# Patient Record
Sex: FEMALE | Race: BLACK OR AFRICAN AMERICAN | Employment: UNEMPLOYED | URBAN - METROPOLITAN AREA
[De-identification: names, ages, dates, MRNs, and addresses within clinical notes are randomized per-mention and may not be internally consistent; named-entity substitution may affect disease eponyms.]

---

## 2018-12-03 ENCOUNTER — HOSPITAL ENCOUNTER (INPATIENT)
Age: 63
LOS: 9 days | Discharge: HOME | DRG: 885 | End: 2018-12-12
Attending: EMERGENCY MEDICINE | Admitting: PSYCHIATRY & NEUROLOGY
Payer: MEDICARE

## 2018-12-03 DIAGNOSIS — R44.0 AUDITORY HALLUCINATION: ICD-10-CM

## 2018-12-03 DIAGNOSIS — Z91.89 AT RISK FOR DANGER TO OTHERS: ICD-10-CM

## 2018-12-03 DIAGNOSIS — F20.1 SCHIZOPHRENIA, DISORGANIZED (HCC): Primary | ICD-10-CM

## 2018-12-03 DIAGNOSIS — R44.1 VISUAL HALLUCINATION: ICD-10-CM

## 2018-12-03 DIAGNOSIS — F23 ACUTE PSYCHOSIS (HCC): ICD-10-CM

## 2018-12-03 PROBLEM — F25.9 SCHIZOAFFECTIVE DISORDER (HCC): Status: ACTIVE | Noted: 2018-12-03

## 2018-12-03 LAB
ALBUMIN SERPL-MCNC: 3.7 G/DL (ref 3.5–5)
ALBUMIN/GLOB SERPL: 1 {RATIO} (ref 1.1–2.2)
ALP SERPL-CCNC: 69 U/L (ref 45–117)
ALT SERPL-CCNC: 21 U/L (ref 12–78)
AMPHET UR QL SCN: NEGATIVE
ANION GAP SERPL CALC-SCNC: 9 MMOL/L (ref 5–15)
APAP SERPL-MCNC: <2 UG/ML (ref 10–30)
APPEARANCE UR: CLEAR
AST SERPL-CCNC: 18 U/L (ref 15–37)
BACTERIA URNS QL MICRO: NEGATIVE /HPF
BARBITURATES UR QL SCN: NEGATIVE
BASOPHILS # BLD: 0 K/UL (ref 0–0.1)
BASOPHILS NFR BLD: 1 % (ref 0–1)
BENZODIAZ UR QL: NEGATIVE
BILIRUB SERPL-MCNC: 0.3 MG/DL (ref 0.2–1)
BILIRUB UR QL: NEGATIVE
BUN SERPL-MCNC: 18 MG/DL (ref 6–20)
BUN/CREAT SERPL: 16 (ref 12–20)
CALCIUM SERPL-MCNC: 9.6 MG/DL (ref 8.5–10.1)
CANNABINOIDS UR QL SCN: NEGATIVE
CHLORIDE SERPL-SCNC: 108 MMOL/L (ref 97–108)
CO2 SERPL-SCNC: 28 MMOL/L (ref 21–32)
COCAINE UR QL SCN: NEGATIVE
COLOR UR: NORMAL
CREAT SERPL-MCNC: 1.12 MG/DL (ref 0.55–1.02)
DIFFERENTIAL METHOD BLD: NORMAL
DRUG SCRN COMMENT,DRGCM: NORMAL
EOSINOPHIL # BLD: 0 K/UL (ref 0–0.4)
EOSINOPHIL NFR BLD: 1 % (ref 0–7)
EPITH CASTS URNS QL MICRO: NORMAL /LPF
ERYTHROCYTE [DISTWIDTH] IN BLOOD BY AUTOMATED COUNT: 12.9 % (ref 11.5–14.5)
ETHANOL SERPL-MCNC: <10 MG/DL
GLOBULIN SER CALC-MCNC: 3.8 G/DL (ref 2–4)
GLUCOSE SERPL-MCNC: 124 MG/DL (ref 65–100)
GLUCOSE UR STRIP.AUTO-MCNC: NEGATIVE MG/DL
HCT VFR BLD AUTO: 38.6 % (ref 35–47)
HGB BLD-MCNC: 12.5 G/DL (ref 11.5–16)
HGB UR QL STRIP: NEGATIVE
IMM GRANULOCYTES # BLD: 0 K/UL (ref 0–0.04)
IMM GRANULOCYTES NFR BLD AUTO: 0 % (ref 0–0.5)
KETONES UR QL STRIP.AUTO: NEGATIVE MG/DL
LEUKOCYTE ESTERASE UR QL STRIP.AUTO: NEGATIVE
LYMPHOCYTES # BLD: 1.7 K/UL (ref 0.8–3.5)
LYMPHOCYTES NFR BLD: 29 % (ref 12–49)
MCH RBC QN AUTO: 29.3 PG (ref 26–34)
MCHC RBC AUTO-ENTMCNC: 32.4 G/DL (ref 30–36.5)
MCV RBC AUTO: 90.4 FL (ref 80–99)
METHADONE UR QL: NEGATIVE
MONOCYTES # BLD: 0.5 K/UL (ref 0–1)
MONOCYTES NFR BLD: 8 % (ref 5–13)
NEUTS SEG # BLD: 3.6 K/UL (ref 1.8–8)
NEUTS SEG NFR BLD: 62 % (ref 32–75)
NITRITE UR QL STRIP.AUTO: NEGATIVE
NRBC # BLD: 0 K/UL (ref 0–0.01)
NRBC BLD-RTO: 0 PER 100 WBC
OPIATES UR QL: NEGATIVE
PCP UR QL: NEGATIVE
PH UR STRIP: 5.5 [PH] (ref 5–8)
PLATELET # BLD AUTO: 300 K/UL (ref 150–400)
PMV BLD AUTO: 9.4 FL (ref 8.9–12.9)
POTASSIUM SERPL-SCNC: 4.3 MMOL/L (ref 3.5–5.1)
PROT SERPL-MCNC: 7.5 G/DL (ref 6.4–8.2)
PROT UR STRIP-MCNC: NEGATIVE MG/DL
RBC # BLD AUTO: 4.27 M/UL (ref 3.8–5.2)
RBC #/AREA URNS HPF: NORMAL /HPF (ref 0–5)
SALICYLATES SERPL-MCNC: 2.2 MG/DL (ref 2.8–20)
SODIUM SERPL-SCNC: 145 MMOL/L (ref 136–145)
SP GR UR REFRACTOMETRY: 1.02 (ref 1–1.03)
UA: UC IF INDICATED,UAUC: NORMAL
UROBILINOGEN UR QL STRIP.AUTO: 1 EU/DL (ref 0.2–1)
WBC # BLD AUTO: 5.8 K/UL (ref 3.6–11)
WBC URNS QL MICRO: NORMAL /HPF (ref 0–4)

## 2018-12-03 PROCEDURE — 90791 PSYCH DIAGNOSTIC EVALUATION: CPT

## 2018-12-03 PROCEDURE — 65220000003 HC RM SEMIPRIVATE PSYCH

## 2018-12-03 PROCEDURE — 80053 COMPREHEN METABOLIC PANEL: CPT

## 2018-12-03 PROCEDURE — 99284 EMERGENCY DEPT VISIT MOD MDM: CPT

## 2018-12-03 PROCEDURE — 81001 URINALYSIS AUTO W/SCOPE: CPT

## 2018-12-03 PROCEDURE — 80307 DRUG TEST PRSMV CHEM ANLYZR: CPT

## 2018-12-03 PROCEDURE — 85025 COMPLETE CBC W/AUTO DIFF WBC: CPT

## 2018-12-03 PROCEDURE — 36415 COLL VENOUS BLD VENIPUNCTURE: CPT

## 2018-12-03 RX ORDER — IBUPROFEN 400 MG/1
400 TABLET ORAL
Status: DISCONTINUED | OUTPATIENT
Start: 2018-12-03 | End: 2018-12-12 | Stop reason: HOSPADM

## 2018-12-03 RX ORDER — LORAZEPAM 2 MG/ML
2 INJECTION INTRAMUSCULAR
Status: DISCONTINUED | OUTPATIENT
Start: 2018-12-03 | End: 2018-12-12 | Stop reason: HOSPADM

## 2018-12-03 RX ORDER — ACETAMINOPHEN 325 MG/1
650 TABLET ORAL
Status: DISCONTINUED | OUTPATIENT
Start: 2018-12-03 | End: 2018-12-12 | Stop reason: HOSPADM

## 2018-12-03 RX ORDER — IBUPROFEN 200 MG
1 TABLET ORAL
Status: DISCONTINUED | OUTPATIENT
Start: 2018-12-03 | End: 2018-12-12 | Stop reason: HOSPADM

## 2018-12-03 RX ORDER — ZOLPIDEM TARTRATE 5 MG/1
5 TABLET ORAL
Status: DISCONTINUED | OUTPATIENT
Start: 2018-12-03 | End: 2018-12-04

## 2018-12-03 RX ORDER — LORAZEPAM 1 MG/1
1 TABLET ORAL
Status: DISCONTINUED | OUTPATIENT
Start: 2018-12-03 | End: 2018-12-12 | Stop reason: HOSPADM

## 2018-12-03 RX ORDER — BENZTROPINE MESYLATE 2 MG/1
2 TABLET ORAL
Status: DISCONTINUED | OUTPATIENT
Start: 2018-12-03 | End: 2018-12-12 | Stop reason: HOSPADM

## 2018-12-03 RX ORDER — ADHESIVE BANDAGE
30 BANDAGE TOPICAL DAILY PRN
Status: DISCONTINUED | OUTPATIENT
Start: 2018-12-03 | End: 2018-12-12 | Stop reason: HOSPADM

## 2018-12-03 RX ORDER — OLANZAPINE 5 MG/1
5 TABLET ORAL
Status: DISCONTINUED | OUTPATIENT
Start: 2018-12-03 | End: 2018-12-12 | Stop reason: HOSPADM

## 2018-12-03 RX ORDER — BENZTROPINE MESYLATE 1 MG/ML
2 INJECTION INTRAMUSCULAR; INTRAVENOUS
Status: DISCONTINUED | OUTPATIENT
Start: 2018-12-03 | End: 2018-12-12 | Stop reason: HOSPADM

## 2018-12-04 PROBLEM — R41.89 NEUROCOGNITIVE DEFICITS: Status: ACTIVE | Noted: 2018-12-04

## 2018-12-04 PROBLEM — R29.818 NEUROCOGNITIVE DEFICITS: Status: ACTIVE | Noted: 2018-12-04

## 2018-12-04 PROCEDURE — 65220000003 HC RM SEMIPRIVATE PSYCH

## 2018-12-04 PROCEDURE — 74011250637 HC RX REV CODE- 250/637: Performed by: PSYCHIATRY & NEUROLOGY

## 2018-12-04 PROCEDURE — 74011250637 HC RX REV CODE- 250/637: Performed by: FAMILY MEDICINE

## 2018-12-04 RX ORDER — RISPERIDONE 0.25 MG/1
0.5 TABLET, FILM COATED ORAL EVERY 12 HOURS
Status: DISCONTINUED | OUTPATIENT
Start: 2018-12-04 | End: 2018-12-06

## 2018-12-04 RX ORDER — SERTRALINE HYDROCHLORIDE 50 MG/1
25 TABLET, FILM COATED ORAL DAILY
Status: DISCONTINUED | OUTPATIENT
Start: 2018-12-05 | End: 2018-12-06

## 2018-12-04 RX ORDER — TRAZODONE HYDROCHLORIDE 50 MG/1
50 TABLET ORAL
Status: DISCONTINUED | OUTPATIENT
Start: 2018-12-04 | End: 2018-12-06

## 2018-12-04 RX ORDER — TRAZODONE HYDROCHLORIDE 50 MG/1
50 TABLET ORAL
Status: DISCONTINUED | OUTPATIENT
Start: 2018-12-04 | End: 2018-12-04

## 2018-12-04 RX ADMIN — LORAZEPAM 1 MG: 1 TABLET ORAL at 21:19

## 2018-12-04 RX ADMIN — RISPERIDONE 0.5 MG: 0.25 TABLET ORAL at 21:19

## 2018-12-04 RX ADMIN — LISINOPRIL: 20 TABLET ORAL at 14:58

## 2018-12-04 NOTE — BH NOTES
PSYCHOSOCIAL ASSESSMENT 
:Patient identifying info: 
Jacquelyn Benoit is a 58 y.o., female admitted 12/3/2018  8:32 PM  
 
Presenting problem and precipitating factors: Pt was admitted on a voluntary status due to inability to care for herself in unsafe living environment. Pt was evicted from her home but had been staying there illegally, all utilities have been shut off. Pt did report that at times she experiences auditory hallucinations. Mental status assessment: Pt was alert and oriented to self and place. Pt denies SI/HI. Pt is free of delusions. Pt's mod was euthymic, affect was congruent with mood. Pt's thought process was logical. Pt's memory and concentration was poor. Pt insight and judgment was poor, reliability was poor. Collateral information: None Current psychiatric /substance abuse providers and contact info: No current provider Previous psychiatric/substance abuse providers and response to treatment: Pt was last hospitalized at [facility] in 2013. Family history of mental illness or substance abuse: Pt's adult daughter is also diagnosed with a mental health illness Substance abuse history:   
Social History Tobacco Use  Smoking status: Former Smoker Packs/day: 0.00  Smokeless tobacco: Never Used Substance Use Topics  Alcohol use: No  
 
 
History of biomedical complications associated with substance abuse : N/A Patient's current acceptance of treatment or motivation for change: Pt willing to take medications and follow-up with treatment Family constellation:  Pt has one adult daughter and extended family that she mentioned whiel meeting with the treatment team.  
 
Is significant other involved? Pt has been  x3 Describe support system:  Pt mentioned a lot of extended family in the area Describe living arrangements and home environment: Pt is homeless Health issues:  
Hospital Problems  Date Reviewed: 9/3/2013 Codes Class Noted POA Neurocognitive deficits ICD-10-CM: R29.818, R41.89 ICD-9-CM: 781.99  2018 Unknown * (Principal) Schizoaffective disorder ([facility] [address] ICD-10-CM: F25.9 ICD-9-CM: 295.70  12/3/2018 Unknown Trauma history: None reported Legal issues: No current/past legal issues History of  service: N/A Financial status: Pt receives SSI Sabianist/cultural factors: None reported Education/work history: unknown Have you been licensed as a health care professional (current or ): N/A Leisure and recreation preferences: None reported at this time Describe coping skills: poor [address], [place] 
2018

## 2018-12-04 NOTE — BSMART NOTE
Comprehensive Assessment Form Part 1 Section I - Disposition Axis I - Schizophrenia Axis II - Deferred Axis III - Hypertension Axis IV - Relationship stressors, Homelessness [place] V - 35 The Medical Doctor to Psychiatrist conference was not completed. The Medical Doctor is in agreement with Psychiatrist disposition because of (reason) patient is willing to be admitted voluntarily. The plan is admit to [facility]. 
The on-call Psychiatrist consulted was Dr. Reza Oneil. The admitting Psychiatrist will be Dr. Reza Oneil. The admitting Diagnosis is Schizophrenia. The Payor source is Medicare and Medicaid. Section II - Integrated Summary Summary:  Patient is a 58year old female seen face to face in the ER. She has a long history of schizophrenia but hasn't taken any medications for \"a long time. \"  She was last hospitalized at [facility] in September 2013. She was living with her adult daughter who also has mental health issues. They were evicted from their house, however patient knows how to get back into the house and has been staying in the house illegally. There is no hot water, and possibly other utilities are shut off. She is every disheveled and has very poor hygiene. She is not sleeping and has not been eating regularly. She admits to hearing voices. She denied suicidal and homicidal ideation. Her daughter has the card that she gets her social security money on, so she does not have access to any of her funds. She is willing to be admitted voluntarily. The patient has demonstrated mental capacity to provide informed consent. The information is given by the patient, past medical records and neice. The Chief Complaint is mental health problem. The Precipitant Factors are relationship stressors, homelessness. Previous Hospitalizations: yes The patient has been in restraints in the past and has not escaped from them. Current Psychiatrist and/or  is NA. Lethality Assessment: 
 
The potential for suicide is not noted. The potential for homicide is not noted. The patient has not been a perpetrator of sexual or physical abuse. There are not pending charges. The patient is not felt to be at risk for self harm or harm to others. The attending nurse was advised that security has not been notified. Section III - Psychosocial 
The patient's overall mood and attitude is euthymic. Feelings of helplessness and hopelessness are not observed. Generalized anxiety is not observed. Panic is not observed. Phobias are not observed. Obsessive compulsive tendencies are not observed. Section IV - Mental Status Exam 
The patient's appearance is unkempt and shows poor hygiene. The patient's behavior shows no evidence of impairment. The patient is oriented to time, place, person and situation. The patient's speech is pressured. The patient's mood is euthymic. The range of affect is flat. The patient's thought content demonstrates no evidence of impairment. The thought process shows no evidence of impairment. The patient's perception demonstrated changes in the following:  auditory hallucinations. The patient's memory shows no evidence of impairment. The patient's appetite shows no evidence of impairment. The patient's sleep has evidence of insomnia. The patient shows little insight. The patient's judgement is psychologically impaired. Section V - Substance Abuse The patient is not using substances. Section VI - Living Arrangements The patient is single. The patient is homeless. The patient has adult children. The patient does not plan to return home upon discharge. The patient does not have legal issues pending. The patient's source of income comes from social security. Anabaptist and cultural practices have not been voiced at this time. The patient's greatest support comes from her neice and this person will be involved with the treatment. The patient has not been in an event described as horrible or outside the realm of ordinary life experience either currently or in the past. 
The patient has not been a victim of sexual/physical abuse. Section VII - Other Areas of Clinical Concern The highest grade achieved is unknown with the overall quality of school experience being described as unknown. The patient is currently disabled and speaks [place] as a primary language. The patient has no communication impairments affecting communication. The patient's preference for learning can be described as: learns best by oral information. The patient's hearing is normal.  The patient's vision is normal. 
 
 
Parmjit Arroyo

## 2018-12-04 NOTE — ED NOTES
Emergency Department Nursing Plan of Care The Nursing Plan of Care is developed from the Nursing assessment and Emergency Department Attending provider initial evaluation. The plan of care may be reviewed in the ED Provider note. The Plan of Care was developed with the following considerations:  
Patient / Family readiness to learn indicated by:verbalized understanding Persons(s) to be included in education: patient Barriers to Learning/Limitations:No 
 
Signed Priti Golden RN   
12/3/2018   9:23 PM

## 2018-12-04 NOTE — CONSULTS
Medical Consult for [facility] Patient    Consult H&P   dictated, see patient chart    Impression:    David Herrera a 58 y.o. female with past medical history of htn, schizophrenia, anxietypresents with behavioral health problems of auditory and visual hallucinations admitted for further psychiatric evaluation and treatment. Plan:   1. Psychiatry to manage mental health issues  2. Continuing home medications once confirmed by nursing/pharmacy. 3. Medically stable at this time, will follow up as needed. 4. No VTE prophylaxis indicated or necessary at this time.      Thank you  Misael Morales MD  12/4/2018, 2:23 PM

## 2018-12-04 NOTE — ED NOTES
TRANSFER - OUT REPORT: 
 
Verbal report given to Tre Tai RN(name) on [employer]  being transferred to behavioral health(unit) for routine progression of care Report consisted of patients Situation, Background, Assessment and  
Recommendations(SBAR). Information from the following report(s) ED Summary and Recent Results was reviewed with the receiving nurse. Lines:    
 
Opportunity for questions and clarification was provided. Patient transported with: 
 Angelo Greenfield

## 2018-12-04 NOTE — BH NOTES
Pt admitted for inability to care for self. Pt has a history of HTN and Schizoaffective Disorder. Pt reports she has not taken any medicine for 10 years. Pt stated she has been depressed for a year or more because they don't have any power where she lives with her daughter and granddaughter. Pt has loud pressured speech and is malodorous. Per report pt has been living without electricity, water or heat for some time now. Pt's family is concerned for her and her daughter as they have not been caring for themselves. Pt's daughter collects her social security and uses the money, unsure if daughter has POA. Skin assessment was unremarkable. Pt showered and clothes are being washed. Orders obtained from Dr Raoul Lamb. Will monitor Q 15 minutes for safety and provide support.

## 2018-12-04 NOTE — PROGRESS NOTES
Problem: Depressed Mood (Adult/Pediatric) Goal: *STG: Participates in treatment plan The patient participates in treatment plan 
Goal: *STG: Attends activities and groups Attended group Goal: *STG: Remains safe in hospital 
The patient is ambulatory with a steady gait has remained safe on Q 15 minute safety checks

## 2018-12-04 NOTE — PROGRESS NOTES
Laboratory monitoring for mood stabilizer and antipsychotics: 
 
Recommended baseline monitoring has been completed based on this patient's current medication regimen. The patient is currently taking the following medication(s):  
Current Facility-Administered Medications Medication Dose Route Frequency  lisinopril/hydroCHLOROthiazide(PRINZIDE/ZESTORETIC) 20/25 mg   Oral DAILY  risperiDONE (RisperDAL) tablet 0.5 mg  0.5 mg Oral Q12H  sertraline (ZOLOFT) tablet 25 mg  25 mg Oral DAILY Height, Weight, BMI Estimation Estimated body mass index is 34.39 kg/m² as calculated from the following: 
  Height as of this encounter: 157.5 cm (62\"). Weight as of this encounter: 85.3 kg (188 lb). Renal Function, Hepatic Function and Chemistry Estimated Creatinine Clearance: 51.4 mL/min (A) (based on SCr of 1.12 mg/dL (H)). Lab Results Component Value Date/Time Sodium 145 12/03/2018 09:06 PM  
 Potassium 4.3 12/03/2018 09:06 PM  
 Chloride 108 12/03/2018 09:06 PM  
 CO2 28 12/03/2018 09:06 PM  
 Anion gap 9 12/03/2018 09:06 PM  
 Glucose 124 (H) 12/03/2018 09:06 PM  
 BUN 18 12/03/2018 09:06 PM  
 Creatinine 1.12 (H) 12/03/2018 09:06 PM  
 BUN/Creatinine ratio 16 12/03/2018 09:06 PM  
 GFR est AA 59 (L) 12/03/2018 09:06 PM  
 GFR est non-AA 49 (L) 12/03/2018 09:06 PM  
 Calcium 9.6 12/03/2018 09:06 PM  
 ALT (SGPT) 21 12/03/2018 09:06 PM  
 AST (SGOT) 18 12/03/2018 09:06 PM  
 Alk. phosphatase 69 12/03/2018 09:06 PM  
 Protein, total 7.5 12/03/2018 09:06 PM  
 Albumin 3.7 12/03/2018 09:06 PM  
 Globulin 3.8 12/03/2018 09:06 PM  
 A-G Ratio 1.0 (L) 12/03/2018 09:06 PM  
 Bilirubin, total 0.3 12/03/2018 09:06 PM  
 
 
Lab Results Component Value Date/Time Glucose 124 (H) 12/03/2018 09:06 PM  
 Glucose (POC) 108 (H) 09/02/2013 10:09 AM  
 
 
Lab Results Component Value Date/Time Hemoglobin A1c 6.0 12/05/2018 04:50 AM  
 
 
Hematology Lab Results Component Value Date/Time WBC 5.8 12/03/2018 09:06 PM  
 Hemoglobin (POC) 11.9 09/02/2013 10:09 AM  
 HGB 12.5 12/03/2018 09:06 PM  
 Hematocrit (POC) 35 09/02/2013 10:09 AM  
 HCT 38.6 12/03/2018 09:06 PM  
 PLATELET 533 84/65/6576 09:06 PM  
 MCV 90.4 12/03/2018 09:06 PM  
 
 
Lipids Lab Results Component Value Date/Time Cholesterol, total 202 (H) 12/05/2018 04:50 AM  
 HDL Cholesterol 85 12/05/2018 04:50 AM  
 LDL, calculated 98.2 12/05/2018 04:50 AM  
 Triglyceride 94 12/05/2018 04:50 AM  
 CHOL/HDL Ratio 2.4 12/05/2018 04:50 AM  
 
 
Thyroid Function Lab Results Component Value Date/Time TSH 1.81 12/05/2018 04:50 AM  
 
Vitals Visit Vitals /85 Pulse 67 Temp 97.4 °F (36.3 °C) Resp 18 Ht 157.5 cm (62\") Wt 85.3 kg (188 lb) SpO2 99% Breastfeeding? No  
BMI 34.39 kg/m² Sylvester Bernstein, PharmD, BCPS 
[phone] (pharmacy)

## 2018-12-04 NOTE — ED NOTES
Family (niece) at bedside. Reports patient is living with her daughter who is probably in need of help herself. No heat, water, electricity and they are being evicted. Reports patient has probably not been on her medications for almost 10 years. Patient is disheveled, malodorous. Seems to have pressured speech
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
## 2018-12-04 NOTE — ED TRIAGE NOTES
Reports she hears voices. Denies any HI or SI. States has not taken any medication for anything for \"a long time\". States lives with her daughter. Patient arriving with EMS. EMS reports called by family who reports patient has decompensated. Family is supposed to be on their way.

## 2018-12-04 NOTE — BH NOTES
GROUP THERAPY PROGRESS NOTE The patient Darrell contreras 58 y.o. female is participating in Coping Skills Group. Group time: 45 minutes Personal goal for participation: To identify positive coping strategies a-z 
 
Goal orientation:  personal 
 
Group therapy participation: active Therapeutic interventions reviewed and discussed: worksheet Impression of participation:  The patient was attentive. Ashli Stern 12/4/2018  2:06 PM
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
## 2018-12-04 NOTE — BH NOTES
Pt observed resting quietly, respirations even and unlabored. No s/s distress noted, no complaints voiced. Pt slept 4.5 hours. Will continue Q 15 minute monitoring for safety.

## 2018-12-05 LAB
CHOLEST SERPL-MCNC: 202 MG/DL
EST. AVERAGE GLUCOSE BLD GHB EST-MCNC: 126 MG/DL
HBA1C MFR BLD: 6 % (ref 4.2–6.3)
HDLC SERPL-MCNC: 85 MG/DL
HDLC SERPL: 2.4 {RATIO} (ref 0–5)
LDLC SERPL CALC-MCNC: 98.2 MG/DL (ref 0–100)
LIPID PROFILE,FLP: ABNORMAL
TRIGL SERPL-MCNC: 94 MG/DL (ref ?–150)
TSH SERPL DL<=0.05 MIU/L-ACNC: 1.81 UIU/ML (ref 0.36–3.74)
VLDLC SERPL CALC-MCNC: 18.8 MG/DL

## 2018-12-05 PROCEDURE — 83036 HEMOGLOBIN GLYCOSYLATED A1C: CPT

## 2018-12-05 PROCEDURE — 36415 COLL VENOUS BLD VENIPUNCTURE: CPT

## 2018-12-05 PROCEDURE — 65220000003 HC RM SEMIPRIVATE PSYCH

## 2018-12-05 PROCEDURE — 74011250637 HC RX REV CODE- 250/637: Performed by: PSYCHIATRY & NEUROLOGY

## 2018-12-05 PROCEDURE — 80061 LIPID PANEL: CPT

## 2018-12-05 PROCEDURE — 84443 ASSAY THYROID STIM HORMONE: CPT

## 2018-12-05 PROCEDURE — 74011250637 HC RX REV CODE- 250/637: Performed by: FAMILY MEDICINE

## 2018-12-05 RX ADMIN — TRAZODONE HYDROCHLORIDE 50 MG: 50 TABLET ORAL at 21:12

## 2018-12-05 RX ADMIN — LISINOPRIL: 20 TABLET ORAL at 08:38

## 2018-12-05 RX ADMIN — SERTRALINE HYDROCHLORIDE 25 MG: 50 TABLET ORAL at 08:38

## 2018-12-05 RX ADMIN — RISPERIDONE 0.5 MG: 0.25 TABLET ORAL at 21:12

## 2018-12-05 RX ADMIN — RISPERIDONE 0.5 MG: 0.25 TABLET ORAL at 08:38

## 2018-12-05 NOTE — BH NOTES
GROUP THERAPY PROGRESS NOTE The patient Jean Paul contreras 58 y.o. female is participating in Creative Expression Group. Group time: 1 hour Personal goal for participation: To concentrate on selected task Goal orientation: social 
 
Group therapy participation: minimal 
 
Therapeutic interventions reviewed and discussed: Crafts, games, music Impression of participation: The patient was present-arrived late Ivy Chin 12/5/2018  5:58 PM

## 2018-12-05 NOTE — PROGRESS NOTES
Problem: Depressed Mood (Adult/Pediatric) Goal: *STG: Demonstrates reduction in symptoms and increase in insight into coping skills/future focused Outcome: Progressing Towards Goal 
Patient became loud and hyperverbal when on the phone with her daughter. PRN Ativan given for anxiety. Patient medication and meal compliant. Had visitors this afternoon. Will continue to monitor patient and assess needs.

## 2018-12-05 NOTE — CONSULTS
[facility]  MR#: [id number]  : 1955  ACCOUNT #: [de-identified]   DATE OF SERVICE: 2018    REFERRING PHYSICIAN:  Tyler Wild MD    REASON FOR CONSULTATION:  Medical evaluation for psychiatric admission. CHIEF COMPLAINT:  Hallucinations. HISTORY OF PRESENT ILLNESS:  This is a 50-year-old who presents with hallucinations requiring further psychiatric evaluation. The patient apparently having auditory and visual hallucinations. Denies any chest pain, shortness of breath, nausea, vomiting or diarrhea. Does not know who her primary is. PAST MEDICAL HISTORY:  Hypertension, schizophrenia and anxiety. PAST SURGICAL HISTORY:  Bilateral tubal ligation. MEDICATIONS:  Listed hydrochlorothiazide 25 mg and lisinopril 20 and she is on trazodone 50 at bedtime, Zoloft 100 mg, Risperdal 2 mg. SOCIAL HISTORY:  Denies any tobacco, alcohol or illicit drug use. She is , has 2 boys, 3 grandkids and gets disability. PHYSICAL EXAMINATION:  VITAL SIGNS:  Blood pressure is 97.9, blood pressure 180/89, pulse 82, respiration 18, pulse ox 98%. GENERAL:  Pleasant, very engaging in no acute distress. HEENT:  Oropharynx is clear. NECK:  Supple. LUNGS:  Clear to auscultation, no wheezes, rales or rhonchi. HEART:  Regular rate. No murmurs, gallops or rubs. ABDOMEN:  Soft, nontender, nondistended. Normoactive bowel sounds. No hepatosplenomegaly. EXTREMITIES:  No cyanosis, clubbing, edema. LABORATORY DATA:  Hemoglobin 12.5, hematocrit 38.6. UA was negative. Sodium 145, potassium 4.3, chloride 108, bicarb 28, BUN was 18. Creatinine is 1.12, glucose 124. Acetaminophen less than 2, salicylate 2.1. Tox screen was negative. TSH 1.04.     IMPRESSION:  A 50-year-old female with past medical history of hypertension, mental disease, presents with auditory and visual hallucinations, admitted for further psychiatric evaluation and treatment. PLAN:  1. Psychiatric management of mental health issues  2. Continue home meds once confirmed. Particularly, we will restart patient's blood pressure medicine and monitor closely. 3.  Otherwise, medically stable, will follow up on a p.r.n. basis. 4.  No VTE prophylaxis indicated or warranted at this time. Thank you for this consult.       Ana Manning MD DC / Paresh Terry  D: 12/05/2018 07:16     T: 12/05/2018 09:41  JOB #: [id number]

## 2018-12-05 NOTE — BH NOTES
Social Work Pt was seen in treatment team this morning. Pt is alert and oriented. Pt denies SI/HI. Pt's mood is euthymic, affect is congruent with mood. Pt's thought process is illogical. Pt's insight and judgment is impaired, reliability is poor. Pt reported that she will stay with her sister upon discharge. As of this time clinician has be unable to get in contact with family. No contact numbers  are listed in pt chart. Pt reported that she spoke with her daughter last night. Moni Marcelino (daughter) [phone] informed clinician that she is taking care of the utilities in the home and the pt will be able to return home with her or stay with a cousin while work is being done on the home. Social work department will continue to coordinate discharge plans.

## 2018-12-05 NOTE — BH NOTES
GROUP THERAPY PROGRESS NOTE Saida Xie is participating in Leisure-Creative Group. Group time: 15 minutes Personal goal for participation: Leisure Goal orientation: relaxation Group therapy participation: minimal 
 
Therapeutic interventions reviewed and discussed: Group read [employer] to promote humor and relaxation to start day. Impression of participation: Pt was in dayroom during group but did not participate.

## 2018-12-05 NOTE — BH NOTES
GROUP THERAPY PROGRESS NOTE The patient Sandi contreras 58 y.o. female is participating in Coping Skills Group. Group time: 45 minutes Personal goal for participation: To participate in mental health journey game Goal orientation:  personal 
 
Group therapy participation: active Therapeutic interventions reviewed and discussed: choices in recovery Impression of participation:  The patient was attentive. Keny Boyd 12/5/2018  1:54 PM
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
## 2018-12-05 NOTE — PROGRESS NOTES
Problem: Depressed Mood (Adult/Pediatric) Goal: *STG: Remains safe in hospital 
Outcome: Progressing Towards Goal 
Alert  and oriented. No distress noted. Medication compliant. Out of room for meals. Cooperative and isolative to room

## 2018-12-05 NOTE — PROGRESS NOTES
Problem: Depressed Mood (Adult/Pediatric) Goal: *STG: Remains safe in hospital 
Outcome: Progressing Towards Goal 
Pt has been observed throughout the night intermittently sleeping in bed with even respirations. Total hours slept approximately  5. No s/s of distress noted. Labs drawn this morning without incident. Patient has remained safe. Will continue to monitor.

## 2018-12-05 NOTE — BH NOTES
Received patient arousable but drowsy in bed with even respirations. Introduced night staff and patient acknowledged information and voiced no complaints. BP under better control. Will continue to monitor with q 15 min checks.

## 2018-12-06 PROCEDURE — 65220000003 HC RM SEMIPRIVATE PSYCH

## 2018-12-06 PROCEDURE — 74011250637 HC RX REV CODE- 250/637: Performed by: PSYCHIATRY & NEUROLOGY

## 2018-12-06 PROCEDURE — 74011250637 HC RX REV CODE- 250/637: Performed by: FAMILY MEDICINE

## 2018-12-06 RX ORDER — RISPERIDONE 1 MG/1
1 TABLET, FILM COATED ORAL EVERY 12 HOURS
Status: DISCONTINUED | OUTPATIENT
Start: 2018-12-06 | End: 2018-12-10

## 2018-12-06 RX ORDER — ZOLPIDEM TARTRATE 5 MG/1
5 TABLET ORAL
Status: DISCONTINUED | OUTPATIENT
Start: 2018-12-06 | End: 2018-12-12 | Stop reason: HOSPADM

## 2018-12-06 RX ORDER — SERTRALINE HYDROCHLORIDE 50 MG/1
50 TABLET, FILM COATED ORAL DAILY
Status: DISCONTINUED | OUTPATIENT
Start: 2018-12-07 | End: 2018-12-12 | Stop reason: HOSPADM

## 2018-12-06 RX ORDER — TRAZODONE HYDROCHLORIDE 50 MG/1
50 TABLET ORAL
Status: DISCONTINUED | OUTPATIENT
Start: 2018-12-06 | End: 2018-12-12 | Stop reason: HOSPADM

## 2018-12-06 RX ADMIN — RISPERIDONE 0.5 MG: 0.25 TABLET ORAL at 08:05

## 2018-12-06 RX ADMIN — LISINOPRIL: 20 TABLET ORAL at 08:05

## 2018-12-06 RX ADMIN — RISPERIDONE 1 MG: 1 TABLET ORAL at 21:23

## 2018-12-06 RX ADMIN — TRAZODONE HYDROCHLORIDE 50 MG: 50 TABLET ORAL at 21:23

## 2018-12-06 RX ADMIN — SERTRALINE HYDROCHLORIDE 25 MG: 50 TABLET ORAL at 08:06

## 2018-12-06 NOTE — BH NOTES
GROUP THERAPY PROGRESS NOTE The patient Cathy contreras 58 y.o. female is participating in [employer]. Group time: 1 hour Personal goal for participation: To concentrate on selected task Goal orientation: social 
 
Group therapy participation: active Therapeutic interventions reviewed and discussed: Crafts, games, music Impression of participation: The patient was attentive. Benji Rossi 12/6/2018  5:42 PM
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
## 2018-12-06 NOTE — PROGRESS NOTES
Problem: Depressed Mood (Adult/Pediatric) Goal: *STG: Attends activities and groups Outcome: Not Progressing Towards Goal 
Patient attended groups today, otherwise, patient isolated mostly to her room this morning. Patient denied SI/HI and A/V Hallucinations. Patient speaks in a loud voice. Affect flat, mood euthymic. Patient is compliant with medication therapy.

## 2018-12-06 NOTE — BH NOTES
GROUP THERAPY PROGRESS NOTE The patient Glenny contreras 58 y.o. female is participating in Coping Skills Group. Group time: 45 minutes Personal goal for participation: To participate in healthy relationships bingo game Goal orientation:  personal 
 
Group therapy participation: active Therapeutic interventions reviewed and discussed: things pertaining to relationships Impression of participation:  The patient was attentive. Ant Adan 12/6/2018  1:41 PM

## 2018-12-06 NOTE — PROGRESS NOTES
GROUP THERAPY PROGRESS NOTE Sandi Hull was present for medication group. GROUP TIME: 45 minutes, Thursdays 2pm 
 
PERSONAL GOAL FOR PARTICIPATION: To be present for group, participate in discussion, and answer patient-directed questions. GOAL ORIENTATION: Personal 
 
THERAPEUTIC INTERVENTIONS REVIEWED AND DISCUSSED: The following topics were presented: storage of medications, how to remember to refill medications and keep up with doctor appointments, relapse prevention, keeping a record of all medication including prescription and non-prescription drugs, and who to contact with medication questions. Patients were given time to ask questions regarding their current therapy. IMPRESSION OF PARTICIPATION: Patient occasionally responded to questions posed in a true/false quiz. She also actively participated in medication bingo and was able to appropriately mj her card with no assistance. Anna Perez, PharmD Candidate

## 2018-12-06 NOTE — PROGRESS NOTES
Problem: Depressed Mood (Adult/Pediatric) Goal: *STG: Remains safe in hospital 
Client has spent most of the shift in her room isolating. Did come out for meals and meds but other wise has stayed to herself. States she is ok when asked. Q 15 min checks for safety continue.

## 2018-12-06 NOTE — PROGRESS NOTES
Problem: Depressed Mood (Adult/Pediatric) Goal: *STG: Attends activities and groups Outcome: Progressing Towards Goal 
Patient noted in and out the milieu.

## 2018-12-07 PROCEDURE — 65220000003 HC RM SEMIPRIVATE PSYCH

## 2018-12-07 PROCEDURE — 74011250637 HC RX REV CODE- 250/637: Performed by: FAMILY MEDICINE

## 2018-12-07 PROCEDURE — 74011250637 HC RX REV CODE- 250/637: Performed by: PSYCHIATRY & NEUROLOGY

## 2018-12-07 RX ADMIN — LISINOPRIL: 20 TABLET ORAL at 08:49

## 2018-12-07 RX ADMIN — TRAZODONE HYDROCHLORIDE 50 MG: 50 TABLET ORAL at 21:18

## 2018-12-07 RX ADMIN — RISPERIDONE 1 MG: 1 TABLET ORAL at 21:18

## 2018-12-07 RX ADMIN — SERTRALINE HYDROCHLORIDE 50 MG: 50 TABLET ORAL at 08:49

## 2018-12-07 RX ADMIN — RISPERIDONE 1 MG: 1 TABLET ORAL at 08:50

## 2018-12-07 NOTE — BH NOTES
GROUP THERAPY PROGRESS NOTE The patient Karthik contreras 58 y.o. female is participating in Coping Skills Group. Group time: 45 minutes Personal goal for participation: To develop a personal plan for success Goal orientation:  personal 
 
Group therapy participation: active Therapeutic interventions reviewed and discussed: positive coping strategies/goals Impression of participation:  The patient was attentive. Ana Wu 12/7/2018  2:04 PM

## 2018-12-07 NOTE — BH NOTES
Patient had no complaints throughout the night, remained in room, monitored q 15minutes for safety, and slept for 2 hours. Will continue to monitor for remainder of shift for changes/issues/complaints.

## 2018-12-07 NOTE — BH NOTES
Social Work Pt was seen in treatment team this morning. Pt is alert and oriented. Pt denies SI/HI. Pt's mood is euthymic, affect is congruent with mood. Pt's thought process is disorganized although she can be redirected during a conversation. Pt's insight and judgment is limited, reliability is poor. Clinician spoke with the pt's daughter Satya Lei [phone] and she confirmed the pt will be going to her sister Rach's home upon discharge. Discharge will be on Monday. Social work department will continue to coordinate discharge plans.

## 2018-12-07 NOTE — PROGRESS NOTES
Problem: Depressed Mood (Adult/Pediatric) Goal: *STG: Remains safe in hospital 
Outcome: Progressing Towards Goal 
Alert and oriented. No acute distress at this time. Isolates to her room all except for meals. Meal and medication compliant. Will continue frequent checks per [facility] protocol.

## 2018-12-07 NOTE — BH NOTES
Patient awake in bed when writer took over assignment. No S/S of distress noted at this time. Breathing appears unlabored. Will continue to monitor patient for safety and security.

## 2018-12-07 NOTE — PROGRESS NOTES
Problem: Depressed Mood (Adult/Pediatric) Goal: *STG: Remains safe in hospital 
Outcome: Progressing Towards Goal 
Patient alert oriented. Calm and cooperative on shift. Isolative to room. Meal and medication complaint. Participatory in groups/activities. Denies SI/HI/A/V hallucinations. No current complaints of pain/discomfort. Continues on q15 min safety checks. Will continue to monitor for changes in physical and mental health and continue plan of care.

## 2018-12-07 NOTE — BH NOTES
GROUP THERAPY PROGRESS NOTE The patient Carmela contreras 58 y.o. female is participating in Creative Expression Group. Group time: 1 hour Personal goal for participation: To concentrate on selected task Goal orientation: social 
 
Group therapy participation: active Therapeutic interventions reviewed and discussed: Crafts, games, music Impression of participation: The patient was attentive. Joan Prado 12/7/2018  6:19 PM
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
## 2018-12-08 PROCEDURE — 65220000003 HC RM SEMIPRIVATE PSYCH

## 2018-12-08 PROCEDURE — 74011250637 HC RX REV CODE- 250/637: Performed by: FAMILY MEDICINE

## 2018-12-08 PROCEDURE — 74011250637 HC RX REV CODE- 250/637: Performed by: PSYCHIATRY & NEUROLOGY

## 2018-12-08 RX ADMIN — RISPERIDONE 1 MG: 1 TABLET ORAL at 10:10

## 2018-12-08 RX ADMIN — RISPERIDONE 1 MG: 1 TABLET ORAL at 21:58

## 2018-12-08 RX ADMIN — TRAZODONE HYDROCHLORIDE 50 MG: 50 TABLET ORAL at 21:58

## 2018-12-08 RX ADMIN — SERTRALINE HYDROCHLORIDE 50 MG: 50 TABLET ORAL at 10:10

## 2018-12-08 RX ADMIN — LISINOPRIL: 20 TABLET ORAL at 10:09

## 2018-12-08 NOTE — BH NOTES
Resting quietly in bed RR even and unlabored Slept 5hrs Q15m checks continued Will continue to monitor for safety

## 2018-12-08 NOTE — PROGRESS NOTES
Problem: Depressed Mood (Adult/Pediatric) Goal: *STG: Remains safe in hospital 
Outcome: Progressing Towards Goal 
Alert and oriented. No acute distress at this time. Denies any SI/HI. Admits to hearing voices. Isolates to herself most of the day. Earlier was called to the phone to talk to her daughter and she was heard arguing with her and telling her to \"mind her own business\". Meal and medication compliant. Will continue frequent checks per [facility] protocol.

## 2018-12-09 PROCEDURE — 74011250637 HC RX REV CODE- 250/637: Performed by: FAMILY MEDICINE

## 2018-12-09 PROCEDURE — 74011250637 HC RX REV CODE- 250/637: Performed by: PSYCHIATRY & NEUROLOGY

## 2018-12-09 PROCEDURE — 65220000003 HC RM SEMIPRIVATE PSYCH

## 2018-12-09 RX ADMIN — RISPERIDONE 1 MG: 1 TABLET ORAL at 21:41

## 2018-12-09 RX ADMIN — TRAZODONE HYDROCHLORIDE 50 MG: 50 TABLET ORAL at 21:42

## 2018-12-09 RX ADMIN — SERTRALINE HYDROCHLORIDE 50 MG: 50 TABLET ORAL at 08:38

## 2018-12-09 RX ADMIN — RISPERIDONE 1 MG: 1 TABLET ORAL at 08:38

## 2018-12-09 RX ADMIN — LISINOPRIL: 20 TABLET ORAL at 08:37
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
## 2018-12-09 NOTE — PROGRESS NOTES
Problem: Depressed Mood (Adult/Pediatric) Goal: *STG: Remains safe in hospital 
Outcome: Progressing Towards Goal 
Patient alert and oriented. Medication and meal compliant. Isolates to her room except for meals. Steady gait. Denies SI/HI. Denies A/V hallucinations. Will continue to monitor patient and assess needs per [facility] protocol.

## 2018-12-09 NOTE — MASTER TREATMENT PLAN
Pnt up in room RR even and unlabored No behavioral issues noted No complaints voiced Slept 4hrs Will continue to monitor for safety
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
## 2018-12-09 NOTE — PROGRESS NOTES
Diet as tolerated. Pt noted to be meal compliant. Pt's wt was 150# in 2012. Hx unremarkable per nutrition Ht: 5'2\" Wt: 188 lb BMI: 34.39 kg/(m^2) c/w obesity grade I 
Est energy needs: 1656 kcal, 65 g protein, 1830 mL fluids Pt will consume > 75% of meals at follow up 7-10 days LOS

## 2018-12-10 PROCEDURE — 74011250637 HC RX REV CODE- 250/637: Performed by: PSYCHIATRY & NEUROLOGY

## 2018-12-10 PROCEDURE — 65220000003 HC RM SEMIPRIVATE PSYCH

## 2018-12-10 PROCEDURE — 74011250637 HC RX REV CODE- 250/637: Performed by: FAMILY MEDICINE

## 2018-12-10 RX ORDER — RISPERIDONE 1 MG/1
2 TABLET, FILM COATED ORAL
Status: DISCONTINUED | OUTPATIENT
Start: 2018-12-10 | End: 2018-12-12 | Stop reason: HOSPADM

## 2018-12-10 RX ORDER — RISPERIDONE 1 MG/1
1 TABLET, FILM COATED ORAL DAILY
Status: DISCONTINUED | OUTPATIENT
Start: 2018-12-11 | End: 2018-12-12 | Stop reason: HOSPADM

## 2018-12-10 RX ADMIN — SERTRALINE HYDROCHLORIDE 50 MG: 50 TABLET ORAL at 08:58

## 2018-12-10 RX ADMIN — LISINOPRIL: 20 TABLET ORAL at 08:58

## 2018-12-10 RX ADMIN — RISPERIDONE 2 MG: 1 TABLET ORAL at 21:00

## 2018-12-10 RX ADMIN — TRAZODONE HYDROCHLORIDE 50 MG: 50 TABLET ORAL at 21:00

## 2018-12-10 RX ADMIN — RISPERIDONE 1 MG: 1 TABLET ORAL at 08:58

## 2018-12-10 NOTE — BH NOTES
Pnt resting quietly in bed RR even and unlabored No behavioral issues noted No complaints voiced Slept 5hrs Will continue to monitor for safety
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
## 2018-12-10 NOTE — BH NOTES
Patient has been noted responding loudly in her room. Patient stated that she's hearing voices, but it doesn't bother her. Patient has been more visible in the milieu. Patient has been pleasant and cooperative.

## 2018-12-11 PROCEDURE — 74011250637 HC RX REV CODE- 250/637: Performed by: FAMILY MEDICINE

## 2018-12-11 PROCEDURE — 74011250637 HC RX REV CODE- 250/637: Performed by: PSYCHIATRY & NEUROLOGY

## 2018-12-11 PROCEDURE — 65220000003 HC RM SEMIPRIVATE PSYCH

## 2018-12-11 RX ORDER — TRAZODONE HYDROCHLORIDE 50 MG/1
50 TABLET ORAL
Qty: 14 TAB | Refills: 1 | Status: SHIPPED | OUTPATIENT
Start: 2018-12-11 | End: 2019-01-25

## 2018-12-11 RX ORDER — RISPERIDONE 1 MG/1
1 TABLET, FILM COATED ORAL DAILY
Qty: 30 TAB | Refills: 1 | Status: SHIPPED | OUTPATIENT
Start: 2018-12-12 | End: 2019-01-25

## 2018-12-11 RX ORDER — SERTRALINE HYDROCHLORIDE 50 MG/1
50 TABLET, FILM COATED ORAL DAILY
Qty: 30 TAB | Refills: 1 | Status: SHIPPED | OUTPATIENT
Start: 2018-12-12 | End: 2019-01-25

## 2018-12-11 RX ORDER — LISINOPRIL 20 MG/1
20 TABLET ORAL DAILY
Qty: 30 TAB | Refills: 1 | Status: SHIPPED | OUTPATIENT
Start: 2018-12-11 | End: 2019-01-25

## 2018-12-11 RX ORDER — HYDROCHLOROTHIAZIDE 25 MG/1
25 TABLET ORAL DAILY
Qty: 30 TAB | Refills: 1 | Status: SHIPPED | OUTPATIENT
Start: 2018-12-11 | End: 2019-01-25

## 2018-12-11 RX ORDER — RISPERIDONE 2 MG/1
2 TABLET, FILM COATED ORAL
Qty: 30 TAB | Refills: 1 | Status: SHIPPED | OUTPATIENT
Start: 2018-12-11 | End: 2019-01-25

## 2018-12-11 RX ADMIN — RISPERIDONE 1 MG: 1 TABLET ORAL at 07:51

## 2018-12-11 RX ADMIN — SERTRALINE HYDROCHLORIDE 50 MG: 50 TABLET ORAL at 07:51

## 2018-12-11 RX ADMIN — TRAZODONE HYDROCHLORIDE 50 MG: 50 TABLET ORAL at 21:17

## 2018-12-11 RX ADMIN — LISINOPRIL: 20 TABLET ORAL at 07:50

## 2018-12-11 RX ADMIN — RISPERIDONE 2 MG: 1 TABLET ORAL at 21:17
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
## 2018-12-11 NOTE — BH NOTES
7-11 Pt has not been visible in milieu, mostly isolative to room. Calm, quiet and cooperative with staff. Pt is medication and meal compliant. Will continue to monitor Q 15 minutes for safety and provide support.

## 2018-12-11 NOTE — PROGRESS NOTES
Problem: Falls - Risk of  Goal: *Absence of Falls  Document Jennifer Fall Risk and appropriate interventions in the flowsheet. Outcome: Progressing Towards Goal  Fall Risk Interventions:            Medication Interventions: Teach patient to arise slowly     Pt continues to receive education r/t fall prevention. Pt is receptive and verbalizes understanding of education. Pt remains self isolative for a majority of shift. Pt interaction with staff and peers is minimal yet positive and appropriate. Pt is med and meal compliant. Pt affect is calm and positive. No behavioral issues.
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
## 2018-12-12 VITALS
OXYGEN SATURATION: 100 % | TEMPERATURE: 98.4 F | WEIGHT: 188 LBS | BODY MASS INDEX: 34.6 KG/M2 | DIASTOLIC BLOOD PRESSURE: 86 MMHG | HEIGHT: 62 IN | SYSTOLIC BLOOD PRESSURE: 141 MMHG | HEART RATE: 61 BPM | RESPIRATION RATE: 18 BRPM

## 2018-12-12 PROCEDURE — 74011250637 HC RX REV CODE- 250/637: Performed by: PSYCHIATRY & NEUROLOGY

## 2018-12-12 PROCEDURE — 74011250637 HC RX REV CODE- 250/637: Performed by: FAMILY MEDICINE

## 2018-12-12 RX ADMIN — LISINOPRIL: 20 TABLET ORAL at 10:23

## 2018-12-12 RX ADMIN — SERTRALINE HYDROCHLORIDE 50 MG: 50 TABLET ORAL at 10:23

## 2018-12-12 RX ADMIN — RISPERIDONE 1 MG: 1 TABLET ORAL at 10:23

## 2018-12-12 NOTE — BH NOTES
Assumed care of pt, pt appears to be asleep. Respirations even and unlabored. No s/s distress noted. Will continue monitoring Q 15 minutes for safety and provide support.

## 2018-12-12 NOTE — BH NOTES
Behavioral Health Transition Record to Provider    Patient Name: Shelly [facility]  YOB: 1955  Medical Record Number: [id number]  Date of Admission: 12/3/2018  Date of Discharge: 12/12/2018     Attending Provider: Loraine Cid, *  Discharging Provider:Charity Maldonado 35, *  To contact this individual call [phone]  and ask the  to page. If unavailable, ask to be transferred to [facility] Provider on call. [facility] Provider will be available on call 24/7 and during holidays. Primary Care Provider: None    Allergies   Allergen Reactions    No Known Allergies Other (comments)       Reason for Admission: Pt has been off her medications for the past 10 years , she has been experiencing auditory hallunications    Admission Diagnosis: Schizoaffective disorder ([facility] [address] [F25.9]    * No surgery found *    Results for orders placed or performed during the hospital encounter of 12/03/18   CBC WITH AUTOMATED DIFF   Result Value Ref Range    WBC 5.8 3.6 - 11.0 K/uL    RBC 4.27 3.80 - 5.20 M/uL    HGB 12.5 11.5 - 16.0 g/dL    HCT 38.6 35.0 - 47.0 %    MCV 90.4 80.0 - 99.0 FL    MCH 29.3 26.0 - 34.0 PG    MCHC 32.4 30.0 - 36.5 g/dL    RDW 12.9 11.5 - 14.5 %    PLATELET 533 047 - 817 K/uL    MPV 9.4 8.9 - 12.9 FL    NRBC 0.0 0  WBC    ABSOLUTE NRBC 0.00 0.00 - 0.01 K/uL    NEUTROPHILS 62 32 - 75 %    LYMPHOCYTES 29 12 - 49 %    MONOCYTES 8 5 - 13 %    EOSINOPHILS 1 0 - 7 %    BASOPHILS 1 0 - 1 %    IMMATURE GRANULOCYTES 0 0.0 - 0.5 %    ABS. NEUTROPHILS 3.6 1.8 - 8.0 K/UL    ABS. LYMPHOCYTES 1.7 0.8 - 3.5 K/UL    ABS. MONOCYTES 0.5 0.0 - 1.0 K/UL    ABS. EOSINOPHILS 0.0 0.0 - 0.4 K/UL    ABS. BASOPHILS 0.0 0.0 - 0.1 K/UL    ABS. IMM.  GRANS. 0.0 0.00 - 0.04 K/UL    DF AUTOMATED     METABOLIC PANEL, COMPREHENSIVE   Result Value Ref Range    Sodium 145 136 - 145 mmol/L    Potassium 4.3 3.5 - 5.1 mmol/L    Chloride 108 97 - 108 mmol/L    CO2 28 21 - 32 mmol/L    Anion gap 9 5 - 15 mmol/L    Glucose 124 (H) 65 - 100 mg/dL    BUN 18 6 - 20 MG/DL    Creatinine 1.12 (H) 0.55 - 1.02 MG/DL    BUN/Creatinine ratio 16 12 - 20      GFR est AA 59 (L) >60 ml/min/1.73m2    GFR est non-AA 49 (L) >60 ml/min/1.73m2    Calcium 9.6 8.5 - 10.1 MG/DL    Bilirubin, total 0.3 0.2 - 1.0 MG/DL    ALT (SGPT) 21 12 - 78 U/L    AST (SGOT) 18 15 - 37 U/L    Alk.  phosphatase 69 45 - 117 U/L    Protein, total 7.5 6.4 - 8.2 g/dL    Albumin 3.7 3.5 - 5.0 g/dL    Globulin 3.8 2.0 - 4.0 g/dL    A-G Ratio 1.0 (L) 1.1 - 2.2     DRUG SCREEN, URINE   Result Value Ref Range    AMPHETAMINES NEGATIVE  NEG      BARBITURATES NEGATIVE  NEG      BENZODIAZEPINES NEGATIVE  NEG      COCAINE NEGATIVE  NEG      METHADONE NEGATIVE  NEG      OPIATES NEGATIVE  NEG      PCP(PHENCYCLIDINE) NEGATIVE  NEG      THC (TH-CANNABINOL) NEGATIVE  NEG      Drug screen comment (NOTE)    URINALYSIS W/ REFLEX CULTURE   Result Value Ref Range    Color YELLOW/STRAW      Appearance CLEAR CLEAR      Specific gravity 1.020 1.003 - 1.030      pH (UA) 5.5 5.0 - 8.0      Protein NEGATIVE  NEG mg/dL    Glucose NEGATIVE  NEG mg/dL    Ketone NEGATIVE  NEG mg/dL    Bilirubin NEGATIVE  NEG      Blood NEGATIVE  NEG      Urobilinogen 1.0 0.2 - 1.0 EU/dL    Nitrites NEGATIVE  NEG      Leukocyte Esterase NEGATIVE  NEG      WBC 0-4 0 - 4 /hpf    RBC 0-5 0 - 5 /hpf    Epithelial cells FEW FEW /lpf    Bacteria NEGATIVE  NEG /hpf    UA:UC IF INDICATED CULTURE NOT INDICATED BY UA RESULT CNI     SALICYLATE   Result Value Ref Range    Salicylate level 2.2 (L) 2.8 - 20.0 MG/DL   ACETAMINOPHEN   Result Value Ref Range    Acetaminophen level <2 (L) 10 - 30 ug/mL   ETHYL ALCOHOL   Result Value Ref Range    ALCOHOL(ETHYL),SERUM <10 <10 MG/DL   HEMOGLOBIN A1C WITH EAG   Result Value Ref Range    Hemoglobin A1c 6.0 4.2 - 6.3 %    Est. average glucose 126 mg/dL   LIPID PANEL   Result Value Ref Range    LIPID PROFILE          Cholesterol, total 202 (H) <200 MG/DL    Triglyceride 94 <150 MG/DL    HDL Cholesterol 85 MG/DL    LDL, calculated 98.2 0 - 100 MG/DL    VLDL, calculated 18.8 MG/DL    CHOL/HDL Ratio 2.4 0 - 5.0     TSH 3RD GENERATION   Result Value Ref Range    TSH 1.81 0.36 - 3.74 uIU/mL       Immunizations administered during this encounter:   Immunization History   Administered Date(s) Administered    Influenza Vaccine Split 10/17/2012       Screening for Metabolic Disorders for Patients on Antipsychotic Medications  (Data obtained from the EMR)    Estimated Body Mass Index  Estimated body mass index is 34.39 kg/m² as calculated from the following:    Height as of this encounter: 5' 2\" (1.575 m). Weight as of this encounter: 85.3 kg (188 lb). Vital Signs/Blood Pressure  Visit Vitals  /86 (BP Patient Position: Sitting)   Pulse 61   Temp 98.4 °F (36.9 °C)   Resp 18   Ht 5' 2\" (1.575 m)   Wt 85.3 kg (188 lb)   SpO2 100%   Breastfeeding? No   BMI 34.39 kg/m²       Blood Glucose/Hemoglobin A1c  Lab Results   Component Value Date/Time    Glucose 124 (H) 12/03/2018 09:06 PM    Glucose (POC) 108 (H) 09/02/2013 10:09 AM       Lab Results   Component Value Date/Time    Hemoglobin A1c 6.0 12/05/2018 04:50 AM        Lipid Panel  Lab Results   Component Value Date/Time    Cholesterol, total 202 (H) 12/05/2018 04:50 AM    HDL Cholesterol 85 12/05/2018 04:50 AM    LDL, calculated 98.2 12/05/2018 04:50 AM    Triglyceride 94 12/05/2018 04:50 AM    CHOL/HDL Ratio 2.4 12/05/2018 04:50 AM        Discharge Diagnosis: Schizoaffective Disorder   Discharge Plan: Pt will return to family's home with her daughter Angela Michel. She was picked up by family this morning. Pt will follow-up with outpatient provider in [facility] outpatient clinic. Clinician explained after-care to daughter Angela Michel. Pt was alert and oriented. Pt denies SI/HI. Pt's mood was euthymic. Pt's thought process is disorganized at baseline. Pt has been compliant with medications. Pt is in agreement with the plan.      Discharge Medication List and Instructions:   Current Discharge Medication List      CONTINUE these medications which have CHANGED    Details   traZODone (DESYREL) 50 mg tablet Take 1 Tab by mouth nightly. Qty: 14 Tab, Refills: 1      hydroCHLOROthiazide (HYDRODIURIL) 25 mg tablet Take 1 Tab by mouth daily for 30 days. Qty: 30 Tab, Refills: 1      lisinopril (PRINIVIL, ZESTRIL) 20 mg tablet Take 1 Tab by mouth daily for 30 days. Qty: 30 Tab, Refills: 1      !! risperiDONE (RISPERDAL) 2 mg tablet Take 1 Tab by mouth nightly for 60 days. Qty: 30 Tab, Refills: 1      !! risperiDONE (RISPERDAL) 1 mg tablet Take 1 Tab by mouth daily. Qty: 30 Tab, Refills: 1      sertraline (ZOLOFT) 50 mg tablet Take 1 Tab by mouth daily. Qty: 30 Tab, Refills: 1       !! - Potential duplicate medications found. Please discuss with provider. Unresulted Labs (24h ago, onward)    None        To obtain results of studies pending at discharge, please contact N/A     Follow-up Information     Follow up With Specialties Details Why Contact Info    Refugio Junior NP Nurse Practitioner  Appointment 1/30/19 @ 2:30pm. If you need to cancel call office prior to date of appointment  [address] [place] [address]       None    None ([phone]) Patient stated that they have no PCP            Advanced Directive:   Does the patient have an appointed surrogate decision maker? Yes  Does the patient have a Medical Advance Directive? No  Does the patient have a Psychiatric Advance Directive? No  If the patient does not have a surrogate or Medical Advance Directive AND Psychiatric Advance Directive, the patient was offered information on these advance directives Patient will complete at a later time    Patient Instructions: Please continue all medications until otherwise directed by physician. Review discharge paperwork     Tobacco Cessation Discharge Plan:   Is the patient a smoker and needs referral for smoking cessation? No  Patient referred to the following for smoking cessation with an appointment? No     Patient was offered medication to assist with smoking cessation at discharge? No  Was education for smoking cessation added to the discharge instructions? No    Alcohol/Substance Abuse Discharge Plan:   Does the patient have a history of substance/alcohol abuse and requires a referral for treatment? No  Patient referred to the following for substance/alcohol abuse treatment with an appointment? No  Patient was offered medication to assist with alcohol cessation at discharge? No  Was education for substance/alcohol abuse added to discharge instructions? No    Patient discharged to Home; provided to the patient/caregiver either in hard copy or electronically.

## 2018-12-12 NOTE — DISCHARGE INSTRUCTIONS
Learning About Schizoaffective Disorder  What is schizoaffective disorder? Schizoaffective (say \"csuk-[id number]-tl-FECK-tiv\") disorder is a complex mental illness. People who have it have the symptoms of both schizophrenia and a mood disorder. The disorder affects how clearly you can think. It can also make it hard to manage your emotions and connect with others. And it affects how happy or sad you feel. What causes it? Experts don't know what causes schizoaffective disorder. It may have different causes for different people. It's not caused by anything you did or how your parents raised you. And it's not a sign of weakness. What are the symptoms? The symptoms of schizoaffective disorder are the same as those of schizophrenia and a mood disorder. People with schizoaffective disorder may have many of these symptoms. Schizophrenia symptoms include:  · Having hallucinations. This means that you see or hear things that aren't really there. · Having delusions. These are beliefs that aren't real.  · Having a hard time feeling and showing emotion. Mood disorder symptoms include:  · Depression. · Feeling extremely happy or having lots of energy. How is it diagnosed? Your doctor or mental health professional usually can tell if you have schizoaffective disorder by talking with you. He or she will look at the order and timing of your symptoms and how long your symptoms last.  Your doctor will ask you about other things too. These may include questions about:  · Any odd experiences you may have had, such as hearing voices or having confusing thoughts. · Your feelings. · Any changes in eating habits, energy level, and interest in daily tasks. · How well you are sleeping. · If you can focus on the things you do. How is it treated? Finding out that you have schizoaffective disorder can be scary and hard to deal with. But the disorder can be treated.   The goal of treatment is to lower your stress and help your brain work as it should. Ongoing treatment can keep the disorder under control. Treatment includes medicines and counseling. Medicines help your symptoms. It's important to take your medicines on schedule to keep your moods even. When you feel good, you may think that you don't need them. But it is important to keep taking them. Counseling helps you change how you think about things. It can also help you cope with the illness. You will work with a mental health professional. This may be a psychologist, a licensed professional counselor, a clinical , or a psychiatrist.  Follow-up care is a key part of your treatment and safety. Be sure to make and go to all appointments, and call your doctor if you are having problems. It's also a good idea to know your test results and keep a list of the medicines you take. Where can you learn more? Go to http://manohar-toyin.info/. Enter A016 in the search box to learn more about \"Learning About Schizoaffective Disorder. \"  Current as of: December 7, 2017  Content Version: 11.8  © 4883-0700 Healthwise, Incorporated. Care instructions adapted under license by [employer] (which disclaims liability or warranty for this information). If you have questions about a medical condition or this instruction, always ask your healthcare professional. [address] any warranty or liability for your use of this information. [facility] [address] [facility]  [phone]  [facility] [place] [address] [phone]  [address] [facility]  [address] Crisis-  [phone]

## 2019-01-21 ENCOUNTER — HOSPITAL ENCOUNTER (EMERGENCY)
Age: 64
Discharge: PSYCHIATRIC OR SUBSTANCE USE FACILITY | End: 2019-01-22
Attending: EMERGENCY MEDICINE
Payer: MEDICARE

## 2019-01-21 DIAGNOSIS — E86.0 DEHYDRATION: Primary | ICD-10-CM

## 2019-01-21 DIAGNOSIS — F20.1 DISORGANIZED SCHIZOPHRENIA (HCC): ICD-10-CM

## 2019-01-21 LAB
ALBUMIN SERPL-MCNC: 4.1 G/DL (ref 3.5–5)
ALBUMIN/GLOB SERPL: 1 {RATIO} (ref 1.1–2.2)
ALP SERPL-CCNC: 72 U/L (ref 45–117)
ALT SERPL-CCNC: 29 U/L (ref 12–78)
AMPHET UR QL SCN: NEGATIVE
ANION GAP SERPL CALC-SCNC: 12 MMOL/L (ref 5–15)
APPEARANCE UR: ABNORMAL
AST SERPL-CCNC: 26 U/L (ref 15–37)
BACTERIA URNS QL MICRO: ABNORMAL /HPF
BARBITURATES UR QL SCN: NEGATIVE
BASOPHILS # BLD: 0 K/UL (ref 0–0.1)
BASOPHILS NFR BLD: 0 % (ref 0–1)
BENZODIAZ UR QL: NEGATIVE
BILIRUB SERPL-MCNC: 0.4 MG/DL (ref 0.2–1)
BILIRUB UR QL CFM: NEGATIVE
BUN SERPL-MCNC: 27 MG/DL (ref 6–20)
BUN/CREAT SERPL: 18 (ref 12–20)
CALCIUM SERPL-MCNC: 9.6 MG/DL (ref 8.5–10.1)
CANNABINOIDS UR QL SCN: NEGATIVE
CHLORIDE SERPL-SCNC: 100 MMOL/L (ref 97–108)
CO2 SERPL-SCNC: 28 MMOL/L (ref 21–32)
COCAINE UR QL SCN: NEGATIVE
COLOR UR: ABNORMAL
COMMENT, HOLDF: NORMAL
CREAT SERPL-MCNC: 1.47 MG/DL (ref 0.55–1.02)
DIFFERENTIAL METHOD BLD: ABNORMAL
DRUG SCRN COMMENT,DRGCM: NORMAL
EOSINOPHIL # BLD: 0 K/UL (ref 0–0.4)
EOSINOPHIL NFR BLD: 0 % (ref 0–7)
EPITH CASTS URNS QL MICRO: ABNORMAL /LPF
ERYTHROCYTE [DISTWIDTH] IN BLOOD BY AUTOMATED COUNT: 11.9 % (ref 11.5–14.5)
ETHANOL SERPL-MCNC: <10 MG/DL
FLUAV AG NPH QL IA: NEGATIVE
FLUBV AG NOSE QL IA: NEGATIVE
GLOBULIN SER CALC-MCNC: 4.2 G/DL (ref 2–4)
GLUCOSE SERPL-MCNC: 113 MG/DL (ref 65–100)
GLUCOSE UR STRIP.AUTO-MCNC: NEGATIVE MG/DL
HCT VFR BLD AUTO: 39.3 % (ref 35–47)
HGB BLD-MCNC: 13.2 G/DL (ref 11.5–16)
HGB UR QL STRIP: NEGATIVE
IMM GRANULOCYTES # BLD AUTO: 0 K/UL (ref 0–0.04)
IMM GRANULOCYTES NFR BLD AUTO: 0 % (ref 0–0.5)
KETONES UR QL STRIP.AUTO: ABNORMAL MG/DL
LEUKOCYTE ESTERASE UR QL STRIP.AUTO: NEGATIVE
LYMPHOCYTES # BLD: 1 K/UL (ref 0.8–3.5)
LYMPHOCYTES NFR BLD: 11 % (ref 12–49)
MCH RBC QN AUTO: 28.8 PG (ref 26–34)
MCHC RBC AUTO-ENTMCNC: 33.6 G/DL (ref 30–36.5)
MCV RBC AUTO: 85.8 FL (ref 80–99)
METHADONE UR QL: NEGATIVE
MONOCYTES # BLD: 0.5 K/UL (ref 0–1)
MONOCYTES NFR BLD: 6 % (ref 5–13)
NEUTS SEG # BLD: 7.4 K/UL (ref 1.8–8)
NEUTS SEG NFR BLD: 83 % (ref 32–75)
NITRITE UR QL STRIP.AUTO: NEGATIVE
NRBC # BLD: 0 K/UL (ref 0–0.01)
NRBC BLD-RTO: 0 PER 100 WBC
OPIATES UR QL: NEGATIVE
PCP UR QL: NEGATIVE
PH UR STRIP: 6 [PH] (ref 5–8)
PLATELET # BLD AUTO: 312 K/UL (ref 150–400)
PMV BLD AUTO: 9 FL (ref 8.9–12.9)
POTASSIUM SERPL-SCNC: 4 MMOL/L (ref 3.5–5.1)
PROT SERPL-MCNC: 8.3 G/DL (ref 6.4–8.2)
PROT UR STRIP-MCNC: 30 MG/DL
RBC # BLD AUTO: 4.58 M/UL (ref 3.8–5.2)
RBC #/AREA URNS HPF: ABNORMAL /HPF (ref 0–5)
SAMPLES BEING HELD,HOLD: NORMAL
SODIUM SERPL-SCNC: 140 MMOL/L (ref 136–145)
SP GR UR REFRACTOMETRY: 1.03 (ref 1–1.03)
UA: UC IF INDICATED,UAUC: ABNORMAL
UROBILINOGEN UR QL STRIP.AUTO: 1 EU/DL (ref 0.2–1)
WBC # BLD AUTO: 9 K/UL (ref 3.6–11)
WBC URNS QL MICRO: ABNORMAL /HPF (ref 0–4)

## 2019-01-21 PROCEDURE — 96360 HYDRATION IV INFUSION INIT: CPT

## 2019-01-21 PROCEDURE — 90791 PSYCH DIAGNOSTIC EVALUATION: CPT

## 2019-01-21 PROCEDURE — 85025 COMPLETE CBC W/AUTO DIFF WBC: CPT

## 2019-01-21 PROCEDURE — 87804 INFLUENZA ASSAY W/OPTIC: CPT

## 2019-01-21 PROCEDURE — 87086 URINE CULTURE/COLONY COUNT: CPT

## 2019-01-21 PROCEDURE — 81001 URINALYSIS AUTO W/SCOPE: CPT

## 2019-01-21 PROCEDURE — 74011250636 HC RX REV CODE- 250/636: Performed by: PHYSICIAN ASSISTANT

## 2019-01-21 PROCEDURE — 80307 DRUG TEST PRSMV CHEM ANLYZR: CPT

## 2019-01-21 PROCEDURE — 96361 HYDRATE IV INFUSION ADD-ON: CPT

## 2019-01-21 PROCEDURE — 80053 COMPREHEN METABOLIC PANEL: CPT

## 2019-01-21 PROCEDURE — 93005 ELECTROCARDIOGRAM TRACING: CPT

## 2019-01-21 PROCEDURE — 99285 EMERGENCY DEPT VISIT HI MDM: CPT

## 2019-01-21 PROCEDURE — 36415 COLL VENOUS BLD VENIPUNCTURE: CPT

## 2019-01-21 PROCEDURE — 74011250637 HC RX REV CODE- 250/637: Performed by: PHYSICIAN ASSISTANT

## 2019-01-21 RX ORDER — ONDANSETRON 4 MG/1
4 TABLET, ORALLY DISINTEGRATING ORAL
Status: COMPLETED | OUTPATIENT
Start: 2019-01-21 | End: 2019-01-21

## 2019-01-21 RX ADMIN — SODIUM CHLORIDE 1000 ML: 900 INJECTION, SOLUTION INTRAVENOUS at 20:56

## 2019-01-21 RX ADMIN — ONDANSETRON 4 MG: 4 TABLET, ORALLY DISINTEGRATING ORAL at 18:30

## 2019-01-21 NOTE — ED TRIAGE NOTES
Pt's niece concerned for pt stating \"her mental health meds aren't helping. She wandered off last night and was found by the police in the street. She paces back and forth at the house. She still hears voices. \" pt denied complaints in triage, stating unsure why she's here.

## 2019-01-21 NOTE — BSMART NOTE
Comprehensive Assessment Form Part 1 Section I - Disposition Axis I - Schizophrenia Rule out Neurocognitive Disoder Axis II - Deffered Axis III - Past Medical History:  
Diagnosis Date  Anxiety disorder  Hypertension  Psychiatric disorder   
 schizophrenia Axis IV - medication concerns, wandering [place] [address] 44 The Medical Doctor to Psychiatrist conference was not completed. The Medical Doctor is in agreement with Psychiatrist disposition because of (reason) they did not request to speak to each other. The plan is admitition to behavioral health unit . The on-call Psychiatrist consulted was Dr. Chitra Beckman. The admitting Psychiatrist will be to be determined. The admitting Diagnosis is Schizophrenia. The Payor source is [facility]. Section II - Integrated Summary Summary:  Writer met with this patient face to face at [facility] Emergency Department. She told writer that she is here due to vomiting. Writer learned from the patient's niece and care provider that the patient wandered from her home last night and slept outside. She was found by the police and returned home this morning. The patient stated that she remembers leaving last night after becoming \"anxious\". She denied suicidal or homicidal ideations. She does state that she hears voices that tell her nice things like \"you are a nice person\" and \"your mama and daddy were really nice\". She stated that she is taking her medications and believes that they are working. However, Her niece, Ms. Bradshaw, stated that the patient has been pacing throughout the house during the day and has been wandering out in the street at night several times. She does not believe that the patient's medication is working and does not feel safe to bring her home.  Writer contacted on call psychiatrist Dr. Chitra Beckman who instructed writer to admit the patient due to the family not feeling safe with her in the home. The patient agreed to stay, however declared very loudly that she will only stay for 5 days. The patient has demonstrated mental capacity to provide informed consent. The information is given by the patient and relative(s). The Chief Complaint is \"vomiting\". The Precipitant Factors are see axis IV. Previous Hospitalizations: December 2018 The patient has not previously been in restraints. Current Psychiatrist and/or  is NP [address] [place] [address]. Lethality Assessment: 
 
The potential for suicide noted by the following: active psychosis . The potential for homicide is noted by the following : psychosis. The patient has not been a perpetrator of sexual or physical abuse. There are not pending charges. The patient is not felt to be at risk for self harm or harm to others. The attending nurse was advised that security has not been notified. Section III - Psychosocial 
The patient's overall mood and attitude is anxious. Feelings of helplessness and hopelessness are not observed. Generalized anxiety is observed by tense appearance. Panic is not observed. Phobias are not observed. Obsessive compulsive tendencies are not observed. Section IV - Mental Status Exam 
The patient's appearance is unkempt. The patient's behavior shows no evidence of impairment. The patient is oriented to time, place, person and situation. The patient's speech shows no evidence of impairment. The patient's mood is anxious. The range of affect is constricted. The patient's thought content demonstrates no evidence of impairment. The thought process shows a flight of ideas. The patient's perception demonstrated changes in the following:  auditory  visual hallucinations. The patient's memory is remote. The patient's appetite shows no evidence of impairment. The patient's sleep has evidence of insomnia. The patient shows no insight. The patient's judgement is cognitively impaired. Section V - Substance Abuse The patient is not using substances. Section VI - Living Arrangements The patient is single. The patient lives her sister and niece. The patient has no children. The patient does plan to return home upon discharge. The patient does not have legal issues pending. The patient's source of income comes from social security and family. Amish and cultural practices have not been voiced at this time. The patient's greatest support comes from family and this person will be involved with the treatment. The patient has been in an event described as horrible or outside the realm of ordinary life experience either currently or in the past. 
The patient has not been a victim of sexual/physical abuse. Section VII - Other Areas of Clinical Concern The highest grade achieved is 11th with the overall quality of school experience being described as not assesed. The patient is currently disabled and speaks [place] as a primary language. The patient has no communication impairments affecting communication. The patient's preference for learning can be described as: not assessed. The patient's hearing is normal.  The patient's vision is impaired and  wears glasses or contacts. REMY Kc, Supervisee in Social Work

## 2019-01-21 NOTE — ED NOTES
Pt left around 230pm yesterday and police brought her back home earlier today. Pt denies hitting head or LOC.

## 2019-01-21 NOTE — ED PROVIDER NOTES
EMERGENCY DEPARTMENT HISTORY AND PHYSICAL EXAM 
 
 
Date: 1/21/2019 Patient Name: Shameka Bonilla History of Presenting Illness Chief Complaint Patient presents with  Mental Health Problem History Provided By: Patient and Pt's niece HPI: Shameka Bonilla, 58 y.o. female with PMHx significant for schizophrenia, HTN, hallucinations, anxiety, psychosis, presents ambulatory w/ niece to the ED with cc of acute mild intermittent n/v x 1 day. Pt reportedly had 3 episodes of \"spitting up\" earlier today. Unable to tolerate food. Pt reportedly \"walked off\" last night because she was feeling anxious. Niece reports she was not found until the police found her sleeping outside this morning in freezing weather. Pt reports she knew where she was and how to get home. Niece reports this is the 2nd episode of walking off in 1 month since being discharged from [place] [address] services 12/3/2018. Denies head injury or LOC. Niece reports pt has had mild chills, cough and cold sxs today. Pt denies pain, fever, CP, sob, wheezing, hemoptysis, headache, lightheadedness, dizziness, abd pain, urinary sxs, diarrhea, constipation. No SI/HI/hallucinations per pt. No alcohol or illicit drug use. Pt last had medications yesterday morning. No medications today. No other modifying factors. There are no other complaints, changes, or physical findings at this time. PCP: None No current facility-administered medications on file prior to encounter. Current Outpatient Medications on File Prior to Encounter Medication Sig Dispense Refill  traZODone (DESYREL) 50 mg tablet Take 1 Tab by mouth nightly. 14 Tab 1  risperiDONE (RISPERDAL) 2 mg tablet Take 1 Tab by mouth nightly for 60 days. 30 Tab 1  risperiDONE (RISPERDAL) 1 mg tablet Take 1 Tab by mouth daily. 30 Tab 1  
 sertraline (ZOLOFT) 50 mg tablet Take 1 Tab by mouth daily.  30 Tab 1  
 hydroCHLOROthiazide (HYDRODIURIL) 25 mg tablet Take 1 Tab by mouth daily for 30 days. 30 Tab 1  
 lisinopril (PRINIVIL, ZESTRIL) 20 mg tablet Take 1 Tab by mouth daily for 30 days. 30 Tab 1 Past History Past Medical History: 
Past Medical History:  
Diagnosis Date  Anxiety disorder  Hypertension  Psychiatric disorder   
 schizophrenia Past Surgical History: 
History reviewed. No pertinent surgical history. Family History: 
History reviewed. No pertinent family history. Social History: 
Social History Tobacco Use  Smoking status: Former Smoker Packs/day: 0.00  Smokeless tobacco: Never Used Substance Use Topics  Alcohol use: No  
 Drug use: No  
 
 
Allergies: Allergies Allergen Reactions  No Known Allergies Other (comments) Review of Systems Review of Systems Constitutional: Positive for chills. Negative for activity change, appetite change, diaphoresis, fatigue and fever. HENT: Negative for congestion, ear discharge, ear pain, postnasal drip, rhinorrhea, sneezing, sore throat and trouble swallowing. Eyes: Negative for pain, redness and visual disturbance. Respiratory: Positive for cough. Negative for chest tightness, shortness of breath, wheezing and stridor. Cardiovascular: Negative for chest pain, palpitations and leg swelling. Gastrointestinal: Positive for nausea and vomiting. Negative for abdominal distention, abdominal pain, blood in stool, constipation and diarrhea. Genitourinary: Negative. Negative for decreased urine volume, dysuria, frequency, hematuria and urgency. Musculoskeletal: Negative for arthralgias, back pain, myalgias, neck pain and neck stiffness. Skin: Negative. Negative for rash. Allergic/Immunologic: Negative for environmental allergies. Neurological: Negative for dizziness, seizures, syncope, facial asymmetry, weakness, light-headedness, numbness and headaches. Psychiatric/Behavioral: Positive for behavioral problems.  Negative for dysphoric mood, hallucinations, self-injury and suicidal ideas. The patient is nervous/anxious. Physical Exam  
Physical Exam  
Constitutional: She is oriented to person, place, and time. She appears well-developed and well-nourished. No distress. HENT:  
Head: Normocephalic and atraumatic. Right Ear: Hearing and external ear normal.  
Left Ear: Hearing and external ear normal.  
Nose: Nose normal.  
Mouth/Throat: Oropharynx is clear and moist. No oropharyngeal exudate. Eyes: Conjunctivae and EOM are normal. Pupils are equal, round, and reactive to light. Neck: Normal range of motion. Cardiovascular: Normal rate, regular rhythm, normal heart sounds and intact distal pulses. Exam reveals no gallop and no friction rub. No murmur heard. Pulmonary/Chest: Effort normal and breath sounds normal. No respiratory distress. She has no wheezes. She has no rales. She exhibits no tenderness. Abdominal: Soft. Bowel sounds are normal. She exhibits no distension and no mass. There is no tenderness. There is no rigidity, no rebound, no guarding, no CVA tenderness, no tenderness at McBurney's point and negative Martin's sign. Musculoskeletal: Normal range of motion. Neurological: She is alert and oriented to person, place, and time. She has normal strength. She is not disoriented. No cranial nerve deficit or sensory deficit. GCS eye subscore is 4. GCS verbal subscore is 5. GCS motor subscore is 6. Skin: Skin is warm and dry. She is not diaphoretic. Psychiatric: Her speech is normal and behavior is normal. Judgment and thought content normal. Her mood appears anxious. She expresses no homicidal and no suicidal ideation. She expresses no suicidal plans and no homicidal plans. Nursing note and vitals reviewed. Diagnostic Study Results Labs - Recent Results (from the past 12 hour(s)) INFLUENZA A & B AG (RAPID TEST) Collection Time: 01/21/19  6:45 PM  
Result Value Ref Range Influenza A Antigen NEGATIVE  NEG Influenza B Antigen NEGATIVE  NEG    
ETHYL ALCOHOL Collection Time: 01/21/19  6:45 PM  
Result Value Ref Range ALCOHOL(ETHYL),SERUM <10 <10 MG/DL  
CBC WITH AUTOMATED DIFF Collection Time: 01/21/19  6:45 PM  
Result Value Ref Range WBC 9.0 3.6 - 11.0 K/uL  
 RBC 4.58 3.80 - 5.20 M/uL  
 HGB 13.2 11.5 - 16.0 g/dL HCT 39.3 35.0 - 47.0 % MCV 85.8 80.0 - 99.0 FL  
 MCH 28.8 26.0 - 34.0 PG  
 MCHC 33.6 30.0 - 36.5 g/dL  
 RDW 11.9 11.5 - 14.5 % PLATELET 060 344 - 893 K/uL MPV 9.0 8.9 - 12.9 FL  
 NRBC 0.0 0  WBC ABSOLUTE NRBC 0.00 0.00 - 0.01 K/uL NEUTROPHILS 83 (H) 32 - 75 % LYMPHOCYTES 11 (L) 12 - 49 % MONOCYTES 6 5 - 13 % EOSINOPHILS 0 0 - 7 % BASOPHILS 0 0 - 1 % IMMATURE GRANULOCYTES 0 0.0 - 0.5 % ABS. NEUTROPHILS 7.4 1.8 - 8.0 K/UL  
 ABS. LYMPHOCYTES 1.0 0.8 - 3.5 K/UL  
 ABS. MONOCYTES 0.5 0.0 - 1.0 K/UL  
 ABS. EOSINOPHILS 0.0 0.0 - 0.4 K/UL  
 ABS. BASOPHILS 0.0 0.0 - 0.1 K/UL  
 ABS. IMM. GRANS. 0.0 0.00 - 0.04 K/UL  
 DF AUTOMATED METABOLIC PANEL, COMPREHENSIVE Collection Time: 01/21/19  6:45 PM  
Result Value Ref Range Sodium 140 136 - 145 mmol/L Potassium 4.0 3.5 - 5.1 mmol/L Chloride 100 97 - 108 mmol/L  
 CO2 28 21 - 32 mmol/L Anion gap 12 5 - 15 mmol/L Glucose 113 (H) 65 - 100 mg/dL BUN 27 (H) 6 - 20 MG/DL Creatinine 1.47 (H) 0.55 - 1.02 MG/DL  
 BUN/Creatinine ratio 18 12 - 20 GFR est AA 43 (L) >60 ml/min/1.73m2 GFR est non-AA 36 (L) >60 ml/min/1.73m2 Calcium 9.6 8.5 - 10.1 MG/DL Bilirubin, total 0.4 0.2 - 1.0 MG/DL  
 ALT (SGPT) 29 12 - 78 U/L  
 AST (SGOT) 26 15 - 37 U/L Alk. phosphatase 72 45 - 117 U/L Protein, total 8.3 (H) 6.4 - 8.2 g/dL Albumin 4.1 3.5 - 5.0 g/dL Globulin 4.2 (H) 2.0 - 4.0 g/dL A-G Ratio 1.0 (L) 1.1 - 2.2 SAMPLES BEING HELD Collection Time: 01/21/19  6:45 PM  
Result Value Ref Range  SAMPLES BEING HELD  Michele Car   
 COMMENT Add-on orders for these samples will be processed based on acceptable specimen integrity and analyte stability, which may vary by analyte. URINALYSIS W/ REFLEX CULTURE Collection Time: 01/21/19  8:15 PM  
Result Value Ref Range Color YELLOW/STRAW Appearance CLOUDY (A) CLEAR Specific gravity 1.030 1.003 - 1.030    
 pH (UA) 6.0 5.0 - 8.0 Protein 30 (A) NEG mg/dL Glucose NEGATIVE  NEG mg/dL Ketone TRACE (A) NEG mg/dL Blood NEGATIVE  NEG Urobilinogen 1.0 0.2 - 1.0 EU/dL Nitrites NEGATIVE  NEG Leukocyte Esterase NEGATIVE  NEG Bilirubin UA, confirm NEGATIVE  NEG    
 WBC 0-4 0 - 4 /hpf  
 RBC 0-5 0 - 5 /hpf Epithelial cells MODERATE (A) FEW /lpf Bacteria 2+ (A) NEG /hpf  
 UA:UC IF INDICATED URINE CULTURE ORDERED (A) CNI    
EKG, 12 LEAD, INITIAL Collection Time: 01/21/19  8:33 PM  
Result Value Ref Range Ventricular Rate 72 BPM  
 Atrial Rate 72 BPM  
 P-R Interval 164 ms QRS Duration 84 ms Q-T Interval 556 ms  
 QTC Calculation (Bezet) 608 ms Calculated P Axis 61 degrees Calculated R Axis 39 degrees Calculated T Axis 95 degrees Diagnosis Normal sinus rhythm Possible Left atrial enlargement Left ventricular hypertrophy with repolarization abnormality Prolonged QT Abnormal ECG No previous ECGs available DRUG SCREEN, URINE Collection Time: 01/21/19 11:11 PM  
Result Value Ref Range AMPHETAMINES NEGATIVE  NEG    
 BARBITURATES NEGATIVE  NEG BENZODIAZEPINES NEGATIVE  NEG    
 COCAINE NEGATIVE  NEG METHADONE NEGATIVE  NEG    
 OPIATES NEGATIVE  NEG    
 PCP(PHENCYCLIDINE) NEGATIVE  NEG    
 THC (TH-CANNABINOL) NEGATIVE  NEG Drug screen comment (NOTE) Radiologic Studies - No orders to display CT Results  (Last 48 hours) None CXR Results  (Last 48 hours) None Medical Decision Making I am the first provider for this patient. I reviewed the vital signs, available nursing notes, past medical history, past surgical history, family history and social history. Vital Signs-Reviewed the patient's vital signs. Patient Vitals for the past 12 hrs: 
 Temp Pulse Resp BP SpO2  
01/22/19 0046 98.4 °F (36.9 °C) 72 16 94/49 99 % 01/21/19 2355  72 16 92/48 99 % 01/21/19 1735 98.1 °F (36.7 °C) 99 16 114/81 100 % Pulse Oximetry Analysis - 100% on RA Records Reviewed: Nursing Notes, Old Medical Records, Previous Radiology Studies and Previous Laboratory Studies EKG interpretation: (Preliminary) 2033 Rhythm: normal sinus rhythm; and regular . Rate (approx.): 72; Axis: normal; VT interval: normal; QRS interval: normal ; ST/T wave: normal; Other findings: left ventricular hypertrophy with repolarization abnormality. Written by Radha Cannon, ED Scribe, as dictated by Skyla Worthington MD. Provider Notes (Medical Decision Making):  
Pt w/ hx of disorganized schizophrenia, psychosis, hallucinations presents w/ acute episode of n/v x 1 day (3 episodes) secondary to \"walking off\" last night and sleeping outside in freezing weather. Found by police this morning and [facility]. Plan to check labs and consult BSMART. Will tx symptoms and PO challenge. ED Course:  
Initial assessment performed. The patients presenting problems have been discussed, and they are in agreement with the care plan formulated and outlined with them. I have encouraged them to ask questions as they arise throughout their visit. 7:55 PM 
Pt is now feeling lightheaded. Will treat with fluids for dehydration. 8:57 PM 
Pt accepted to [facility] [place] by Dr. Marge Francisco. 9:45 PM 
No longer lightheaded. Tolerating PO fluid and food. 1:17 AM 
Pt continues to rest comfortably in room in NAD. Has tolerated frequent PO fluids and bagged lunch in ED. Critical Care Time:  
0 Disposition: 
1:17 AM 
Transfer Note: Patient is being transferred to Psychiatry at [facility], transfer accepted by Dr. Humberto Freeman. The reasons for patient's transfer have been discussed with the patient and available family. They convey agreement and understanding for the need to be transferred as explained to them by myself. PLAN: 
1. Transfer to [facility] Diagnosis Clinical Impression: 1. Dehydration 2. Disorganized schizophrenia ([facility] [address]

## 2019-01-22 ENCOUNTER — HOSPITAL ENCOUNTER (INPATIENT)
Age: 64
LOS: 3 days | Discharge: HOME | DRG: 885 | End: 2019-01-25
Attending: PSYCHIATRY & NEUROLOGY | Admitting: PSYCHIATRY & NEUROLOGY
Payer: MEDICARE

## 2019-01-22 VITALS
HEART RATE: 90 BPM | OXYGEN SATURATION: 97 % | TEMPERATURE: 98.2 F | WEIGHT: 146 LBS | RESPIRATION RATE: 18 BRPM | DIASTOLIC BLOOD PRESSURE: 64 MMHG | HEIGHT: 62 IN | BODY MASS INDEX: 26.87 KG/M2 | SYSTOLIC BLOOD PRESSURE: 119 MMHG

## 2019-01-22 PROCEDURE — 65220000003 HC RM SEMIPRIVATE PSYCH

## 2019-01-22 PROCEDURE — 96361 HYDRATE IV INFUSION ADD-ON: CPT

## 2019-01-22 PROCEDURE — 74011250637 HC RX REV CODE- 250/637: Performed by: PSYCHIATRY & NEUROLOGY

## 2019-01-22 PROCEDURE — 93005 ELECTROCARDIOGRAM TRACING: CPT

## 2019-01-22 RX ORDER — OLANZAPINE 2.5 MG/1
2.5 TABLET ORAL
Status: DISCONTINUED | OUTPATIENT
Start: 2019-01-22 | End: 2019-01-25 | Stop reason: HOSPADM

## 2019-01-22 RX ORDER — SERTRALINE HYDROCHLORIDE 50 MG/1
25 TABLET, FILM COATED ORAL DAILY
Status: DISCONTINUED | OUTPATIENT
Start: 2019-01-22 | End: 2019-01-23

## 2019-01-22 RX ORDER — TRAZODONE HYDROCHLORIDE 50 MG/1
50 TABLET ORAL
Status: DISCONTINUED | OUTPATIENT
Start: 2019-01-22 | End: 2019-01-22

## 2019-01-22 RX ORDER — ACETAMINOPHEN 325 MG/1
650 TABLET ORAL
Status: DISCONTINUED | OUTPATIENT
Start: 2019-01-22 | End: 2019-01-25 | Stop reason: HOSPADM

## 2019-01-22 RX ORDER — SERTRALINE HYDROCHLORIDE 50 MG/1
50 TABLET, FILM COATED ORAL DAILY
Status: DISCONTINUED | OUTPATIENT
Start: 2019-01-23 | End: 2019-01-22

## 2019-01-22 RX ORDER — ADHESIVE BANDAGE
30 BANDAGE TOPICAL DAILY PRN
Status: DISCONTINUED | OUTPATIENT
Start: 2019-01-22 | End: 2019-01-25 | Stop reason: HOSPADM

## 2019-01-22 RX ORDER — ZOLPIDEM TARTRATE 5 MG/1
5 TABLET ORAL
Status: DISCONTINUED | OUTPATIENT
Start: 2019-01-22 | End: 2019-01-25 | Stop reason: HOSPADM

## 2019-01-22 RX ORDER — BENZTROPINE MESYLATE 1 MG/ML
1 INJECTION INTRAMUSCULAR; INTRAVENOUS
Status: DISCONTINUED | OUTPATIENT
Start: 2019-01-22 | End: 2019-01-25 | Stop reason: HOSPADM

## 2019-01-22 RX ORDER — HYDROCHLOROTHIAZIDE 25 MG/1
25 TABLET ORAL DAILY
Status: DISCONTINUED | OUTPATIENT
Start: 2019-01-23 | End: 2019-01-23

## 2019-01-22 RX ORDER — LISINOPRIL 20 MG/1
20 TABLET ORAL DAILY
Status: DISCONTINUED | OUTPATIENT
Start: 2019-01-23 | End: 2019-01-23

## 2019-01-22 RX ORDER — RISPERIDONE 1 MG/1
1 TABLET, FILM COATED ORAL DAILY
Status: DISCONTINUED | OUTPATIENT
Start: 2019-01-22 | End: 2019-01-25 | Stop reason: HOSPADM

## 2019-01-22 RX ORDER — IBUPROFEN 400 MG/1
400 TABLET ORAL
Status: DISCONTINUED | OUTPATIENT
Start: 2019-01-22 | End: 2019-01-25 | Stop reason: HOSPADM

## 2019-01-22 RX ORDER — BENZTROPINE MESYLATE 1 MG/1
1 TABLET ORAL
Status: DISCONTINUED | OUTPATIENT
Start: 2019-01-22 | End: 2019-01-25 | Stop reason: HOSPADM

## 2019-01-22 RX ADMIN — ZOLPIDEM TARTRATE 5 MG: 5 TABLET ORAL at 21:23

## 2019-01-22 RX ADMIN — SERTRALINE HYDROCHLORIDE 25 MG: 50 TABLET ORAL at 12:48

## 2019-01-22 RX ADMIN — RISPERIDONE 1 MG: 1 TABLET ORAL at 16:13

## 2019-01-22 NOTE — BH NOTES
Primary Nurse Jaskaran Gipson and Jessee Espitia RN performed a dual skin assessment on this patient Impairment noted- see wound doc flow sheet Thompson score is 23

## 2019-01-22 NOTE — PROGRESS NOTES
TRANSFER - IN REPORT: 
 
Verbal report received from  [facility] Melissa DIAZ RN on Terence Huston  being received from  [facility]/ED  for routine progression of care Report consisted of patients Situation, Background, Assessment and  
Recommendations(SBAR). Information from the following report(s) SBAR, Kardex, ED Summary, [facility] and Recent Results was reviewed with the receiving nurse. Opportunity for questions and clarification was provided. Assessment completed upon patients arrival to unit and care assumed Discussion/concerns   related to last  BP 94/49  Post IV fluids Urinalysis results 2+ Bacteria , no treatment  
 reported asymptomatic , received medical clearance

## 2019-01-22 NOTE — PROGRESS NOTES
Problem: Discharge Planning Goal: *Discharge to safe environment Outcome: Progressing Towards Goal 
Patient does not identify family member's home as safe environment. Patient's judgement is impaired. Patient may need [facility] placement if she is not able to return to family member's home. Goal: *Knowledge of medication management Outcome: Progressing Towards Goal 
Patient is able to verbalize limited understanding of medication regimen. Patient agrees to take medications as prescribed. Goal: *Knowledge of discharge instructions Outcome: Progressing Towards Goal 
Patient verbalizes limited understanding of goals for treatment and safe discharge.

## 2019-01-22 NOTE — ED NOTES
Emergency Department Nursing Plan of Care The Nursing Plan of Care is developed from the Nursing assessment and Emergency Department Attending provider initial evaluation. The plan of care may be reviewed in the ED Provider note. The Plan of Care was developed with the following considerations:  
Patient / Family readiness to learn indicated by:verbalized understanding Persons(s) to be included in education: patient Barriers to Learning/Limitations:No 
 
Signed Willem Flores 1/21/2019   7:04 PM

## 2019-01-22 NOTE — BH NOTES
Primary Nurse Joan Easton, RN and Flaco Lemus, RN performed a dual skin assessment on this patient Impairment noted- see wound doc flow sheet , pendulous breast , loose skin tissue  Appears significant weight loss over a 2 year period states pt Thompson score is 23

## 2019-01-22 NOTE — INTERDISCIPLINARY ROUNDS
Behavioral Health Interdisciplinary Rounds Patient Name: Nan Olmstead  Age: 58 y.o. Room/Bed:  725/02 Primary Diagnosis: <principal problem not specified> Admission Status: Voluntary Readmission within 30 days: no 
Power of  in place: no 
Patient requires a blocked bed: no          Reason for blocked bed: VTE Prophylaxis: No 
 
Mobility needs/Fall risk: no 
Flu Vaccine : yes , PTA Nutritional Plan: no 
Consults: Hospitalist for  H&P Labs/Testing due today?: no 
 
Sleep hours:  45 min Participation in Care/Groups:  New admit Medication Compliant?: new admit PRNS (last 24 hours): None Restraints (last 24 hours):  no 
  
CIWA (range last 24 hours): COWS (range last 24 hours): Alcohol screening (AUDIT) completed -   AUDIT Score: 0 If applicable, date SBIRT discussed in treatment team AND documented:  
 
Tobacco - patient is a smoker:   
Illegal Drugs use:   
 
24 hour chart check complete: yes Patient goal(s) for today: meet with treatment team 
Treatment team focus/goals: psychosocial 
Progress note: Pt disorganized and confused; states she was wandering because she was upset; does not remember being found by police LOS:  0  Expected LOS: TBD Financial concerns/prescription coverage: Medicare; Medicaid Date of last family contact: None Family requesting physician contact today: No 
Discharge plan: TBD Guns in the home: No     
Outpatient provider(s): [facility] [address] [place] [address] 
 
Participating treatment team members: Taya Goldstein MSW; Dr. Oren Ugalde MD; Zehra Lou RN; Gilda Winston, SandraD

## 2019-01-22 NOTE — BH NOTES
GROUP THERAPY PROGRESS NOTE Leigh Sr participated in a morning Process Group on the General Unit with a focus identifying feelings, planning for the day, and learning more about DBT concepts on \"Emotion Regulation. \" . 
Group time: 60 minutes. Personal goal for participation: To increase the capacity to improve ones mood, set personal goals, and understand more about basic activities to help regulate emotions. Goal orientation: The patients will be able to identify their feelings and develop a plan for  
structuring their day. They were also presented with a summary sheet on emotional regulation,  
in regards to focusing on one goal per day, taking physical care of oneself, and recognizing  
and/or building positive experiences. The didactic portion of the session focused on these three 
concepts; 1) defining and focusing on one goal per day; 2) taking care of ones physical  
maintenance and basic needs  sleep, nutrition, and exercise; and 3) finding and building on  
positive experiences. Group therapy participation: With prompting, this patient actively participated in the group. Therapeutic interventions reviewed and discussed: The group members were asked to  
identify an emotion they are having and/or let the group know what they want to focus on for the  
day as they continue to make discharge plans. The group members reviewed three DBT  
suggestions regarding emotional regulation, in regards to focusing on one goal per day, taking  
physical care of oneself, and recognizing and/or building positive experiences. It was suggested  
that these three concepts can be seen as headings for their list of coping skills. The group  
members were also provided worksheets on the topic discussed for their review and use on  
their own time. Impression of participation: The patient said she was feeling, \"Good. [place] Srinath [place] I don't have anything I need to work on. [place] Srin[place] I do want to be polite. Srinath [place] Srinath [place] I have a brother and my children to help me. \" She was alert, generally oriented, complained about \"other people,\" and spoke about her jb a couple of times. She was provided re-direction that underlined that this group can get diverted if we focus too much on other people and their negativity or begin to preach. She was encouraged to focus more on what she wants or needs to do to make her life better, because her power to change things begins with the personal changes one is willing to make. The patient expressed no current SI/HI and displayed no overt psychotic symptoms in this group, although her Temple preoccupation may be part of a larger delusional system. Her affect was mostly anxious and discontent about others. Her mood was either non-dysphoric, evangelistic, and attention seeking. This was the first General Process Group the patient has attended with the undersigned. Her capacity for insight will need to be evaluated during her hospitalization. She may need continued education on how best to use this group to help her achieve her goals.

## 2019-01-22 NOTE — ED NOTES
Verbal shift change report given to Wilfred Dawson RN (oncoming nurse) by Nate Craft RN (offgoing nurse). Report included the following information SBAR, ED Summary, MAR and Recent Results. Assumed pt care at this time. Pt noted to be resting comfortably. Pt updated on plan of care, has no questions or concerns at this time.

## 2019-01-22 NOTE — BH NOTES
GROUP THERAPY PROGRESS NOTE Lisa Villarreal participated in a morning Process Group on the Geriatric Unit, with a focus identifying feelings, planning for the day, and singing. Group time: 45 minutes. Personal goal for participation: To increase the capacity to shift ones mood, prepare for the day, and share in group singing. Goal orientation: The patient will be able to prepare for the day through group singing. Group therapy participation: When prompted, this patient partially participated in the group. Therapeutic interventions reviewed and discussed: The group members were asked to introduce themselves by first names and participate in group singing as a way to increase their oxygen and blood flow and begin their day on a positive note. They were also asked to join in singing several songs. Impression of participation: The patient joined the group about five minutes after it started and introduced herself to her peers. She took a song sheet but did not appear to be tracking the lyrics. It was not clear to the undersigned if the patient could not read, was having trouble seeing the words, or if some other factor may have created a difficulty for her regarding her ability to concentrate on the lyrics. She was alert and generally oriented. She expressed no current SI/HI and displayed no overt psychosis. Her affect was somewhat restricted and sad. Her mood matched her affect. This was the patient's first process group with the undersigned.

## 2019-01-22 NOTE — PROGRESS NOTES
100 [facility] 60 Master Treatment Plan for [employer] Date Treatment Plan Initiated: 1/22/19 Treatment Plan Modalities: 
Type of Modality Amount (x minutes) Frequency (x/week) Duration (x days) Name of Responsible Staff Community & wrap-up meetings to encourage peer interactions [address] Group psychotherapy to assist in building coping skills and internal controls 60 7 [address] Therapeutic activity groups to build coping skills 60 7 1 Marcus Hooks Psychoeducation in group setting to address:  
Medication education [address] [facility] Coping skills Relaxation techniques Symptom management Discharge planning [facility] 71 Spirituality 2[address] [facility] Simone 60 1 1 volunteer Recovery/AA/NA 
    volunteer Physician medication management 15 7 1 Dr. Brittany Petersen Family meeting/discharge planning [facility] Susanne and Porfirio Parekh These goals will be met by 1/29/19 Problem: Depressed Mood (Adult/Pediatric) Goal: *STG: Participates in treatment plan Outcome: Progressing Towards Goal 
Up on unit smiling and engaged. Affect incongruent with pt thoughts. Pt reports sadness and anxiety. Daily goal is to talk with tx team about housing and \"take my meds\" Goal: *STG: Verbalizes anger, guilt, and other feelings in a constructive manor Outcome: Progressing Towards Goal 
Anxiety and sadness regarding her housing situation Goal: *STG: Attends activities and groups Outcome: Progressing Towards Goal 
engaged Goal: *STG: Demonstrates reduction in symptoms and increase in insight into coping skills/future focused Outcome: Progressing Towards Goal 
Denies SI, states she is not suicidal and reports not liking her housing situation. Problem: Altered Thought Process (Adult/Pediatric) Goal: *STG: Decreased hallucinations Outcome: Progressing Towards Goal 
 Reports hearing \"talking\" that does not scare or cause anxiety Goal: *STG: Demonstrates ability to understand and use improved judgment in daily activities and relationships Outcome: Progressing Towards Goal 
Noted confusion easily and impaired memory.

## 2019-01-22 NOTE — H&P
INITIAL PSYCHIATRIC EVALUATION   
   
 
IDENTIFICATION:   
Patient Name  Gaby Boudreaux Date of Birth 1955 [facility] [id number] Medical Record Number  [id number] Age  58 y.o. PCP None Admit date:  1/22/2019 Room Number  725/02  @ [facility]  
Date of Service  1/22/2019 HISTORY  
 
   
REASON FOR HOSPITALIZATION: 
CC: \" Pt admitted under a voluntary basis for psychosis and bizzare behvaior and an inability to care for self. HISTORY OF PRESENT ILLNESS:   
The patient, Gaby Boudreaux, is a 58 y.o. [address] female with a past psychiatric history significant for schizophrenia was admitted for bizzare behavior and confusion . AS per reports, pt wandered off without telling the family. Pt reported she is going through a lot but did not elaborate. Pt presented as pleasently confused not knowing the year but new the day of the week. Gaby Boudreaux  currently denied suicidal/homicidal ideations and plans. Pt denied auditory and visual hallucinations, Pt is compliant with the meds, no side effects to meds reported. As per intial reports: 
Pt reportedly \"walked off\" because she was feeling anxious. Niece reports she was not found until the police found her sleeping outside this morning in freezing weather. Pt reports she knew where she was and how to get home. Niece reports this is the 2nd episode of walking off in 1 month since being discharged from [place] [address] services  These symptoms are of high severity. These symptoms are intermittent/ fleeting in nature. .  
 
 
Past Psychiatric history:  
Hospitalizations: yes, multiple, last admission at [facility] in December Pt w f/u at [place] Suicidal attempts: none Substance abuse: none Family History of mental illness: 
Not known: 
 
Social History: 
Patient reports she has one son nad one daughter. Pt lives with aunt Legal issues: none ALLERGIES:  
Allergies Allergen Reactions  No Known Allergies Other (comments) MEDICATIONS PRIOR TO ADMISSION:  
Medications Prior to Admission Medication Sig  
 traZODone (DESYREL) 50 mg tablet Take 1 Tab by mouth nightly.  hydroCHLOROthiazide (HYDRODIURIL) 25 mg tablet Take 1 Tab by mouth daily for 30 days.  lisinopril (PRINIVIL, ZESTRIL) 20 mg tablet Take 1 Tab by mouth daily for 30 days.  risperiDONE (RISPERDAL) 2 mg tablet Take 1 Tab by mouth nightly for 60 days.  risperiDONE (RISPERDAL) 1 mg tablet Take 1 Tab by mouth daily.  sertraline (ZOLOFT) 50 mg tablet Take 1 Tab by mouth daily. PAST MEDICAL HISTORY:  
Past Medical History:  
Diagnosis Date  Anxiety disorder  Hypertension  Psychiatric disorder   
 schizophrenia History reviewed. No pertinent surgical history. SOCIAL HISTORY:   
Social History Socioeconomic History  Marital status: UNKNOWN Spouse name: Not on file  Number of children: Not on file  Years of education: Not on file  Highest education level: Not on file Social Needs  Financial resource strain: Not on file  Food insecurity - worry: Not on file  Food insecurity - inability: Not on file  Transportation needs - medical: Not on file  Transportation needs - non-medical: Not on file Occupational History  Not on file Tobacco Use  Smoking status: Former Smoker Packs/day: 0.00  Smokeless tobacco: Never Used Substance and Sexual Activity  Alcohol use: No  
 Drug use: No  
 Sexual activity: No  
Other Topics Concern  Not on file Social History Narrative  Not on file FAMILY HISTORY: History reviewed. No pertinent family history. History reviewed. No pertinent family history. REVIEW OF SYSTEMS:  
Negative except Pertinent items are noted in the History of Present Illness. All other Systems reviewed and are considered negative. [facility] MENTAL STATUS EXAM (MSE):   
 MSE FINDINGS ARE WITHIN NORMAL LIMITS (WNL) UNLESS OTHERWISE STATED BELOW. ( ALL OF THE BELOW CATEGORIES OF THE MSE HAVE BEEN REVIEWED (reviewed 1/22/2019) AND UPDATED AS DEEMED APPROPRIATE ) General Presentation age appropriate and casually dressed, cooperative Orientation oriented to time, place and person Vital Signs  See below (reviewed 1/22/2019); Vital Signs (BP, Pulse, & Temp) are within normal limits if not listed below. Gait and Station Stable/steady, no ataxia Musculoskeletal System No extrapyramidal symptoms (EPS); no abnormal muscular movements or Tardive Dyskinesia (TD); muscle strength and tone are within normal limits Language No aphasia or dysarthria Speech:  normal pitch and normal volume Thought Processes logical; slow rate of thoughts; poor abstract reasoning/computation Thought Associations tangential  
Thought Content free of hallucinations Suicidal Ideations no plan  and no intention Homicidal Ideations no plan  and no intention Mood:  anxious Affect:  full range Memory recent  fair Memory remote:  impaired Concentration/Attention:  distractable Fund of Knowledge below average Insight:  limited Reliability poor Judgment:  poor VITALS:    
Patient Vitals for the past 24 hrs: 
 Temp Pulse Resp BP SpO2  
01/22/19 0825 98.5 °F (36.9 °C) 69 18 103/71 96 % 01/22/19 0203 99.7 °F (37.6 °C) 94 16 114/72 98 % Wt Readings from Last 3 Encounters:  
01/21/19 66.2 kg (146 lb) 12/03/18 85.3 kg (188 lb) 09/02/13 74.8 kg (165 lb) Temp Readings from Last 3 Encounters:  
01/22/19 98.5 °F (36.9 °C)  
01/22/19 98.2 °F (36.8 °C)  
12/12/18 98.4 °F (36.9 °C) BP Readings from Last 3 Encounters:  
01/22/19 103/71  
01/22/19 119/64  
12/12/18 141/86 Pulse Readings from Last 3 Encounters:  
01/22/19 69  
01/22/19 90  
12/12/18 61 DATA LABORATORY DATA: 
Labs Reviewed - No data to display Admission on 01/21/2019, Discharged on 01/22/2019 Component Date Value Ref Range Status  Influenza A Antigen 01/21/2019 NEGATIVE   NEG   Final  
 Influenza B Antigen 01/21/2019 NEGATIVE   NEG   Final  
 Color 01/21/2019 YELLOW/STRAW    Final  
 Appearance 01/21/2019 CLOUDY* CLEAR   Final  
 Specific gravity 01/21/2019 1.030  1.003 - 1.030   Final  
 pH (UA) 01/21/2019 6.0  5.0 - 8.0   Final  
 Protein 01/21/2019 30* NEG mg/dL Final  
 Glucose 01/21/2019 NEGATIVE   NEG mg/dL Final  
 Ketone 01/21/2019 TRACE* NEG mg/dL Final  
 Blood 01/21/2019 NEGATIVE   NEG   Final  
 Urobilinogen 01/21/2019 1.0  0.2 - 1.0 EU/dL Final  
 Nitrites 01/21/2019 NEGATIVE   NEG   Final  
 Leukocyte Esterase 01/21/2019 NEGATIVE   NEG   Final  
 Bilirubin UA, confirm 01/21/2019 NEGATIVE   NEG   Final  
 WBC 01/21/2019 0-4  0 - 4 /hpf Final  
 RBC 01/21/2019 0-5  0 - 5 /hpf Final  
 Epithelial cells 01/21/2019 MODERATE* FEW /lpf Final  
 Bacteria 01/21/2019 2+* NEG /hpf Final  
 UA:UC IF INDICATED 01/21/2019 URINE CULTURE ORDERED* CNI   Final  
 AMPHETAMINES 01/21/2019 NEGATIVE   NEG   Final  
 BARBITURATES 01/21/2019 NEGATIVE   NEG   Final  
 BENZODIAZEPINES 01/21/2019 NEGATIVE   NEG   Final  
 COCAINE 01/21/2019 NEGATIVE   NEG   Final  
 METHADONE 01/21/2019 NEGATIVE   NEG   Final  
 OPIATES 01/21/2019 NEGATIVE   NEG   Final  
 PCP(PHENCYCLIDINE) 01/21/2019 NEGATIVE   NEG   Final  
 THC (TH-CANNABINOL) 01/21/2019 NEGATIVE   NEG   Final  
 Drug screen comment 01/21/2019 (NOTE)   Final  
 ALCOHOL(ETHYL),SERUM 01/21/2019 <10  <10 MG/DL Final  
 WBC 01/21/2019 9.0  3.6 - 11.0 K/uL Final  
 RBC 01/21/2019 4.58  3.80 - 5.20 M/uL Final  
 HGB 01/21/2019 13.2  11.5 - 16.0 g/dL Final  
 HCT 01/21/2019 39.3  35.0 - 47.0 % Final  
 MCV 01/21/2019 85.8  80.0 - 99.0 FL Final  
 MCH 01/21/2019 28.8  26.0 - 34.0 PG Final  
 MCHC 01/21/2019 33.6  30.0 - 36.5 g/dL Final  
 RDW 01/21/2019 11.9  11.5 - 14.5 % Final  
  PLATELET 98/51/0867 646  150 - 400 K/uL Final  
 MPV 01/21/2019 9.0  8.9 - 12.9 FL Final  
 NRBC 01/21/2019 0.0  0  WBC Final  
 ABSOLUTE NRBC 01/21/2019 0.00  0.00 - 0.01 K/uL Final  
 NEUTROPHILS 01/21/2019 83* 32 - 75 % Final  
 LYMPHOCYTES 01/21/2019 11* 12 - 49 % Final  
 MONOCYTES 01/21/2019 6  5 - 13 % Final  
 EOSINOPHILS 01/21/2019 0  0 - 7 % Final  
 BASOPHILS 01/21/2019 0  0 - 1 % Final  
 IMMATURE GRANULOCYTES 01/21/2019 0  0.0 - 0.5 % Final  
 ABS. NEUTROPHILS 01/21/2019 7.4  1.8 - 8.0 K/UL Final  
 ABS. LYMPHOCYTES 01/21/2019 1.0  0.8 - 3.5 K/UL Final  
 ABS. MONOCYTES 01/21/2019 0.5  0.0 - 1.0 K/UL Final  
 ABS. EOSINOPHILS 01/21/2019 0.0  0.0 - 0.4 K/UL Final  
 ABS. BASOPHILS 01/21/2019 0.0  0.0 - 0.1 K/UL Final  
 ABS. IMM. GRANS. 01/21/2019 0.0  0.00 - 0.04 K/UL Final  
 DF 01/21/2019 AUTOMATED    Final  
 Sodium 01/21/2019 140  136 - 145 mmol/L Final  
 Potassium 01/21/2019 4.0  3.5 - 5.1 mmol/L Final  
 Chloride 01/21/2019 100  97 - 108 mmol/L Final  
 CO2 01/21/2019 28  21 - 32 mmol/L Final  
 Anion gap 01/21/2019 12  5 - 15 mmol/L Final  
 Glucose 01/21/2019 113* 65 - 100 mg/dL Final  
 BUN 01/21/2019 27* 6 - 20 MG/DL Final  
 Creatinine 01/21/2019 1.47* 0.55 - 1.02 MG/DL Final  
 BUN/Creatinine ratio 01/21/2019 18  12 - 20   Final  
 GFR est AA 01/21/2019 43* >60 ml/min/1.73m2 Final  
 GFR est non-AA 01/21/2019 36* >60 ml/min/1.73m2 Final  
 Calcium 01/21/2019 9.6  8.5 - 10.1 MG/DL Final  
 Bilirubin, total 01/21/2019 0.4  0.2 - 1.0 MG/DL Final  
 ALT (SGPT) 01/21/2019 29  12 - 78 U/L Final  
 AST (SGOT) 01/21/2019 26  15 - 37 U/L Final  
 Alk.  phosphatase 01/21/2019 72  45 - 117 U/L Final  
 Protein, total 01/21/2019 8.3* 6.4 - 8.2 g/dL Final  
 Albumin 01/21/2019 4.1  3.5 - 5.0 g/dL Final  
 Globulin 01/21/2019 4.2* 2.0 - 4.0 g/dL Final  
 A-G Ratio 01/21/2019 1.0* 1.1 - 2.2   Final  
 SAMPLES BEING HELD 01/21/2019  1GOLD, 1BLUE   Final  
  COMMENT 01/21/2019 Add-on orders for these samples will be processed based on acceptable specimen integrity and analyte stability, which may vary by analyte. Final  
 Ventricular Rate 01/21/2019 72  BPM Preliminary  Atrial Rate 01/21/2019 72  BPM Preliminary  P-R Interval 01/21/2019 164  ms Preliminary  QRS Duration 01/21/2019 84  ms Preliminary  Q-T Interval 01/21/2019 556  ms Preliminary  QTC Calculation (Bezet) 01/21/2019 608  ms Preliminary  Calculated P Axis 01/21/2019 61  degrees Preliminary  Calculated R Axis 01/21/2019 39  degrees Preliminary  Calculated T Axis 01/21/2019 95  degrees Preliminary  Diagnosis 01/21/2019    Preliminary Value:Normal sinus rhythm Possible Left atrial enlargement Left ventricular hypertrophy with repolarization abnormality Prolonged QT Abnormal ECG No previous ECGs available RADIOLOGY REPORTS: 
No results found for this or any previous visit. No results found. MEDICATIONS ALL MEDICATIONS Current Facility-Administered Medications Medication Dose Route Frequency  OLANZapine (ZyPREXA) tablet 2.5 mg  2.5 mg Oral Q6H PRN  
 ziprasidone (GEODON) 10 mg in sterile water (preservative free) 0.5 mL injection  10 mg IntraMUSCular BID PRN  
 benztropine (COGENTIN) tablet 1 mg  1 mg Oral BID PRN  
 benztropine (COGENTIN) injection 1 mg  1 mg IntraMUSCular BID PRN  
 zolpidem (AMBIEN) tablet 5 mg  5 mg Oral QHS PRN  
 acetaminophen (TYLENOL) tablet 650 mg  650 mg Oral Q4H PRN  
 ibuprofen (MOTRIN) tablet 400 mg  400 mg Oral Q8H PRN  
 magnesium hydroxide (MILK OF MAGNESIA) 400 mg/5 mL oral suspension 30 mL  30 mL Oral DAILY PRN  
 [START ON 1/23/2019] risperiDONE (RisperDAL) tablet 1 mg  1 mg Oral DAILY  [START ON 1/23/2019] hydroCHLOROthiazide (HYDRODIURIL) tablet 25 mg  25 mg Oral DAILY  [START ON 1/23/2019] lisinopril (PRINIVIL, ZESTRIL) tablet 20 mg  20 mg Oral DAILY  [START ON 1/23/2019] sertraline (ZOLOFT) tablet 50 mg  50 mg Oral DAILY  traZODone (DESYREL) tablet 50 mg  50 mg Oral QHS  
  
SCHEDULED MEDICATIONS Current Facility-Administered Medications Medication Dose Route Frequency  [START ON 1/23/2019] risperiDONE (RisperDAL) tablet 1 mg  1 mg Oral DAILY  [START ON 1/23/2019] hydroCHLOROthiazide (HYDRODIURIL) tablet 25 mg  25 mg Oral DAILY  [START ON 1/23/2019] lisinopril (PRINIVIL, ZESTRIL) tablet 20 mg  20 mg Oral DAILY  [START ON 1/23/2019] sertraline (ZOLOFT) tablet 50 mg  50 mg Oral DAILY  traZODone (DESYREL) tablet 50 mg  50 mg Oral QHS  
 
  
 
 
 
ASSESSMENT & PLAN The patient, Mireille Cantu, is a 58 y.o.  female who presents at this time for treatment of the following diagnoses: 
Patient Active Hospital Problem List: 
 Schizoaffective disorder ([facility] [address] (12/3/2018) Assessment: psychosis, hearing voices, depressed Plan: zoloft, risperidal 
HTN: restart meds. Patient psycho educated Get collaterals Discussed side effects/benefits and risks of medications Individual/milue therapy I will continue to monitor blood levels (Depakote, Tegretol, lithium, clozapine---a drug with a narrow therapeutic index= NTI) and associated labs for drug therapy implemented that require intense monitoring for toxicity as deemed appropriate based on current medication side effects and pharmacodynamically determined drug 1/2 lives. A coordinated, multidisplinary treatment team (includes the nurse, unit pharmcist,  and writer) round was conducted for this initial evaluation with the patient present. The following regarding medications was addressed during rounds with patient:  
he risks and benefits of the proposed medication. The patient was given the opportunity to ask questions. Informed consent given to the use of the above medications.   
 
I will continue to adjust psychiatric and non-psychiatric medications (see above \"medication\" section and orders section for details) as deemed appropriate & based upon diagnoses and response to treatment. I have reviewed admission (and previous/old) labs and medical tests in the EHR and or transferring hospital documents. I will continue to order blood tests/labs and diagnostic tests as deemed appropriate and review results as they become available (see orders for details). 
 
 have reviewed old psychiatric and medical records available in the EHR. I Will order additional psychiatric records from other institutions to further elucidate the nature of patient's psychopathology and review once available. I will gather additional collateral information from riends, family and o/p treatment team to further elucidate the nature of patient's psychopathology and baselline level of psychiatric functioning. ESTIMATED LENGTH OF STAY:   
3-7 days STRENGTHS: 
Access to housing/residential stability and Interpersonal/supportive relationships (family, friends, peers) SIGNED:   
Sangeeta Sun MD 
1/22/2019

## 2019-01-22 NOTE — BH NOTES
Pt received to general unit voluntary a transfer from  [facility]/ED Alert , oriented , pleasant , steady gait , suttravis at times States she is depressed about death of mom and dad Hx of HTN , at times has pain in knees and legs Denies S/I, H/I , ETOH , Nicotine and street drugs Has lived in own home until recently , living with cousin now Pt has A/H at baseline , Wandering in the night , found by police . States she gets her mental health services at [address] [place] [address] States she likes to walk Poor historian

## 2019-01-22 NOTE — ED NOTES
TRANSFER - OUT REPORT: 
 
Verbal report given to Keli Garcia RN(name) on Dory Almanzar  being transferred to [facility] for routine progression of care Report consisted of patients Situation, Background, Assessment and  
Recommendations(SBAR). Information from the following report(s) SBAR, ED Summary, [facility] and Recent Results was reviewed with the receiving nurse. Lines:  
Peripheral IV 01/21/19 Right Antecubital (Active) Site Assessment Clean, dry, & intact 1/21/2019  8:55 PM  
Phlebitis Assessment 0 1/21/2019  8:55 PM  
Infiltration Assessment 0 1/21/2019  8:55 PM  
Dressing Status Clean, dry, & intact 1/21/2019  8:55 PM  
Dressing Type Transparent 1/21/2019  8:55 PM  
Hub Color/Line Status Blue;Flushed 1/21/2019  8:55 PM  
  
 
Opportunity for questions and clarification was provided. Patient transported with: 
 VALARIE via [facility]

## 2019-01-22 NOTE — CONSULTS
Hospitalist Consult for H&P Note  DEANNE Lofton  phone [phone]  Call physician on-call through the  7pm-7am    Primary Care Provider: None  Source of Information: Patient/Chart     History of Presenting Illness:   Hospitalists consulted for medical H&P:    Pt had initially presented to [facility] ED accompanied by her niece to the ED with a chief complaint of acute mild intermittent n/v x 1 day and reportedly had 3 episodes of \"spitting up\" and was unable to tolerate food. She also reportedly \"walked off\" the night before coming to the ED because she was feeling anxious and was not found until the police found her sleeping outside in the morning in the freezing weather. Pt reports she knew where she was and how to get home. Niece reported this episode was the 2nd episode of \"walking off\" in 1 month since being discharged from [place] [address] services 12/3/2018. Today, pt interviewed, she was very conversant and had a hard time staying on topic. Pt denied hx stroke, seizures, CAD, Pulmonary disorder, GI complaints. No hx DM but + hypertension.      PMHx significant for schizophrenia, HTN, hallucinations, anxiety, psychosis     Review of Systems:  General: negative for fever, chills, sweats, weakness, weight loss  Eyes: negative for changes or loss in vision  Ear Nose and Throat: negative for rhinorrhea, speech or swallowing difficulties  Respiratory:  negative for cough, SOB, wheezing, SUMMERS  Cardiology:  negative for chest pain, palpitations, edema, syncope   Gastrointestinal: negative for abdominal pain, N/V, change in bowel habits  Genitourinary: negative for frequency, urgency, dysuria  Musculoskeletal : negative for arthralgia, myalgia  Neurological: negative for headache, dizziness, focal weakness, paresthesia, gait disturbance    Past Medical History:   Diagnosis Date    Anxiety disorder     Hypertension     Psychiatric disorder     schizophrenia       History reviewed. No pertinent surgical history. Prior to Admission medications    Medication Sig Start Date End Date Taking? Authorizing Provider   traZODone (DESYREL) 50 mg tablet Take 1 Tab by mouth nightly. 12/11/18   Cleve Paz MD   hydroCHLOROthiazide (HYDRODIURIL) 25 mg tablet Take 1 Tab by mouth daily for 30 days. 12/11/18 1/10/19  Cleve Paz MD   lisinopril (PRINIVIL, ZESTRIL) 20 mg tablet Take 1 Tab by mouth daily for 30 days. 12/11/18 1/10/19  Cleve Paz MD   risperiDONE (RISPERDAL) 2 mg tablet Take 1 Tab by mouth nightly for 60 days. 12/11/18 2/9/19  Cleve Paz MD   risperiDONE (RISPERDAL) 1 mg tablet Take 1 Tab by mouth daily. 12/12/18   Cleve Paz MD   sertraline (ZOLOFT) 50 mg tablet Take 1 Tab by mouth daily. 12/12/18   Cleve Paz MD     Allergies   Allergen Reactions    No Known Allergies Other (comments)      SOCIAL HISTORY:   Smoking history:   None xx   Former    Chronic      Alcohol history:   None xx   Social    Chronic      CODE STATUS:  DNR    Full xx   Other      Objective:   Physical Exam:   Visit Vitals  /71 (BP 1 Location: Right arm, BP Patient Position: Sitting)   Pulse 69   Temp 98.5 °F (36.9 °C)   Resp 18   SpO2 96%   Breastfeeding? No      O2 Device: Room air    General:  Alert, cooperative, no distress, appears stated age. HEENT:  Normocephalic, atraumatic. Conjunctivae/corneas clear. EOMs intact. Nares nl. MM dry. Edentulous. Back:   Symmetric, no curvature. Lungs:   Clear to auscultation bilaterally. No Wheezing/Rhonchi. No SOB, no accessory muscle use and on RA    Heart:  Regular rate and rhythm    Abdomen:   Soft, non-tender. Bowel sounds nl. Extremities: Extremities nl, atraumatic, no edema. Pulses palpable    Skin: Skin color, texture, turgor nl. Psych: Cooperative, not anxious or agitated. A/O x 1-2.  Pt aware she was hospitalized but did not recall name and unclear on date/month or even temperatures outside. Neurologic: CNII-XII grossly intact. no focal neurological deficits,  moving all extremities, speech clear      EKG:  Prolonged QTc    Data Review:   24 Hour Results:  Recent Results (from the past 24 hour(s))   INFLUENZA A & B AG (RAPID TEST)    Collection Time: 01/21/19  6:45 PM   Result Value Ref Range    Influenza A Antigen NEGATIVE  NEG      Influenza B Antigen NEGATIVE  NEG     ETHYL ALCOHOL    Collection Time: 01/21/19  6:45 PM   Result Value Ref Range    ALCOHOL(ETHYL),SERUM <10 <10 MG/DL   CBC WITH AUTOMATED DIFF    Collection Time: 01/21/19  6:45 PM   Result Value Ref Range    WBC 9.0 3.6 - 11.0 K/uL    RBC 4.58 3.80 - 5.20 M/uL    HGB 13.2 11.5 - 16.0 g/dL    HCT 39.3 35.0 - 47.0 %    MCV 85.8 80.0 - 99.0 FL    MCH 28.8 26.0 - 34.0 PG    MCHC 33.6 30.0 - 36.5 g/dL    RDW 11.9 11.5 - 14.5 %    PLATELET 746 631 - 715 K/uL    MPV 9.0 8.9 - 12.9 FL    NRBC 0.0 0  WBC    ABSOLUTE NRBC 0.00 0.00 - 0.01 K/uL    NEUTROPHILS 83 (H) 32 - 75 %    LYMPHOCYTES 11 (L) 12 - 49 %    MONOCYTES 6 5 - 13 %    EOSINOPHILS 0 0 - 7 %    BASOPHILS 0 0 - 1 %    IMMATURE GRANULOCYTES 0 0.0 - 0.5 %    ABS. NEUTROPHILS 7.4 1.8 - 8.0 K/UL    ABS. LYMPHOCYTES 1.0 0.8 - 3.5 K/UL    ABS. MONOCYTES 0.5 0.0 - 1.0 K/UL    ABS. EOSINOPHILS 0.0 0.0 - 0.4 K/UL    ABS. BASOPHILS 0.0 0.0 - 0.1 K/UL    ABS. IMM.  GRANS. 0.0 0.00 - 0.04 K/UL    DF AUTOMATED     METABOLIC PANEL, COMPREHENSIVE    Collection Time: 01/21/19  6:45 PM   Result Value Ref Range    Sodium 140 136 - 145 mmol/L    Potassium 4.0 3.5 - 5.1 mmol/L    Chloride 100 97 - 108 mmol/L    CO2 28 21 - 32 mmol/L    Anion gap 12 5 - 15 mmol/L    Glucose 113 (H) 65 - 100 mg/dL    BUN 27 (H) 6 - 20 MG/DL    Creatinine 1.47 (H) 0.55 - 1.02 MG/DL    BUN/Creatinine ratio 18 12 - 20      GFR est AA 43 (L) >60 ml/min/1.73m2    GFR est non-AA 36 (L) >60 ml/min/1.73m2    Calcium 9.6 8.5 - 10.1 MG/DL    Bilirubin, total 0.4 0.2 - 1.0 MG/DL    ALT (SGPT) 29 12 - 78 U/L    AST (SGOT) 26 15 - 37 U/L    Alk. phosphatase 72 45 - 117 U/L    Protein, total 8.3 (H) 6.4 - 8.2 g/dL    Albumin 4.1 3.5 - 5.0 g/dL    Globulin 4.2 (H) 2.0 - 4.0 g/dL    A-G Ratio 1.0 (L) 1.1 - 2.2     SAMPLES BEING HELD    Collection Time: 01/21/19  6:45 PM   Result Value Ref Range    SAMPLES BEING HELD  1GOLD, 1BLUE     COMMENT        Add-on orders for these samples will be processed based on acceptable specimen integrity and analyte stability, which may vary by analyte.    URINALYSIS W/ REFLEX CULTURE    Collection Time: 01/21/19  8:15 PM   Result Value Ref Range    Color YELLOW/STRAW      Appearance CLOUDY (A) CLEAR      Specific gravity 1.030 1.003 - 1.030      pH (UA) 6.0 5.0 - 8.0      Protein 30 (A) NEG mg/dL    Glucose NEGATIVE  NEG mg/dL    Ketone TRACE (A) NEG mg/dL    Blood NEGATIVE  NEG      Urobilinogen 1.0 0.2 - 1.0 EU/dL    Nitrites NEGATIVE  NEG      Leukocyte Esterase NEGATIVE  NEG      Bilirubin UA, confirm NEGATIVE  NEG      WBC 0-4 0 - 4 /hpf    RBC 0-5 0 - 5 /hpf    Epithelial cells MODERATE (A) FEW /lpf    Bacteria 2+ (A) NEG /hpf    UA:UC IF INDICATED URINE CULTURE ORDERED (A) CNI     EKG, 12 LEAD, INITIAL    Collection Time: 01/21/19  8:33 PM   Result Value Ref Range    Ventricular Rate 72 BPM    Atrial Rate 72 BPM    P-R Interval 164 ms    QRS Duration 84 ms    Q-T Interval 556 ms    QTC Calculation (Bezet) 608 ms    Calculated P Axis 61 degrees    Calculated R Axis 39 degrees    Calculated T Axis 95 degrees    Diagnosis       Normal sinus rhythm  Possible Left atrial enlargement  Left ventricular hypertrophy with repolarization abnormality  Prolonged QT  Abnormal ECG  No previous ECGs available     DRUG SCREEN, URINE    Collection Time: 01/21/19 11:11 PM   Result Value Ref Range    AMPHETAMINES NEGATIVE  NEG      BARBITURATES NEGATIVE  NEG      BENZODIAZEPINES NEGATIVE  NEG      COCAINE NEGATIVE  NEG      METHADONE NEGATIVE  NEG      OPIATES NEGATIVE  NEG PCP(PHENCYCLIDINE) NEGATIVE  NEG      THC (TH-CANNABINOL) NEGATIVE  NEG      Drug screen comment (NOTE)      Assessment/Plan     Disorganized Schizophrenia:  - treatment as per primary team    Prolonged QTc:  - seen on EKG from [facility] 1/21  - repeat EKG as plans are to restart pts antipsychotics soon  - d/c trazadone  - check electrolytes in am    Elevated Creatinine:   - pt was noted to have some n/v PTA per chart with intoleration to po diet ?  Dehydration  - bolused at [facility]  - recheck labs in am    Hx Hypertension:  - BP appears as though it may be trending up, her last reading 133/73  - BP meds had been restarted this am with parameters    Code Status: Full  DVT ppx: none indication  Dispo: as per primary team       Signed By: Aj Pastrana NP     January 22, 2019

## 2019-01-22 NOTE — BH NOTES
PSYCHOSOCIAL ASSESSMENT 
:Patient identifying info: 
Michel Tuprin is a 58 y.o., female admitted 1/22/2019  2:12 AM  
 
Presenting problem and precipitating factors: Patient was transferred from [facility] to [place] [address] after being brought to the ED by her family member for wandering behavior and disorganized thinking. Pt's family member states that Pt lives with her and wandered out of the house when she stated she was going for a walk. Pt was found by police the following morning, sleeping outside in freezing weather. Pt did not know the reason she was brought to the hospital, stating she believed it was due to vomiting. Pt's family member reported that this is the 2nd incident of wandering in the past month and does not believe Pt's medications are working effectively. Pt reports AH, but denies depression, SI/HI. Of note, Pt's UA indicates the presence of bacteria and may indicate a UTI. Mental status assessment: Disorganized, alert, disoriented to place and time Collateral information: Yanni Ackerman (cousin [phone]) Current psychiatric /substance abuse providers and contact info: Newly linked to [facility], [address] Previous psychiatric/substance abuse providers and response to treatment: Multiple previous inpatient psychiatric hospitalizations [facility] 12/2018, 9/2013, 10/2012, 4/2012) Family history of mental illness or substance abuse: Unknown Substance abuse history:  None Social History Tobacco Use  Smoking status: Former Smoker Packs/day: 0.00  Smokeless tobacco: Never Used Substance Use Topics  Alcohol use: No  
 
 
History of biomedical complications associated with substance abuse: N/A Patient's current acceptance of treatment or motivation for change: N/A Family constellation: Adult children; aunt/niece/cousin Is significant other involved? No 
 
 
Describe support system: Family Describe living arrangements and home environment: Patient lives with family; it is unclear if Pt lives with an aunt, niece, or cousin, as she uses the terms interchangably. Health issues:  
Hospital Problems  Date Reviewed: 9/3/2013 Codes Class Noted POA [facility] ICD-10-CM: F25.9 ICD-9-CM: 295.70  12/3/2018 Unknown Trauma history: Long history of serious mental illness Legal issues: None indicated History of  service: No 
 
Financial status: [facility] [employer]/cultural factors: Non Church 
 
Education/work history: Completed 11th grade; unemployed on disability; previous work cleaning houses Have you been licensed as a health care professional (current or ): No 
 
Leisure and recreation preferences: Spending time with family Describe coping skills: Limited [place] 2019

## 2019-01-23 LAB
ANION GAP SERPL CALC-SCNC: 7 MMOL/L (ref 5–15)
ATRIAL RATE: 53 BPM
ATRIAL RATE: 72 BPM
BACTERIA SPEC CULT: NORMAL
BUN SERPL-MCNC: 27 MG/DL (ref 6–20)
BUN/CREAT SERPL: 27 (ref 12–20)
CALCIUM SERPL-MCNC: 8.8 MG/DL (ref 8.5–10.1)
CALCULATED P AXIS, ECG09: 53 DEGREES
CALCULATED P AXIS, ECG09: 61 DEGREES
CALCULATED R AXIS, ECG10: 27 DEGREES
CALCULATED R AXIS, ECG10: 39 DEGREES
CALCULATED T AXIS, ECG11: 84 DEGREES
CALCULATED T AXIS, ECG11: 95 DEGREES
CC UR VC: NORMAL
CHLORIDE SERPL-SCNC: 104 MMOL/L (ref 97–108)
CO2 SERPL-SCNC: 28 MMOL/L (ref 21–32)
CREAT SERPL-MCNC: 0.99 MG/DL (ref 0.55–1.02)
DIAGNOSIS, 93000: NORMAL
DIAGNOSIS, 93000: NORMAL
GLUCOSE P FAST SERPL-MCNC: 105 MG/DL (ref 65–100)
GLUCOSE SERPL-MCNC: 105 MG/DL (ref 65–100)
MAGNESIUM SERPL-MCNC: 2.1 MG/DL (ref 1.6–2.4)
P-R INTERVAL, ECG05: 164 MS
P-R INTERVAL, ECG05: 182 MS
POTASSIUM SERPL-SCNC: 4.3 MMOL/L (ref 3.5–5.1)
Q-T INTERVAL, ECG07: 556 MS
Q-T INTERVAL, ECG07: 564 MS
QRS DURATION, ECG06: 84 MS
QRS DURATION, ECG06: 86 MS
QTC CALCULATION (BEZET), ECG08: 529 MS
QTC CALCULATION (BEZET), ECG08: 608 MS
SERVICE CMNT-IMP: NORMAL
SODIUM SERPL-SCNC: 139 MMOL/L (ref 136–145)
VENTRICULAR RATE, ECG03: 53 BPM
VENTRICULAR RATE, ECG03: 72 BPM

## 2019-01-23 PROCEDURE — 65220000003 HC RM SEMIPRIVATE PSYCH

## 2019-01-23 PROCEDURE — 83735 ASSAY OF MAGNESIUM: CPT

## 2019-01-23 PROCEDURE — 74011250637 HC RX REV CODE- 250/637: Performed by: PSYCHIATRY & NEUROLOGY

## 2019-01-23 PROCEDURE — 82947 ASSAY GLUCOSE BLOOD QUANT: CPT

## 2019-01-23 PROCEDURE — 80048 BASIC METABOLIC PNL TOTAL CA: CPT

## 2019-01-23 PROCEDURE — 36415 COLL VENOUS BLD VENIPUNCTURE: CPT

## 2019-01-23 RX ORDER — SERTRALINE HYDROCHLORIDE 50 MG/1
50 TABLET, FILM COATED ORAL DAILY
Status: DISCONTINUED | OUTPATIENT
Start: 2019-01-24 | End: 2019-01-25 | Stop reason: HOSPADM

## 2019-01-23 RX ORDER — RISPERIDONE 1 MG/1
2 TABLET, FILM COATED ORAL
Status: DISCONTINUED | OUTPATIENT
Start: 2019-01-23 | End: 2019-01-25 | Stop reason: HOSPADM

## 2019-01-23 RX ORDER — SERTRALINE HYDROCHLORIDE 50 MG/1
25 TABLET, FILM COATED ORAL ONCE
Status: COMPLETED | OUTPATIENT
Start: 2019-01-23 | End: 2019-01-23

## 2019-01-23 RX ADMIN — RISPERIDONE 1 MG: 1 TABLET ORAL at 08:28

## 2019-01-23 RX ADMIN — SERTRALINE HYDROCHLORIDE 25 MG: 50 TABLET ORAL at 11:22

## 2019-01-23 RX ADMIN — SERTRALINE HYDROCHLORIDE 25 MG: 50 TABLET ORAL at 08:28

## 2019-01-23 RX ADMIN — RISPERIDONE 2 MG: 1 TABLET ORAL at 21:35

## 2019-01-23 NOTE — BH NOTES
Lying quietly in bed with eyes closed, respirations even and unlabored , NAD noted Q15 min safety monitoring continues

## 2019-01-23 NOTE — PROGRESS NOTES
Problem: Depressed Mood (Adult/Pediatric) Goal: *STG: Participates in treatment plan Outcome: Progressing Towards Goal 
Pt attends group. Problem: Altered Thought Process (Adult/Pediatric) Goal: *STG: Decreased hallucinations Outcome: Progressing Towards Goal 
Pt demonstrates no objective signs of hallucinations

## 2019-01-23 NOTE — BH NOTES
GROUP THERAPY PROGRESS NOTE Kaye Dawson participated in an [address] on the General Unit, with a focus on singing and building positive experiences as a coping skill. Group time: 50 minutes. Personal goal for participation: To increase the capacity to shift ones mood, prepare for the rest of the day, and share in group singing. Goal orientation: The patient will participate in group singing. Group therapy participation: When prompted, this patient partially and marginally participated in the group. Therapeutic interventions reviewed and discussed: The group members were join in singing several songs and talk about what ideas and thoughts came up for them during the song. Impression of participation: The patient sang quietly at the beginning but left about group home through, without comment. The group sang numerous songs including How Much [id number] that Doggie in the Window, This [address], Don't Fence Me In, Keep on the [place],  etc. The patients affect was both constrained and anxious. Her mood matched her affect.

## 2019-01-23 NOTE — BH NOTES
PSYCHIATRIC PROGRESS NOTE Patient Name  Ludwig Dudley Date of Birth 1955 [facility] [id number] Medical Record Number  [id number] Age  58 y.o. PCP None Admit date:  1/22/2019 Room Number  725/02  @ [facility]  
Date of Service  1/23/2019 E & M PROGRESS NOTE:15 mins HISTORY  
   
C 
REASON FOR HOSPITALIZATION: 
CC: \" Pt admitted under a voluntary basis for psychosis and bizzare behvaior and an inability to care for self. HISTORY OF PRESENT ILLNESS:   
The patient, Ludwig Dudley, is a 58 y.o. [address] female with a past psychiatric history significant for schizophrenia was admitted for bizzare behavior and confusion . AS per reports, pt wandered off without telling the family. Pt reported she is going through a lot but did not elaborate. Pt presented as pleasently confused not knowing the year but new the day of the week. Ludwig Dudley  currently denied suicidal/homicidal ideations and plans. Pt denied auditory and visual hallucinations, Pt is compliant with the meds, no side effects to meds reported. As per intial reports: 
Pt reportedly \"walked off\" because she was feeling anxious. Niece reports she was not found until the police found her sleeping outside this morning in freezing weather. Pt reports she knew where she was and how to get home. Niece reports this is the 2nd episode of walking off in 1 month since being discharged from [place] [address] services  These symptoms are of high severity. These symptoms are intermittent/ fleeting in nature. 1/23/19:  Pt is pleasant, smiling, forgetful, tangential, no acute issues reporsted 
  
  
Past Psychiatric history:  
Hospitalizations: yes, multiple, last admission at [facility] in December Pt w f/u at [place] Suicidal attempts: none Substance abuse: none 
  
Family History of mental illness: 
Not known: 
  
Social History: Patient reports she has one son nad one daughter. Pt lives with aunt Legal issues: none SIDE EFFECTS: (reviewed/updated 1/23/2019) None reported or admitted to. No noted toxicity with use of epakote/Tegretol/lithium/Clozaril/TCAs ALLERGIES:(reviewed/updated 1/23/2019) Allergies Allergen Reactions  No Known Allergies Other (comments) MEDICATIONS PRIOR TO ADMISSION:(reviewed/updated 1/23/2019) Medications Prior to Admission Medication Sig  
 traZODone (DESYREL) 50 mg tablet Take 1 Tab by mouth nightly.  hydroCHLOROthiazide (HYDRODIURIL) 25 mg tablet Take 1 Tab by mouth daily for 30 days.  lisinopril (PRINIVIL, ZESTRIL) 20 mg tablet Take 1 Tab by mouth daily for 30 days.  risperiDONE (RISPERDAL) 2 mg tablet Take 1 Tab by mouth nightly for 60 days.  risperiDONE (RISPERDAL) 1 mg tablet Take 1 Tab by mouth daily.  sertraline (ZOLOFT) 50 mg tablet Take 1 Tab by mouth daily. PAST MEDICAL HISTORY: Past medical history from the initial psychiatric evaluation has been reviewed (reviewed/updated 1/23/2019) with no additional updates (I asked patient and no additional past medical history provided). Past Medical History:  
Diagnosis Date  Anxiety disorder  Hypertension  Psychiatric disorder   
 schizophrenia History reviewed. No pertinent surgical history. SOCIAL HISTORY: Social history from the initial psychiatric evaluation has been reviewed (reviewed/updated 1/23/2019) with no additional updates (I asked patient and no additional social history provided). Social History Socioeconomic History  Marital status: UNKNOWN Spouse name: Not on file  Number of children: Not on file  Years of education: Not on file  Highest education level: Not on file Social Needs  Financial resource strain: Not on file  Food insecurity - worry: Not on file  Food insecurity - inability: Not on file  Transportation needs - medical: Not on file  Transportation needs - non-medical: Not on file Occupational History  Not on file Tobacco Use  Smoking status: Former Smoker Packs/day: 0.00  Smokeless tobacco: Never Used Substance and Sexual Activity  Alcohol use: No  
 Drug use: No  
 Sexual activity: No  
Other Topics Concern  Not on file Social History Narrative  Not on file FAMILY HISTORY: Family history from the initial psychiatric evaluation has been reviewed (reviewed/updated 1/23/2019) with no additional updates (I asked patient and no additional family history provided). History reviewed. No pertinent family history. REVIEW OF SYSTEMS: (reviewed/updated 1/23/2019) Appetite: good Sleep:  good All other Review of Systems: [facility] MENTAL STATUS EXAM (MSE): MSE FINDINGS ARE WITHIN NORMAL LIMITS (WNL) UNLESS OTHERWISE STATED BELOW. ( ALL OF THE BELOW CATEGORIES OF THE MSE HAVE BEEN REVIEWED (reviewed 1/22/2019) AND UPDATED AS DEEMED APPROPRIATE ) General Presentation age appropriate and casually dressed, cooperative Orientation oriented to time, place and person Vital Signs  See below (reviewed 1/22/2019); Vital Signs (BP, Pulse, & Temp) are within normal limits if not listed below. Gait and Station Stable/steady, no ataxia Musculoskeletal System No extrapyramidal symptoms (EPS); no abnormal muscular movements or Tardive Dyskinesia (TD); muscle strength and tone are within normal limits Language No aphasia or dysarthria Speech:  normal pitch and normal volume Thought Processes logical; slow rate of thoughts; poor abstract reasoning/computation Thought Associations tangential  
Thought Content free of hallucinations Suicidal Ideations no plan  and no intention Homicidal Ideations no plan  and no intention Mood:  anxious Affect:  full range Memory recent  fair Memory remote:  impaired Concentration/Attention:  distractable Fund of Knowledge below average Insight:  limited Reliability poor Judgment:  poor VITALS:    
Patient Vitals for the past 24 hrs: 
 Temp Pulse Resp BP SpO2  
01/23/19 0720 98.4 °F (36.9 °C) 60 16 118/74 97 % 01/22/19 2016 98.4 °F (36.9 °C) 60 16 123/74 100 % 01/22/19 1628 98.4 °F (36.9 °C) 64 16 102/65 100 % 01/22/19 1220 98.1 °F (36.7 °C) 98 16 133/73 100 % Wt Readings from Last 3 Encounters:  
01/21/19 66.2 kg (146 lb) 12/03/18 85.3 kg (188 lb) 09/02/13 74.8 kg (165 lb) Temp Readings from Last 3 Encounters:  
01/23/19 98.4 °F (36.9 °C)  
01/22/19 98.2 °F (36.8 °C)  
12/12/18 98.4 °F (36.9 °C) BP Readings from Last 3 Encounters:  
01/23/19 118/74  
01/22/19 119/64  
12/12/18 141/86 Pulse Readings from Last 3 Encounters:  
01/23/19 60  
01/22/19 90  
12/12/18 61 DATA LABORATORY DATA:(reviewed/updated 1/23/2019) Recent Results (from the past 24 hour(s)) EKG, 12 LEAD, INITIAL Collection Time: 01/22/19  4:33 PM  
Result Value Ref Range Ventricular Rate 53 BPM  
 Atrial Rate 53 BPM  
 P-R Interval 182 ms QRS Duration 86 ms  
 Q-T Interval 564 ms QTC Calculation (Bezet) 529 ms Calculated P Axis 53 degrees Calculated R Axis 27 degrees Calculated T Axis 84 degrees Diagnosis Sinus bradycardia Left ventricular hypertrophy with repolarization abnormality Prolonged QT Abnormal ECG When compared with ECG of 21-JAN-2019 20:33, 
QT has shortened METABOLIC PANEL, BASIC Collection Time: 01/23/19  6:21 AM  
Result Value Ref Range Sodium 139 136 - 145 mmol/L Potassium 4.3 3.5 - 5.1 mmol/L Chloride 104 97 - 108 mmol/L  
 CO2 28 21 - 32 mmol/L Anion gap 7 5 - 15 mmol/L Glucose 105 (H) 65 - 100 mg/dL BUN 27 (H) 6 - 20 MG/DL Creatinine 0.99 0.55 - 1.02 MG/DL  
 BUN/Creatinine ratio 27 (H) 12 - 20 GFR est AA >60 >60 ml/min/1.73m2 GFR est non-AA 56 (L) >60 ml/min/1.73m2  Calcium 8.8 8.5 - 10.1 MG/DL  
 MAGNESIUM Collection Time: 01/23/19  6:21 AM  
Result Value Ref Range Magnesium 2.1 1.6 - 2.4 mg/dL GLUCOSE, FASTING Collection Time: 01/23/19  6:21 AM  
Result Value Ref Range Glucose 105 (H) 65 - 100 MG/DL No results found for: VALF2, VALAC, VALP, VALPR, DS6, CRBAM, CRBAMP, CARB2, XCRBAM 
No results found for: LITHM  
RADIOLOGY REPORTS:(reviewed/updated 1/23/2019) No results found. MEDICATIONS ALL MEDICATIONS:  
Current Facility-Administered Medications Medication Dose Route Frequency  risperiDONE (RisperDAL) tablet 2 mg  2 mg Oral QHS  [START ON 1/24/2019] sertraline (ZOLOFT) tablet 50 mg  50 mg Oral DAILY  sertraline (ZOLOFT) tablet 25 mg  25 mg Oral ONCE  
 OLANZapine (ZyPREXA) tablet 2.5 mg  2.5 mg Oral Q6H PRN  
 ziprasidone (GEODON) 10 mg in sterile water (preservative free) 0.5 mL injection  10 mg IntraMUSCular BID PRN  
 benztropine (COGENTIN) tablet 1 mg  1 mg Oral BID PRN  
 benztropine (COGENTIN) injection 1 mg  1 mg IntraMUSCular BID PRN  
 zolpidem (AMBIEN) tablet 5 mg  5 mg Oral QHS PRN  
 acetaminophen (TYLENOL) tablet 650 mg  650 mg Oral Q4H PRN  
 ibuprofen (MOTRIN) tablet 400 mg  400 mg Oral Q8H PRN  
 magnesium hydroxide (MILK OF MAGNESIA) 400 mg/5 mL oral suspension 30 mL  30 mL Oral DAILY PRN  
 risperiDONE (RisperDAL) tablet 1 mg  1 mg Oral DAILY  hydroCHLOROthiazide (HYDRODIURIL) tablet 25 mg  25 mg Oral DAILY  lisinopril (PRINIVIL, ZESTRIL) tablet 20 mg  20 mg Oral DAILY  
  
SCHEDULED MEDICATIONS:  
Current Facility-Administered Medications Medication Dose Route Frequency  risperiDONE (RisperDAL) tablet 2 mg  2 mg Oral QHS  [START ON 1/24/2019] sertraline (ZOLOFT) tablet 50 mg  50 mg Oral DAILY  sertraline (ZOLOFT) tablet 25 mg  25 mg Oral ONCE  
 risperiDONE (RisperDAL) tablet 1 mg  1 mg Oral DAILY  hydroCHLOROthiazide (HYDRODIURIL) tablet 25 mg  25 mg Oral DAILY  lisinopril (PRINIVIL, ZESTRIL) tablet 20 mg  20 mg Oral DAILY ASSESSMENT & PLAN The patient, Ginette Lugo, is a 58 y.o.  female who presents at this time for treatment of the following diagnoses: 
Patient Active Hospital Problem List: 
 Schizoaffective disorder ([facility].) (12/3/2018) Assessment: psychosis, hearing voices, depressed Plan: zoloft, risperidal 
HTN: restart meds. Patient psycho educated Get collaterals Discussed side effects/benefits and risks of medications Individual/milue therapy 
 1/23/19: increaisng risperidal 2 mg po hs, get MMSE, continue milue, coll[facility]als from [facility] obtained 
  In summary, Ginette Lugo, is a 58 y.o.  female who presents with a severe exacerbation of the principal diagnosis of Schizoaffective disorder ([facility] Patient's condition is orsening/not improving/not stable improving. Patient requires continued inpatient hospitalization for further stabilization, safety monitoring and medication management. I will continue to coordinate the provision of individual, milieu, occupational, group, and substance abuse therapies to address target symptoms/diagnoses as deemed appropriate for the individual patient. A coordinated, multidisplinary treatment team round was conducted with the patient (this team consists of the nurse, psychiatric unit pharmcist,  and writer). Complete current electronic health record for patient has been reviewed today including consultant notes, ancillary staff notes, nurses and psychiatric tech notes. Suicide risk assessment completed and patient deemed to be of low risk for suicide at this time. The following regarding medications was addressed during rounds with patient:  
the risks and benefits of the proposed medication. The patient was given the opportunity to ask questions. Informed consent given to the use of the above medications.  Will continue to adjust psychiatric and non-psychiatric medications (see above \"medication\" section and orders section for details) as deemed appropriate & based upon diagnoses and response to treatment. I will continue to order blood tests/labs and diagnostic tests as deemed appropriate and review results as they become available (see orders for details and above listed lab/test results). I will order psychiatric records from previous [facility] hospitals to further elucidate the nature of patient's psychopathology and review once available. I will gather additional collateral information from friends, family and o/p treatment team to further elucidate the nature of patient's psychopathology and baselline level of psychiatric functioning. I certify that this patient's inpatient psychiatric hospital services furnished since the previous certification were, and continue to be, required for treatment that could reasonably be expected to improve the patient's condition, or for diagnostic study, and that the patient continues to need, on a daily basis, active treatment furnished directly by or requiring the supervision of inpatient psychiatric facility personnel. In addition, the hospital records show that services furnished were intensive treatment services, admission or related services, or equivalent services. EXPECTED DISCHARGE DATE/DAY: TBD  
 
DISPOSITION: Home Signed By:  
Suzie Delgadillo MD 
1/23/2019

## 2019-01-23 NOTE — PROGRESS NOTES
NUTRITION Nutrition screening referral was triggered based on results obtained during nursing admission assessment for \"unsure wt loss. \" The patient's chart was reviewed and nutrition assessment is not indicated at this time. Some weight loss per records but predict previous weight no accurate. Weight WNL. Some \"spitting up\" prior to admit noted but non-specific. Wt Readings:  
01/21/19 66.2 kg (146 lb) - standing scale at [facility] ED  
12/03/18 85.3 kg (188 lb) - ED weight, predict not accurate 09/02/13 74.8 kg (165 lb) 10/17/12 68 kg (150 lb) 04/29/12 63.5 kg (140 lb) If nausea and vomiting a persistent issue or poor appetite/wt loss during admit please consult as indicated. Thank you.   
 
Jayce Reeder RD

## 2019-01-23 NOTE — PROGRESS NOTES
Problem: Depressed Mood (Adult/Pediatric) Goal: *STG: Participates in treatment plan Outcome: Progressing Towards Goal 
Pt participated in treatment team. Interacting with peers and staff. Goal: *STG: Verbalizes anger, guilt, and other feelings in a constructive manor Outcome: Progressing Towards Goal 
Able to communicate feelings in a constructive manor. Goal: *STG: Attends activities and groups Outcome: Progressing Towards Goal 
Attended the Process Group and Nursing Group during the shift.   
 
1:39 pm-MMSE 20

## 2019-01-23 NOTE — BH NOTES
GROUP THERAPY PROGRESS NOTE Artis Feliz participated in a Process Group on the General Unit with a focus identifying feelings, planning for the day, and learning more about DBT concepts of \"Elf Help\" and the importance of self-care. Group time: 75 minutes. Personal goal for participation: To identify feelings and increase the capacity to take care of oneself first as a primary coping skill. Goal orientation: The patient will be able to introduce themselves to their peers, identify their feelings, and consider the importance of taking time for ones self-care as an important part of life planning and coping skill development. Group therapy participation: With minimal prompting, this patient partially participated in the group. The patient was called out of group to meet with her treatment team about fifteen minutes into the session and did not return. Therapeutic interventions reviewed and discussed: The group members were asked to begin by introducing themselves and sharing about their feelings. They were then asked to  take turns reading from an outline of twenty-two behavioral suggestions from DBT, called the [address] [place] [address], were reviewed with the group members and discussed as a group. The suggestions came with drawings of clinton engaged in the activities described. These suggestions included: 1) trusting yourself (wise mind); 2) take a day off to retreat (observe and describe); 3) talk and play with children or adults (participate); 4) when you cant stop thinking, feel (non-judgmental stance); 5) stay in the present by recognizing when anxiety gets you caught up in a future or a past (one mindfully); 6) take time to think before just jumping in to solve a problem (efficiently);  7) when angry, let yourself feel the anger (the function of emotion); 8) make time for play (adult pleasant activities); 9) when sad, think about what would be comforting (accepting and engaging the opposite); 10) put yourself first (self-soothe); 11) when uncomfortable being alone, think about being with others and decide whether to make it happen (improve imagery); 12) take time to wonder (improve meaning); 13) carve out time to be lazy (improve relaxation); 14) notice the sun and the moon as they rise and set (improve one thing at a time); 15) nothing is usually the hardest thing to do  but often it is the best (guidelines for accepting reality); 16) when things are in chaos and one is in a frenzy, ask yourself Adria Puente is right about now?  (turning your mind); 17) learn to stand back and let others take their turn as leaders (radical acceptance); 18) when someone turns you down, it usually has to do with them and not you  ask someone else for what you need (using objective effectiveness); 19) when wanting to talk with someone new and are scared, breathe  dont rehearse, just plunge and if it doesnt go well, you can stop (using relationship effectiveness); 20) when you see someone elses hurt face, breathe  you are not responsible for making other people happy (no apologies); 21) when you want something from someone else, ask  asking is being true to yourself (be truthful); 22) when you are hurt, tell the person who hurt you (situations for interpersonal effectiveness). The group members were provided a summary sheet of the DBT information discussed, which they could also review on their own time. Impression of participation: The patient said she was, \"Fine. \" She added that she wanted to \"focus on myself\" and pay attention \"to what's going to happen on the unit [attend her groups]. \" She  was alert, generally oriented, and cooperative for as long as she remained in the group. She expressed no current SI/HI and did not vocalize any [employer] preoccupation and was not hyper-verbal or interruptive.  She left the group, to meet with her treatment team, soon after she shared. Her mood was not as labile or agitated as she was the previous day. Her affect was non-dysphoric, with some anxiety. She was not able to tolerate the whole group session.

## 2019-01-23 NOTE — INTERDISCIPLINARY ROUNDS
Behavioral Health Interdisciplinary Rounds Patient Name: Sahara Alvarenga  Age: 58 y.o. Room/Bed:  725/02 Primary Diagnosis: Schizoaffective disorder ([facility] Admission Status: Voluntary Readmission within 30 days: no 
Power of  in place: no 
Patient requires a blocked bed: no          Reason for blocked bed: VTE Prophylaxis: No 
 
Mobility needs/Fall risk: no 
Flu Vaccine : yes , PTA Nutritional Plan: no 
Consults:       
Labs/Testing due today?: yes, cooperative with lab draw , tolerated well Sleep hours:  6 Participation in Care/Groups:  yes Medication Compliant?: Yes PRNS (last 24 hours): None Restraints (last 24 hours):  no 
  
CIWA (range last 24 hours): COWS (range last 24 hours): Alcohol screening (AUDIT) completed -   AUDIT Score: 0 If applicable, date SBIRT discussed in treatment team AND documented:  
 
Tobacco - patient is a smoker: Have You Used Tobacco in the Past 30 Days: No 
Illegal Drugs use: Have You Used Any Illegal Substances Over the Past 12 Months: No 
 
24 hour chart check complete: yes Patient goal(s) for today: continue attending all groups Treatment team focus/goals: Link to home health; MMSE; increase Risperdal 
Progress note: Pt is pleasant, but confused LOS:  1  Expected LOS: TBD Financial concerns/prescription coverage: Medicare; [place] Medicaid Date of last family contact: 1/22 RN spoke to family Family requesting physician contact today: No 
Discharge plan: Return to family's home Guns in the home: No      
Outpatient provider(s): Corine Kenney, [address] Participating treatment team members: Sahara Alvarenga, REMY Pedroza; Dr. Sola Perera MD; Linda Read, RN

## 2019-01-23 NOTE — BH NOTES
GROUP THERAPY PROGRESS NOTE Donis Jackson is participating in Reflections Group. Group time: 30 minutes Personal goal for participation: to relax Goal orientation: relaxation Group therapy participation: active Therapeutic interventions reviewed and discussed: review unit schedule, treatment and goal setting Impression of participation: Participated in entire group. Made goal \" to attend all groups tomorrow and ask to be discharged Friday\".

## 2019-01-24 LAB
ATRIAL RATE: 53 BPM
CALCULATED P AXIS, ECG09: 64 DEGREES
CALCULATED R AXIS, ECG10: 30 DEGREES
CALCULATED T AXIS, ECG11: 89 DEGREES
DIAGNOSIS, 93000: NORMAL
P-R INTERVAL, ECG05: 190 MS
Q-T INTERVAL, ECG07: 488 MS
QRS DURATION, ECG06: 88 MS
QTC CALCULATION (BEZET), ECG08: 457 MS
VENTRICULAR RATE, ECG03: 53 BPM

## 2019-01-24 PROCEDURE — 93005 ELECTROCARDIOGRAM TRACING: CPT

## 2019-01-24 PROCEDURE — 74011250637 HC RX REV CODE- 250/637: Performed by: PSYCHIATRY & NEUROLOGY

## 2019-01-24 PROCEDURE — 65220000003 HC RM SEMIPRIVATE PSYCH

## 2019-01-24 RX ORDER — ATORVASTATIN CALCIUM 20 MG/1
20 TABLET, FILM COATED ORAL
Status: DISCONTINUED | OUTPATIENT
Start: 2019-01-24 | End: 2019-01-25 | Stop reason: HOSPADM

## 2019-01-24 RX ADMIN — ATORVASTATIN CALCIUM 20 MG: 20 TABLET, FILM COATED ORAL at 21:27

## 2019-01-24 RX ADMIN — RISPERIDONE 2 MG: 1 TABLET ORAL at 21:27

## 2019-01-24 RX ADMIN — RISPERIDONE 1 MG: 1 TABLET ORAL at 08:23

## 2019-01-24 RX ADMIN — SERTRALINE HYDROCHLORIDE 50 MG: 50 TABLET ORAL at 08:23

## 2019-01-24 NOTE — PROGRESS NOTES
Problem: Altered Thought Process (Adult/Pediatric) Goal: *STG: Demonstrates ability to understand and use improved judgment in daily activities and relationships Outcome: Progressing Towards Goal 
Out on unit pleasant and cooperative. Engaged and social w peers. Thought process tangential, confused at times, forgetful regarding daily activities and local of room, and personal items. Easily redirected. Medication compliant. Daily goal is to dress and attend groups. Staff focus is on d/c planning and follow up care

## 2019-01-24 NOTE — PROGRESS NOTES
Problem: Discharge Planning Goal: *Discharge to safe environment Outcome: Progressing Towards Goal 
Patient identifies family's home as a safe environment. Patient will return to family's home upon discharge. Goal: *Knowledge of medication management Outcome: Progressing Towards Goal 
Patient is not able to verbalize understanding of medication regimen. Patient is taking medications as prescribed. Goal: *Knowledge of discharge instructions Outcome: Not Progressing Towards Goal 
Patient is not able to verbalize goals for treatment or safe discharge.

## 2019-01-24 NOTE — PROGRESS NOTES
Problem: Falls - Risk of 
Goal: *Absence of Falls Document Makenna Bland Fall Risk and appropriate interventions in the flowsheet. Outcome: Progressing Towards Goal 
Fall Risk Interventions: 
 Pt is free from falls, provided a safe environment. Mentation Interventions: Adequate sleep, hydration, pain control Medication Interventions: Teach patient to arise slowly

## 2019-01-24 NOTE — PROGRESS NOTES
Brief Follow up Note: 
 
Prolonged QTc: 
- seen on EKG from [facility] 1/21 
- repeat EKG as plans are to restart pts antipsychotics soon - this was done with an improved shorter QT interval 
- d/c'd trazadone - electrolytes normal 
  
Elevated Creatinine:  
- pt was noted to have some n/v PTA per chart with intoleration to po diet ? Dehydration 
- bolused at [facility], labs now normalized 
  
Hx Hypertension: 
- normotensive without meds 
- stop BP meds for now, if trend up can restart Thank you for giving us opportunity to participate in this patients care.  Will sign off at this time, please re-consult if there are any further medical management needs or questions.

## 2019-01-24 NOTE — BH NOTES
GROUP THERAPY PROGRESS NOTE Song Gonzales is participating in Positive thoughts. Group time: 30 minutes Personal goal for participation: focus on positive thinking Goal orientation: community Group therapy participation: minimal 
 
Therapeutic interventions reviewed and discussed: riddles, brain teasers Impression of participation: minimally engaged

## 2019-01-24 NOTE — INTERDISCIPLINARY ROUNDS
Behavioral Health Interdisciplinary Rounds Patient Name: Jostin Sears  Age: 58 y.o. Room/Bed:  725/02 Primary Diagnosis: Schizoaffective disorder ([facility] [address] [facility] Admission Status: Voluntary Readmission within 30 days: no 
Power of  in place: no 
Patient requires a blocked bed: no          Reason for blocked bed: VTE Prophylaxis: No 
 
Mobility needs/Fall risk: no 
Flu Vaccine : no  
Nutritional Plan: no 
Consults:         
Labs/Testing due today?: no 
 
Sleep hours:  6 Participation in Care/Groups:  yes Medication Compliant?: Yes PRNS (last 24 hours): None Restraints (last 24 hours):  no 
  
CIWA (range last 24 hours): COWS (range last 24 hours): Alcohol screening (AUDIT) completed -   AUDIT Score: 0 If applicable, date SBIRT discussed in treatment team AND documented:  
 
Tobacco - patient is a smoker: Have You Used Tobacco in the Past 30 Days: No 
Illegal Drugs use: Have You Used Any Illegal Substances Over the Past 12 Months: No 
 
24 hour chart check complete: yes Patient goal(s) for today: Continue attending groups Treatment team focus/goals: Link to [address] [place] [address]; schedule follow-up with psych and neuro Progress note: pt continues to be pleasant; scored 20 on MMSE 
 
LOS:  2  Expected LOS: 3 Financial concerns/prescription coverage:  Medicaid; Medicare Date of last family contact: 1/24 RN spoke to cousin Family requesting physician contact today:  No 
Discharge plan: Return to cousin's home Guns in the home: No     
Outpatient provider(s): [facility] [address] [place] [address] 
 
Participating treatment team members: Jostin OrionChana MSW; Dr. Fabi Self MD; Jean-Claude Dawson RN; Candace Romero, SandraD

## 2019-01-24 NOTE — BH NOTES
PSYCHIATRIC PROGRESS NOTE Patient Name  Gus Jennings Date of Birth 1955 [facility] [id number] Medical Record Number  [id number] Age  58 y.o. PCP None Admit date:  1/22/2019 Room Number  725/02  @ [facility]  
Date of Service  1/24/2019 E & M PROGRESS NOTE:15 mins HISTORY  
   
C 
REASON FOR HOSPITALIZATION: 
CC: \" Pt admitted under a voluntary basis for psychosis and bizzare behvaior and an inability to care for self. HISTORY OF PRESENT ILLNESS:   
The patient, Gus Jennings, is a 58 y.o. [address] female with a past psychiatric history significant for schizophrenia was admitted for bizzare behavior and confusion . AS per reports, pt wandered off without telling the family. Pt reported she is going through a lot but did not elaborate. Pt presented as pleasently confused not knowing the year but new the day of the week. Gus Jennings  currently denied suicidal/homicidal ideations and plans. Pt denied auditory and visual hallucinations, Pt is compliant with the meds, no side effects to meds reported. As per intial reports: 
Pt reportedly \"walked off\" because she was feeling anxious. Niece reports she was not found until the police found her sleeping outside this morning in freezing weather. Pt reports she knew where she was and how to get home. Niece reports this is the 2nd episode of walking off in 1 month since being discharged from [place] [address] services  These symptoms are of high severity. These symptoms are intermittent/ fleeting in nature. 1/23/19:  Pt is pleasant, smiling, forgetful, tangential, no acute issues reported. 1/24/19: As per family, pt has been more physically restless and wanting to go out more nad they had difficulty convincing her. Pt is not restless in the unit, is pleasant, is redirectiable. 
  
  
Past Psychiatric history:  
Hospitalizations: yes, multiple, last admission at [facility] in December Pt w f/u at [facility] Suicidal attempts: none Substance abuse: none 
  
Family History of mental illness: 
Not known: 
  
Social History: 
Patient reports she has one son nad one daughter. Pt lives with aunt Legal issues: none SIDE EFFECTS: (reviewed/updated 1/24/2019) None reported or admitted to. No noted toxicity with use of epakote/Tegretol/lithium/Clozaril/TCAs ALLERGIES:(reviewed/updated 1/24/2019) Allergies Allergen Reactions  No Known Allergies Other (comments) MEDICATIONS PRIOR TO ADMISSION:(reviewed/updated 1/24/2019) Medications Prior to Admission Medication Sig  
 traZODone (DESYREL) 50 mg tablet Take 1 Tab by mouth nightly.  hydroCHLOROthiazide (HYDRODIURIL) 25 mg tablet Take 1 Tab by mouth daily for 30 days.  lisinopril (PRINIVIL, ZESTRIL) 20 mg tablet Take 1 Tab by mouth daily for 30 days.  risperiDONE (RISPERDAL) 2 mg tablet Take 1 Tab by mouth nightly for 60 days.  risperiDONE (RISPERDAL) 1 mg tablet Take 1 Tab by mouth daily.  sertraline (ZOLOFT) 50 mg tablet Take 1 Tab by mouth daily. PAST MEDICAL HISTORY: Past medical history from the initial psychiatric evaluation has been reviewed (reviewed/updated 1/24/2019) with no additional updates (I asked patient and no additional past medical history provided). Past Medical History:  
Diagnosis Date  Anxiety disorder  Hypertension  Psychiatric disorder   
 schizophrenia History reviewed. No pertinent surgical history. SOCIAL HISTORY: Social history from the initial psychiatric evaluation has been reviewed (reviewed/updated 1/24/2019) with no additional updates (I asked patient and no additional social history provided). Social History Socioeconomic History  Marital status: UNKNOWN Spouse name: Not on file  Number of children: Not on file  Years of education: Not on file  Highest education level: Not on file Social Needs  Financial resource strain: Not on file  Food insecurity - worry: Not on file  Food insecurity - inability: Not on file  Transportation needs - medical: Not on file  Transportation needs - non-medical: Not on file Occupational History  Not on file Tobacco Use  Smoking status: Former Smoker Packs/day: 0.00  Smokeless tobacco: Never Used Substance and Sexual Activity  Alcohol use: No  
 Drug use: No  
 Sexual activity: No  
Other Topics Concern  Not on file Social History Narrative  Not on file FAMILY HISTORY: Family history from the initial psychiatric evaluation has been reviewed (reviewed/updated 1/24/2019) with no additional updates (I asked patient and no additional family history provided). History reviewed. No pertinent family history. REVIEW OF SYSTEMS: (reviewed/updated 1/24/2019) Appetite: good Sleep:  good All other Review of Systems: [facility] MENTAL STATUS EXAM (MSE): MSE FINDINGS ARE WITHIN NORMAL LIMITS (WNL) UNLESS OTHERWISE STATED BELOW. ( ALL OF THE BELOW CATEGORIES OF THE MSE HAVE BEEN REVIEWED (reviewed 1/22/2019) AND UPDATED AS DEEMED APPROPRIATE ) General Presentation age appropriate and casually dressed, cooperative Orientation oriented to time, place and person Vital Signs  See below (reviewed 1/22/2019); Vital Signs (BP, Pulse, & Temp) are within normal limits if not listed below. Gait and Station Stable/steady, no ataxia Musculoskeletal System No extrapyramidal symptoms (EPS); no abnormal muscular movements or Tardive Dyskinesia (TD); muscle strength and tone are within normal limits Language No aphasia or dysarthria Speech:  normal pitch and normal volume Thought Processes logical; slow rate of thoughts; poor abstract reasoning/computation Thought Associations tangential  
Thought Content free of hallucinations Suicidal Ideations no plan  and no intention Homicidal Ideations no plan  and no intention Mood:  anxious Affect:  full range Memory recent  fair Memory remote:  impaired Concentration/Attention:  distractable Fund of Knowledge below average Insight:  limited Reliability poor Judgment:  poor VITALS:    
Patient Vitals for the past 24 hrs: 
 Temp Pulse Resp BP SpO2  
01/24/19 0749 98.8 °F (37.1 °C) 63 16 133/86 98 % 01/23/19 2015 99.2 °F (37.3 °C) 66 16 138/84   
01/23/19 1522 99.1 °F (37.3 °C) 64 16 104/81 99 % 01/23/19 1205 98.2 °F (36.8 °C) 63 16 124/74  Wt Readings from Last 3 Encounters:  
01/21/19 66.2 kg (146 lb) 12/03/18 85.3 kg (188 lb) 09/02/13 74.8 kg (165 lb) Temp Readings from Last 3 Encounters:  
01/24/19 98.8 °F (37.1 °C)  
01/22/19 98.2 °F (36.8 °C)  
12/12/18 98.4 °F (36.9 °C) BP Readings from Last 3 Encounters:  
01/24/19 133/86  
01/22/19 119/64  
12/12/18 141/86 Pulse Readings from Last 3 Encounters:  
01/24/19 63  
01/22/19 90  
12/12/18 61 DATA LABORATORY DATA:(reviewed/updated 1/24/2019) No results found for this or any previous visit (from the past 24 hour(s)). No results found for: VALF2, VALAC, VALP, VALPR, DS6, CRBAM, CRBAMP, CARB2, XCRBAM 
No results found for: LITHM  
RADIOLOGY REPORTS:(reviewed/updated 1/24/2019) No results found. MEDICATIONS ALL MEDICATIONS:  
Current Facility-Administered Medications Medication Dose Route Frequency  risperiDONE (RisperDAL) tablet 2 mg  2 mg Oral QHS  sertraline (ZOLOFT) tablet 50 mg  50 mg Oral DAILY  OLANZapine (ZyPREXA) tablet 2.5 mg  2.5 mg Oral Q6H PRN  
 ziprasidone (GEODON) 10 mg in sterile water (preservative free) 0.5 mL injection  10 mg IntraMUSCular BID PRN  
 benztropine (COGENTIN) tablet 1 mg  1 mg Oral BID PRN  
 benztropine (COGENTIN) injection 1 mg  1 mg IntraMUSCular BID PRN  
 zolpidem (AMBIEN) tablet 5 mg  5 mg Oral QHS PRN  
 acetaminophen (TYLENOL) tablet 650 mg  650 mg Oral Q4H PRN  
 ibuprofen (MOTRIN) tablet 400 mg  400 mg Oral Q8H PRN  
  magnesium hydroxide (MILK OF MAGNESIA) 400 mg/5 mL oral suspension 30 mL  30 mL Oral DAILY PRN  
 risperiDONE (RisperDAL) tablet 1 mg  1 mg Oral DAILY  
  
SCHEDULED MEDICATIONS:  
Current Facility-Administered Medications Medication Dose Route Frequency  risperiDONE (RisperDAL) tablet 2 mg  2 mg Oral QHS  sertraline (ZOLOFT) tablet 50 mg  50 mg Oral DAILY  risperiDONE (RisperDAL) tablet 1 mg  1 mg Oral DAILY ASSESSMENT & PLAN The patient, Jignesh Monterroso, is a 58 y.o.  female who presents at this time for treatment of the following diagnoses: 
Patient Active Hospital Problem List: 
 [facility] (12/3/2018) Assessment: psychosis, hearing voices, depressed Plan: zoloft, risperidal 
HTN: restart meds. Patient psycho educated Get collaterals Discussed side effects/benefits and risks of medications Individual/milue therapy 
 1/23/19: increaisng risperidal 2 mg po hs, get MMSE, continue milue, collatreals from famiyl obtained 
 1/24/19: continue meds/milue In summary, Jignesh Monterroso, is a 58 y.o.  female who presents with a severe exacerbation of the principal diagnosis of Schizoaffective disorder ([facility] Patient's condition is orsening/not improving/not stable improving. Patient requires continued inpatient hospitalization for further stabilization, safety monitoring and medication management. I will continue to coordinate the provision of individual, milieu, occupational, group, and substance abuse therapies to address target symptoms/diagnoses as deemed appropriate for the individual patient. A coordinated, multidisplinary treatment team round was conducted with the patient (this team consists of the nurse, psychiatric unit pharmcist,  and writer). Complete current electronic health record for patient has been reviewed today including consultant notes, ancillary staff notes, nurses and psychiatric tech notes. Suicide risk assessment completed and patient deemed to be of low risk for suicide at this time. The following regarding medications was addressed during rounds with patient:  
the risks and benefits of the proposed medication. The patient was given the opportunity to ask questions. Informed consent given to the use of the above medications. Will continue to adjust psychiatric and non-psychiatric medications (see above \"medication\" section and orders section for details) as deemed appropriate & based upon diagnoses and response to treatment. I will continue to order blood tests/labs and diagnostic tests as deemed appropriate and review results as they become available (see orders for details and above listed lab/test results). I will order psychiatric records from previous [facility] hospitals to further elucidate the nature of patient's psychopathology and review once available. I will gather additional collateral information from friends, family and o/p treatment team to further elucidate the nature of patient's psychopathology and baselline level of psychiatric functioning. I certify that this patient's inpatient psychiatric hospital services furnished since the previous certification were, and continue to be, required for treatment that could reasonably be expected to improve the patient's condition, or for diagnostic study, and that the patient continues to need, on a daily basis, active treatment furnished directly by or requiring the supervision of inpatient psychiatric facility personnel. In addition, the hospital records show that services furnished were intensive treatment services, admission or related services, or equivalent services. EXPECTED DISCHARGE DATE/DAY: TBD  
 
DISPOSITION: Home Signed By:  
Saida Vaughn MD 
1/24/2019

## 2019-01-24 NOTE — PROGRESS NOTES
Problem: Patient Education: Go to Patient Education Activity Goal: Patient/Family Education Outcome: Progressing Towards Goal 
Pt resting quietly in bed, labile mood. Pt compliant with meals and medications.

## 2019-01-24 NOTE — BH NOTES
GROUP THERAPY PROGRESS NOTE Raymundo Park participated in a Process Group on the General Unit with a focus identifying feelings,  
planning for the day, and learning about using the [address] as a long-term personal treatment plan. . 
 Group time: 75 minutes. Personal goal for participation: To increase the capacity to improve ones mood, set personal goals, and understand more about basic activities to successfully state and get ones needs met through personal treatment goal setting. Goal orientation: The patients will be able to identify their feelings and develop a goal for themselves for  
their day. The didactic portion of the session covered developing a personal short-term and long-term  
treatment plan for oneself. The group members were asked to consider filling in on their own after group. Below are the elements of a DBT house drawing with multiple levels:   
1) foundation - values that govern your life;  
2) first floor with a door  behaviors would like to manage and feel more control over or areas you want to change; the door represents an opportunity to list or draw things that you keep hidden from others;  
3) second floor  list or draw emotions you want to experience more often, more fully, or in a more healthy fashion;  
4) third floor  a list of things that make you happy or want to feel happy about;  
5) attic  list or draw what  a Life [place] White would look like. There is also a roof, where people and things that protect you can be listed. The chimney provides an opportunity to list ways in which you blow off steam. The billboard allows one to post those things in one's life they are proud of and the walls of the house provide an opportunity to list those people and things that provide support. Group therapy participation: With prompting, this patient actively participated in the group. Therapeutic interventions reviewed and discussed:  The group members were asked to  
identify an emotion they are having and/or let the group know what they want to focus on for the  
day as they continue to make discharge plans. The group were informed of the elements of the  
[address] for them to complete in their free time. Impression of participation: The patient said she was feeling, \"Anxious. ..working on staying calm. \" She added that she had worked as a \"\" and a \"cook. \" She was alert, generally oriented, and attentive to the group discussion. She expressed no current SI/HI and displayed no overt psychotic symptoms in this group, although her work as a  was a new claim on her part and might be part of an exaggeration for self-esteem or need for protection. Her affect was mostly anxious. Her thinking seemed slightly unusual and difficult to follow. She appeared to be making an effort to fit into the group today and she stayed for the entire group session. Her mood matched her affect.

## 2019-01-25 VITALS
BODY MASS INDEX: 28.08 KG/M2 | RESPIRATION RATE: 16 BRPM | TEMPERATURE: 98.4 F | DIASTOLIC BLOOD PRESSURE: 82 MMHG | OXYGEN SATURATION: 96 % | HEART RATE: 58 BPM | WEIGHT: 153.5 LBS | SYSTOLIC BLOOD PRESSURE: 157 MMHG

## 2019-01-25 PROCEDURE — 74011250637 HC RX REV CODE- 250/637: Performed by: PSYCHIATRY & NEUROLOGY

## 2019-01-25 RX ORDER — ATORVASTATIN CALCIUM 20 MG/1
20 TABLET, FILM COATED ORAL
Qty: 30 TAB | Refills: 0 | Status: ON HOLD | OUTPATIENT
Start: 2019-01-25 | End: 2019-04-30

## 2019-01-25 RX ORDER — RISPERIDONE 1 MG/1
TABLET, FILM COATED ORAL
Qty: 90 TAB | Refills: 0 | Status: SHIPPED | OUTPATIENT
Start: 2019-01-25 | End: 2019-01-30 | Stop reason: SDUPTHER

## 2019-01-25 RX ORDER — SERTRALINE HYDROCHLORIDE 50 MG/1
50 TABLET, FILM COATED ORAL DAILY
Qty: 30 TAB | Refills: 0 | Status: SHIPPED | OUTPATIENT
Start: 2019-01-26 | End: 2019-01-30 | Stop reason: SDUPTHER

## 2019-01-25 RX ADMIN — RISPERIDONE 1 MG: 1 TABLET ORAL at 08:34

## 2019-01-25 RX ADMIN — SERTRALINE HYDROCHLORIDE 50 MG: 50 TABLET ORAL at 08:34

## 2019-01-25 NOTE — BH NOTES
PSYCHIATRIC PROGRESS NOTE Patient Name  Dory Almanzar Date of Birth 1955 [facility] [id number] Medical Record Number  [id number] Age  58 y.o. PCP None Admit date:  1/22/2019 Room Number  725/02  @ [facility]  
Date of Service  1/25/2019 E & M PROGRESS NOTE:15 mins HISTORY  
   
C 
REASON FOR HOSPITALIZATION: 
CC: \" Pt admitted under a voluntary basis for psychosis and bizzare behvaior and an inability to care for self. HISTORY OF PRESENT ILLNESS:   
The patient, Dory Almanzar, is a 58 y.o. [address] female with a past psychiatric history significant for schizophrenia was admitted for bizzare behavior and confusion . AS per reports, pt wandered off without telling the family. Pt reported she is going through a lot but did not elaborate. Pt presented as pleasently confused not knowing the year but new the day of the week. Dory Almanzar  currently denied suicidal/homicidal ideations and plans. Pt denied auditory and visual hallucinations, Pt is compliant with the meds, no side effects to meds reported. As per intial reports: 
Pt reportedly \"walked off\" because she was feeling anxious. Niece reports she was not found until the police found her sleeping outside this morning in freezing weather. Pt reports she knew where she was and how to get home. Niece reports this is the 2nd episode of walking off in 1 month since being discharged from [place] [address] services  These symptoms are of high severity. These symptoms are intermittent/ fleeting in nature. 1/23/19:  Pt is pleasant, smiling, forgetful, tangential, no acute issues reported. 1/24/19: As per family, pt has been more physically restless and wanting to go out more nad they had difficulty convincing her. Pt is not restless in the unit, is pleasant, is redirectiable. 
  
 1/25/19: Pt doing better, ready for discharge. Jignesh Monterroso  currently denied suicidal/homicidal ideations and plans. Pt denied auditory and visual hallucinations, Pt is compliant with the meds, no side effects to meds reported. Past Psychiatric history:  
Hospitalizations: yes, multiple, last admission at [facility] in December Pt w f/u at [facility] Suicidal attempts: none Substance abuse: none 
  
Family History of mental illness: 
Not known: 
  
Social History: 
Patient reports she has one son nad one daughter. Pt lives with aunt Legal issues: none SIDE EFFECTS: (reviewed/updated 1/25/2019) None reported or admitted to. No noted toxicity with use of epakote/Tegretol/lithium/Clozaril/TCAs ALLERGIES:(reviewed/updated 1/25/2019) Allergies Allergen Reactions  No Known Allergies Other (comments) MEDICATIONS PRIOR TO ADMISSION:(reviewed/updated 1/25/2019) Medications Prior to Admission Medication Sig  
 traZODone (DESYREL) 50 mg tablet Take 1 Tab by mouth nightly.  hydroCHLOROthiazide (HYDRODIURIL) 25 mg tablet Take 1 Tab by mouth daily for 30 days.  lisinopril (PRINIVIL, ZESTRIL) 20 mg tablet Take 1 Tab by mouth daily for 30 days.  risperiDONE (RISPERDAL) 2 mg tablet Take 1 Tab by mouth nightly for 60 days.  risperiDONE (RISPERDAL) 1 mg tablet Take 1 Tab by mouth daily.  sertraline (ZOLOFT) 50 mg tablet Take 1 Tab by mouth daily. PAST MEDICAL HISTORY: Past medical history from the initial psychiatric evaluation has been reviewed (reviewed/updated 1/25/2019) with no additional updates (I asked patient and no additional past medical history provided). Past Medical History:  
Diagnosis Date  Anxiety disorder  Hypertension  Psychiatric disorder   
 schizophrenia History reviewed. No pertinent surgical history.   
SOCIAL HISTORY: Social history from the initial psychiatric evaluation has been reviewed (reviewed/updated 1/25/2019) with no additional updates (I asked patient and no additional social history provided). Social History Socioeconomic History  Marital status: UNKNOWN Spouse name: Not on file  Number of children: Not on file  Years of education: Not on file  Highest education level: Not on file Social Needs  Financial resource strain: Not on file  Food insecurity - worry: Not on file  Food insecurity - inability: Not on file  Transportation needs - medical: Not on file  Transportation needs - non-medical: Not on file Occupational History  Not on file Tobacco Use  Smoking status: Former Smoker Packs/day: 0.00  Smokeless tobacco: Never Used Substance and Sexual Activity  Alcohol use: No  
 Drug use: No  
 Sexual activity: No  
Other Topics Concern  Not on file Social History Narrative  Not on file FAMILY HISTORY: Family history from the initial psychiatric evaluation has been reviewed (reviewed/updated 1/25/2019) with no additional updates (I asked patient and no additional family history provided). History reviewed. No pertinent family history. REVIEW OF SYSTEMS: (reviewed/updated 1/25/2019) Appetite: good Sleep:  good All other Review of Systems: [facility] MENTAL STATUS EXAM (MSE): MSE FINDINGS ARE WITHIN NORMAL LIMITS (WNL) UNLESS OTHERWISE STATED BELOW. ( ALL OF THE BELOW CATEGORIES OF THE MSE HAVE BEEN REVIEWED (reviewed 1/22/2019) AND UPDATED AS DEEMED APPROPRIATE ) General Presentation age appropriate and casually dressed, cooperative Orientation oriented to time, place and person Vital Signs  See below (reviewed 1/22/2019); Vital Signs (BP, Pulse, & Temp) are within normal limits if not listed below. Gait and Station Stable/steady, no ataxia Musculoskeletal System No extrapyramidal symptoms (EPS); no abnormal muscular movements or Tardive Dyskinesia (TD); muscle strength and tone are within normal limits Language No aphasia or dysarthria Speech:  normal pitch and normal volume Thought Processes logical; slow rate of thoughts; poor abstract reasoning/computation Thought Associations linear Thought Content free of hallucinations Suicidal Ideations no plan  and no intention Homicidal Ideations no plan  and no intention Mood:  euthymic Affect:  full range Memory recent  fair Memory remote:  impaired Concentration/Attention:  distractable Fund of Knowledge below average Insight:  improving Reliability improving Judgment:  improving VITALS:    
Patient Vitals for the past 24 hrs: 
 Temp Pulse Resp BP SpO2  
01/25/19 0720 98.4 °F (36.9 °C) (!) 58 16 157/82 96 % 01/24/19 2044 98.5 °F (36.9 °C) 65 16 133/80   
01/24/19 1542 98.5 °F (36.9 °C) 65 16 147/79 97 % Wt Readings from Last 3 Encounters:  
01/24/19 69.6 kg (153 lb 8 oz) 01/21/19 66.2 kg (146 lb) 12/03/18 85.3 kg (188 lb) Temp Readings from Last 3 Encounters:  
01/25/19 98.4 °F (36.9 °C)  
01/22/19 98.2 °F (36.8 °C)  
12/12/18 98.4 °F (36.9 °C) BP Readings from Last 3 Encounters:  
01/25/19 157/82  
01/22/19 119/64  
12/12/18 141/86 Pulse Readings from Last 3 Encounters:  
01/25/19 (!) 58  
01/22/19 90  
12/12/18 61 DATA LABORATORY DATA:(reviewed/updated 1/25/2019) No results found for this or any previous visit (from the past 24 hour(s)). No results found for: VALF2, VALAC, VALP, VALPR, DS6, CRBAM, CRBAMP, CARB2, XCRBAM 
No results found for: LITHM  
RADIOLOGY REPORTS:(reviewed/updated 1/25/2019) No results found. MEDICATIONS ALL MEDICATIONS:  
Current Facility-Administered Medications Medication Dose Route Frequency  atorvastatin (LIPITOR) tablet 20 mg  20 mg Oral QHS  risperiDONE (RisperDAL) tablet 2 mg  2 mg Oral QHS  sertraline (ZOLOFT) tablet 50 mg  50 mg Oral DAILY  OLANZapine (ZyPREXA) tablet 2.5 mg  2.5 mg Oral Q6H PRN  
  ziprasidone (GEODON) 10 mg in sterile water (preservative free) 0.5 mL injection  10 mg IntraMUSCular BID PRN  
 benztropine (COGENTIN) tablet 1 mg  1 mg Oral BID PRN  
 benztropine (COGENTIN) injection 1 mg  1 mg IntraMUSCular BID PRN  
 zolpidem (AMBIEN) tablet 5 mg  5 mg Oral QHS PRN  
 acetaminophen (TYLENOL) tablet 650 mg  650 mg Oral Q4H PRN  
 ibuprofen (MOTRIN) tablet 400 mg  400 mg Oral Q8H PRN  
 magnesium hydroxide (MILK OF MAGNESIA) 400 mg/5 mL oral suspension 30 mL  30 mL Oral DAILY PRN  
 risperiDONE (RisperDAL) tablet 1 mg  1 mg Oral DAILY  
  
SCHEDULED MEDICATIONS:  
Current Facility-Administered Medications Medication Dose Route Frequency  atorvastatin (LIPITOR) tablet 20 mg  20 mg Oral QHS  risperiDONE (RisperDAL) tablet 2 mg  2 mg Oral QHS  sertraline (ZOLOFT) tablet 50 mg  50 mg Oral DAILY  risperiDONE (RisperDAL) tablet 1 mg  1 mg Oral DAILY ASSESSMENT & PLAN The patient, Jignesh Monterroso, is a 58 y.o.  female who presents at this time for treatment of the following diagnoses: 
Patient Active Hospital Problem List: 
 [facility] (12/3/2018) Assessment: psychosis, hearing voices, depressed Plan: zoloft, risperidal 
HTN: restart meds. Patient psycho educated Get collaterals Discussed side effects/benefits and risks of medications Individual/milue therapy 
 1/23/19: increaisng risperidal 2 mg po hs, get MMSE, continue milue, [facility] 
 1/24/19: continue meds/milue In summary, Jignesh Monterroso, is a 58 y.o.  female who presents with a severe exacerbation of the principal diagnosis of [facility] Patient's condition is orsening/not improving/not stable improving. Patient requires continued inpatient hospitalization for further stabilization, safety monitoring and medication management.   I will continue to coordinate the provision of individual, milieu, occupational, group, and substance abuse therapies to address target symptoms/diagnoses as deemed appropriate for the individual patient. A coordinated, multidisplinary treatment team round was conducted with the patient (this team consists of the nurse, psychiatric unit pharmcist,  and writer). Complete current electronic health record for patient has been reviewed today including consultant notes, ancillary staff notes, nurses and psychiatric tech notes. Suicide risk assessment completed and patient deemed to be of low risk for suicide at this time. The following regarding medications was addressed during rounds with patient:  
the risks and benefits of the proposed medication. The patient was given the opportunity to ask questions. Informed consent given to the use of the above medications. Will continue to adjust psychiatric and non-psychiatric medications (see above \"medication\" section and orders section for details) as deemed appropriate & based upon diagnoses and response to treatment. I will continue to order blood tests/labs and diagnostic tests as deemed appropriate and review results as they become available (see orders for details and above listed lab/test results). I will order psychiatric records from previous [facility] hospitals to further elucidate the nature of patient's psychopathology and review once available. I will gather additional collateral information from friends, family and o/p treatment team to further elucidate the nature of patient's psychopathology and baselline level of psychiatric functioning.  
  
 
 
I certify that this patient's inpatient psychiatric hospital services furnished since the previous certification were, and continue to be, required for treatment that could reasonably be expected to improve the patient's condition, or for diagnostic study, and that the patient continues to need, on a daily basis, active treatment furnished directly by or requiring the supervision of inpatient psychiatric facility personnel. In addition, the hospital records show that services furnished were intensive treatment services, admission or related services, or equivalent services. EXPECTED DISCHARGE DATE/DAY: TBD  
 
DISPOSITION: Home Signed By:  
Judy Rudd MD 
1/25/2019

## 2019-01-25 NOTE — BH NOTES
Behavioral Health Transition Record to Provider Patient Name: Terence Huston YOB: 1955 Medical Record Number: [id number] Date of Admission: 1/22/2019 Date of Discharge: 1/25/2019 Attending Provider: Mariluz Shook MD 
Discharging Provider: Mariluz Shook MD 
To contact this individual call [phone] and ask the  to page. If unavailable, ask to be transferred to [facility] Provider on call. [facility] Provider will be available on call 24/7 and during holidays. Primary Care Provider: None Allergies Allergen Reactions  No Known Allergies Other (comments) Reason for Admission: Pt admitted under a voluntary basis for psychosis and bizzare behvaior and an inability to care for self. Admission Diagnosis: Schizoaffective Disorder Schizoaffective [facility] ([facility] * No surgery found * Results for orders placed or performed during the hospital encounter of 01/22/19 METABOLIC PANEL, BASIC Result Value Ref Range Sodium 139 136 - 145 mmol/L Potassium 4.3 3.5 - 5.1 mmol/L Chloride 104 97 - 108 mmol/L  
 CO2 28 21 - 32 mmol/L Anion gap 7 5 - 15 mmol/L Glucose 105 (H) 65 - 100 mg/dL BUN 27 (H) 6 - 20 MG/DL Creatinine 0.99 0.55 - 1.02 MG/DL  
 BUN/Creatinine ratio 27 (H) 12 - 20 GFR est AA >60 >60 ml/min/1.73m2 GFR est non-AA 56 (L) >60 ml/min/1.73m2 Calcium 8.8 8.5 - 10.1 MG/DL MAGNESIUM Result Value Ref Range Magnesium 2.1 1.6 - 2.4 mg/dL GLUCOSE, FASTING Result Value Ref Range Glucose 105 (H) 65 - 100 MG/DL  
EKG, 12 LEAD, INITIAL Result Value Ref Range Ventricular Rate 53 BPM  
 Atrial Rate 53 BPM  
 P-R Interval 182 ms QRS Duration 86 ms  
 Q-T Interval 564 ms QTC Calculation (Bezet) 529 ms Calculated P Axis 53 degrees Calculated R Axis 27 degrees Calculated T Axis 84 degrees Diagnosis Sinus bradycardia Left ventricular hypertrophy with repolarization abnormality Prolonged QT Abnormal ECG When compared with ECG of 21-JAN-2019 20:33, 
QT has shortened Confirmed by Laura Horan MD, Magy Royal ([id number]) on 1/23/2019 10:57:26 AM 
  
EKG, 12 LEAD, INITIAL Result Value Ref Range Ventricular Rate 53 BPM  
 Atrial Rate 53 BPM  
 P-R Interval 190 ms QRS Duration 88 ms Q-T Interval 488 ms QTC Calculation (Bezet) 457 ms Calculated P Axis 64 degrees Calculated R Axis 30 degrees Calculated T Axis 89 degrees Diagnosis Sinus bradycardia Voltage criteria for left ventricular hypertrophy T wave abnormality, consider lateral ischemia Abnormal ECG When compared with ECG of 22-JAN-2019 16:33, 
T wave inversion more evident in Lateral leads QT has shortened Confirmed by Verona Bello ([id number]) on 1/24/2019 6:46:52 PM 
  
 
 
Immunizations administered during this encounter:  
Immunization History Administered Date(s) Administered  Influenza Vaccine Split 10/17/2012 Screening for Metabolic Disorders for Patients on Antipsychotic Medications 
(Data obtained from the EMR) Estimated Body Mass Index Estimated body mass index is 28.08 kg/m² as calculated from the following: 
  Height as of 1/21/19: 5' 2\" (1.575 m). Weight as of this encounter: 69.6 kg (153 lb 8 oz). Vital Signs/Blood Pressure Visit Vitals /82 (BP 1 Location: Left arm, BP Patient Position: Sitting) Pulse (!) 58 Temp 98.4 °F (36.9 °C) Resp 16 Wt 69.6 kg (153 lb 8 oz) SpO2 96% Breastfeeding? No  
BMI 28.08 kg/m² Blood Glucose/Hemoglobin A1c Lab Results Component Value Date/Time Glucose 105 (H) 01/23/2019 06:21 AM  
 Glucose 105 (H) 01/23/2019 06:21 AM  
 Glucose (POC) 108 (H) 09/02/2013 10:09 AM  
 
Lab Results Component Value Date/Time Hemoglobin A1c 6.0 12/05/2018 04:50 AM  
 
  
Lipid Panel Lab Results Component Value Date/Time  Cholesterol, total 202 (H) 12/05/2018 04:50 AM  
 HDL Cholesterol 85 12/05/2018 04:50 AM  
 LDL, calculated 98.2 2018 04:50 AM  
 Triglyceride 94 2018 04:50 AM  
 CHOL/HDL Ratio 2.4 2018 04:50 AM  
 
  
Discharge Diagnosis: Schizoaffective disorder (ICD-10-CM: F25.9); Neurocognitive deficits (ICD-10-CM: R29.818; R41.89) Discharge Plan: Patient discharged home into the care of family. [id number] [place] [address] : 1955 MRN: [id number] The patient Gus Jennings exhibits the ability to control behavior in a less restrictive environment. Patient's level of functioning is improving. No assaultive/destructive behavior has been observed for the past 24 hours. No suicidal/homicidal threat or behavior has been observed for the past 24 hours. There is no evidence of serious medication side effects. Patient has not been in physical or protective restraints for at least the past 24 hours. If weapons involved, how are they secured? No weapons involved. Is patient aware of and in agreement with discharge plan? Yes Arrangements for medication:  Prescriptions given to patient. Copy of discharge instructions to provider?:  Estela Montoya; Dr. Joaquin Clemens Arrangements for transportation home:  Family to . Keep all follow up appointments as scheduled, continue to take prescribed medications per physician instructions. Mental health crisis number:  255 or your local mental health crisis line number at [phone]. Discharge Medication List and Instructions:  
Discharge Medication List as of 2019  1:02 PM  
  
START taking these medications Details  
atorvastatin (LIPITOR) 20 mg tablet Take 1 Tab by mouth nightly. , Print, Disp-30 Tab, R-0  
  
  
CONTINUE these medications which have CHANGED Details  
sertraline (ZOLOFT) 50 mg tablet Take 1 Tab by mouth daily. , Print, Disp-30 Tab, R-0  
  
risperiDONE (RISPERDAL) 1 mg tablet One tab on am and 2 tabs at night, Print, Disp-90 Tab, R-0  
  
  
STOP taking these medications traZODone (DESYREL) 50 mg tablet Comments:  
Reason for Stopping:   
   
 hydroCHLOROthiazide (HYDRODIURIL) 25 mg tablet Comments:  
Reason for Stopping:   
   
 lisinopril (PRINIVIL, ZESTRIL) 20 mg tablet Comments:  
Reason for Stopping:   
   
  
 
 
Unresulted Labs (24h ago, onward) None To obtain results of studies pending at discharge, please contact [phone] Follow-up Information Follow up With Specialties Details Why Contact Info NEUROLOGY CLINIC  [facility]  On 2/19/2019 11am arrive 10:30 with Dr. Yohana Holland [id number] [facility] [address] 
[phone] [facility] On 1/26/2019 Home health services to [place]. [address] [facility] [address] 
[phone] [facility] will receive a call about setting up an evaluation for mental health skill building services. [facility] 
[phone] Pierre Calderon NP Nurse Practitioner On 2/1/2019 You have a 1:30pm appointment with the psychiatric nurse practitioner for medication management. [address] [facility] [address] 
[phone] None    None [phone]) Patient stated that they have no PCP Advanced Directive:  
Does the patient have an appointed surrogate decision maker? No 
Does the patient have a Medical Advance Directive? No 
Does the patient have a Psychiatric Advance Directive? No 
If the patient does not have a surrogate or Medical Advance Directive AND Psychiatric Advance Directive, the patient was offered information on these advance directives Yes and Patient declined to complete Patient Instructions: Please continue all medications until otherwise directed by physician. Tobacco Cessation Discharge Plan:  
Is the patient a smoker and needs referral for smoking cessation?  No 
Patient referred to the following for smoking cessation with an appointment? Not applicable Patient was offered medication to assist with smoking cessation at discharge? Not applicable Was education for smoking cessation added to the discharge instructions? Yes Alcohol/Substance Abuse Discharge Plan:  
Does the patient have a history of substance/alcohol abuse and requires a referral for treatment? No 
Patient referred to the following for substance/alcohol abuse treatment with an appointment? Not applicable Patient was offered medication to assist with alcohol cessation at discharge? Not applicable Was education for substance/alcohol abuse added to discharge instructions? No 
 
Patient discharged to Home; discussed with patient/caregiver and provided to the patient/caregiver either in hard copy or electronically.

## 2019-01-25 NOTE — PROGRESS NOTES
Problem: Falls - Risk of 
Goal: *Absence of Falls Document Evelio Shows Fall Risk and appropriate interventions in the flowsheet. Outcome: Progressing Towards Goal 
Fall Risk Interventions: 
Mentation Interventions: Adequate sleep, hydration, pain control Medication Interventions: Teach patient to arise slowly Received pt lying awake in bed. Pleasant, cooperative. NAD. Respirations even and unlabored. Will cont to monitor q15min for safety.

## 2019-01-25 NOTE — DISCHARGE SUMMARY
PSYCHIATRIC DISCHARGE SUMMARY         IDENTIFICATION:    Patient Name  Patricia Figueredo   Date of Birth 1955   [facility] [id number]   Medical Record Number  [id number]      Age  58 y.o. PCP None   Admit date:  1/22/2019    Discharge date: 1/25/2019   Room Number  725/02  @ [facility]   Date of Service  1/25/2019            TYPE OF DISCHARGE:REGULAR               CONDITION AT DISCHARGE: improved       PROVISIONAL & DISCHARGE DIAGNOSES:    Problem List  Date Reviewed: 9/3/2013          Codes Class    Neurocognitive deficits ICD-10-CM: R29.818, R41.89  ICD-9-CM: 781.99         * (Principal) Schizoaffective disorder ([address] ICD-10-CM: F25.9  ICD-9-CM: 295.70               Active Hospital Problems    *Schizoaffective disorder ([address]        DISCHARGE DIAGNOSIS:   Axis I:  SEE ABOVE  Axis II: SEE ABOVE  Axis III: SEE ABOVE  Axis IV:  lack of structure  Axis V:  25 on admission, 55 on discharge 55(baseline)       CC & HISTORY OF PRESENT ILLNESS:  REASON FOR HOSPITALIZATION:  CC: \" Pt admitted under a voluntary basis for psychosis and bizzare behvaior and an inability to care for self. HISTORY OF PRESENT ILLNESS:    The patient, Patricia Figueredo, is a 58 y.o. [address] female with a past psychiatric history significant for schizophrenia was admitted for bizzare behavior and confusion . AS per reports, pt wandered off without telling the family. Pt reported she is going through a lot but did not elaborate. Pt presented as pleasently confused not knowing the year but new the day of the week. Patricia Figueredo  currently denied suicidal/homicidal ideations and plans. Pt denied auditory and visual hallucinations, Pt is compliant with the meds, no side effects to meds reported. As per intial reports:  Pt reportedly \"walked off\" because she was feeling anxious. Niece reports she was not found until the police found her sleeping outside this morning in freezing weather.  Pt reports she knew where she was and how to get home. Niece reports this is the 2nd episode of walking off in 1 month since being discharged from [place] [address] services  These symptoms are of high severity. These symptoms are intermittent/ fleeting in nature. 1/23/19:  Pt is pleasant, smiling, forgetful, tangential, no acute issues reported. 1/24/19: As per family, pt has been more physically restless and wanting to go out more nad they had difficulty convincing her. Pt is not restless in the unit, is pleasant, is redirectiable.      1/25/19: Pt doing better, ready for discharge. Nichol Handler  currently denied suicidal/homicidal ideations and plans. Pt denied auditory and visual hallucinations, Pt is compliant with the meds, no side effects to meds reported. Past Psychiatric history:   Hospitalizations: yes, multiple, last admission [facility] in December  Pt w f/u at [address] [facility]  Suicidal attempts: none  Substance abuse: none     Family History of mental illness:  Not known:     Social History:  Patient reports she has one son nad one daughter. Pt lives with aunt  Legal issues: none       SOCIAL HISTORY:    Social History     Socioeconomic History    Marital status: UNKNOWN     Spouse name: Not on file    Number of children: Not on file    Years of education: Not on file    Highest education level: Not on file   Social Needs    Financial resource strain: Not on file    Food insecurity - worry: Not on file    Food insecurity - inability: Not on file   [employer] needs - medical: Not on file   [employer] needs - non-medical: Not on file   Occupational History    Not on file   Tobacco Use    Smoking status: Former Smoker     Packs/day: 0.00    Smokeless tobacco: Never Used   Substance and Sexual Activity    Alcohol use: No    Drug use: No    Sexual activity: No   Other Topics Concern    Not on file   Social History Narrative    Not on file      FAMILY HISTORY:   History reviewed. No pertinent family history. HOSPITALIZATION COURSE:    Ginette Lugo was admitted to the inpatient psychiatric unit [facility] for acute psychiatric stabilization in regards to symptomatology as described in the HPI above. he differential diagnosis at time of admission included: schizoaffective  vs. Schizophrenia. While on the unit Ginette Lugo was involved in individual, group, occupational and milieu therapy. Psychiatric medications were adjusted during this hospitalization including risperidal, zoloft . Ginette Lugo demonstrated a slow, but progressive improvement in overall condition. uch of patient's depression appeared to be related to situational stressors, effects of drugs of abuse, and psychological factors. Please see individual progress notes for more specific details regarding patient's hospitalization course. At time of discharge, Ginette Lugo is without significant problems of depression psychosis  daniella. Patient free of suicidal and homicidal ideations (appears to be at very low risk of suicide or homicide) and reports many positive predictive factors in terms of not attempting suicide or homicide. Overall presentation at time of discharge is most consistent with the diagnosis of  Schizoaffective d/o r/o neurocognitive issues Patient with request for discharge today. There are no grounds to seek a TDO. Patient has maximized benefit to be derived from acute inpatient psychiatric treatment. All members of the treatment team concur with each other in regards to plans for discharge today er patient's request.  atient nd family are aware and in agreement with discharge and discharge plan. Per my last note:1 /25/19: Pt doing better, ready for discharge. Ginette Lugo  currently denied suicidal/homicidal ideations and plans. Pt denied auditory and visual hallucinations, Pt is compliant with the meds, no side effects to meds reported.          LABS AND IMAGAING:    Labs Reviewed   METABOLIC PANEL, BASIC - Abnormal; Notable for the following components:       Result Value    Glucose 105 (*)     BUN 27 (*)     BUN/Creatinine ratio 27 (*)     GFR est non-AA 56 (*)     All other components within normal limits   GLUCOSE, FASTING - Abnormal; Notable for the following components:    Glucose 105 (*)     All other components within normal limits   MAGNESIUM     No results found for: DS35, PHEN, PHENO, PHENT, DILF, DS39, PHENY, PTN, VALF2, VALAC, VALP, VALPR, DS6, CRBAM, CRBAMP, CARB2, XCRBAM  Admission on 01/22/2019   Component Date Value Ref Range Status    Ventricular Rate 01/22/2019 53  BPM Final    Atrial Rate 01/22/2019 53  BPM Final    P-R Interval 01/22/2019 182  ms Final    QRS Duration 01/22/2019 86  ms Final    Q-T Interval 01/22/2019 564  ms Final    QTC Calculation (Bezet) 01/22/2019 529  ms Final    Calculated P Axis 01/22/2019 53  degrees Final    Calculated R Axis 01/22/2019 27  degrees Final    Calculated T Axis 01/22/2019 84  degrees Final    Diagnosis 01/22/2019    Final                    Value:Sinus bradycardia  Left ventricular hypertrophy with repolarization abnormality  Prolonged QT  Abnormal ECG  When compared with ECG of 21-JAN-2019 20:33,  QT has shortened  Confirmed by Kayli Garcia MD, Shamar Nice ([id number]) on 1/23/2019 10:57:26 AM      Sodium 01/23/2019 139  136 - 145 mmol/L Final    Potassium 01/23/2019 4.3  3.5 - 5.1 mmol/L Final    Chloride 01/23/2019 104  97 - 108 mmol/L Final    CO2 01/23/2019 28  21 - 32 mmol/L Final    Anion gap 01/23/2019 7  5 - 15 mmol/L Final    Glucose 01/23/2019 105* 65 - 100 mg/dL Final    BUN 01/23/2019 27* 6 - 20 MG/DL Final    Creatinine 01/23/2019 0.99  0.55 - 1.02 MG/DL Final    BUN/Creatinine ratio 01/23/2019 27* 12 - 20   Final    GFR est AA 01/23/2019 >60  >60 ml/min/1.73m2 Final    GFR est non-AA 01/23/2019 56* >60 ml/min/1.73m2 Final    Calcium 01/23/2019 8.8  8.5 - 10.1 MG/DL Final    Magnesium 01/23/2019 2.1  1.6 - 2.4 mg/dL Final    Glucose 01/23/2019 105* 65 - 100 MG/DL Final    Ventricular Rate 01/24/2019 53  BPM Final    Atrial Rate 01/24/2019 53  BPM Final    P-R Interval 01/24/2019 190  ms Final    QRS Duration 01/24/2019 88  ms Final    Q-T Interval 01/24/2019 488  ms Final    QTC Calculation (Bezet) 01/24/2019 457  ms Final    Calculated P Axis 01/24/2019 64  degrees Final    Calculated R Axis 01/24/2019 30  degrees Final    Calculated T Axis 01/24/2019 89  degrees Final    Diagnosis 01/24/2019    Final                    Value:Sinus bradycardia  Voltage criteria for left ventricular hypertrophy  T wave abnormality, consider lateral ischemia  Abnormal ECG  When compared with ECG of 22-JAN-2019 16:33,  T wave inversion more evident in Lateral leads  QT has shortened  Confirmed by Verona Bello ([id number]) on 1/24/2019 6:46:52 PM     Admission on 01/21/2019, Discharged on 01/22/2019   Component Date Value Ref Range Status    Influenza A Antigen 01/21/2019 NEGATIVE   NEG   Final    Influenza B Antigen 01/21/2019 NEGATIVE   NEG   Final    Color 01/21/2019 YELLOW/STRAW    Final    Appearance 01/21/2019 CLOUDY* CLEAR   Final    Specific gravity 01/21/2019 1.030  1.003 - 1.030   Final    pH (UA) 01/21/2019 6.0  5.0 - 8.0   Final    Protein 01/21/2019 30* NEG mg/dL Final    Glucose 01/21/2019 NEGATIVE   NEG mg/dL Final    Ketone 01/21/2019 TRACE* NEG mg/dL Final    Blood 01/21/2019 NEGATIVE   NEG   Final    Urobilinogen 01/21/2019 1.0  0.2 - 1.0 EU/dL Final    Nitrites 01/21/2019 NEGATIVE   NEG   Final    Leukocyte Esterase 01/21/2019 NEGATIVE   NEG   Final    Bilirubin UA, confirm 01/21/2019 NEGATIVE   NEG   Final    WBC 01/21/2019 0-4  0 - 4 /hpf Final    RBC 01/21/2019 0-5  0 - 5 /hpf Final    Epithelial cells 01/21/2019 MODERATE* FEW /lpf Final    Bacteria 01/21/2019 2+* NEG /hpf Final    UA:UC IF INDICATED 01/21/2019 URINE CULTURE ORDERED* CNI   Final    AMPHETAMINES 01/21/2019 NEGATIVE   NEG   Final    BARBITURATES 01/21/2019 NEGATIVE   NEG   Final    BENZODIAZEPINES 01/21/2019 NEGATIVE   NEG   Final    COCAINE 01/21/2019 NEGATIVE   NEG   Final    METHADONE 01/21/2019 NEGATIVE   NEG   Final    OPIATES 01/21/2019 NEGATIVE   NEG   Final    PCP(PHENCYCLIDINE) 01/21/2019 NEGATIVE   NEG   Final    THC (TH-CANNABINOL) 01/21/2019 NEGATIVE   NEG   Final    Drug screen comment 01/21/2019 (NOTE)   Final    ALCOHOL(ETHYL),SERUM 01/21/2019 <10  <10 MG/DL Final    WBC 01/21/2019 9.0  3.6 - 11.0 K/uL Final    RBC 01/21/2019 4.58  3.80 - 5.20 M/uL Final    HGB 01/21/2019 13.2  11.5 - 16.0 g/dL Final    HCT 01/21/2019 39.3  35.0 - 47.0 % Final    MCV 01/21/2019 85.8  80.0 - 99.0 FL Final    MCH 01/21/2019 28.8  26.0 - 34.0 PG Final    MCHC 01/21/2019 33.6  30.0 - 36.5 g/dL Final    RDW 01/21/2019 11.9  11.5 - 14.5 % Final    PLATELET 89/10/9638 764  150 - 400 K/uL Final    MPV 01/21/2019 9.0  8.9 - 12.9 FL Final    NRBC 01/21/2019 0.0  0  WBC Final    ABSOLUTE NRBC 01/21/2019 0.00  0.00 - 0.01 K/uL Final    NEUTROPHILS 01/21/2019 83* 32 - 75 % Final    LYMPHOCYTES 01/21/2019 11* 12 - 49 % Final    MONOCYTES 01/21/2019 6  5 - 13 % Final    EOSINOPHILS 01/21/2019 0  0 - 7 % Final    BASOPHILS 01/21/2019 0  0 - 1 % Final    IMMATURE GRANULOCYTES 01/21/2019 0  0.0 - 0.5 % Final    ABS. NEUTROPHILS 01/21/2019 7.4  1.8 - 8.0 K/UL Final    ABS. LYMPHOCYTES 01/21/2019 1.0  0.8 - 3.5 K/UL Final    ABS. MONOCYTES 01/21/2019 0.5  0.0 - 1.0 K/UL Final    ABS. EOSINOPHILS 01/21/2019 0.0  0.0 - 0.4 K/UL Final    ABS. BASOPHILS 01/21/2019 0.0  0.0 - 0.1 K/UL Final    ABS. IMM.  GRANS. 01/21/2019 0.0  0.00 - 0.04 K/UL Final    DF 01/21/2019 AUTOMATED    Final    Sodium 01/21/2019 140  136 - 145 mmol/L Final    Potassium 01/21/2019 4.0  3.5 - 5.1 mmol/L Final    Chloride 01/21/2019 100  97 - 108 mmol/L Final    CO2 01/21/2019 28  21 - 32 mmol/L Final    Anion gap 01/21/2019 12  5 - 15 mmol/L Final    Glucose 01/21/2019 113* 65 - 100 mg/dL Final    BUN 01/21/2019 27* 6 - 20 MG/DL Final    Creatinine 01/21/2019 1.47* 0.55 - 1.02 MG/DL Final    BUN/Creatinine ratio 01/21/2019 18  12 - 20   Final    GFR est AA 01/21/2019 43* >60 ml/min/1.73m2 Final    GFR est non-AA 01/21/2019 36* >60 ml/min/1.73m2 Final    Calcium 01/21/2019 9.6  8.5 - 10.1 MG/DL Final    Bilirubin, total 01/21/2019 0.4  0.2 - 1.0 MG/DL Final    ALT (SGPT) 01/21/2019 29  12 - 78 U/L Final    AST (SGOT) 01/21/2019 26  15 - 37 U/L Final    Alk. phosphatase 01/21/2019 72  45 - 117 U/L Final    Protein, total 01/21/2019 8.3* 6.4 - 8.2 g/dL Final    Albumin 01/21/2019 4.1  3.5 - 5.0 g/dL Final    Globulin 01/21/2019 4.2* 2.0 - 4.0 g/dL Final    A-G Ratio 01/21/2019 1.0* 1.1 - 2.2   Final    SAMPLES BEING HELD 01/21/2019  1GOLD, 1BLUE   Final    COMMENT 01/21/2019 Add-on orders for these samples will be processed based on acceptable specimen integrity and analyte stability, which may vary by analyte.     Final    Ventricular Rate 01/21/2019 72  BPM Final    Atrial Rate 01/21/2019 72  BPM Final    P-R Interval 01/21/2019 164  ms Final    QRS Duration 01/21/2019 84  ms Final    Q-T Interval 01/21/2019 556  ms Final    QTC Calculation (Bezet) 01/21/2019 608  ms Final    Calculated P Axis 01/21/2019 61  degrees Final    Calculated R Axis 01/21/2019 39  degrees Final    Calculated T Axis 01/21/2019 95  degrees Final    Diagnosis 01/21/2019    Final                    Value:Normal sinus rhythm  Possible Left atrial enlargement  Left ventricular hypertrophy with repolarization abnormality  Prolonged QT  No previous ECGs available  Confirmed by Monica Blackmon ([id number]) on 1/23/2019 12:01:36 AM      Special Requests: 01/21/2019     Final                    Value:NO SPECIAL REQUESTS  Reflexed from [id number]      [place] Count 01/21/2019     Final                    Value:>100,000  COLONIES/mL      Culture result: 01/21/2019 MIXED UROGENITAL RICARDO ISOLATED    Final     No results found. DISPOSITION:  Pt to f/u with [facility] out pt on Feb 1 st nad will f/u with neurology for neurocognitive issues  Home. Patient to f/u with  psychiatric, and psychotherapy appointments. Patient is to f/u with internist as directed. atient should have a associated labs checked within the next 1-2 weeks by patient's o/p psychiatrist/internist.               FOLLOW-UP CARE:      Regular Diet  Wound Care: none needed. Follow-up Information     Follow up With Specialties Details Why [facility]  On 2/19/2019 11am arrive 10:30 with Dr. Desire Garcia [address] [facility] [address]  [address] [place] [address] On 1/26/2019 Home health services to start. [facility] [id number] [phone]    Mental Health Skill Building   You will receive a call about setting up an evaluation for mental health skill building services. [address] [place] [address], [address] [place] [address] [address], [address] [place] [address]  [phone]    Dat Jones NP Nurse Practitioner On 2/1/2019 You have a 1:30pm appointment with the psychiatric nurse practitioner for medication management. [facility]      None    None ([phone]) Patient stated that they have no PCP                   PROGNOSIS:   Good / Delories Chas / Nighat Peter--- based on nature of patient's pathology/ies and treatment compliance issues. Prognosis is greatly dependent upon patient's ability to remain sober and to follow up with drug/etoh rehabilitation and psychiatric/psychotherapy appointments as well as to comply with psychiatric medications as prescribed. DISCHARGE MEDICATIONS: (no changes made).     Informed consent given for the use of following psychotropic medications:  Current Discharge Medication List      START taking these medications    Details atorvastatin (LIPITOR) 20 mg tablet Take 1 Tab by mouth nightly. Qty: 30 Tab, Refills: 0         CONTINUE these medications which have CHANGED    Details   sertraline (ZOLOFT) 50 mg tablet Take 1 Tab by mouth daily. Qty: 30 Tab, Refills: 0      risperiDONE (RISPERDAL) 1 mg tablet One tab on am and 2 tabs at night  Qty: 90 Tab, Refills: 0         STOP taking these medications       traZODone (DESYREL) 50 mg tablet Comments:   Reason for Stopping:         hydroCHLOROthiazide (HYDRODIURIL) 25 mg tablet Comments:   Reason for Stopping:         lisinopril (PRINIVIL, ZESTRIL) 20 mg tablet Comments:   Reason for Stopping:                      A coordinated, multidisplinary treatment team round was conducted with Jacky Sosa---this is done daily here at [facility]. This team consists of the nurse, psychiatric unit pharmcist,  and writer. I have spent greater than 35 minutes on discharge work.     Signed:  Saida Vaughn MD  1/25/2019

## 2019-01-25 NOTE — INTERDISCIPLINARY ROUNDS
Behavioral Health Interdisciplinary Rounds Patient Name: Donis Jackson  Age: 58 y.o. Room/Bed:  725/02 Primary Diagnosis: [facility] Admission Status: Voluntary Readmission within 30 days: no 
Power of  in place: no 
Patient requires a blocked bed: no          Reason for blocked bed: VTE Prophylaxis: No 
 
Mobility needs/Fall risk: no 
Flu Vaccine : no  
Nutritional Plan: no 
Consults:         
Labs/Testing due today?: no 
 
Sleep hours:  8+ Participation in Care/Groups:  yes Medication Compliant?: Yes PRNS (last 24 hours): None Restraints (last 24 hours):  no 
  
CIWA (range last 24 hours): COWS (range last 24 hours): Alcohol screening (AUDIT) completed -   AUDIT Score: 0 If applicable, date SBIRT discussed in treatment team AND documented:  
 
Tobacco - patient is a smoker: Have You Used Tobacco in the Past 30 Days: No 
Illegal Drugs use: Have You Used Any Illegal Substances Over the Past 12 Months: No 
 
24 hour chart check complete: yes Patient goal(s) for today:  
Treatment team focus/goals:  
Progress note LOS:  3  Expected LOS:  
 
Financial concerns/prescription coverage:   
Date of last family contact:      
Family requesting physician contact today:   
Discharge plan:  
Guns in the home: Outpatient provider(s):  
 
Participating treatment team members: Donis Jackson, * (assigned SW),

## 2019-01-25 NOTE — PROGRESS NOTES
Problem: Depressed Mood (Adult/Pediatric) Goal: *STG: Participates in treatment plan Outcome: Progressing Towards Goal 
Out on unit engaged, pleasantly confused, mood and affect stable smiling and reports \"well' when asked about her mood. Thoughts are logical, slight delay at processing, demonstrates appropriate behaviors, ability to get her needs met, following nursing direction and unit routine with no difficulty. Completing her ADL's independently. Daily goal is to d/c home. Staff focus is on d/c planning 1310: discharge to exit signed AVS via electronically into aunts care with scripts, AVS and belongings. Aunt verbalized understanding of discharge instructions

## 2019-01-25 NOTE — DISCHARGE INSTRUCTIONS
DISCHARGE SUMMARY    NAME:Shani Sosa  : [birth year]  MRN: [id number]    The patient Dory Almanzar exhibits the ability to control behavior in a less restrictive environment. Patient's level of functioning is improving. No assaultive/destructive behavior has been observed for the past 24 hours. No suicidal/homicidal threat or behavior has been observed for the past 24 hours. There is no evidence of serious medication side effects. Patient has not been in physical or protective restraints for at least the past 24 hours. If weapons involved, how are they secured? No weapons involved. Is patient aware of and in agreement with discharge plan? Yes    Arrangements for medication:  Prescriptions given to patient. Copy of discharge instructions to provider?:  Shellie Thompson; Dr. Wisdom Ask for transportation home:  Family to . Keep all follow up appointments as scheduled, continue to take prescribed medications per physician instructions. Mental health crisis number:  026 or your local mental health crisis line number at [phone]. DISCHARGE SUMMARY from Nurse    PATIENT INSTRUCTIONS:    What to do at Home:  Recommended activity: Activity as tolerated,     If you experience any of the following symptoms thoughts of harming self, feeling overwhelmed with hopelessness, helplessness, please follow up with your assigned providers and local crisis number at [phone]. *  Please give a list of your current medications to your Primary Care Provider. *  Please update this list whenever your medications are discontinued, doses are      changed, or new medications (including over-the-counter products) are added. *  Please carry medication information at all times in case of emergency situations.     These are general instructions for a healthy lifestyle:    No smoking/ No tobacco products/ Avoid exposure to second hand smoke  Surgeon General's Warning:  Quitting smoking now greatly reduces serious risk to your health. Obesity, smoking, and sedentary lifestyle greatly increases your risk for illness    A healthy diet, regular physical exercise & weight monitoring are important for maintaining a healthy lifestyle    You may be retaining fluid if you have a history of heart failure or if you experience any of the following symptoms:  Weight gain of 3 pounds or more overnight or 5 pounds in a week, increased swelling in our hands or feet or shortness of breath while lying flat in bed. Please call your doctor as soon as you notice any of these symptoms; do not wait until your next office visit. Recognize signs and symptoms of STROKE:    F-face looks uneven    A-arms unable to move or move unevenly    S-speech slurred or non-existent    T-time-call 911 as soon as signs and symptoms begin-DO NOT go       Back to bed or wait to see if you get better-TIME IS BRAIN. Warning Signs of HEART ATTACK     Call 911 if you have these symptoms:   Chest discomfort. Most heart attacks involve discomfort in the center of the chest that lasts more than a few minutes, or that goes away and comes back. It can feel like uncomfortable pressure, squeezing, fullness, or pain.  Discomfort in other areas of the upper body. Symptoms can include pain or discomfort in one or both arms, the back, neck, jaw, or stomach.  Shortness of breath with or without chest discomfort.  Other signs may include breaking out in a cold sweat, nausea, or lightheadedness. Don't wait more than five minutes to call 911 - MINUTES MATTER! Fast action can save your life. Calling 911 is almost always the fastest way to get lifesaving treatment. Emergency Medical Services staff can begin treatment when they arrive -- up to an hour sooner than if someone gets to the hospital by car. The discharge information has been reviewed with the patient. The patient verbalized understanding.   Discharge medications reviewed with the patient and appropriate educational materials and side effects teaching were provided.   ___________________________________________________________________________________________________________________________________

## 2019-01-25 NOTE — BH NOTES
GROUP THERAPY PROGRESS NOTE Jostin Sears participated in a Morning Process Group on the General Unit with a focus on identifying feelings, planning for the day, and learning more about Self-nurturance. Group time: 75 minutes. Personal goal for participation: To identify feelings, increase the capacity to manage ones ability to cope through Wellness in several areas of personal life. Goal orientation: The patient will be able to introduce themselves to their peers, identify their feelings, and consider the importance of coping skill development, particularly in seven areas of ones life. Group therapy participation: With prompting, this patient participated in the group. Therapeutic interventions reviewed and discussed: The patients were encouraged to identify themselves by their first names, speak to their feelings, and define a goal for their day. The group participated in a discussion of a handout that suggested seven areas of one's life that may contribute to self-nurturance: physical nurturing, adult relationships, my voice, creativity, self-compassion, contributions and/or meaning, and limit setting. The group members were provided a copy of the handout to review on their own. Impression of participation: The patient waited until most of her peers had shared before saying that she felt, \"Alright. \" She said she planned \"to walk, eat a good meals, and stay on my meds. \" She was alert, generally oriented, and cooperative. She expressed no SI/HI and no overt psychotic symptoms. Her affect was anxious and depressed. Her mood reflected her affect. She appeared to have clearer thinking in today's process group.

## 2019-01-25 NOTE — PROGRESS NOTES
GROUP THERAPY PROGRESS NOTE Chris Cardoza is participating in Goals Group and Community. Group time: 45 minutes Personal goal for participation: Go home and be happy Goal orientation: personal 
 
Group therapy participation: active Therapeutic interventions reviewed and discussed: Reviewed unit schedule and roles of different staff members. Discussed treatment team and asked group members to reflect on medication effects before speaking with MD and charge nurse, asked to reflect on sleep, appetite, anxiety, thought pattern, etc. Discussed importance of taking care of self on unit, like showering, eating, making bed, etc. Discussed importance of setting daily goals that are small and realistic in order to achieve larger goals. Pts shared goals and shared tips with others to help achieve goals. Impression of participation: Actively listened, asked questions regarding discharge later this afternoon.

## 2019-01-30 ENCOUNTER — OFFICE VISIT (OUTPATIENT)
Dept: BEHAVIORAL/MENTAL HEALTH CLINIC | Age: 64
End: 2019-01-30

## 2019-01-30 VITALS — BODY MASS INDEX: 28.34 KG/M2 | WEIGHT: 154 LBS | HEIGHT: 62 IN

## 2019-01-30 DIAGNOSIS — F03.A0 MILD DEMENTIA: Primary | ICD-10-CM

## 2019-01-30 DIAGNOSIS — F41.9 ANXIETY: ICD-10-CM

## 2019-01-30 DIAGNOSIS — G31.84 MILD COGNITIVE IMPAIRMENT: ICD-10-CM

## 2019-01-30 DIAGNOSIS — F25.1 SCHIZOAFFECTIVE DISORDER, DEPRESSIVE TYPE (HCC): ICD-10-CM

## 2019-01-30 RX ORDER — SERTRALINE HYDROCHLORIDE 50 MG/1
50 TABLET, FILM COATED ORAL DAILY
Qty: 30 TAB | Refills: 1 | Status: SHIPPED | OUTPATIENT
Start: 2019-01-30 | End: 2019-03-04 | Stop reason: SDUPTHER

## 2019-01-30 RX ORDER — RISPERIDONE 1 MG/1
TABLET, FILM COATED ORAL
Qty: 90 TAB | Refills: 1 | Status: SHIPPED | OUTPATIENT
Start: 2019-01-30 | End: 2019-03-04 | Stop reason: SDUPTHER

## 2019-01-30 NOTE — PROGRESS NOTES
Ambulatory Initial Psychiatric Evaluation     Chief Complaint: \" I have my appointment today\"     History of Present Illness: Gaby Boudreaux is a 58 y.o. female who presents with symptoms of depression and schizoaffective disorder. Patient reports/evidences the following emotional symptoms: client  has no genetic loading for a psychiatric d/o and no personal history of substance abuse. No abuse history noted. PMH is significant for HT. Client ahs a long h/o of schizophrenia . she is a poor historian and unable to recall her previous psychaitric history. No previous medical records available. Reported she has onset of psychosis in her late 19's. She was [place] [facility] and had trial of psychotropics- haldol, ? thorazine , she was unable to recall her medications. Client has been hospitalized multiple times in past - [facility]. Hospitalized in December at [facility]. She was recently hospitalized at [facility] for schizoaffective ds from 01/22/19. She was wandering off from home x 2  In 1 month. Reported she is stable on sertraline and risperidone. Reported sleeping for 6-8  hrs and and reported trazodone is benefiting. Reported appetite is \"good\", has interest, has motivation, and able to focus and concentrate. Reported she watches tv for 4-5 hrs and able to enjoy movie. Reported AVH - hears male and female voices and voices are nice now a days. Denied any command hallucinations. Reported her last hallucination last week and hears at night. denied nay paranoid or delusional thoughts at this time. Reported she is able to maintain IADL's . She lives with her aunt, her aunt manages her finances. reported her aunt is supportive. H/o daniella in past.  Additional symptomatology include anxiety and had crying spells. The above symptoms have been present for \" all my life\". .   The patient reports onset of symptoms at young age. These symptoms are of high severity as per patient's report.  The symptoms are variable in nature. The patient's condition has been precipitated by and condition worsened with stressors. Stressors/life events: son killed at age 23, parents . Pt denied any flashbacks, hypervigilance or avoidance or reexperience or night damico. Pt denied any h/o seizures or head trauma or neurological problems. Client denied any SI or any plan or intent; denied HI or SIB. Past Psychiatric History:     Previous psychiatric care: admits   Age 33 till Age 47-  [facility]- psychosis/ schizophrenia- trial of  haldol, thorazine, risperidone, trazodone   Age 47 till 60-  psychiatrist- Dr. Yasir Sood- risperidone,      hospitalized in , ,, [id number] -     Previous suicide attempts: denied    Previous self injurious behavior: No    Previous Psych Hospitalizations: admits  Multiple hospitalizations-, , [facility] , [facility] [address] in 2019. Current psychotropics: risperidone, sertraline, trazodone.         Previous psychiatric medications/ECT/TMS: admits  unknown          Past history of SA,rehab, detox, withdrawal: denied    Social History:   Social History     Socioeconomic History    Marital status: UNKNOWN     Spouse name: Not on file    Number of children: Not on file    Years of education: Not on file    Highest education level: Not on file   Tobacco Use    Smoking status: Former Smoker     Packs/day: 0.00    Smokeless tobacco: Never Used   Substance and Sexual Activity    Alcohol use: No    Drug use: No    Sexual activity: No        Ethnic:   Relationship Status: single,   Kids: 3- daughter - 29, sons - 42's, one son got killed    Living Situation: Other aunt and cousin   Born: [place]  Raised by: mother   Siblings: 7   Education: 10 th grade- IEP    Employment: on permanent disability  Tobacco:  tobacco use: quit tobacco use 5 years ago   Caffeine: caffeine intake: 5 cans/bottles of caffeinated soda pop per day(s)  Alcohol: no alcohol use  Illicit Drug Social History:  no history of illicit drug use  Hobbies:  music   Abuse: denied  Sexual:  heterosexual  Support: family  Legal: denied    Family History:   Family History   Problem Relation Age of Onset    HIV/AIDS Mother     Heart Attack Father         Family Psychiatric history: Theres no formal diagnosed psychiatric illness in the family. There is no history of suicide attempt in the family. Past Medical/Surgical History:   Past Medical History:   Diagnosis Date    Anxiety disorder     Hypertension     Psychiatric disorder     schizophrenia          Allergies: Allergies   Allergen Reactions    No Known Allergies Other (comments)        Medication List:   Current Outpatient Medications   Medication Sig Dispense Refill    risperiDONE (RISPERDAL) 1 mg tablet One tab on am and 2 tabs at night 90 Tab 1    sertraline (ZOLOFT) 50 mg tablet Take 1 Tab by mouth daily. 30 Tab 1    atorvastatin (LIPITOR) 20 mg tablet Take 1 Tab by mouth nightly. 30 Tab 0        A comprehensive review of systems was negative except for that written in the HPI. Psychiatric/Mental Status Examination:     MENTAL STATUS EXAM:  Sensorium  oriented to time, place and person   Orientation person, place, not oriented to year and date,  situation, day of week, month of year    Relations cooperative   Eye Contact appropriate   Appearance:  age appropriate, disheveled , malodorous and older than stated age   Motor Behavior:  gait stable and within normal limits   Speech:  normal pitch and normal volume   Vocabulary below average   Thought Process: goal directed, logical and within normal limits   Thought Content hallucinations   Suicidal ideations none   Homicidal ideations none   Mood:  euthymic   Affect:  anxious and mood-incongruent   Memory recent  impaired   Memory remote:  impaired   Concentration:  adequate   Abstraction:  concrete   Insight:  limited   Reliability limited.    Judgment:  limited     Labs:  Results for orders placed or performed during the hospital encounter of 01/44/76   METABOLIC PANEL, BASIC   Result Value Ref Range    Sodium 139 136 - 145 mmol/L    Potassium 4.3 3.5 - 5.1 mmol/L    Chloride 104 97 - 108 mmol/L    CO2 28 21 - 32 mmol/L    Anion gap 7 5 - 15 mmol/L    Glucose 105 (H) 65 - 100 mg/dL    BUN 27 (H) 6 - 20 MG/DL    Creatinine 0.99 0.55 - 1.02 MG/DL    BUN/Creatinine ratio 27 (H) 12 - 20      GFR est AA >60 >60 ml/min/1.73m2    GFR est non-AA 56 (L) >60 ml/min/1.73m2    Calcium 8.8 8.5 - 10.1 MG/DL   MAGNESIUM   Result Value Ref Range    Magnesium 2.1 1.6 - 2.4 mg/dL   GLUCOSE, FASTING   Result Value Ref Range    Glucose 105 (H) 65 - 100 MG/DL   EKG, 12 LEAD, INITIAL   Result Value Ref Range    Ventricular Rate 53 BPM    Atrial Rate 53 BPM    P-R Interval 182 ms    QRS Duration 86 ms    Q-T Interval 564 ms    QTC Calculation (Bezet) 529 ms    Calculated P Axis 53 degrees    Calculated R Axis 27 degrees    Calculated T Axis 84 degrees    Diagnosis       Sinus bradycardia  Left ventricular hypertrophy with repolarization abnormality  Prolonged QT  Abnormal ECG  When compared with ECG of 21-JAN-2019 20:33,  QT has shortened  Confirmed by Laura Horan MD, Magy Royal ([id number]) on 1/23/2019 10:57:26 AM     EKG, 12 LEAD, INITIAL   Result Value Ref Range    Ventricular Rate 53 BPM    Atrial Rate 53 BPM    P-R Interval 190 ms    QRS Duration 88 ms    Q-T Interval 488 ms    QTC Calculation (Bezet) 457 ms    Calculated P Axis 64 degrees    Calculated R Axis 30 degrees    Calculated T Axis 89 degrees    Diagnosis       Sinus bradycardia  Voltage criteria for left ventricular hypertrophy  T wave abnormality, consider lateral ischemia  Abnormal ECG  When compared with ECG of 22-JAN-2019 16:33,  T wave inversion more evident in Lateral leads  QT has shortened  Confirmed by Verona Bello ([id number]) on 1/24/2019 6:46:52 PM           Assessment:  The client, Chris Cardoza is a 58 y. o.AA female presents with schizoaffective ds , depressiona nd anxiety. She was recently hospitalized at [facility] for anxiety and was wandering off from house. She was on risperidone and began sertraline. Client reported her mood is better, denied any symptoms of daniella or anxiety. reported AH is rare and are not bothersome. denied any command hallucinations. Client has memory issues and is not oriented. Her MOCA and MMSE score indicate mild cognitive impairment. Client possibly has ? intellectual disability. Unable to recall remote or recent incidents. support system is ? Nola Richardson Plan to referral for neurocognitive testing. Plan to continue medication regimen. Plan to adjust the medications as per the response and tolerability. Discussed importance of psychotherapy in her treatment plan and gave resources. Reviewed labs and records. Patient denies SI/HI/SIB. No evidence of AH/VH or delusions. Possible organic causes contributing are: HT  Reviewed medical admissions and discussed with the patient. Client is medically . .stable. Vitals stable    PHQ 9 score: 3- minimal  HAM:7 - mild  Mood disorder questionnaire: negative   MMSE:21- mild dementia -    [facility] 25- cognitive impairment /dementia    Diagnosis: schizoaffective disorder, anxiety, mild cognitive impairment , r/o Intellectual disability    Treatment Plan:   1.  Medication: continue sertraline 50 mg daily                           Continue risperidone 1 mg in morning and 2 mg HS                             Labs ordered- folate, B12, RPR,                              Ordered MRI                             Referral to neurocognitive testing - Dr walters - r/o dementia                              2. Discussed:  the risks and benefits of the proposed medication  the potential medication side effects  dry mouth, GI disturbance, headache, insomnia, libido decreased, weight gain, weight loss, somnolence  patient given opportunity to ask questions  off label use of an approved drug/prescription discussed with patient      3. Psychotherapy: Recommended: CBT- gave a list of local providers. 4. Medical: PCP  5. Return to Clinic: Follow-up Disposition:  Return in about 2 days (around 2/1/2019) for med check and follow up. or sooner prn    The risk versus benefits of treatment were discussed and side effects explained. Patient agreed with plan. Patient instructed to call with any side effects.   - Instructed patient to call the clinic, and if after hours call the provider on call if experiences any suicidal thought or ideas to hurt herself or other. Also instructed to call 911 or go to the ED. Patient verbalized understanding and agreed to call. Patient was given an after visit summary or informed of [employer] which includes patient instructions, diagnoses, current medications, & vitals.       Time spent with Patient:  30 to 74 minutes    Hilda Watts NP  1/30/2019

## 2019-03-04 ENCOUNTER — HOSPITAL ENCOUNTER (EMERGENCY)
Age: 64
Discharge: STILL ADMITTED OR ADMINISTRATIVE | End: 2019-03-04
Attending: EMERGENCY MEDICINE
Payer: MEDICAID

## 2019-03-04 PROCEDURE — 75810000275 HC EMERGENCY DEPT VISIT NO LEVEL OF CARE

## 2019-04-01 ENCOUNTER — OFFICE VISIT (OUTPATIENT)
Dept: BEHAVIORAL/MENTAL HEALTH CLINIC | Age: 64
End: 2019-04-01

## 2019-04-01 VITALS
WEIGHT: 154 LBS | HEIGHT: 62 IN | HEART RATE: 62 BPM | SYSTOLIC BLOOD PRESSURE: 173 MMHG | DIASTOLIC BLOOD PRESSURE: 102 MMHG | BODY MASS INDEX: 28.34 KG/M2

## 2019-04-01 DIAGNOSIS — F41.9 ANXIETY: ICD-10-CM

## 2019-04-01 DIAGNOSIS — F25.1 SCHIZOAFFECTIVE DISORDER, DEPRESSIVE TYPE (HCC): Primary | ICD-10-CM

## 2019-04-01 RX ORDER — SERTRALINE HYDROCHLORIDE 100 MG/1
100 TABLET, FILM COATED ORAL DAILY
Qty: 30 TAB | Refills: 1 | Status: ON HOLD | OUTPATIENT
Start: 2019-04-01 | End: 2019-04-30

## 2019-04-01 RX ORDER — RISPERIDONE 1 MG/1
TABLET, FILM COATED ORAL
Qty: 90 TAB | Refills: 0 | Status: ON HOLD | OUTPATIENT
Start: 2019-04-01 | End: 2019-04-30

## 2019-04-01 NOTE — PATIENT INSTRUCTIONS
Sleep Tips    What to avoid    · Do not have drinks with caffeine, such as coffee or black tea, for 8 hours before bed. · Do not smoke or use other types of tobacco near bedtime. Nicotine is a stimulant and can keep you awake. · Avoid drinking alcohol late in the evening, because it can cause you to wake in the middle of the night. · Do not eat a big meal close to bedtime. If you are hungry, eat a light snack. · Do not drink a lot of water close to bedtime, because the need to urinate may wake you up during the night. · Do not read or watch TV in bed. Use the bed only for sleeping and sexual activity. What to try    · Go to bed at the same time every night, and wake up at the same time every morning. Do not take naps during the day. · Keep your bedroom quiet, dark, and cool. · Get regular exercise, but not within 3 to 4 hours of your bedtime. · Sleep on a comfortable pillow and mattress. · If watching the clock makes you anxious, turn it facing away from you so you cannot see the time. · If you worry when you lie down, start a worry book. Well before bedtime, write down your worries, and then set the book and your concerns aside. · Try meditation or other relaxation techniques before you go to bed. · If you cannot fall asleep, get up and go to another room until you feel sleepy. Do something relaxing. Repeat your bedtime routine before you go to bed again. Make your house quiet and calm about an hour before bedtime. Turn down the lights, turn off the TV, log off the computer, and turn down the volume on music. This can help you relax after a busy day. Anxiety Disorder: Care Instructions  Your Care Instructions    Anxiety is a normal reaction to stress. Difficult situations can cause you to have symptoms such as sweaty palms and a nervous feeling. In an anxiety disorder, the symptoms are far more severe.  Constant worry, muscle tension, trouble sleeping, nausea and diarrhea, and other symptoms can make normal daily activities difficult or impossible. These symptoms may occur for no reason, and they can affect your work, school, or social life. Medicines, counseling, and self-care can all help. Follow-up care is a key part of your treatment and safety. Be sure to make and go to all appointments, and call your doctor if you are having problems. It's also a good idea to know your test results and keep a list of the medicines you take. How can you care for yourself at home? Take medicines exactly as directed. Call your doctor if you think you are having a problem with your medicine. Go to your counseling sessions and follow-up appointments. Recognize and accept your anxiety. Then, when you are in a situation that makes you anxious, say to yourself, \"This is not an emergency. I feel uncomfortable, but I am not in danger. I can keep going even if I feel anxious. \"  Be kind to your body:  Relieve tension with exercise or a massage. Get enough rest.  Avoid alcohol, caffeine, nicotine, and illegal drugs. They can increase your anxiety level and cause sleep problems. Learn and do relaxation techniques. See below for more about these techniques. Engage your mind. Get out and do something you enjoy. Go to a [employer] movie, or take a walk or hike. Plan your day. Having too much or too little to do can make you anxious. Keep a record of your symptoms. Discuss your fears with a good friend or family member, or join a support group for people with similar problems. Talking to others sometimes relieves stress. Get involved in social groups, or volunteer to help others. Being alone sometimes makes things seem worse than they are. Get at least 30 minutes of exercise on most days of the week to relieve stress. Walking is a good choice. You also may want to do other activities, such as running, swimming, cycling, or playing tennis or team sports. Relaxation techniques  Do relaxation exercises 10 to 20 minutes a day.  You can play soothing, relaxing music while you do them, if you wish. Tell others in your house that you are going to do your relaxation exercises. Ask them not to disturb you. Find a comfortable place, away from all distractions and noise. Lie down on your back, or sit with your back straight. Focus on your breathing. Make it slow and steady. Breathe in through your nose. Breathe out through either your nose or mouth. Breathe deeply, filling up the area between your navel and your rib cage. Breathe so that your belly goes up and down. Do not hold your breath. Breathe like this for 5 to 10 minutes. Notice the feeling of calmness throughout your whole body. As you continue to breathe slowly and deeply, relax by doing the following for another 5 to 10 minutes:  Tighten and relax each muscle group in your body. You can begin at your toes and work your way up to your head. Imagine your muscle groups relaxing and becoming heavy. Empty your mind of all thoughts. Let yourself relax more and more deeply. Become aware of the state of calmness that surrounds you. When your relaxation time is over, you can bring yourself back to alertness by moving your fingers and toes and then your hands and feet and then stretching and moving your entire body. Sometimes people fall asleep during relaxation, but they usually wake up shortly afterward. Always give yourself time to return to full alertness before you drive a car or do anything that might cause an accident if you are not fully alert. Never play a relaxation tape while you drive a car. When should you call for help? Call 911 anytime you think you may need emergency care. For example, call if:    You feel you cannot stop from hurting yourself or someone else.   Jong Quintana the numbers for these national suicide hotlines: 5-610-755-TALK (1-941.729.6867) and 1-887-XHDDABY (8-326.499.6627).  If you or someone you know talks about suicide or feeling hopeless, get help right away.   Watch closely for changes in your health, and be sure to contact your doctor if:    You have anxiety or fear that affects your life.     You have symptoms of anxiety that are new or different from those you had before. Where can you learn more? Go to http://manohar-toyin.info/. Enter P754 in the search box to learn more about \"Anxiety Disorder: Care Instructions. \"  Current as of: September 11, 2018  Content Version: 11.9  © 9282-8285 [employer]. Care instructions adapted under license by [employer] (which disclaims liability or warranty for this information). If you have questions about a medical condition or this instruction, always ask your healthcare professional. [place] [address] any warranty or liability for your use of this information.     ·

## 2019-04-01 NOTE — PROGRESS NOTES
Psychiatric Outpatient Progress Note    Account Number:  [id number]  Name: Hima Casas    SUBJECTIVE:   CHIEF COMPLAINT:  Hima Casas is a 71 y.o. female and was seen today for follow-up of psychiatric condition and therapy/ psychotropic medication management. Last office visit was . Jan 2019. .... HPI:  History and HPI Copied from Alyx's initial visit note and addended  Orlin Verdin reports the following psychiatric symptoms:  depression, anxiety and psychosis.   client  has no genetic loading for a psychiatric d/o and no personal history of substance abuse. No abuse history noted. PMH is significant for HT. Client ahs a long h/o of schizophrenia . she is a poor historian and unable to recall her previous psychaitric history. No previous medical records available. Reported she has onset of psychosis in her late 19's. She was seen in [facility] and had trial of psychotropics- haldol, ? thorazine , she was unable to recall her medications. Client has been hospitalized multiple times in past - [facility] and [address] [place] [address]. Hospitalized in December at [facility]. She was recently hospitalized at [facility] for schizoaffective ds from 01/22/19. She was wandering off from home x 2  In 1 month. Reported she is stable on sertraline and risperidone. Reported sleeping for 6-8  hrs and and reported trazodone is benefiting. Reported appetite is \"good\", has interest, has motivation, and able to focus and concentrate. Reported she watches tv for 4-5 hrs and able to enjoy movie. Reported AVH - hears male and female voices and voices are nice now a days. Denied any command hallucinations. Reported her last hallucination last week and hears at night. denied nay paranoid or delusional thoughts at this time. Reported she is able to maintain IADL's . She lives with her aunt, her aunt manages her finances. reported her aunt is supportive. H/o daniella in past.  Additional symptomatology include anxiety and had crying spells.  The above symptoms have been present for \" all my life\". .   The patient reports onset of symptoms at young age. These symptoms are of high severity as per patient's report. The symptoms are  variable in nature.  The patient's condition has been precipitated by and condition worsened with stressors.     Contributing factors include/Stressors/life events: son killed at age 23, parents .     :  Patient denies SI/HI/SIB. Side Effects:  none      Fam/Soc Hx (from [place] with updates):    Family History   Problem Relation Age of Onset    HIV/AIDS Mother     Heart Attack Father       Social History     Tobacco Use    Smoking status: Current Some Day Smoker     Packs/day: 0.00    Smokeless tobacco: Never Used   Substance Use Topics    Alcohol use: No    Drug use: No        Social History:   Social History               Socioeconomic History    Marital status: UNKNOWN       Spouse name: Not on file    Number of children: Not on file    Years of education: Not on file    Highest education level: Not on file   Tobacco Use    Smoking status: Former Smoker       Packs/day: 0.00    Smokeless tobacco: Never Used   Substance and Sexual Activity    Alcohol use: No    Drug use: No    Sexual activity: No            Ethnic:   Relationship Status: single,   Kids: 3- daughter - 29, sons - 42's, one son got killed    Living Situation: Other aunt and cousin   Born: [place]  Raised by: mother   Siblings: 7   Education: 10 th grade- IEP    Employment: on permanent disability  Tobacco:  tobacco use: quit tobacco use 5 years ago   Caffeine: caffeine intake: 5 cans/bottles of caffeinated soda pop per day(s)  Alcohol: no alcohol use  Illicit Drug Social History:  no history of illicit drug use  Hobbies:  music   Abuse: denied  Sexual:  heterosexual  Support: family  Legal: denied       Family Psychiatric history: Theres no formal diagnosed psychiatric illness in the family.  There is no history of suicide attempt in the family.           Scales:01/019-   PHQ 9 score: 3- minimal  HAM:7 - mild  Mood disorder questionnaire: negative   MMSE:21- mild dementia -    [facility]- 25- cognitive impairment /dementia      REVIEW OF SYSTEMS:  Psychiatric:  psychosis and depression. Appetite:good   Sleep: good   Neuro: none reported                 Mental Status exam: WNL except for      Sensorium  oriented to time, place and person   Relations cooperative    Eye Contact    appropriate   Appearance:  age appropriate, casually dressed, disheveled, malodorous and within normal Limits   Motor Behavior/Gait:  gait stable and within normal limits   Speech:  normal pitch and normal volume   Thought Process: goal directed, logical and within normal limits   Thought Content free of delusions and hallucinations   Suicidal ideations none   Homicidal ideations none   Mood:  anxious   Affect:  anxious and mood-congruent   Memory recent  adequate   Memory remote:  adequate   Concentration:  adequate   Abstraction:  abstract   Insight:  poor   Reliability fair   Judgment:  poor       MEDICAL DECISION MAKING  Data: pertinent labs, imaging, medical records and diagnostic tests reviewed and incorporated in diagnosis and treatment plan    Allergies   Allergen Reactions    No Known Allergies Other (comments)        Current Outpatient Medications   Medication Sig Dispense Refill    sertraline (ZOLOFT) 100 mg tablet Take 1 Tab by mouth daily. Indications: major depressive disorder 30 Tab 1    risperiDONE (RISPERDAL) 1 mg tablet One tab on am and 2 tabs at night  Indications: Schizophrenia 90 Tab 0    atorvastatin (LIPITOR) 20 mg tablet Take 1 Tab by mouth nightly. 30 Tab 0        Visit Vitals  BP (!) 173/102   Pulse 62   Ht 5' 2\" (1.575 m)   Wt 69.9 kg (154 lb)   BMI 28.17 kg/m²         Problems addressed today:  schizoaffective disorder, anxiety, mild cognitive impairment , r/o Intellectual disability, r/o MCI/ dementia    Assessment:   Bette Patel  is a 71 y.o. female  is responding to treatment. she has h/o  schizoaffective ds , depressiona nd anxiety. Patient reports no changes to her medical conditions Symptoms are occurring daily. Reported has AH of men and her mother some night. She is not wandering from house. Reported appetite is fair, has interest and energy, has motivation and able to focus and concentrate. She is anot able to maintain ADL's was malodorous. Occasional verbal agitation when things go her way. Denied any hopelessness or helplessness or any passive suicide thoughts. Reported improved sleep. - 7-8 hrs. Reported has anxiety ocassionally. Denied any symptoms of daniella or anxiety. reported AH is rare and are not bothersome. denied any command hallucinations. Client has memory issues and is not oriented. Her MOCA and MMSE score indicate mild cognitive impairment. Client possibly has ? intellectual disability. She has not completed any labs or MRI or neuropsychological. Her niece Ms. Sulma Billings is her payee. Plan to increase the dose of sertraline to target anxiety. Plan to continue medication regimen. Plan to adjust the medications as per the response and tolerability. Discussed importance of psychotherapy in her treatment plan and gave resources. Reviewed labs and records. Patient denies SI/HI/SIB. No evidence of AH/VH or delusions. .Client is not responding to treatment and is tolerating treatment well. Psychoeducation, medication teaching, co-morbid illness and pertinent health factors to manage care were discussed. Overall, patient is unstable at this time and will require ongoing medication management. Possible organic causes contributing are: HT  Reviewed medical admissions and discussed with the patient. Client is medically . .stable. Vitals stable    Risk Scoring- chronic illnesses and prescription drug management    Treatment Plan:  1.   Medications:          Medication Changes/Adjustments: Increase  Sertraline 100 mg daily Continue risperidone 1 mg in morning and 2 mg HS- 90 days supply. reprinted  Labs ordered- folate, B12, RPR,                                                                  Reprinted  MRI order                                                                 Referral to neurocognitive testing - Dr walters - r/o dementia      Current Outpatient Medications   Medication Sig Dispense Refill    sertraline (ZOLOFT) 100 mg tablet Take 1 Tab by mouth daily. Indications: major depressive disorder 30 Tab 1    risperiDONE (RISPERDAL) 1 mg tablet One tab on am and 2 tabs at night  Indications: Schizophrenia 90 Tab 0    atorvastatin (LIPITOR) 20 mg tablet Take 1 Tab by mouth nightly. 30 Tab 0                  The following regarding medications was addressed:    (The risks and benefits of the proposed medication; the potential medication side effects ie    dry mouth, weight gain, GI upset, headache; patient given opportunity to ask questions)       2. Counseling and coordination of care including instructions for treatment, risks/benefits, risk factor reduction and patient/family education. She agrees with the plan. Patient instructed to call with any side effects, questions or issues. Instructed patient to call the clinic, and if after hours call the provider on call ifclient experiences any suicidal thought or ideas to hurt self or other. Also instructed to call 911 or go to the ED. Patient verbalized understanding and agreed to call    3. Follow-up and Dispositions    · Return in about 2 months (around 6/1/2019) for med check and follow up. 4. Other: Nutritional/health counseling on diet and exercise. For reliable dietary information, go to www. [employer].org.     PSYCHOTHERAPY:  approx 16 minutes  Type:  Supportive/Cognitive Behavioral psychotherapy provided  Focus:     Current problems   Medical issues   Interpersonal conflicts  Ecucation : hygiene  Psychoeducation provided  Treatment plan reviewed with patient-including diagnosis and medications    Worked on issues of denial & effects of substance dependency/use    Clair Nyhan is not progressing.     Cynthia Mcbride NP  4/1/2019

## 2019-04-29 ENCOUNTER — HOSPITAL ENCOUNTER (EMERGENCY)
Age: 64
Discharge: OTHER INSTITUTION | End: 2019-04-30
Attending: INTERNAL MEDICINE | Admitting: INTERNAL MEDICINE
Payer: MEDICARE

## 2019-04-29 DIAGNOSIS — F20.0 SCHIZOPHRENIA, PARANOID (HCC): Primary | ICD-10-CM

## 2019-04-29 DIAGNOSIS — F33.9 RECURRENT MAJOR DEPRESSIVE DISORDER, REMISSION STATUS UNSPECIFIED (HCC): ICD-10-CM

## 2019-04-29 LAB
ALBUMIN SERPL-MCNC: 3.8 G/DL (ref 3.5–5)
ALBUMIN/GLOB SERPL: 1.1 {RATIO} (ref 1.1–2.2)
ALP SERPL-CCNC: 60 U/L (ref 45–117)
ALT SERPL-CCNC: 13 U/L (ref 12–78)
ANION GAP SERPL CALC-SCNC: 6 MMOL/L (ref 5–15)
APPEARANCE UR: ABNORMAL
AST SERPL-CCNC: 10 U/L (ref 15–37)
BACTERIA URNS QL MICRO: ABNORMAL /HPF
BASOPHILS # BLD: 0 K/UL (ref 0–0.1)
BASOPHILS NFR BLD: 1 % (ref 0–1)
BILIRUB SERPL-MCNC: 0.2 MG/DL (ref 0.2–1)
BILIRUB UR QL CFM: NEGATIVE
BUN SERPL-MCNC: 15 MG/DL (ref 6–20)
BUN/CREAT SERPL: 16 (ref 12–20)
CALCIUM SERPL-MCNC: 9 MG/DL (ref 8.5–10.1)
CAOX CRY URNS QL MICRO: ABNORMAL
CHLORIDE SERPL-SCNC: 107 MMOL/L (ref 97–108)
CO2 SERPL-SCNC: 30 MMOL/L (ref 21–32)
COLOR UR: ABNORMAL
CREAT SERPL-MCNC: 0.93 MG/DL (ref 0.55–1.02)
DIFFERENTIAL METHOD BLD: NORMAL
EOSINOPHIL # BLD: 0.1 K/UL (ref 0–0.4)
EOSINOPHIL NFR BLD: 2 % (ref 0–7)
EPITH CASTS URNS QL MICRO: ABNORMAL /LPF
ERYTHROCYTE [DISTWIDTH] IN BLOOD BY AUTOMATED COUNT: 12.1 % (ref 11.5–14.5)
ETHANOL SERPL-MCNC: <10 MG/DL
GLOBULIN SER CALC-MCNC: 3.5 G/DL (ref 2–4)
GLUCOSE SERPL-MCNC: 143 MG/DL (ref 65–100)
GLUCOSE UR STRIP.AUTO-MCNC: NEGATIVE MG/DL
HCT VFR BLD AUTO: 38.8 % (ref 35–47)
HGB BLD-MCNC: 12.7 G/DL (ref 11.5–16)
HGB UR QL STRIP: NEGATIVE
HYALINE CASTS URNS QL MICRO: ABNORMAL /LPF (ref 0–5)
IMM GRANULOCYTES # BLD AUTO: 0 K/UL (ref 0–0.04)
IMM GRANULOCYTES NFR BLD AUTO: 0 % (ref 0–0.5)
KETONES UR QL STRIP.AUTO: ABNORMAL MG/DL
LEUKOCYTE ESTERASE UR QL STRIP.AUTO: ABNORMAL
LYMPHOCYTES # BLD: 1.5 K/UL (ref 0.8–3.5)
LYMPHOCYTES NFR BLD: 33 % (ref 12–49)
MCH RBC QN AUTO: 29.7 PG (ref 26–34)
MCHC RBC AUTO-ENTMCNC: 32.7 G/DL (ref 30–36.5)
MCV RBC AUTO: 90.9 FL (ref 80–99)
MONOCYTES # BLD: 0.4 K/UL (ref 0–1)
MONOCYTES NFR BLD: 9 % (ref 5–13)
MUCOUS THREADS URNS QL MICRO: ABNORMAL /LPF
NEUTS SEG # BLD: 2.5 K/UL (ref 1.8–8)
NEUTS SEG NFR BLD: 55 % (ref 32–75)
NITRITE UR QL STRIP.AUTO: NEGATIVE
NRBC # BLD: 0 K/UL (ref 0–0.01)
NRBC BLD-RTO: 0 PER 100 WBC
PH UR STRIP: 5.5 [PH] (ref 5–8)
PLATELET # BLD AUTO: 303 K/UL (ref 150–400)
PMV BLD AUTO: 9 FL (ref 8.9–12.9)
POTASSIUM SERPL-SCNC: 4.2 MMOL/L (ref 3.5–5.1)
PROT SERPL-MCNC: 7.3 G/DL (ref 6.4–8.2)
PROT UR STRIP-MCNC: NEGATIVE MG/DL
RBC # BLD AUTO: 4.27 M/UL (ref 3.8–5.2)
RBC #/AREA URNS HPF: ABNORMAL /HPF (ref 0–5)
SODIUM SERPL-SCNC: 143 MMOL/L (ref 136–145)
SP GR UR REFRACTOMETRY: 1.02 (ref 1–1.03)
UROBILINOGEN UR QL STRIP.AUTO: 1 EU/DL (ref 0.2–1)
WBC # BLD AUTO: 4.5 K/UL (ref 3.6–11)
WBC URNS QL MICRO: ABNORMAL /HPF (ref 0–4)

## 2019-04-29 PROCEDURE — 99284 EMERGENCY DEPT VISIT MOD MDM: CPT

## 2019-04-29 PROCEDURE — 36415 COLL VENOUS BLD VENIPUNCTURE: CPT

## 2019-04-29 PROCEDURE — 81001 URINALYSIS AUTO W/SCOPE: CPT

## 2019-04-29 PROCEDURE — 90791 PSYCH DIAGNOSTIC EVALUATION: CPT

## 2019-04-29 PROCEDURE — 80053 COMPREHEN METABOLIC PANEL: CPT

## 2019-04-29 PROCEDURE — 80307 DRUG TEST PRSMV CHEM ANLYZR: CPT

## 2019-04-29 PROCEDURE — 85025 COMPLETE CBC W/AUTO DIFF WBC: CPT

## 2019-04-29 NOTE — ED NOTES
Report given to night shift rn, eliza, and care passed on of pt. No si/s of acute distress. Pt within line of sight of staff. Denied pain/complaints. Call bell within reach.

## 2019-04-29 NOTE — ED NOTES
Pt unable to yet void. Unable to get piv but able to draw labs-ok per dr Umu Flores md, to not have piv at present. Carlo Gonzalez, counselor at bedside.
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
## 2019-04-29 NOTE — ED NOTES
.. 
Emergency Department Nursing Plan of Care The Nursing Plan of Care is developed from the Nursing assessment and Emergency Department Attending provider initial evaluation. The plan of care may be reviewed in the ED Provider note. The Plan of Care was developed with the following considerations:  
Patient / Family readiness to learn indicated by:verbalized understanding and appropriate questions asked Persons(s) to be included in education: patient Barriers to Learning/Limitations:Yes:psych condition, poor historian Signed uQincy Cosby RN   
4/29/2019   7:12 PM

## 2019-04-30 ENCOUNTER — HOSPITAL ENCOUNTER (INPATIENT)
Age: 64
LOS: 20 days | Discharge: HOME | DRG: 885 | End: 2019-05-20
Attending: PSYCHIATRY & NEUROLOGY | Admitting: PSYCHIATRY & NEUROLOGY
Payer: MEDICARE

## 2019-04-30 VITALS
TEMPERATURE: 98.4 F | OXYGEN SATURATION: 100 % | WEIGHT: 157 LBS | SYSTOLIC BLOOD PRESSURE: 139 MMHG | BODY MASS INDEX: 28.89 KG/M2 | DIASTOLIC BLOOD PRESSURE: 92 MMHG | RESPIRATION RATE: 18 BRPM | HEART RATE: 66 BPM | HEIGHT: 62 IN

## 2019-04-30 PROBLEM — F20.0 SCHIZOPHRENIA, PARANOID (HCC): Status: ACTIVE | Noted: 2019-04-30

## 2019-04-30 PROCEDURE — 65220000003 HC RM SEMIPRIVATE PSYCH

## 2019-04-30 PROCEDURE — 74011250637 HC RX REV CODE- 250/637: Performed by: NURSE PRACTITIONER

## 2019-04-30 RX ORDER — BENZTROPINE MESYLATE 1 MG/1
1 TABLET ORAL
Status: DISCONTINUED | OUTPATIENT
Start: 2019-04-30 | End: 2019-05-20 | Stop reason: HOSPADM

## 2019-04-30 RX ORDER — IBUPROFEN 400 MG/1
400 TABLET ORAL
Status: DISCONTINUED | OUTPATIENT
Start: 2019-04-30 | End: 2019-05-20 | Stop reason: HOSPADM

## 2019-04-30 RX ORDER — SERTRALINE HYDROCHLORIDE 50 MG/1
150 TABLET, FILM COATED ORAL DAILY
Status: DISCONTINUED | OUTPATIENT
Start: 2019-04-30 | End: 2019-05-04

## 2019-04-30 RX ORDER — IBUPROFEN 200 MG
1 TABLET ORAL
Status: DISCONTINUED | OUTPATIENT
Start: 2019-04-30 | End: 2019-05-20 | Stop reason: HOSPADM

## 2019-04-30 RX ORDER — SERTRALINE HYDROCHLORIDE 100 MG/1
150 TABLET, FILM COATED ORAL DAILY
COMMUNITY
End: 2019-05-20

## 2019-04-30 RX ORDER — BENZTROPINE MESYLATE 1 MG/ML
1 INJECTION INTRAMUSCULAR; INTRAVENOUS
Status: DISCONTINUED | OUTPATIENT
Start: 2019-04-30 | End: 2019-05-20 | Stop reason: HOSPADM

## 2019-04-30 RX ORDER — ACETAMINOPHEN 325 MG/1
650 TABLET ORAL
Status: DISCONTINUED | OUTPATIENT
Start: 2019-04-30 | End: 2019-05-20 | Stop reason: HOSPADM

## 2019-04-30 RX ORDER — ADHESIVE BANDAGE
30 BANDAGE TOPICAL DAILY PRN
Status: DISCONTINUED | OUTPATIENT
Start: 2019-04-30 | End: 2019-05-20 | Stop reason: HOSPADM

## 2019-04-30 RX ORDER — RISPERIDONE 1 MG/1
TABLET, FILM COATED ORAL
COMMUNITY
End: 2019-05-20

## 2019-04-30 RX ORDER — RISPERIDONE 1 MG/1
2 TABLET, FILM COATED ORAL
Status: DISCONTINUED | OUTPATIENT
Start: 2019-04-30 | End: 2019-05-16

## 2019-04-30 RX ORDER — OLANZAPINE 2.5 MG/1
2.5 TABLET ORAL
Status: DISCONTINUED | OUTPATIENT
Start: 2019-04-30 | End: 2019-05-20 | Stop reason: HOSPADM

## 2019-04-30 RX ORDER — RISPERIDONE 1 MG/1
1 TABLET, FILM COATED ORAL DAILY
Status: DISCONTINUED | OUTPATIENT
Start: 2019-04-30 | End: 2019-05-16

## 2019-04-30 RX ADMIN — RISPERIDONE 1 MG: 1 TABLET ORAL at 12:11

## 2019-04-30 RX ADMIN — SERTRALINE HYDROCHLORIDE 150 MG: 50 TABLET ORAL at 12:11

## 2019-04-30 RX ADMIN — RISPERIDONE 2 MG: 1 TABLET ORAL at 21:35

## 2019-04-30 NOTE — PROGRESS NOTES
[facility] 60 Master Treatment Plan for [employer] Date Treatment Plan Initiated: 4/30/19 Treatment Plan Modalities: 
Type of Modality Amount (x minutes) Frequency (x/week) Duration (x days) Name of Responsible Staff Community & wrap-up meetings to encourage peer interactions 15 7 1 [facility] Group psychotherapy to assist in building coping skills and internal controls 60 7 [address] [facility] Therapeutic activity groups to build coping skills 60 7 1 Marcus Koo Psychoeducation in group setting to address:  
Medication education 105 [facility] Coping skills Relaxation techniques Symptom management Discharge planning [facility] Spirituality 2209 [facility] Hernandez 60 1 1 volunteer Recovery/AA/NA 
    volunteer Physician medication management [id number] [facility] NP Family meeting/discharge planning Migel Skinner and Arthur Larson THESE GOALS WILL BE MET BY 5/3/19 Problem: Depressed Mood (Adult/Pediatric) Goal: *STG: Participates in treatment plan Outcome: Progressing Towards Goal 
Note: Out on unit social w peers and staff. Mood and affect smiling, incongruent w thought content. Impaired memory, AH and slight delay in processing noted during tx team. Reports housing unstable and open to assistance with placement. Daily goal is to \"find a place to live\". Staff focus is on offering support, reassurance and  enocurage groups. Goal: *STG: Verbalizes anger, guilt, and other feelings in a constructive manor Outcome: Progressing Towards Goal 
Goal: *STG: Attends activities and groups Outcome: Progressing Towards Goal 
Goal: *STG: Demonstrates reduction in symptoms and increase in insight into coping skills/future focused Outcome: Progressing Towards Goal 
Goal: Interventions Outcome: Progressing Towards Goal 
  
 Problem: Patient Education: Go to Patient Education Activity Goal: Patient/Family Education Outcome: Progressing Towards Goal 
  
Problem: Altered Thought Process (Adult/Pediatric) Goal: *STG: Decreased hallucinations Outcome: Progressing Towards Goal 
Goal: Interventions Outcome: Progressing Towards Goal

## 2019-04-30 NOTE — ED NOTES
Verbal shift change report given to Mia Prather RN (oncoming nurse) by SONIDO Lanier RN (offgoing nurse). Report included the following information SBAR, Kardex, ED Summary, Procedure Summary, MAR and Recent Results.

## 2019-04-30 NOTE — PROGRESS NOTES
Problem: Discharge Planning Goal: *Discharge to safe environment Outcome: Progressing Towards Goal 
Note: She will return to her home with her niece She has supportive family She has psych follow up Goal: *Knowledge of medication management Outcome: Progressing Towards Goal 
Note: She is compliant with her medications Problem: Discharge Planning Goal: *Knowledge of discharge instructions Outcome: Not Progressing Towards Goal 
Note: She is unable to verbalize discharge instructions at this time

## 2019-04-30 NOTE — PROGRESS NOTES
Problem: Falls - Risk of 
Goal: *Absence of Falls Description Document Ml Lange Fall Risk and appropriate interventions in the flowsheet. Outcome: Progressing Towards Goal 
  
Problem: Patient Education: Go to Patient Education Activity Goal: Patient/Family Education Outcome: Progressing Towards Goal 
 Will continue to observe pt for any changes Pt will  remain  q15 minute check for saftey

## 2019-04-30 NOTE — BH NOTES
Admission note: 71year old female admitted from [facility] ER and TDO'D by [facility] due to having anxiety and feeling paranoid. Having problems with family members she was staying with and wanted to leave. Stated she wanted to get away from them but did not give specific reason. Labs indicated U/A-4+, CBC+, Etoh negative and NKA. B/P 162/93, P-106, R-18 O2 sats- 98%influenza ER. Calm and cooperative during admission process. Sleep hours -2.0

## 2019-04-30 NOTE — CONSULTS
Hospitalist Consult Note  Nishi Shelton NP  Answering service: [phone] OR [phone] from in house phone  Cell: Faina Joseph   Date of Service:    NAME:  Kya Orta  :  149  MRN:  [id number]    Admission Summary:   Pt initially presented to [facility] indicating she no longer wanted to live with her cousins because they were \"mean\" and did not treat her well. Denies any emotional or physical abuse. Per social work notes, pt denied suicidal and homicidal ideation, but did endorse auditory and visual hallucinations. Pt refuses to return to her cousin's house and is sought in-patient admission. Other than Htn, denies any other pmhx.     - flu vaccine  - EtOH  - illicit drug use  - smoking  - No Known Allergies    Interval history / Subjective:   Pt lying in bed resting. Awake and willing to participate in interview and questioning. Noted pt is edentulous - she requested we continue regular diet at present time but explained a mechanical soft diet could be provided if she was unable to chew. Assessment & Plan:     Psychosis, hallucinations: treatment as per primary team    Asymptomatic Bacteriuria:  - UA with moderate leuks, 4+ bacteria but WBC 0-4  - no urinary complaints  - no abx, discussed with nurse    Hx Hypertension:   - per pt reports and she stated she also takes BP medications at home   - no noted BP medications on file and these were verified through the [address] [place] [address]  - BP reasonable but if trends up, could consider adding 2.5mg Norvasc    Code status: Full  DVT prophylaxis: none indicated  Care Plan discussed with: patient, psychiatric staff  Disposition: as per primary team    Thank you for giving us opportunity to participate in this patients care. Will sign off at this time, please re-consult if there are any further medical management needs or questions.        Hospital Problems  Date Reviewed: 9/3/2013          Codes Class Noted POA    Schizophrenia, paranoid ([facility] ICD-10-CM: F20.0  ICD-9-CM: 295.30  4/30/2019 Unknown            Review of Systems:   Denies HA. No chest pain or pressure. No respiratory or GI complaints. No N/V. Vital Signs:    Last 24hrs VS reviewed since prior progress note. Most recent are:  Visit Vitals  /84   Pulse 60   Temp 98.3 °F (36.8 °C)   Resp 16   Ht 5' 2\" (1.575 m)   Wt 71.2 kg (157 lb)   SpO2 97%   BMI 28.72 kg/m²     No intake or output data in the 24 hours ending 04/30/19 1628     Physical Examination:         Constitutional:  No acute distress, cooperative, pleasant    ENT:  Oral MM moist, edentulous. No noted cervical adenopathy     Resp:  CTA bilaterally. No accessory muscle use and on RA   CV:  Regular rhythm, normal rate    GI:  Soft, non distended, non tender. Normoactive bowel sounds     Musculoskeletal:  No edema, warm, 2+ pulses throughout    Neurologic:  Moves all extremities. AAOx2 (person/city). Strength equal, no paresthesias. Speech clear. Skin:  No abrasions/cuts noted      Data Review:   Review and/or order of clinical lab test  Review and/or order of tests in the medicine section of CPT    Labs:     Recent Labs     04/29/19  1834   WBC 4.5   HGB 12.7   HCT 38.8        Recent Labs     04/29/19  1834      K 4.2      CO2 30   BUN 15   CREA 0.93   *   CA 9.0     Recent Labs     04/29/19  1834   SGOT 10*   ALT 13   AP 60   TBILI 0.2   TP 7.3   ALB 3.8   GLOB 3.5     No results for input(s): INR, PTP, APTT in the last 72 hours. No lab exists for component: INREXT   No results for input(s): FE, TIBC, PSAT, FERR in the last 72 hours. No results found for: FOL, RBCF   No results for input(s): PH, PCO2, PO2 in the last 72 hours. No results for input(s): CPK, CKNDX, TROIQ in the last 72 hours.     No lab exists for component: CPKMB  Lab Results   Component Value Date/Time    Cholesterol, total 202 (H) 12/05/2018 04:50 AM    HDL Cholesterol 85 12/05/2018 04:50 AM    LDL, calculated 98.2 12/05/2018 04:50 AM    Triglyceride 94 12/05/2018 04:50 AM    CHOL/HDL Ratio 2.4 12/05/2018 04:50 AM     Lab Results   Component Value Date/Time    Glucose (POC) 108 (H) 09/02/2013 10:09 AM    Glucose (POC) 104 10/16/2012 10:03 PM    Glucose (POC) 127 (H) 04/29/2012 01:40 PM     Lab Results   Component Value Date/Time    Color YELLOW/STRAW 04/29/2019 09:00 PM    Appearance CLOUDY (A) 04/29/2019 09:00 PM    Specific gravity 1.025 04/29/2019 09:00 PM    pH (UA) 5.5 04/29/2019 09:00 PM    Protein NEGATIVE  04/29/2019 09:00 PM    Glucose NEGATIVE  04/29/2019 09:00 PM    Ketone TRACE (A) 04/29/2019 09:00 PM    Bilirubin NEGATIVE  12/03/2018 09:06 PM    Urobilinogen 1.0 04/29/2019 09:00 PM    Nitrites NEGATIVE  04/29/2019 09:00 PM    Leukocyte Esterase MODERATE (A) 04/29/2019 09:00 PM    Epithelial cells MODERATE (A) 04/29/2019 09:00 PM    Bacteria 4+ (A) 04/29/2019 09:00 PM    WBC 0-4 04/29/2019 09:00 PM    RBC 0-5 04/29/2019 09:00 PM     Medications Reviewed:     Current Facility-Administered Medications   Medication Dose Route Frequency    OLANZapine (ZyPREXA) tablet 2.5 mg  2.5 mg Oral Q6H PRN    ziprasidone (GEODON) 10 mg in sterile water (preservative free) 0.5 mL injection  10 mg IntraMUSCular BID PRN    benztropine (COGENTIN) tablet 1 mg  1 mg Oral BID PRN    benztropine (COGENTIN) injection 1 mg  1 mg IntraMUSCular BID PRN    acetaminophen (TYLENOL) tablet 650 mg  650 mg Oral Q4H PRN    ibuprofen (MOTRIN) tablet 400 mg  400 mg Oral Q8H PRN    magnesium hydroxide (MILK OF MAGNESIA) 400 mg/5 mL oral suspension 30 mL  30 mL Oral DAILY PRN    nicotine (NICODERM CQ) 21 mg/24 hr patch 1 Patch  1 Patch TransDERmal DAILY PRN    risperiDONE (RisperDAL) tablet 1 mg  1 mg Oral DAILY    sertraline (ZOLOFT) tablet 150 mg  150 mg Oral DAILY    risperiDONE (RisperDAL) tablet 2 mg  2 mg Oral QHS ______________________________________________________________________  EXPECTED LENGTH OF STAY: - - -  ACTUAL LENGTH OF STAY:          0               Hortencia Gonsalves NP

## 2019-04-30 NOTE — BH NOTES
GROUP THERAPY PROGRESS NOTE Clever [place] participated in a morning Process Group on the Geriatric Unit, with a focus identifying feelings, planning for the day, and singing. Group time: 45 minutes. Personal goal for participation: To increase the capacity to shift ones mood, prepare for the day, and listen to live music. Goal orientation: The patient will be able to prepare for the day through music. Group therapy participation: When prompted, this patient partially participated in the group. Therapeutic interventions reviewed and discussed: The group members were asked to introduce themselves by first names and participate in listening to live music and/or singing as a way to begin their day on a positive note. Impression of participation: When prompted, the patient said she was feeling \"alright. \" She was alert and generally oriented. She did not join in the singing or track the lyrics. She expressed no current SI/HI and displayed no overt psychosis, although this latter concern was not fully evaluated in this group. She left the group about FPC through the session, without offering an explanation. Her affect was slightly anxious and her mood reflected her affect. This was the patient's first process group with the undersigned in this re-hospitalization.

## 2019-04-30 NOTE — BH NOTES
PSYCHOSOCIAL ASSESSMENT 
:Patient identifying info: 
Annalee Mobley is a 71 y.o., female admitted 4/30/2019  1:47 AM  
 
Presenting problem and precipitating factors: SH was admitted on a [facility] TDO due to depression and disorganized behavior. She stated she di not want to live with her niece anymore because she was mean to her. Niece had brought her tot he ER due to her request.  Niece reported that patient had been easily agitated and she does not feel she has been taking her medications. Mental status assessment:alert, oriented to self, she denies suicidal/ homicidal ideations, stated she is hearing voices , insight and judgment are impaired Collateral information: niece Ronald Jean-Baptiste [phone] Current psychiatric /substance abuse providers and contact info:  
 
Previous psychiatric/substance abuse providers and response to treatment: long history of psych treatment Family history of mental illness or substance abuse: unknown Substance abuse history:   
Social History Tobacco Use  Smoking status: Current Some Day Smoker Packs/day: 0.25  Smokeless tobacco: Never Used Substance Use Topics  Alcohol use: No  
 
 
History of biomedical complications associated with substance abuse :none noted Patient's current acceptance of treatment or motivation for change: 
 
Family constellation:  , one son and one daughter Is significant other involved? Describe support system:  
 
Describe living arrangements and home environment:lives with her niece Health issues:  
Hospital Problems  Date Reviewed: 9/3/2013 Codes Class Noted POA Schizophrenia, paranoid ([facility] ICD-10-CM: F20.0 ICD-9-CM: 295.30  4/30/2019 Unknown Trauma history: no 
 
Legal issues: no 
History of  service: no  
 
Financial status: SSDI Orthodox/cultural factors: none noted Education/work history: 10 th grade education Have you been licensed as a health care professional (current or ): no  
 
Leisure and recreation preferences: unknown Describe coping skills:ineffectual  
 
Himanshu South 2019

## 2019-04-30 NOTE — PROGRESS NOTES
Laboratory Monitoring for Antipsychotics: This patient is currently prescribed the following medication(s):  
Current Facility-Administered Medications Medication Dose Route Frequency  
 risperiDONE (RisperDAL) tablet 1 mg  1 mg Oral DAILY  
 sertraline (ZOLOFT) tablet 150 mg  150 mg Oral DAILY  
 risperiDONE (RisperDAL) tablet 2 mg  2 mg Oral QHS The following labs have been completed for monitoring of antipsychotics and/or mood stabilizers: 
 
Height, Weight, BMI Estimation Estimated body mass index is 28.72 kg/m² as calculated from the following: 
  Height as of this encounter: 157.5 cm (62\"). Weight as of this encounter: 71.2 kg (157 lb). Vital Signs/Blood Pressure Visit Vitals /84 Pulse 60 Temp 98.3 °F (36.8 °C) Resp 16 Ht 157.5 cm (62\") Wt 71.2 kg (157 lb) SpO2 97% BMI 28.72 kg/m² Renal Function, Hepatic Function and Chemistry Estimated Creatinine Clearance: 55.7 mL/min (based on SCr of 0.93 mg/dL). Lab Results Component Value Date/Time Sodium 143 04/29/2019 06:34 PM  
 Potassium 4.2 04/29/2019 06:34 PM  
 Chloride 107 04/29/2019 06:34 PM  
 CO2 30 04/29/2019 06:34 PM  
 Anion gap 6 04/29/2019 06:34 PM  
 BUN 15 04/29/2019 06:34 PM  
 Creatinine 0.93 04/29/2019 06:34 PM  
 BUN/Creatinine ratio 16 04/29/2019 06:34 PM  
 Bilirubin, total 0.2 04/29/2019 06:34 PM  
 Protein, total 7.3 04/29/2019 06:34 PM  
 Albumin 3.8 04/29/2019 06:34 PM  
 Globulin 3.5 04/29/2019 06:34 PM  
 A-G Ratio 1.1 04/29/2019 06:34 PM  
 ALT (SGPT) 13 04/29/2019 06:34 PM  
 Alk. phosphatase 60 04/29/2019 06:34 PM  
 
Lab Results Component Value Date/Time Glucose 143 (H) 04/29/2019 06:34 PM  
 Glucose 105 (H) 01/23/2019 06:21 AM  
 Glucose (POC) 108 (H) 09/02/2013 10:09 AM  
 
Lab Results Component Value Date/Time Hemoglobin A1c 6.0 12/05/2018 04:50 AM  
 
Hematology Lab Results Component Value Date/Time  WBC 4.5 04/29/2019 06:34 PM  
 RBC 4.27 04/29/2019 06:34 PM  
 HGB 12.7 04/29/2019 06:34 PM  
 HCT 38.8 04/29/2019 06:34 PM  
 MCV 90.9 04/29/2019 06:34 PM  
 MCH 29.7 04/29/2019 06:34 PM  
 MCHC 32.7 04/29/2019 06:34 PM  
 RDW 12.1 04/29/2019 06:34 PM  
 PLATELET 526 30/04/2529 06:34 PM  
 
Lipids Lab Results Component Value Date/Time Cholesterol, total 202 (H) 12/05/2018 04:50 AM  
 HDL Cholesterol 85 12/05/2018 04:50 AM  
 LDL, calculated 98.2 12/05/2018 04:50 AM  
 Triglyceride 94 12/05/2018 04:50 AM  
 CHOL/HDL Ratio 2.4 12/05/2018 04:50 AM  
 
Thyroid Function Lab Results Component Value Date/Time TSH 1.81 12/05/2018 04:50 AM  
 
Assessment/Plan: 
Will order lipid panel and hemoglobin A1c or fasting glucose to complete the recommended baseline laboratory monitoring based on the patient's current medication regimen.      
CHRIS MaxwellD

## 2019-04-30 NOTE — ED NOTES
TRANSFER - OUT REPORT: 
 
Verbal report given to Jazmyne Vázquez RN (name) on Rachelle Singleton  being transferred to [facility] Unit at [facility] (unit) for routine progression of care Report consisted of patients Situation, Background, Assessment and  
Recommendations(SBAR). Information from the following report(s) SBAR, Kardex, ED Summary, Procedure Summary, MAR and Recent Results was reviewed with the receiving nurse. Opportunity for questions and clarification was provided. Patient transported with: 
 [employer] officer

## 2019-04-30 NOTE — BH NOTES
[phone] 
Paged Matt Escobar regarding H/P for patient. RN also would like to inform of Urinalysis results and elevated glucose. At 0834: Matt Escobar called and aware of the H/P, urinalysis and glucose.

## 2019-04-30 NOTE — BH NOTES
TRANSFER - IN REPORT: 
 
Verbal report received from [address] [place] [address],[address]  being received from [facility]. (unit) for routine progression of care Report consisted of patients Situation, Background, Assessment and  
Recommendations(SBAR). Information from the following report(s) SBAR, ED Summary and Recent Results was reviewed with the receiving nurse. Opportunity for questions and clarification was provided. Assessment completed upon patients arrival to unit and care assumed.

## 2019-04-30 NOTE — INTERDISCIPLINARY ROUNDS
Behavioral Health Interdisciplinary Rounds Patient Name: Teresa Record  Age: 71 y.o. Room/Bed:  748/ Primary Diagnosis: <principal problem not specified> Admission Status: TDO Readmission within 30 days: no 
Power of  in place: no 
Patient requires a blocked bed: no          Reason for blocked bed: VTE Prophylaxis: Not indicated Mobility needs/Fall risk: no 
Flu Vaccine : no  
Nutritional Plan: no 
Consults:         
Labs/Testing due today?: admission labs Sleep hours: new admission Participation in Care/Groups:   
Medication Compliant?:  
PRNS (last 24 hours):    
Restraints (last 24 hours):  no 
  
CIWA (range last 24 hours): CIWA-Ar Total: 0  
COWS (range last 24 hours): Alcohol screening (AUDIT) completed -   AUDIT Score: 0 If applicable, date SBIRT discussed in treatment team AND documented:  
AUDIT Screen Score: AUDIT Score: 0 Tobacco - patient is a smoker: Have You Used Tobacco in the Past 30 Days: No 
Illegal Drugs use: Have You Used Any Illegal Substances Over the Past 12 Months: No 
 
24 hour chart check complete: new admission Patient goal(s) for today:  
Treatment team focus/goals: Plan to set up her hearing. Plan to restart her medications. Assess for medication and discharge needs. Progress note : She was very disorganized and confused in treatment team.   Denies suicidal/ homicidal ideations. Very pleasant. LOS:  0  Expected LOS: TBD Financial concerns/prescription coverage:  Medicaid Date of last family contact: TAM spoke to her moe Gallardo  - [phone] Family requesting physician contact today:   
Discharge plan: She will return home with her niece Guns in the home: no Outpatient provider(s): Dr. Estevan Hashimoto  
 
Participating treatment team members: Tonny Slaughter NP - Joanne Bloom, RN - Paul Villasenor, PharmD.  Arthur Larson NP

## 2019-04-30 NOTE — ED NOTES
Pt left ED via [employer] en route to Behavioral Health Unit at [facility]. Pt in no acute distress and verbalizes understanding of progression of care.

## 2019-05-01 LAB
EST. AVERAGE GLUCOSE BLD GHB EST-MCNC: 120 MG/DL
HBA1C MFR BLD: 5.8 % (ref 4.2–6.3)
TSH SERPL DL<=0.05 MIU/L-ACNC: 1.36 UIU/ML (ref 0.36–3.74)

## 2019-05-01 PROCEDURE — 36415 COLL VENOUS BLD VENIPUNCTURE: CPT

## 2019-05-01 PROCEDURE — 84443 ASSAY THYROID STIM HORMONE: CPT

## 2019-05-01 PROCEDURE — 74011250637 HC RX REV CODE- 250/637: Performed by: NURSE PRACTITIONER

## 2019-05-01 PROCEDURE — 65220000003 HC RM SEMIPRIVATE PSYCH

## 2019-05-01 PROCEDURE — 83036 HEMOGLOBIN GLYCOSYLATED A1C: CPT

## 2019-05-01 RX ADMIN — RISPERIDONE 1 MG: 1 TABLET ORAL at 08:50

## 2019-05-01 RX ADMIN — RISPERIDONE 2 MG: 1 TABLET ORAL at 21:02

## 2019-05-01 RX ADMIN — SERTRALINE HYDROCHLORIDE 150 MG: 50 TABLET ORAL at 08:49

## 2019-05-01 NOTE — BH NOTES
Note no acute distress at present. No agitation present. Sl disorganized thoughts,follows redirection well.

## 2019-05-01 NOTE — BH NOTES
GROUP THERAPY PROGRESS NOTE Clementine Emery is participating in [place]. Group time: 30 minutes Personal goal for participation: take a shower Goal orientation: community Group therapy participation: active Therapeutic interventions reviewed and discussed: yes Impression of participation: engaged

## 2019-05-01 NOTE — H&P
[address] PSYCH HISTORY AND PHYSICAL Name:  Richardo Schlatter 
MR#:  [id number] :  1955 ACCOUNT #:  [de-identified] ADMIT DATE:  2019 DATE OF SERVICE: 19 CHIEF COMPLAINT:  \"My cousin does not treat me right. \" HISTORY OF PRESENT ILLNESS:  The patient is a 65-year-old female who is currently being admitted at [address] [place] [address] on a voluntary basis. She is known here at [facility] due to previous inpatient psychiatric admission, but is new to this provider. Paranoia and persecutory delusions noteworthy. She states that she went to the emergency room because she thought that her cousin is not treating her right. She had an argument with her cousin, states that her cousin is not friendly with her. She, however, cannot clearly elaborate what was going on. She believes that people are out to hurt her, keeping secrets from her. She feels that her cousin is not nice and friendly. She denies any abuse or any trauma history. She is noted to have intermittent confusion during the interview. She tells me that she gets confused, she has been forgetful lately. She states that the year is 65, she could not tell me the date and the month. She is endorsing suicidal ideation. She states that the voices are telling her that she is a nice person and visual hallucination of a man and a woman who love each other. Her thought process is noted to be disorganized. PAST MEDICAL HISTORY:  See H and P. PAST PSYCHIATRIC HOSPITALIZATION:  She was previously admitted at [facility].  It is unsure if she is currently being followed by [facility] or by Dr. Amberly Berg at [facility].  She is currently being prescribed Risperdal and Zoloft. PSYCHOSOCIAL HISTORY:  She is . She has two children. She finished tenth grade. She is currently receiving social security disability checks. MENTAL STATUS EXAMINATION:  She is only alert and oriented to self. She is calm and pleasant during the interview. She is dressed in hospital apparel. Mood is elevated. Affect is a little bit expansive. Speech is pressured. Thought process is notably disorganized. She denies suicidal ideation, homicidal ideations. Admits to auditory or visual hallucinations. Memory seems poor. Insight is poor. Judgment is poor. Intelligence seems below average. DIAGNOSIS:  Unspecified psychotic disorder. Unspecified dementia. TREATMENT PLANNING:  I will continue her inpatient stay. She will be provided with support and encouraged to attend groups. Her safety will be monitored. Her medications will be modified and assessed. Case Management will work on discharge planning. ASSETS AND STRENGTH:  She is willing to seek help. She is willing to take medications. ESTIMATED LENGTH OF STAY:  5-7 days. PARAMJIT NUÑEZ NP 
 
 
[id number] 
D:  04/30/2019 18:18 
T:  04/30/2019 22:45 JOB #:  [id number]

## 2019-05-01 NOTE — PROGRESS NOTES
PSYCHIATRIC PROGRESS NOTE Chief Complaint: \"I am fine. \" Interval History: Ester Christine reports she is doing ok. She is notably paranoid, delusional, and acutely psychotic. She thinks that her cousin is out to get her. \"They'e keeping secrets from me including my daughter. \"  Her paranoia has gotten worse to the point that she was staying upstairs at home, refusing to eat, and subsequently stopped her hs meds. Disorganized thought process. Asserts that shes from [place] and her body is different form other people. States she is smart. No si/hi. Tolerating her meds and denies side effects. Past Medical History: 
Past Medical History:  
Diagnosis Date  Anxiety disorder  Hypertension  Psychiatric disorder   
 schizophrenia ALLERGIES:(reviewed/updated 5/1/2019) Allergies Allergen Reactions  No Known Allergies Other (comments) Laboratory report: 
Lab Results Component Value Date/Time WBC 4.5 04/29/2019 06:34 PM  
 Hemoglobin (POC) 11.9 09/02/2013 10:09 AM  
 HGB 12.7 04/29/2019 06:34 PM  
 Hematocrit (POC) 35 09/02/2013 10:09 AM  
 HCT 38.8 04/29/2019 06:34 PM  
 PLATELET 509 46/06/2402 06:34 PM  
 MCV 90.9 04/29/2019 06:34 PM  
  
Lab Results Component Value Date/Time Sodium 143 04/29/2019 06:34 PM  
 Potassium 4.2 04/29/2019 06:34 PM  
 Chloride 107 04/29/2019 06:34 PM  
 CO2 30 04/29/2019 06:34 PM  
 Anion gap 6 04/29/2019 06:34 PM  
 Glucose 143 (H) 04/29/2019 06:34 PM  
 Glucose 105 (H) 01/23/2019 06:21 AM  
 BUN 15 04/29/2019 06:34 PM  
 Creatinine 0.93 04/29/2019 06:34 PM  
 BUN/Creatinine ratio 16 04/29/2019 06:34 PM  
 GFR est AA >60 04/29/2019 06:34 PM  
 GFR est non-AA >60 04/29/2019 06:34 PM  
 Calcium 9.0 04/29/2019 06:34 PM  
 Bilirubin, total 0.2 04/29/2019 06:34 PM  
 AST (SGOT) 10 (L) 04/29/2019 06:34 PM  
 Alk.  phosphatase 60 04/29/2019 06:34 PM  
 Protein, total 7.3 04/29/2019 06:34 PM  
 Albumin 3.8 04/29/2019 06:34 PM  
 Globulin 3.5 04/29/2019 06:34 PM  
 A-G Ratio 1.1 04/29/2019 06:34 PM  
 ALT (SGPT) 13 04/29/2019 06:34 PM  
  
Vitals:  
 04/30/19 4297 04/30/19 0936 04/30/19 1710 04/30/19 2035 BP: 136/84  135/79 117/78 Pulse: 60  75 76 Resp: 16  18 16 Temp: 98.3 °F (36.8 °C)  98.8 °F (37.1 °C) 98.3 °F (36.8 °C) SpO2: 97%  98% Weight:  71.2 kg (157 lb) Height:  5' 2\" (1.575 m) No results found for: VALF2, VALAC, VALP, VALPR, DS6, CRBAM, CRBAMP, CARB2, XCRBAM 
No results found for: [facility] Vital Signs Patient Vitals for the past 24 hrs: 
 Temp Pulse Resp BP SpO2  
04/30/19 2035 98.3 °F (36.8 °C) 76 16 117/78   
04/30/19 1710 98.8 °F (37.1 °C) 75 18 135/79 98 % Wt Readings from Last 3 Encounters:  
04/30/19 71.2 kg (157 lb) 04/29/19 71.2 kg (157 lb) 04/01/19 69.9 kg (154 lb) Temp Readings from Last 3 Encounters:  
04/30/19 98.3 °F (36.8 °C)  
04/30/19 98.4 °F (36.9 °C)  
01/25/19 98.4 °F (36.9 °C) BP Readings from Last 3 Encounters:  
04/30/19 117/78  
04/30/19 (!) 139/92  
04/01/19 (!) 173/102 Pulse Readings from Last 3 Encounters:  
04/30/19 76  
04/30/19 66  
04/01/19 62 Radiology (reviewed/updated 5/1/2019) No results found. Side Effects: (reviewed/updated 5/1/2019) None reported or admitted to. Review of Systems: (reviewed/updated 5/1/2019) Appetite: 
Sleep: All other Review of Systems:  
 
Mental Status Exam: 
Eye contact: good Psychomotor activity: relaxed Speech is pressured Thought process: loose Mood is \"fine\" Affect: constricted Perception: No avh 
Suicidal ideation: None Homicidal ideation; None Insight/judgment: poor Cognition is impaired Physical Exam: Musculoskeletal system: steady gait Tremor not present Cog wheeling not present Assessment and Plan: Tiff Vega meets criteria for a diagnosis of Unspecified psychotic disorder. Unspecified dementia. Continue current medications as prescribed. We will closely monitor for safety. We will encourage reality orientation. Disposition planning to continue. I certify that this patients inpatient psychiatric hospital services furnished since the previous certification were, and continue to be, required for treatment that could reasonably be expected to improve the patient's condition, or for diagnostic study, and that the patient continues to need, on a daily basis, active treatment furnished directly by or requiring the supervision of inpatient psychiatric facility personnel. In addition, the hospital records show that services furnished were intensive treatment services, admission or related services, or equivalent services. Signed: 
Erin Elizabeth NP 
5/1/2019

## 2019-05-01 NOTE — PROGRESS NOTES
Problem: Falls - Risk of 
Goal: *Absence of Falls Description Document Janet Rojo Fall Risk and appropriate interventions in the flowsheet. Outcome: Progressing Towards Goal 
  
Problem: Depressed Mood (Adult/Pediatric) Goal: *STG: Participates in treatment plan Outcome: Progressing Towards Goal 
  
Problem: Altered Thought Process (Adult/Pediatric) Goal: *STG: Decreased hallucinations Outcome: Progressing Towards Goal 
 
Patient remains free from falls. Document Jennifer fall risk scale. Patient participates in treatment plan. Remains passively engaged on unit during mealtimes. States that she is not having auditory hallucinations at this time.

## 2019-05-01 NOTE — BH NOTES
GROUP THERAPY PROGRESS NOTE Hima Casas did not participate in a 45 minute Process Group on the Geriatric Unit with a focus on shifting ones feelings to a positive note and preparing for the day through live music.

## 2019-05-01 NOTE — PROGRESS NOTES
Problem: Falls - Risk of 
Goal: *Absence of Falls Description Document Jessica Briggs Fall Risk and appropriate interventions in the flowsheet. Outcome: Progressing Towards Goal 
  
Problem: Patient Education: Go to Patient Education Activity Goal: Patient/Family Education Outcome: Progressing Towards Goal 
 Will continue to observe pt for any changes in behavior Will continue q 15 minute checks for saftey

## 2019-05-01 NOTE — INTERDISCIPLINARY ROUNDS
Behavioral Health Interdisciplinary Rounds Patient Name: Rere Irvin  Age: 71 y.o. Room/Bed:  748/ Primary Diagnosis: <principal problem not specified> Admission Status: Involuntary Commitment Readmission within 30 days: no 
Power of  in place: no 
Patient requires a blocked bed: no          Reason for blocked bed: VTE Prophylaxis: Not indicated Mobility needs/Fall risk: no 
Flu Vaccine : no  
Nutritional Plan: no 
Consults: no        
Labs/Testing due today?: yes Sleep hours: 6 Participation in Care/Groups:  yes Medication Compliant?: Yes PRNS (last 24 hours):    
Restraints (last 24 hours):  no 
  
CIWA (range last 24 hours): CIWA-Ar Total: 0  
COWS (range last 24 hours): Alcohol screening (AUDIT) completed -   AUDIT Score: 0 If applicable, date SBIRT discussed in treatment team AND documented:  
AUDIT Screen Score: AUDIT Score: 0 Tobacco - patient is a smoker: Have You Used Tobacco in the Past 30 Days: No 
Illegal Drugs use: Have You Used Any Illegal Substances Over the Past 12 Months: No 
 
24 hour chart check complete: yes Patient goal(s) for today:  
Treatment team focus/goals:   Plan to titrate her medications. Consider long acting injection before discharge. Progress note : She has been pleasant and complaint with her treatment. Attended group this morning. Still somewhat disorganized. LOS:  1  Expected LOS: TBD Financial concerns/prescription coverage:  Medicare Date of last family contact:   TAM spoke to her niece yesterday. Family requesting physician contact today:   
Discharge plan: she will return home Guns in the home:  no Outpatient provider(s): Dr. Jessie Velásquez  
 
Participating treatment team members: Cesar Millan [address] [place] [address], NORMA Rashid

## 2019-05-01 NOTE — PROGRESS NOTES
2310:  Patient appears to be sleeping. Respirations even and unlabored. NAD. Will continue to monitor throughout the shift with q15 checks. Problem: Falls - Risk of 
Goal: *Absence of Falls Description Document Amie Bangura Fall Risk and appropriate interventions in the flowsheet.  
Outcome: Progressing Towards Goal

## 2019-05-01 NOTE — BH NOTES
GROUP THERAPY PROGRESS NOTE Aidee Blandon is participating in Reflections. Group time: 15 minutes Personal goal for participation: unit orientation and daily progress Goal orientation: personal 
 
Group therapy participation: active Therapeutic interventions reviewed and discussed: yes Impression of participation: Seems in fair spirits. Comments appropriate to group discussion. Voiced no complaints.

## 2019-05-01 NOTE — BH NOTES
Alert. Vitals stable, wnl.  
Medication and meal compliant. Patient remained isolative for most of shift. Cooperative with staff. Will continue to monitor on Q 15 minute safety checks.

## 2019-05-01 NOTE — PROGRESS NOTES
Problem: Falls - Risk of 
Goal: *Absence of Falls Description Document Jessica Briggs Fall Risk and appropriate interventions in the flowsheet.  
Outcome: Progressing Towards Goal

## 2019-05-02 PROCEDURE — 74011250637 HC RX REV CODE- 250/637: Performed by: NURSE PRACTITIONER

## 2019-05-02 PROCEDURE — 65220000003 HC RM SEMIPRIVATE PSYCH

## 2019-05-02 RX ADMIN — SERTRALINE HYDROCHLORIDE 150 MG: 50 TABLET ORAL at 08:36

## 2019-05-02 RX ADMIN — RISPERIDONE 2 MG: 1 TABLET ORAL at 21:23

## 2019-05-02 RX ADMIN — RISPERIDONE 1 MG: 1 TABLET ORAL at 08:36

## 2019-05-02 NOTE — PROGRESS NOTES
Problem: Falls - Risk of 
Goal: *Absence of Falls Description Document Lana Posey Fall Risk and appropriate interventions in the flowsheet. Outcome: Progressing Towards Goal 
Note:  
Fall Risk Interventions: 
Mobility Interventions: Assess mobility with egress test 
Mentation Interventions: Adequate sleep, hydration, pain control Medication Interventions: Teach patient to arise slowly Elimination Interventions: Toilet paper/wipes in reach History of Falls Interventions: Door open when patient unattended Received pt lying in bed, appears to be asleep. Respirations even and non labored. NAD. Will continue to monitor q15 for safety.

## 2019-05-02 NOTE — INTERDISCIPLINARY ROUNDS
Behavioral Health Interdisciplinary Rounds Patient Name: Imtiaz Ray  Age: 71 y.o. Room/Bed:  8/ Primary Diagnosis: <principal problem not specified> Admission Status: Involuntary Commitment Readmission within 30 days: no 
Power of  in place: no 
Patient requires a blocked bed: no          Reason for blocked bed: VTE Prophylaxis: No 
 
Mobility needs/Fall risk: no 
Flu Vaccine : no  
Nutritional Plan: no 
Consults:         
Labs/Testing due today?: no 
 
Sleep hours:  8.5 Participation in Care/Groups:  yes Medication Compliant?: Yes PRNS (last 24 hours): None Restraints (last 24 hours):  no 
  
CIWA (range last 24 hours): CIWA-Ar Total: 0  
COWS (range last 24 hours): Alcohol screening (AUDIT) completed -   AUDIT Score: 0 If applicable, date SBIRT discussed in treatment team AND documented:  
AUDIT Screen Score: AUDIT Score: 0 Tobacco - patient is a smoker: Have You Used Tobacco in the Past 30 Days: No 
Illegal Drugs use: Have You Used Any Illegal Substances Over the Past 12 Months: No 
 
24 hour chart check complete: no  
 
Patient goal(s) for today: Continue attending groups and taking medications as prescribed Treatment team focus/goals: MMSE; continue medication regimen Progress note: Still expressing paranoia that niece is \"out to get me;\" states her appetite and sleep are good; plan to discuss long-acting injectable on Monday LOS:  2  Expected LOS: TBD Financial concerns/prescription coverage: Medicare; [place] Medicaid Date of last family contact: None Family requesting physician contact today: None Discharge plan: TBD Guns in the home: No   
Outpatient provider(s): Hilda Vinson  
 
Participating treatment team members: REMY Campuzano; Liudmila Tavarez NP; Audra Starkey RN; Moe Rhoades, SandraD

## 2019-05-02 NOTE — BH NOTES
GROUP THERAPY PROGRESS NOTE Meir Pizano did not participate in a 45 minute Process Group on the Geriatric Unit with a focus on shifting ones feelings to a positive note and preparing for the day through live music.

## 2019-05-02 NOTE — PROGRESS NOTES
PSYCHIATRIC PROGRESS NOTE Chief Complaint: \"I am fine. \" Interval History: Yulissa Santillan reports she is doing ok. Feeling and sleeping good. She is grossly psychotic, ongoing persecuting delusions. Continues to feel that her niece is out to get hurt. States family is spreading AIDS. Confused but calm and pleasant. Denies si/hi. Past Medical History: 
Past Medical History:  
Diagnosis Date  Anxiety disorder  Hypertension  Psychiatric disorder   
 schizophrenia ALLERGIES:(reviewed/updated 5/2/2019) Allergies Allergen Reactions  No Known Allergies Other (comments) Laboratory report: 
Lab Results Component Value Date/Time WBC 4.5 04/29/2019 06:34 PM  
 Hemoglobin (POC) 11.9 09/02/2013 10:09 AM  
 HGB 12.7 04/29/2019 06:34 PM  
 Hematocrit (POC) 35 09/02/2013 10:09 AM  
 HCT 38.8 04/29/2019 06:34 PM  
 PLATELET 992 15/26/1686 06:34 PM  
 MCV 90.9 04/29/2019 06:34 PM  
  
Lab Results Component Value Date/Time Sodium 143 04/29/2019 06:34 PM  
 Potassium 4.2 04/29/2019 06:34 PM  
 Chloride 107 04/29/2019 06:34 PM  
 CO2 30 04/29/2019 06:34 PM  
 Anion gap 6 04/29/2019 06:34 PM  
 Glucose 143 (H) 04/29/2019 06:34 PM  
 Glucose 105 (H) 01/23/2019 06:21 AM  
 BUN 15 04/29/2019 06:34 PM  
 Creatinine 0.93 04/29/2019 06:34 PM  
 BUN/Creatinine ratio 16 04/29/2019 06:34 PM  
 GFR est AA >60 04/29/2019 06:34 PM  
 GFR est non-AA >60 04/29/2019 06:34 PM  
 Calcium 9.0 04/29/2019 06:34 PM  
 Bilirubin, total 0.2 04/29/2019 06:34 PM  
 AST (SGOT) 10 (L) 04/29/2019 06:34 PM  
 Alk. phosphatase 60 04/29/2019 06:34 PM  
 Protein, total 7.3 04/29/2019 06:34 PM  
 Albumin 3.8 04/29/2019 06:34 PM  
 Globulin 3.5 04/29/2019 06:34 PM  
 A-G Ratio 1.1 04/29/2019 06:34 PM  
 ALT (SGPT) 13 04/29/2019 06:34 PM  
  
Vitals:  
 05/01/19 1115 05/01/19 1856 05/01/19 2147 05/02/19 0747 BP: 143/88 152/86 136/87 150/84 Pulse: 88 94 71 (!) 56 Resp: 16 16 16 18  
 Temp: 98 °F (36.7 °C) 99.1 °F (37.3 °C)  98.6 °F (37 °C) SpO2: 99%   98% Weight:      
Height:      
   
No results found for: VALF2, VALAC, VALP, VALPR, DS6, CRBAM, CRBAMP, CARB2, XCRBAM 
No results found for: [facility] Vital Signs Patient Vitals for the past 24 hrs: 
 Temp Pulse Resp BP SpO2  
05/02/19 0747 98.6 °F (37 °C) (!) 56 18 150/84 98 % 05/01/19 2147  71 16 136/87   
05/01/19 1856 99.1 °F (37.3 °C) 94 16 152/86   
05/01/19 1115 98 °F (36.7 °C) 88 16 143/88 99 % Wt Readings from Last 3 Encounters:  
04/30/19 71.2 kg (157 lb) 04/29/19 71.2 kg (157 lb) 04/01/19 69.9 kg (154 lb) Temp Readings from Last 3 Encounters:  
05/02/19 98.6 °F (37 °C)  
04/30/19 98.4 °F (36.9 °C)  
01/25/19 98.4 °F (36.9 °C) BP Readings from Last 3 Encounters:  
05/02/19 150/84  
04/30/19 (!) 139/92  
04/01/19 (!) 173/102 Pulse Readings from Last 3 Encounters:  
05/02/19 (!) 56  
04/30/19 66  
04/01/19 62 Radiology (reviewed/updated 5/2/2019) No results found. Side Effects: (reviewed/updated 5/2/2019) None reported or admitted to. Review of Systems: (reviewed/updated 5/2/2019) Appetite: good Sleep: good All other Review of Systems: negative Mental Status Exam: 
Eye contact: limited Psychomotor activity: relaxed Speech is pressured Thought process: loose and disorganized Mood is \"ok\" Affect: constricted Perception: No avh. Suicidal ideation: None Homicidal ideation: None Insight/judgment: Poor Cognition is impaired Physical Exam: Musculoskeletal system: steady gait Tremor not present Cog wheeling not present Assessment and Plan: Annalee Mobley meets criteria for a diagnosis of Unspecified psychotic disorder. Unspecified dementia. Continue current medications as prescribed. Sheri Shed this coming Monday. Will request MMSE. We will closely monitor for safety. We will encourage reality orientation. Disposition planning to continue. I certify that this patients inpatient psychiatric hospital services furnished since the previous certification were, and continue to be, required for treatment that could reasonably be expected to improve the patient's condition, or for diagnostic study, and that the patient continues to need, on a daily basis, active treatment furnished directly by or requiring the supervision of inpatient psychiatric facility personnel. In addition, the hospital records show that services furnished were intensive treatment services, admission or related services, or equivalent services. Signed: 
Juan Escamilla NP 
5/2/2019

## 2019-05-02 NOTE — BH NOTES
GROUP THERAPY PROGRESS NOTE Wilson Calixto is participating in reflections grou. Group time: 20 minutes Personal goal for participation: reflections on positive and negatives of day, goals for tomorrow, and activities for enjoyment Goal orientation: community Group therapy participation: active Impression of participation: patient participated in group

## 2019-05-02 NOTE — PROGRESS NOTES
Problem: Depressed Mood (Adult/Pediatric) Goal: *STG: Participates in treatment plan Outcome: Progressing Towards Goal 
Note: Out on unit bright affect smiling when engaged. \"I feel better, now\". Reports ready to discuss d/c planning. Voices open to conversation about being placed in an snf not with family. Slower rate of thought, impaired memory noted and slight loose associations. AH has decreased per her report. Daily goal is to talk with tx team about d/c plans. Staff focus is on d/c planning Problem: Altered Thought Process (Adult/Pediatric) Goal: *STG: Decreased hallucinations Outcome: Progressing Towards Goal

## 2019-05-02 NOTE — PROGRESS NOTES
Problem: Discharge Planning Goal: *Knowledge of medication management Outcome: Progressing Towards Goal 
Note:  
Patient verbalizes limited understanding of medication regimen. Patient is taking all medications as prescribed. To discuss long-acting injectable next week. Goal: *Knowledge of discharge instructions Outcome: Progressing Towards Goal 
Note:  
Patient verbalizes understanding of goals for treatment and safe discharge. Problem: Discharge Planning Goal: *Discharge to safe environment Outcome: Not Progressing Towards Goal 
Note:  
Patient does not identify niece's home as a safe environment. Patient is hesitant to discharge to long term.

## 2019-05-02 NOTE — PROGRESS NOTES
1500: patient resting in bed. Med compliant. Attends some groups. Pleasant with staff and peers. Denies AH. Problem: Depressed Mood (Adult/Pediatric) Goal: *STG: Participates in treatment plan 5/2/2019 1602 by Darrell Steward RN Outcome: Progressing Towards Goal 
5/2/2019 1601 by Darrell Steward RN Outcome: Progressing Towards Goal 
Goal: *STG: Attends activities and groups 5/2/2019 1602 by Darrell Steward RN Outcome: Not Progressing Towards Goal 
5/2/2019 1601 by Darrell Steward RN Outcome: Not Progressing Towards Goal

## 2019-05-03 PROCEDURE — 74011250637 HC RX REV CODE- 250/637: Performed by: NURSE PRACTITIONER

## 2019-05-03 PROCEDURE — 65220000003 HC RM SEMIPRIVATE PSYCH

## 2019-05-03 RX ADMIN — RISPERIDONE 2 MG: 1 TABLET ORAL at 21:13

## 2019-05-03 RX ADMIN — RISPERIDONE 1 MG: 1 TABLET ORAL at 08:16

## 2019-05-03 RX ADMIN — SERTRALINE HYDROCHLORIDE 150 MG: 50 TABLET ORAL at 08:16

## 2019-05-03 NOTE — INTERDISCIPLINARY ROUNDS
Behavioral Health Interdisciplinary Rounds Patient Name: Mireille Bernstein  Age: 71 y.o. Room/Bed:  731/ Primary Diagnosis: <principal problem not specified> Admission Status: Involuntary Commitment Readmission within 30 days: no 
Power of  in place: no 
Patient requires a blocked bed: no          Reason for blocked bed: VTE Prophylaxis: No 
 
Mobility needs/Fall risk: no 
Flu Vaccine : no  
Nutritional Plan: no 
Consults:       
Labs/Testing due today?: no 
 
Sleep hours: 6 1/2 Participation in Care/Groups:  no 
Medication Compliant?: Yes PRNS (last 24 hours): None Restraints (last 24 hours):  no 
  
CIWA (range last 24 hours): CIWA-Ar Total: 0  
COWS (range last 24 hours): Alcohol screening (AUDIT) completed -   AUDIT Score: 0 If applicable, date SBIRT discussed in treatment team AND documented:  
AUDIT Screen Score: AUDIT Score: 0 Tobacco - patient is a smoker: Have You Used Tobacco in the Past 30 Days: No 
Illegal Drugs use: Have You Used Any Illegal Substances Over the Past 12 Months: No 
 
24 hour chart check complete: yes Patient goal(s) for today: Continue taking medications as prescribed Treatment team focus/goals: Continue medication regimen Progress note: Pt continues to express paranoia and delusional thinking; states she does not feel safe returning to her cousin's home and would like to be placed in [facility]  
 
LOS:  3  Expected LOS: TBD Financial concerns/prescription coverage: Medicare; [place] Medicaid Date of last family contact: None Family requesting physician contact today: No 
Discharge plan: intermediate placement Guns in the home: No     
Outpatient provider(s): Hilda Miranda 
 
Participating treatment team members: REMY Yarbrough; Delmar Boeck, NP; Carlos Brown RN; Nyasia Bryan, PharmD

## 2019-05-03 NOTE — PROGRESS NOTES
Problem: Falls - Risk of 
Goal: *Absence of Falls Description Document Lorri Bear Fall Risk and appropriate interventions in the flowsheet. Outcome: Progressing Towards Goal 
Note:  
Fall Risk Interventions: 
Mobility Interventions: Assess mobility with egress test 
 
Mentation Interventions: Adequate sleep, hydration, pain control Medication Interventions: Teach patient to arise slowly Elimination Interventions: Toilet paper/wipes in reach History of Falls Interventions: Door open when patient unattended Patient free from falls on current evening shift. Patient educated on fall precautions while taking sedative medications. Problem: Depressed Mood (Adult/Pediatric) Goal: *STG: Attends activities and groups Outcome: Progressing Towards Goal 
Note:  
Patient encouraged to seek staff when feelings of anxiety become overwhelming. Patient encouraged to participate in milieu activities and treatment planning. At 1745: 
Simi Durant called asking to speak with patient. Rohith Lobato did not have patient's code number. Patient was advised of phone call, patient advised that she does not want to talk with Phyliciajovanna Lee is from another [address] [place] [address]! \". Patient was reassured we will keep her identity private.

## 2019-05-03 NOTE — BH NOTES
GROUP THERAPY PROGRESS NOTE [place] Gary did not participate in a 45 minute Process Group on the Geriatric Unit with a focus on shifting ones feelings to a positive note and preparing for the day through live music.

## 2019-05-03 NOTE — PROGRESS NOTES
Problem: Falls - Risk of 
Goal: *Absence of Falls Description Document Bishop Nagel Fall Risk and appropriate interventions in the flowsheet. Outcome: Progressing Towards Goal 
 Lying quietly in bed with eyes closed, respirations  even and unlabored , NAD noted Q15 min safety monitoring continues

## 2019-05-03 NOTE — PROGRESS NOTES
PSYCHIATRIC PROGRESS NOTE Chief Complaint: \"My aunt is poisoning me. \" Interval History: Lucero Smallwood states she is feeling fine. Not making any progress. Ongoing paranoia with persecutory content. She states that Rekha Garlandr her aunt is out to get her. Reports that they are putting pills on her food to hurt her. If she gets back home she will die. \"They dont like me. \" \"They dont care about me. \" \"I hate these people. \" She is med compliant with no side effects noted. Denies si/hi. Past Medical History: 
Past Medical History:  
Diagnosis Date  Anxiety disorder  Hypertension  Psychiatric disorder   
 schizophrenia ALLERGIES:(reviewed/updated 5/3/2019) Allergies Allergen Reactions  No Known Allergies Other (comments) Laboratory report: 
Lab Results Component Value Date/Time WBC 4.5 04/29/2019 06:34 PM  
 Hemoglobin (POC) 11.9 09/02/2013 10:09 AM  
 HGB 12.7 04/29/2019 06:34 PM  
 Hematocrit (POC) 35 09/02/2013 10:09 AM  
 HCT 38.8 04/29/2019 06:34 PM  
 PLATELET 656 63/47/4765 06:34 PM  
 MCV 90.9 04/29/2019 06:34 PM  
  
Lab Results Component Value Date/Time Sodium 143 04/29/2019 06:34 PM  
 Potassium 4.2 04/29/2019 06:34 PM  
 Chloride 107 04/29/2019 06:34 PM  
 CO2 30 04/29/2019 06:34 PM  
 Anion gap 6 04/29/2019 06:34 PM  
 Glucose 143 (H) 04/29/2019 06:34 PM  
 Glucose 105 (H) 01/23/2019 06:21 AM  
 BUN 15 04/29/2019 06:34 PM  
 Creatinine 0.93 04/29/2019 06:34 PM  
 BUN/Creatinine ratio 16 04/29/2019 06:34 PM  
 GFR est AA >60 04/29/2019 06:34 PM  
 GFR est non-AA >60 04/29/2019 06:34 PM  
 Calcium 9.0 04/29/2019 06:34 PM  
 Bilirubin, total 0.2 04/29/2019 06:34 PM  
 AST (SGOT) 10 (L) 04/29/2019 06:34 PM  
 Alk. phosphatase 60 04/29/2019 06:34 PM  
 Protein, total 7.3 04/29/2019 06:34 PM  
 Albumin 3.8 04/29/2019 06:34 PM  
 Globulin 3.5 04/29/2019 06:34 PM  
 A-G Ratio 1.1 04/29/2019 06:34 PM  
 ALT (SGPT) 13 04/29/2019 06:34 PM  
  
Vitals: 05/01/19 2147 05/02/19 1183 05/02/19 1638 05/03/19 0919 BP: 136/87 150/84 (!) 145/93 136/88 Pulse: 71 (!) 56 60 89 Resp: 16 18 16 16 Temp:  98.6 °F (37 °C) 99 °F (37.2 °C) 98.2 °F (36.8 °C) SpO2:  98% 98% 97% Weight:      
Height:      
   
No results found for: VALF2, VALAC, VALP, VALPR, DS6, CRBAM, CRBAMP, CARB2, XCRBAM 
No results found for: [facility] Vital Signs Patient Vitals for the past 24 hrs: 
 Temp Pulse Resp BP SpO2  
05/03/19 0919 98.2 °F (36.8 °C) 89 16 136/88 97 % 05/02/19 1638 99 °F (37.2 °C) 60 16 (!) 145/93 98 % Wt Readings from Last 3 Encounters:  
04/30/19 71.2 kg (157 lb) 04/29/19 71.2 kg (157 lb) 04/01/19 69.9 kg (154 lb) Temp Readings from Last 3 Encounters:  
05/03/19 98.2 °F (36.8 °C)  
04/30/19 98.4 °F (36.9 °C)  
01/25/19 98.4 °F (36.9 °C) BP Readings from Last 3 Encounters:  
05/03/19 136/88  
04/30/19 (!) 139/92  
04/01/19 (!) 173/102 Pulse Readings from Last 3 Encounters:  
05/03/19 89  
04/30/19 66  
04/01/19 62 Radiology (reviewed/updated 5/3/2019) No results found. Side Effects: (reviewed/updated 5/3/2019) None reported or admitted to. Review of Systems: (reviewed/updated 5/3/2019) Appetite: good Sleep: good Positive for paranoia. Mental Status Exam: 
Eye contact: limited Psychomotor activity: relaxed Speech is pressured Thought process: loose and disorganized Content: delusion Mood is \"ok\" Affect: constricted Perception: No avh. Suicidal ideation: None Homicidal ideation: None Insight/judgment: Poor Cognition is impaired Physical Exam: Musculoskeletal system: steady gait Tremor not present Cog wheeling not present Assessment and Plan: Tila Carvajal meets criteria for a diagnosis of Unspecified psychotic disorder. Unspecified dementia. Continue current medications as prescribed. Nunu Cox this coming Monday. Will request MMSE. We will closely monitor for safety. We will encourage reality orientation. Disposition planning to continue. I certify that this patients inpatient psychiatric hospital services furnished since the previous certification were, and continue to be, required for treatment that could reasonably be expected to improve the patient's condition, or for diagnostic study, and that the patient continues to need, on a daily basis, active treatment furnished directly by or requiring the supervision of inpatient psychiatric facility personnel. In addition, the hospital records show that services furnished were intensive treatment services, admission or related services, or equivalent services. Signed: 
Chan Schmidt NP 
5/3/2019

## 2019-05-03 NOTE — PROGRESS NOTES
Problem: Depressed Mood (Adult/Pediatric) Goal: *STG: Participates in treatment plan Outcome: Progressing Towards Goal 
Note: Out on unit social w peers. Improved eye contact and attending groups. Easily shares her delusional thinking. Reports AH has decreased. Noted less preoccupied during conversation. States she would like to discuss options of FARIDA. Staff focus is on d/c planning and follow up care.

## 2019-05-04 PROCEDURE — 74011250637 HC RX REV CODE- 250/637: Performed by: NURSE PRACTITIONER

## 2019-05-04 PROCEDURE — 65220000003 HC RM SEMIPRIVATE PSYCH

## 2019-05-04 RX ORDER — SERTRALINE HYDROCHLORIDE 50 MG/1
100 TABLET, FILM COATED ORAL DAILY
Status: DISCONTINUED | OUTPATIENT
Start: 2019-05-05 | End: 2019-05-20 | Stop reason: HOSPADM

## 2019-05-04 RX ADMIN — RISPERIDONE 2 MG: 1 TABLET ORAL at 21:03

## 2019-05-04 RX ADMIN — RISPERIDONE 1 MG: 1 TABLET ORAL at 09:01

## 2019-05-04 RX ADMIN — SERTRALINE HYDROCHLORIDE 150 MG: 50 TABLET ORAL at 09:01

## 2019-05-04 NOTE — PROGRESS NOTES
Problem: Falls - Risk of 
Goal: *Absence of Falls Description Document Lorri Bear Fall Risk and appropriate interventions in the flowsheet. Outcome: Progressing Towards Goal 
  
Problem: Depressed Mood (Adult/Pediatric) Goal: *STG: Participates in treatment plan Outcome: Progressing Towards Goal 
Goal: *STG: Demonstrates reduction in symptoms and increase in insight into coping skills/future focused Outcome: Progressing Towards Goal 
  
Problem: Altered Thought Process (Adult/Pediatric) Goal: *STG: Decreased hallucinations Outcome: Progressing Towards Goal 
Patient remains free from falls. Document Jennifer fall risk scale. Patient participates in treatment plan. Strongly reiterates that she does not want to speak on telephone with someone called Rohith Lobato. Does not remain on unit unless at mealtime or groups. Stated that she is looking forward to finding out where she is going, to go home. @1111 Patient received visitors, Rohith Lobato (her niece) and an Aunt. Patient did visit with instructions on coping. Visit went well, Rohith Lobato wants her aunt to return home with her. Explained that  would be in on Monday and a discharge plan would be discussed that that time. All express happiness over situation

## 2019-05-04 NOTE — INTERDISCIPLINARY ROUNDS
Behavioral Health Interdisciplinary Rounds Patient Name: Lianet Vazquez  Age: 71 y.o. Room/Bed:  731/ Primary Diagnosis: <principal problem not specified> Admission Status: Involuntary committed Readmission within 30 days: no 
Power of  in place: no 
Patient requires a blocked bed: no          Reason for blocked bed: VTE Prophylaxis: No 
 
Mobility needs/Fall risk: no 
Flu Vaccine : no  
Nutritional Plan: no 
Consults:       
Labs/Testing due today?: no 
 
Sleep hours:  8 Participation in Care/Groups:  no 
Medication Compliant?: Yes PRNS (last 24 hours): None Restraints (last 24 hours):  no 
  
CIWA (range last 24 hours): CIWA-Ar Total: 0  
COWS (range last 24 hours): Alcohol screening (AUDIT) completed -   AUDIT Score: 0 If applicable, date SBIRT discussed in treatment team AND documented:  
AUDIT Screen Score: AUDIT Score: 0 Document Brief Intervention (corresponds directly with the 5 A's, Ask, Advise, Assess, Assist, and Arrange): At- Risk Patients (Score 7-15 for women; 8-15 for men) Discuss concern patient is drinking at unhealthy levels known to increase risk of alcohol-related health problems. Is Patient ready to commit to change? If No: 
? Encourage reflection ? Discuss short term and long term health risks of consuming alcohol ? Barriers to change ? Reaffirm willingness to help / Educational materials provided If Yes: 
? Set goal 
? Plan 
? Educational materials provided Harmful use or Dependence (Score 16 or greater) ? Discuss short term and long term health risks of consuming alcohol ? Recommendations ? Negotiate drinking goal 
? Recommend addiction specialist/center ? Arrange follow-up appointments. Tobacco - patient is a smoker: Have You Used Tobacco in the Past 30 Days: No 
Illegal Drugs use: Have You Used Any Illegal Substances Over the Past 12 Months: No 
 
24 hour chart check complete: yes Patient goal(s) for today: Treatment team focus/goals:  
Progress note LOS:  4  Expected LOS: 
 
Financial concerns/prescription coverage Date of last family contact:    
Family requesting physician contact today:   
Discharge plan: 
Guns in the home: Outpatient provider(s): 
 
Participating treatment team members: Corey Ley, * (assigned SW),

## 2019-05-04 NOTE — BH NOTES
Patient asked for staff to give code to moe Torres and phone number for  regarding discharge.  Gave code per patient request.

## 2019-05-04 NOTE — PROGRESS NOTES
Problem: Falls - Risk of 
Goal: *Absence of Falls Description Document Enoch Kelley Fall Risk and appropriate interventions in the flowsheet. Outcome: Progressing Towards Goal 
Note:  
Fall Risk Interventions: 
Mobility Interventions: Assess mobility with egress test 
 
Mentation Interventions: Adequate sleep, hydration, pain control Medication Interventions: Teach patient to arise slowly Elimination Interventions: Toilet paper/wipes in reach History of Falls Interventions: Door open when patient unattended Patient free from falls on current evening shift. Patient educated on fall precautions while taking sedative medications. Problem: Altered Thought Process (Adult/Pediatric) Goal: *STG: Decreased hallucinations Outcome: Progressing Towards Goal 
Note:  
Patient encouraged to seek staff when delusions start or become overwhelming.

## 2019-05-04 NOTE — PROGRESS NOTES
Problem: Falls - Risk of 
Goal: *Absence of Falls Description Document Clide Failing Fall Risk and appropriate interventions in the flowsheet. Outcome: Progressing Towards Goal 
 Lying quietly  in bed with eyes closed, respirations even and unlabored , NAD noted Has been free of falls Q15 min safety monitoring continues

## 2019-05-04 NOTE — PROGRESS NOTES
PSYCHIATRIC PROGRESS NOTE Chief Complaint: \"My aunt is poisoning me. \" Interval History: Ms. Nathen Kent presents with ongoing paranoia with persecutory content. She states that Mickeal Rode her aunt is out to get her. Reports that they are putting pills on her food to hurt her. If she gets back home she will die. \"They dont like me. \" \"They dont care about me. \" \"I hate these people. \" She is med compliant with no side effects noted. Denies si/hi. She was loud and hyper verbal but easily directable. Past Medical History: 
Past Medical History:  
Diagnosis Date  Anxiety disorder  Hypertension  Psychiatric disorder   
 schizophrenia ALLERGIES:(reviewed/updated 5/4/2019) Allergies Allergen Reactions  No Known Allergies Other (comments) Laboratory report: 
Lab Results Component Value Date/Time WBC 4.5 04/29/2019 06:34 PM  
 Hemoglobin (POC) 11.9 09/02/2013 10:09 AM  
 HGB 12.7 04/29/2019 06:34 PM  
 Hematocrit (POC) 35 09/02/2013 10:09 AM  
 HCT 38.8 04/29/2019 06:34 PM  
 PLATELET 061 73/20/2519 06:34 PM  
 MCV 90.9 04/29/2019 06:34 PM  
  
Lab Results Component Value Date/Time Sodium 143 04/29/2019 06:34 PM  
 Potassium 4.2 04/29/2019 06:34 PM  
 Chloride 107 04/29/2019 06:34 PM  
 CO2 30 04/29/2019 06:34 PM  
 Anion gap 6 04/29/2019 06:34 PM  
 Glucose 143 (H) 04/29/2019 06:34 PM  
 Glucose 105 (H) 01/23/2019 06:21 AM  
 BUN 15 04/29/2019 06:34 PM  
 Creatinine 0.93 04/29/2019 06:34 PM  
 BUN/Creatinine ratio 16 04/29/2019 06:34 PM  
 GFR est AA >60 04/29/2019 06:34 PM  
 GFR est non-AA >60 04/29/2019 06:34 PM  
 Calcium 9.0 04/29/2019 06:34 PM  
 Bilirubin, total 0.2 04/29/2019 06:34 PM  
 AST (SGOT) 10 (L) 04/29/2019 06:34 PM  
 Alk. phosphatase 60 04/29/2019 06:34 PM  
 Protein, total 7.3 04/29/2019 06:34 PM  
 Albumin 3.8 04/29/2019 06:34 PM  
 Globulin 3.5 04/29/2019 06:34 PM  
 A-G Ratio 1.1 04/29/2019 06:34 PM  
 ALT (SGPT) 13 04/29/2019 06:34 PM  
  
Vitals: 05/02/19 1638 05/03/19 0919 05/03/19 2107 05/04/19 0900 BP: (!) 145/93 136/88 134/83 140/79 Pulse: 60 89 69 63 Resp: 16 16 16 18 Temp: 99 °F (37.2 °C) 98.2 °F (36.8 °C) 98.3 °F (36.8 °C) 98.5 °F (36.9 °C) SpO2: 98% 97% 96% 99% Weight:      
Height:      
   
No results found for: VALF2, VALAC, VALP, VALPR, DS6, CRBAM, CRBAMP, CARB2, XCRBAM 
No results found for: [facility] Vital Signs Patient Vitals for the past 24 hrs: 
 Temp Pulse Resp BP SpO2  
05/04/19 0900 98.5 °F (36.9 °C) 63 18 140/79 99 % 05/03/19 2107 98.3 °F (36.8 °C) 69 16 134/83 96 % Wt Readings from Last 3 Encounters:  
04/30/19 71.2 kg (157 lb) 04/29/19 71.2 kg (157 lb) 04/01/19 69.9 kg (154 lb) Temp Readings from Last 3 Encounters:  
05/04/19 98.5 °F (36.9 °C)  
04/30/19 98.4 °F (36.9 °C)  
01/25/19 98.4 °F (36.9 °C) BP Readings from Last 3 Encounters:  
05/04/19 140/79  
04/30/19 (!) 139/92  
04/01/19 (!) 173/102 Pulse Readings from Last 3 Encounters:  
05/04/19 63  
04/30/19 66  
04/01/19 62 Radiology (reviewed/updated 5/4/2019) No results found. Side Effects: (reviewed/updated 5/4/2019) None reported or admitted to. Review of Systems: (reviewed/updated 5/4/2019) Appetite: good Sleep: good Positive for paranoia. Mental Status Exam: 
Eye contact: limited Psychomotor activity: relaxed Speech is loud and pressured Thought process: loose and disorganized Content: Paranoid delusion Mood is \"ok\" Affect: constricted Perception: No avh. Suicidal ideation: None Homicidal ideation: None Insight/judgment: Poor Cognition is impaired Physical Exam: Musculoskeletal system: steady gait Tremor not present Cog wheeling not present Assessment and Plan: Corey Ley meets criteria for a diagnosis of Unspecified psychotic disorder. Unspecified dementia. Pt presents loud and hyper verbal. Decrease Sertraline to 100 mg daily Apple Pierre this coming Monday. Will request MMSE. We will closely monitor for safety. We will encourage reality orientation. Disposition planning to continue. I certify that this patients inpatient psychiatric hospital services furnished since the previous certification were, and continue to be, required for treatment that could reasonably be expected to improve the patient's condition, or for diagnostic study, and that the patient continues to need, on a daily basis, active treatment furnished directly by or requiring the supervision of inpatient psychiatric facility personnel. In addition, the hospital records show that services furnished were intensive treatment services, admission or related services, or equivalent services. Signed: 
Shweta Goyal MD 
5/4/2019

## 2019-05-04 NOTE — PROGRESS NOTES
Pt has been refusing to see/speak with family since admission. Family came to unit for visitation but did not have code number. Staff notified pt of visitors Chelan  and her mother) and pt refused to see visitors. Staff returned to family and explained the confidentiality code and the regulations we must follow. Family began to yell at staff demanding to speak to manager. Nurses in unit came out to support staff in escalating situation. Nursing staff brought visitors onto unit to visit with pt, and pt greeted visitors with positive regard, contrary to previous conversations.

## 2019-05-05 PROCEDURE — 65220000003 HC RM SEMIPRIVATE PSYCH

## 2019-05-05 PROCEDURE — 74011250637 HC RX REV CODE- 250/637: Performed by: PSYCHIATRY & NEUROLOGY

## 2019-05-05 PROCEDURE — 74011250637 HC RX REV CODE- 250/637: Performed by: NURSE PRACTITIONER

## 2019-05-05 RX ADMIN — SERTRALINE HYDROCHLORIDE 100 MG: 50 TABLET ORAL at 08:28

## 2019-05-05 RX ADMIN — RISPERIDONE 2 MG: 1 TABLET ORAL at 21:28

## 2019-05-05 RX ADMIN — RISPERIDONE 1 MG: 1 TABLET ORAL at 08:28

## 2019-05-05 NOTE — PROGRESS NOTES
PSYCHIATRIC PROGRESS NOTE Chief Complaint: \"My aunt is poisoning me. \" Interval History: 
5/4/19-Ms. Renny Puentes presents with ongoing paranoia with persecutory content. She states that Nani Gabriely her aunt is out to get her. Reports that they are putting pills on her food to hurt her. If she gets back home she will die. \"They dont like me. \" \"They dont care about me. \" \"I hate these people. \" She is med compliant with no side effects noted. Denies si/hi. She was loud and hyper verbal but easily directable. 5/5/19- Presented today smiling and in a good mood. Did not verbalize any paranoid ideations today. Has been compliant with meds. Attended groups. Slept 7 hrs. No prn's were used. Past Medical History: 
Past Medical History:  
Diagnosis Date  Anxiety disorder  Hypertension  Psychiatric disorder   
 schizophrenia ALLERGIES:(reviewed/updated 5/5/2019) Allergies Allergen Reactions  No Known Allergies Other (comments) Laboratory report: 
Lab Results Component Value Date/Time WBC 4.5 04/29/2019 06:34 PM  
 Hemoglobin (POC) 11.9 09/02/2013 10:09 AM  
 HGB 12.7 04/29/2019 06:34 PM  
 Hematocrit (POC) 35 09/02/2013 10:09 AM  
 HCT 38.8 04/29/2019 06:34 PM  
 PLATELET 118 74/99/5247 06:34 PM  
 MCV 90.9 04/29/2019 06:34 PM  
  
Lab Results Component Value Date/Time Sodium 143 04/29/2019 06:34 PM  
 Potassium 4.2 04/29/2019 06:34 PM  
 Chloride 107 04/29/2019 06:34 PM  
 CO2 30 04/29/2019 06:34 PM  
 Anion gap 6 04/29/2019 06:34 PM  
 Glucose 143 (H) 04/29/2019 06:34 PM  
 Glucose 105 (H) 01/23/2019 06:21 AM  
 BUN 15 04/29/2019 06:34 PM  
 Creatinine 0.93 04/29/2019 06:34 PM  
 BUN/Creatinine ratio 16 04/29/2019 06:34 PM  
 GFR est AA >60 04/29/2019 06:34 PM  
 GFR est non-AA >60 04/29/2019 06:34 PM  
 Calcium 9.0 04/29/2019 06:34 PM  
 Bilirubin, total 0.2 04/29/2019 06:34 PM  
 AST (SGOT) 10 (L) 04/29/2019 06:34 PM  
 Alk.  phosphatase 60 04/29/2019 06:34 PM  
 Protein, total 7.3 04/29/2019 06:34 PM  
 Albumin 3.8 04/29/2019 06:34 PM  
 Globulin 3.5 04/29/2019 06:34 PM  
 A-G Ratio 1.1 04/29/2019 06:34 PM  
 ALT (SGPT) 13 04/29/2019 06:34 PM  
  
Vitals:  
 05/04/19 0900 05/04/19 1642 05/05/19 0745 05/05/19 0830 BP: 140/79 (!) 152/92 (!) 150/92 Pulse: 63 61 77 Resp: 18 16 16 Temp: 98.5 °F (36.9 °C) 98.4 °F (36.9 °C) 98.2 °F (36.8 °C) SpO2: 99%  97% Weight:    69.4 kg (153 lb 1.6 oz) Height:      
   
No results found for: VALF2, VALAC, VALP, VALPR, DS6, CRBAM, CRBAMP, CARB2, XCRBAM 
No results found for: [facility] Vital Signs Patient Vitals for the past 24 hrs: 
 Temp Pulse Resp BP SpO2  
05/05/19 0745 98.2 °F (36.8 °C) 77 16 (!) 150/92 97 % 05/04/19 1642 98.4 °F (36.9 °C) 61 16 (!) 152/92  Wt Readings from Last 3 Encounters:  
05/05/19 69.4 kg (153 lb 1.6 oz) 04/29/19 71.2 kg (157 lb) 04/01/19 69.9 kg (154 lb) Temp Readings from Last 3 Encounters:  
05/05/19 98.2 °F (36.8 °C)  
04/30/19 98.4 °F (36.9 °C)  
01/25/19 98.4 °F (36.9 °C) BP Readings from Last 3 Encounters:  
05/05/19 (!) 150/92  
04/30/19 (!) 139/92  
04/01/19 (!) 173/102 Pulse Readings from Last 3 Encounters:  
05/05/19 77  
04/30/19 66  
04/01/19 62 Radiology (reviewed/updated 5/5/2019) No results found. Side Effects: (reviewed/updated 5/5/2019) None reported or admitted to. Review of Systems: (reviewed/updated 5/5/2019) Appetite: good Sleep: good Positive for paranoia. Mental Status Exam: 
Eye contact: limited Psychomotor activity: relaxed Speech is loud and pressured Thought process: loose and disorganized Content: Paranoid delusion Mood is \"ok\" Affect: constricted Perception: No avh. Suicidal ideation: None Homicidal ideation: None Insight/judgment: Poor Cognition is impaired Physical Exam: Musculoskeletal system: steady gait Tremor not present Cog wheeling not present Assessment and Plan: Jorje Tripp meets criteria for a diagnosis of Unspecified psychotic disorder. Unspecified dementia. Pt presents loud and hyper verbal. Decrease Sertraline to 100 mg daily Steven Pinto this coming Monday. Will request MMSE. We will closely monitor for safety. We will encourage reality orientation. Disposition planning to continue. I certify that this patients inpatient psychiatric hospital services furnished since the previous certification were, and continue to be, required for treatment that could reasonably be expected to improve the patient's condition, or for diagnostic study, and that the patient continues to need, on a daily basis, active treatment furnished directly by or requiring the supervision of inpatient psychiatric facility personnel. In addition, the hospital records show that services furnished were intensive treatment services, admission or related services, or equivalent services. Signed: 
Summer Lin MD 
5/5/2019

## 2019-05-05 NOTE — PROGRESS NOTES
Problem: Falls - Risk of 
Goal: *Absence of Falls Description Document Max Chappell Fall Risk and appropriate interventions in the flowsheet. Outcome: Progressing Towards Goal 
Note:  
Fall Risk Interventions: 
Mobility Interventions: Assess mobility with egress test 
Mentation Interventions: Adequate sleep, hydration, pain control Medication Interventions: Teach patient to arise slowly Elimination Interventions: Toilet paper/wipes in reach History of Falls Interventions: Door open when patient unattended Received pt lying in bed, appears to be asleep. NAD. Respirations even and unlabored. Will continue to monitor Q15 for safety.

## 2019-05-05 NOTE — INTERDISCIPLINARY ROUNDS
Behavioral Health Interdisciplinary Rounds Patient Name: Norman Odell  Age: 71 y.o. Room/Bed:  731/ Primary Diagnosis: <principal problem not specified> Admission Status: Involuntary Commitment Readmission within 30 days: no 
Power of  in place: no 
Patient requires a blocked bed: no          Reason for blocked bed: VTE Prophylaxis: No 
 
Mobility needs/Fall risk: no 
Flu Vaccine : no  
Nutritional Plan: no 
Consults:         
Labs/Testing due today?: no 
 
Sleep hours:  8 Participation in Care/Groups:  yes Medication Compliant?: Yes PRNS (last 24 hours): None Restraints (last 24 hours):  no 
  
CIWA (range last 24 hours): CIWA-Ar Total: 0  
COWS (range last 24 hours): Alcohol screening (AUDIT) completed -   AUDIT Score: 0 If applicable, date SBIRT discussed in treatment team AND documented:  
AUDIT Screen Score: AUDIT Score: 0 Document Brief Intervention (corresponds directly with the 5 A's, Ask, Advise, Assess, Assist, and Arrange): At- Risk Patients (Score 7-15 for women; 8-15 for men) Discuss concern patient is drinking at unhealthy levels known to increase risk of alcohol-related health problems. Is Patient ready to commit to change? If No: 
? Encourage reflection ? Discuss short term and long term health risks of consuming alcohol ? Barriers to change ? Reaffirm willingness to help / Educational materials provided If Yes: 
? Set goal 
? Plan 
? Educational materials provided Harmful use or Dependence (Score 16 or greater) ? Discuss short term and long term health risks of consuming alcohol ? Recommendations ? Negotiate drinking goal 
? Recommend addiction specialist/center ? Arrange follow-up appointments. Tobacco - patient is a smoker: Have You Used Tobacco in the Past 30 Days: No 
Illegal Drugs use: Have You Used Any Illegal Substances Over the Past 12 Months: No 
 
24 hour chart check complete: yes Patient goal(s) for today:  
Treatment team focus/goals:  
Progress note LOS:  5  Expected LOS:  
 
Financial concerns/prescription coverage:   
Date of last family contact:      
Family requesting physician contact today:   
Discharge plan:  
Guns in the home: Outpatient provider(s):  
 
Participating treatment team members: Nishi Melara, * (assigned SW),

## 2019-05-05 NOTE — PROGRESS NOTES
12:37 pm-speech is rapid. Less paranoid at this time. Hygiene is poor. Encouraged pt to take a shower. Problem: Falls - Risk of 
Goal: *Absence of Falls Description Document Olga Sanchez Fall Risk and appropriate interventions in the flowsheet. Outcome: Progressing Towards Goal 
Note:  
Fall Risk Interventions: 
Mobility Interventions: Assess mobility with egress test 
 
Mentation Interventions: Adequate sleep, hydration, pain control Medication Interventions: Teach patient to arise slowly Elimination Interventions: Toilet paper/wipes in reach History of Falls Interventions: Door open when patient unattended Problem: Patient Education: Go to Patient Education Activity Goal: Patient/Family Education Outcome: Progressing Towards Goal 
  
Problem: Depressed Mood (Adult/Pediatric) Goal: *STG: Participates in treatment plan Outcome: Progressing Towards Goal 
Note:  
Pt participated in treatment team. Isolating in her room. Stated \"I am leaving tomorrow\". Goal: Interventions Outcome: Progressing Towards Goal 
  
Problem: Patient Education: Go to Patient Education Activity Goal: Patient/Family Education Outcome: Progressing Towards Goal

## 2019-05-05 NOTE — BH NOTES
GROUP THERAPY PROGRESS NOTE Norman Odell is participating in [place]. Group time: 1 hour Personal goal for participation: setting small goals Goal orientation: community Group therapy participation: active Therapeutic interventions reviewed and discussed: setting small goals Impression of participation: participated

## 2019-05-05 NOTE — PROGRESS NOTES
Problem: Falls - Risk of 
Goal: *Absence of Falls Description Document Lana Posey Fall Risk and appropriate interventions in the flowsheet. Outcome: Progressing Towards Goal 
Note:  
Fall Risk Interventions: 
Mobility Interventions: Assess mobility with egress test 
 
Mentation Interventions: Adequate sleep, hydration, pain control Medication Interventions: Teach patient to arise slowly Elimination Interventions: Toilet paper/wipes in reach History of Falls Interventions: Door open when patient unattended Patient free from falls on current evening shift. Problem: Depressed Mood (Adult/Pediatric) Goal: *STG: Participates in treatment plan Outcome: Progressing Towards Goal 
Note:  
Patient encouraged to participate in milieu activities, groups, and treatment planning.

## 2019-05-06 PROCEDURE — 74011250637 HC RX REV CODE- 250/637: Performed by: PSYCHIATRY & NEUROLOGY

## 2019-05-06 PROCEDURE — 74011250637 HC RX REV CODE- 250/637: Performed by: NURSE PRACTITIONER

## 2019-05-06 PROCEDURE — 65220000003 HC RM SEMIPRIVATE PSYCH

## 2019-05-06 RX ORDER — MEMANTINE HYDROCHLORIDE 10 MG/1
5 TABLET ORAL 2 TIMES DAILY
Status: DISCONTINUED | OUTPATIENT
Start: 2019-05-06 | End: 2019-05-20 | Stop reason: HOSPADM

## 2019-05-06 RX ORDER — DONEPEZIL HYDROCHLORIDE 5 MG/1
5 TABLET, FILM COATED ORAL
Status: DISCONTINUED | OUTPATIENT
Start: 2019-05-06 | End: 2019-05-20 | Stop reason: HOSPADM

## 2019-05-06 RX ADMIN — DONEPEZIL HYDROCHLORIDE 5 MG: 5 TABLET, FILM COATED ORAL at 21:39

## 2019-05-06 RX ADMIN — RISPERIDONE 1 MG: 1 TABLET ORAL at 08:13

## 2019-05-06 RX ADMIN — MEMANTINE HYDROCHLORIDE 5 MG: 10 TABLET ORAL at 17:12

## 2019-05-06 RX ADMIN — RISPERIDONE 2 MG: 1 TABLET ORAL at 21:39

## 2019-05-06 RX ADMIN — SERTRALINE HYDROCHLORIDE 100 MG: 50 TABLET ORAL at 08:13

## 2019-05-06 NOTE — BH NOTES
GROUP THERAPY PROGRESS NOTE Olaf Worthington partially participated in a morning Process Group on the General Unit with a focus identifying feelings, planning for the day, and learning more about DBT concepts on \"Emotion Regulation. \" . 
Group time: 75 minutes. Personal goal for participation: To increase the capacity to improve ones mood, set personal goals, and understand more about basic activities to help regulate emotions. Goal orientation: The patients will be able to identify their feelings and develop a plan for structuring  
their day. They were also presented with a summary sheet on emotional regulation, in regards to  
focusing on one goal per day, taking physical care of oneself, and recognizing and/or building positive  
experiences. The didactic portion of the session focused on these three concepts:  
1) defining and focusing on one goal per day;  
2) taking care of ones physical maintenance and basic needs  
sleep, nutrition, and exercise; and  
3) finding and building on positive experiences. Group therapy participation: With prompting, this patient mimally and passively participated in the group. Therapeutic interventions reviewed and discussed: The group members were asked to identify an  
emotion they are having and/or let the group know what they want to focus on for the day as they 
continue to make discharge plans. The group members reviewed three DBT suggestions regarding  
emotional regulation, in regards to focusing on one goal per day, taking physical care of oneself, 
and recognizing and/or building positive experiences. It was suggested that these three concepts can be  
seen as headings for their list of coping skills. The group members were also provided worksheets on the  
topic discussed for their review and use on their own time.  
  
Impression of participation: The patient said she did not want to speak in group today that she preferred \"to listen. \" She was alert,  generally oriented, and appeared to be tracking the group's conversation. She expressed no current SI/HI and displayed no overt psychotic symptoms in this group. Her affect was non-dysphoric and anxiously reserved. Her mood reflected her affect. This was the patient's first process group on the General Unit with the undersigned.

## 2019-05-06 NOTE — PROGRESS NOTES
Problem: Falls - Risk of 
Goal: *Absence of Falls Description Document Bishop Nagel Fall Risk and appropriate interventions in the flowsheet. Outcome: Progressing Towards Goal 
Note:  
Fall Risk Interventions: 
Mobility Interventions: Assess mobility with egress test 
 
Mentation Interventions: Adequate sleep, hydration, pain control Medication Interventions: Teach patient to arise slowly Elimination Interventions: Toilet paper/wipes in reach History of Falls Interventions: Door open when patient unattended Patient free from falls on current evening shift. Patient educated on fall precautions while taking sedative medications. Problem: Depressed Mood (Adult/Pediatric) Goal: *STG: Attends activities and groups Outcome: Progressing Towards Goal 
Note:  
Patient encouraged to participate in milieu activities, groups, and treatment planning.

## 2019-05-06 NOTE — PROGRESS NOTES
Problem: Falls - Risk of 
Goal: *Absence of Falls Description Document Yao Vanessa Fall Risk and appropriate interventions in the flowsheet. Note:  
Fall Risk Interventions: 
Mobility Interventions: Assess mobility with egress test 
 
Mentation Interventions: Adequate sleep, hydration, pain control Medication Interventions: Teach patient to arise slowly Elimination Interventions: Toilet paper/wipes in reach History of Falls Interventions: Door open when patient unattended Problem: Depressed Mood (Adult/Pediatric) Goal: *STG: Participates in treatment plan Note:  
Patient denies SI. Med and meal compliant. Stated that she continues to have auditory hallucinations that people are telling her \"I am a good person why am I still here\" Staff re-assured. Patient looking forward to discharge soon. SW working on placement. Goal: Interventions Note:  
Medication management. Q 15 min safety rounds. Group therapy.

## 2019-05-06 NOTE — INTERDISCIPLINARY ROUNDS
Behavioral Health Interdisciplinary Rounds Patient Name: Aidee Blandon  Age: 71 y.o. Room/Bed:  731/ Primary Diagnosis: <principal problem not specified> Admission Status: Involuntary Commitment Readmission within 30 days: no 
Power of  in place: no 
Patient requires a blocked bed: no          Reason for blocked bed: VTE Prophylaxis: No 
 
Mobility needs/Fall risk: no 
Flu Vaccine : no  
Nutritional Plan: no 
Consults:         
Labs/Testing due today?: no 
 
Sleep hours: 7.50 Participation in Care/Groups:  yes Medication Compliant?: Yes PRNS (last 24 hours): None Restraints (last 24 hours):  no 
  
CIWA (range last 24 hours): CIWA-Ar Total: 0  
COWS (range last 24 hours): Alcohol screening (AUDIT) completed -   AUDIT Score: 0 If applicable, date SBIRT discussed in treatment team AND documented:  
AUDIT Screen Score: AUDIT Score: 0 Tobacco - patient is a smoker: Have You Used Tobacco in the Past 30 Days: No 
Illegal Drugs use: Have You Used Any Illegal Substances Over the Past 12 Months: No 
 
24 hour chart check complete: yes Patient goal(s) for today: Continue attending all groups Treatment team focus/goals: Talk to Pt about half-way and long-acting injectable Progress note: Pt states she wants to go to [facility]  
 
LOS:  6  Expected LOS: TBD Financial concerns/prescription coverage: [place] Medicare; [place] Medicaid Date of last family contact: None Family requesting physician contact today: No 
Discharge plan: half-way placement Guns in the home: No      
Outpatient provider(s): Rayleen Kussmaul, NP Participating treatment team members: REMY Pollock; Bravo Coley NP; Lashay Ramsey RN; Ren Love, Li

## 2019-05-06 NOTE — PROGRESS NOTES
In sarah beth asleep with respirations noted as even and unlabored as chest was rising and falling. Will continue to monitor for safety and 15 minute checks throughout shift.

## 2019-05-06 NOTE — BH NOTES
GROUP THERAPY PROGRESS NOTE Wilson Durga did not participate in a 50 minute Process Group on the Geriatric Unit with a focus on shifting ones feelings to a positive note and preparing for the day through live music.

## 2019-05-06 NOTE — BH NOTES
GROUP THERAPY PROGRESS NOTE Kalen Bee is participating in Substance abuse group. Group time: 1 hour Personal goal for participation: To understand addiction, criteria for diagnosis, and identify triggers and coping skills. Goal orientation: personal 
 
Group therapy participation: active and minimal 
 
Therapeutic interventions reviewed and discussed: Group discussion of substance use, abuse, and dependence and the DSM 5 criteria for a substance use disorder. Patients were able to self-rate themselves based on the 11 criteria for a substance use disorder and explore their own level of addiction for cigarettes, alcohol, heroin, and other substances. Group discussed how they feel when they are unable to use and ways substance use has hindered their lives. Triggers for use and coping skills to avoid use or manage symptoms until craving subsides were discussed. Impression of participation: Clair Nyhan was quiet in group but did share her thoughts when prompted. She reports alcohol is bad because it causes damage to your body and that cigarettes are good and bad because they do not cause as much damage. She shared support or someone to talk to is important for people who are trying to keep from using or having issues with anxiety and depression.  
 
Jing Luu [facility]

## 2019-05-06 NOTE — PROGRESS NOTES
PSYCHIATRIC PROGRESS NOTE Chief Complaint: \"I am doing fine\" Interval History: Samuel Jauregui states she is feeling better and is requesting to be discharged. States she is eating and drinking well. Asserts that the voices are telling her that she is a good and sweet person and had to go through what she did. States her niece is out to hurt her. However, her niece came and visited her and states the visit went well. Pleasantly confused. Active in the unit and attending groups. No behavioral issues noted. No si or hi. MMSE 17 which shows moderate dementia. Past Medical History: 
Past Medical History:  
Diagnosis Date  Anxiety disorder  Hypertension  Psychiatric disorder   
 schizophrenia ALLERGIES:(reviewed/updated 5/6/2019) Allergies Allergen Reactions  No Known Allergies Other (comments) Laboratory report: 
Lab Results Component Value Date/Time WBC 4.5 04/29/2019 06:34 PM  
 Hemoglobin (POC) 11.9 09/02/2013 10:09 AM  
 HGB 12.7 04/29/2019 06:34 PM  
 Hematocrit (POC) 35 09/02/2013 10:09 AM  
 HCT 38.8 04/29/2019 06:34 PM  
 PLATELET 455 51/23/5699 06:34 PM  
 MCV 90.9 04/29/2019 06:34 PM  
  
Lab Results Component Value Date/Time Sodium 143 04/29/2019 06:34 PM  
 Potassium 4.2 04/29/2019 06:34 PM  
 Chloride 107 04/29/2019 06:34 PM  
 CO2 30 04/29/2019 06:34 PM  
 Anion gap 6 04/29/2019 06:34 PM  
 Glucose 143 (H) 04/29/2019 06:34 PM  
 Glucose 105 (H) 01/23/2019 06:21 AM  
 BUN 15 04/29/2019 06:34 PM  
 Creatinine 0.93 04/29/2019 06:34 PM  
 BUN/Creatinine ratio 16 04/29/2019 06:34 PM  
 GFR est AA >60 04/29/2019 06:34 PM  
 GFR est non-AA >60 04/29/2019 06:34 PM  
 Calcium 9.0 04/29/2019 06:34 PM  
 Bilirubin, total 0.2 04/29/2019 06:34 PM  
 AST (SGOT) 10 (L) 04/29/2019 06:34 PM  
 Alk.  phosphatase 60 04/29/2019 06:34 PM  
 Protein, total 7.3 04/29/2019 06:34 PM  
 Albumin 3.8 04/29/2019 06:34 PM  
 Globulin 3.5 04/29/2019 06:34 PM  
 A-G Ratio 1.1 04/29/2019 06:34 PM  
 ALT (SGPT) 13 04/29/2019 06:34 PM  
  
Vitals:  
 05/05/19 0830 05/05/19 1642 05/05/19 2002 05/06/19 0800 BP:  (!) 148/101 153/84 153/90 Pulse:  (!) 58 69 73 Resp:  16 16 18 Temp:  98.8 °F (37.1 °C) 98.8 °F (37.1 °C) 98.1 °F (36.7 °C) SpO2:  99% 97% 98% Weight: 69.4 kg (153 lb 1.6 oz) Height:      
   
No results found for: VALF2, VALAC, VALP, VALPR, DS6, CRBAM, CRBAMP, CARB2, XCRBAM 
No results found for: [facility] Vital Signs Patient Vitals for the past 24 hrs: 
 Temp Pulse Resp BP SpO2  
05/06/19 0800 98.1 °F (36.7 °C) 73 18 153/90 98 % 05/05/19 2002 98.8 °F (37.1 °C) 69 16 153/84 97 % 05/05/19 1642 98.8 °F (37.1 °C) (!) 58 16 (!) 148/101 99 % Wt Readings from Last 3 Encounters:  
05/05/19 69.4 kg (153 lb 1.6 oz) 04/29/19 71.2 kg (157 lb) 04/01/19 69.9 kg (154 lb) Temp Readings from Last 3 Encounters:  
05/06/19 98.1 °F (36.7 °C)  
04/30/19 98.4 °F (36.9 °C)  
01/25/19 98.4 °F (36.9 °C) BP Readings from Last 3 Encounters:  
05/06/19 153/90  
04/30/19 (!) 139/92  
04/01/19 (!) 173/102 Pulse Readings from Last 3 Encounters:  
05/06/19 73  
04/30/19 66  
04/01/19 62 Radiology (reviewed/updated 5/6/2019) No results found. Side Effects: (reviewed/updated 5/6/2019) None reported or admitted to. Review of Systems: (reviewed/updated 5/6/2019) Appetite: good Sleep: good Positive for paranoia. Mental Status Exam: 
Eye contact: limited Psychomotor activity: relaxed Speech is pressured Thought process: loose and disorganized Content: delusion Mood is \"ok\" Affect: constricted Perception: No avh. Suicidal ideation: None Homicidal ideation: None Insight/judgment: Poor Cognition is impaired Physical Exam: Musculoskeletal system: steady gait Tremor not present Cog wheeling not present Assessment and Plan: Clementine Emery meets criteria for a diagnosis of Unspecified psychotic disorder. Unspecified dementia. Aricept 5mg qhs. Namenda 5mg bid. Continue rest medications as prescribed. Sean  Tuesday since we don't have any supply today. We will closely monitor for safety. We will encourage reality orientation. Disposition planning to continue. I certify that this patients inpatient psychiatric hospital services furnished since the previous certification were, and continue to be, required for treatment that could reasonably be expected to improve the patient's condition, or for diagnostic study, and that the patient continues to need, on a daily basis, active treatment furnished directly by or requiring the supervision of inpatient psychiatric facility personnel. In addition, the hospital records show that services furnished were intensive treatment services, admission or related services, or equivalent services. Signed: 
Stacy Ordoñez NP 
5/6/2019

## 2019-05-07 PROCEDURE — 74011250637 HC RX REV CODE- 250/637: Performed by: PSYCHIATRY & NEUROLOGY

## 2019-05-07 PROCEDURE — 65220000003 HC RM SEMIPRIVATE PSYCH

## 2019-05-07 PROCEDURE — 74011250637 HC RX REV CODE- 250/637: Performed by: NURSE PRACTITIONER

## 2019-05-07 RX ADMIN — MEMANTINE HYDROCHLORIDE 5 MG: 10 TABLET ORAL at 09:00

## 2019-05-07 RX ADMIN — DONEPEZIL HYDROCHLORIDE 5 MG: 5 TABLET, FILM COATED ORAL at 21:20

## 2019-05-07 RX ADMIN — RISPERIDONE 1 MG: 1 TABLET ORAL at 09:00

## 2019-05-07 RX ADMIN — MEMANTINE HYDROCHLORIDE 5 MG: 10 TABLET ORAL at 17:15

## 2019-05-07 RX ADMIN — RISPERIDONE 2 MG: 1 TABLET ORAL at 21:20

## 2019-05-07 RX ADMIN — SERTRALINE HYDROCHLORIDE 100 MG: 50 TABLET ORAL at 08:59

## 2019-05-07 NOTE — PROGRESS NOTES
Problem: Falls - Risk of 
Goal: *Absence of Falls Description Document Olganola Lopeztodd Fall Risk and appropriate interventions in the flowsheet. Outcome: Progressing Towards Goal 
Note:  
Fall Risk Interventions: 
Mobility Interventions: Assess mobility with egress test 
Mentation Interventions: Adequate sleep, hydration, pain control Medication Interventions: Teach patient to arise slowly Elimination Interventions: Toilet paper/wipes in reach History of Falls Interventions: Door open when patient unattended Received pt lying in bed with eyes closed, appears to be asleep. NAD. Respirations even and unlabored. Will continue to monitor Q15 for safety.

## 2019-05-07 NOTE — INTERDISCIPLINARY ROUNDS
Behavioral Health Interdisciplinary Rounds Patient Name: Richie Blancas  Age: 71 y.o. Room/Bed:  731/ Primary Diagnosis: <principal problem not specified> Admission Status: Involuntary Commitment Readmission within 30 days: no 
Power of  in place: no 
Patient requires a blocked bed: no          Reason for blocked bed:  
Sleep hours:  6.5 Participation in Care/Groups:  yes Medication Compliant?: Yes PRNS (last 24 hours): None Restraints (last 24 hours):  no 
  
Alcohol screening (AUDIT) completed -  AUDIT Score: 0  If applicable, date SBIRT discussed in treatment team AND documented:   
Tobacco - patient is a smoker: Have You Used Tobacco in the Past 30 Days: No 
Illegal Drugs use: Have You Used Any Illegal Substances Over the Past 12 Months: No 
 
24 hour chart check complete: yes Patient goal(s) for today: Keep up the good work! Treatment team focus/goals: Administer Isabel Snow injection; complete [facility] Progress note: Pt endorsing delusions and paranoia about family; continues to state she wants to go to long-term  
 
LOS:  7  Expected LOS: TBD Participating treatment team members: REMY Quispe; Shun hKanna NP; Ellyn Pepe RN; Demetrius Beyer, PharmD

## 2019-05-07 NOTE — PROGRESS NOTES
PSYCHIATRIC PROGRESS NOTE Chief Complaint: \"I am doing fine\" Interval History: Anderson Espino states she is doing well. She is in complete agreement that [facility] is the best option for her. Persecutory delusions remain, she continues to believe that she and her niece don't get along. \"Bree and his mama have AIDS. \" Fixated on \"marie. \" Pressured speech, pleasantly confused. She is agreeable in taking sustenna. No acute behavioral issues noted. Past Medical History: 
Past Medical History:  
Diagnosis Date  Anxiety disorder  Hypertension  Psychiatric disorder   
 schizophrenia ALLERGIES:(reviewed/updated 5/7/2019) Allergies Allergen Reactions  No Known Allergies Other (comments) Laboratory report: 
Lab Results Component Value Date/Time WBC 4.5 04/29/2019 06:34 PM  
 Hemoglobin (POC) 11.9 09/02/2013 10:09 AM  
 HGB 12.7 04/29/2019 06:34 PM  
 Hematocrit (POC) 35 09/02/2013 10:09 AM  
 HCT 38.8 04/29/2019 06:34 PM  
 PLATELET 416 82/71/5672 06:34 PM  
 MCV 90.9 04/29/2019 06:34 PM  
  
Lab Results Component Value Date/Time Sodium 143 04/29/2019 06:34 PM  
 Potassium 4.2 04/29/2019 06:34 PM  
 Chloride 107 04/29/2019 06:34 PM  
 CO2 30 04/29/2019 06:34 PM  
 Anion gap 6 04/29/2019 06:34 PM  
 Glucose 143 (H) 04/29/2019 06:34 PM  
 Glucose 105 (H) 01/23/2019 06:21 AM  
 BUN 15 04/29/2019 06:34 PM  
 Creatinine 0.93 04/29/2019 06:34 PM  
 BUN/Creatinine ratio 16 04/29/2019 06:34 PM  
 GFR est AA >60 04/29/2019 06:34 PM  
 GFR est non-AA >60 04/29/2019 06:34 PM  
 Calcium 9.0 04/29/2019 06:34 PM  
 Bilirubin, total 0.2 04/29/2019 06:34 PM  
 AST (SGOT) 10 (L) 04/29/2019 06:34 PM  
 Alk. phosphatase 60 04/29/2019 06:34 PM  
 Protein, total 7.3 04/29/2019 06:34 PM  
 Albumin 3.8 04/29/2019 06:34 PM  
 Globulin 3.5 04/29/2019 06:34 PM  
 A-G Ratio 1.1 04/29/2019 06:34 PM  
 ALT (SGPT) 13 04/29/2019 06:34 PM  
  
Vitals:  
 05/06/19 0800 05/06/19 1642 05/06/19 2000 05/07/19 0815 BP: 153/90 (!) 176/93 128/80 157/90 Pulse: 73 78 76 61 Resp: 18 20 16 18 Temp: 98.1 °F (36.7 °C) 98.8 °F (37.1 °C) 98.2 °F (36.8 °C) 98.3 °F (36.8 °C) SpO2: 98% 98% 100% 99% Weight:      
Height:      
   
No results found for: VALF2, VALAC, VALP, VALPR, DS6, CRBAM, CRBAMP, CARB2, XCRBAM 
No results found for: [facility] Vital Signs Patient Vitals for the past 24 hrs: 
 Temp Pulse Resp BP SpO2  
05/07/19 0815 98.3 °F (36.8 °C) 61 18 157/90 99 % 05/06/19 2000 98.2 °F (36.8 °C) 76 16 128/80 100 % 05/06/19 1642 98.8 °F (37.1 °C) 78 20 (!) 176/93 98 % Wt Readings from Last 3 Encounters:  
05/05/19 69.4 kg (153 lb 1.6 oz) 04/29/19 71.2 kg (157 lb) 04/01/19 69.9 kg (154 lb) Temp Readings from Last 3 Encounters:  
05/07/19 98.3 °F (36.8 °C)  
04/30/19 98.4 °F (36.9 °C)  
01/25/19 98.4 °F (36.9 °C) BP Readings from Last 3 Encounters:  
05/07/19 157/90  
04/30/19 (!) 139/92  
04/01/19 (!) 173/102 Pulse Readings from Last 3 Encounters:  
05/07/19 61  
04/30/19 66  
04/01/19 62 Radiology (reviewed/updated 5/7/2019) No results found. Side Effects: (reviewed/updated 5/7/2019) None reported or admitted to. Review of Systems: (reviewed/updated 5/7/2019) Appetite: good Sleep: good Positive for paranoia. Mental Status Exam: 
Eye contact: limited Psychomotor activity: relaxed Speech is pressured Thought process: loose and disorganized Content: delusion Mood is \"ok\" Affect: constricted Perception: No avh. Suicidal ideation: None Homicidal ideation: None Insight/judgment: Poor Cognition is impaired Physical Exam: Musculoskeletal system: steady gait Tremor not present Cog wheeling not present Assessment and Plan: Guero Rausch meets criteria for a diagnosis of Unspecified psychotic disorder. Unspecified dementia. Invega sustenna 234mg im to be administered today. Continue rest of medications as prescribed. We will closely monitor for safety. We will encourage reality orientation. Disposition planning to continue. I certify that this patients inpatient psychiatric hospital services furnished since the previous certification were, and continue to be, required for treatment that could reasonably be expected to improve the patient's condition, or for diagnostic study, and that the patient continues to need, on a daily basis, active treatment furnished directly by or requiring the supervision of inpatient psychiatric facility personnel. In addition, the hospital records show that services furnished were intensive treatment services, admission or related services, or equivalent services. Signed: 
Carlos Naik NP 
5/7/2019

## 2019-05-07 NOTE — BSMART NOTE
GROUP THERAPY PROGRESS NOTE Guero Rausch is participating in Positive thoughts. Group time: 35 minutes Personal goal for participation: The goal of the group today was to understand what affirmations are and to develop your own word and phrase to use to promote positive thinking. Also made parallel of using a song  to also promote confidence and positive thinking. Goal orientation: personal 
 
Group therapy participation: minimal 
 
Therapeutic interventions reviewed and discussed: Cognitive Behavioral Therapy, affirmations, positive thinking strategies Impression of participation: Araceliwilian Cl attended group. She was attentive but did not spontaneously verbalize anything. She was able to relate to topics discussed and stated that she had reassuring statements she made to herself in times of stress and told herself that everything would be ok.

## 2019-05-07 NOTE — BH NOTES
GROUP THERAPY PROGRESS NOTE Rere Irvin partially participated in the General Unit's Process Group, with a focus identifying feelings,  planning for the day, and defining aftercare needs. Group time: 75 minutes. Personal goal for participation: To increase the capacity to improve ones mood and structure. Goal orientation: The patient will be able to identify their feelings, develop a plan for structuring their day, and discharge planning. Group therapy participation: With prompting, this patient marginally and passively participated in the group. Therapeutic interventions reviewed and discussed: The group members were asked to introduce themselves to each other and to see if they could identify an emotion they are having and/or let the group know what they want to focus on for the day as they continue to make discharge plans. A handout was provided that had the image of thunder cloud to represent Karen Flatten is looming in your life.  It also had raindrops standing for smaller obstacles. There was also a lightning bolt to stand for zaps to your ability to hold the umbrella of protection. The umbrella was where one might list those things that protect you or help one cope with the small obstacles in life and the drawing offers a place to identify Who holds up your umbrella?  Impression of participation: The patient said, \"I don't want to share this morning. \" She The patient was alert, generally oriented, and attentive. She looked like she may have been listening to and tracking the group discussion. At the end of the group, she said she thought she was getting close to being discharged and that she was pleased with her progress. She expressed no current SI/HI and displayed no overt psychotic symptoms, including any delusions. Her affect was non-dysphoric, mildly euphoric, and slightly anxious. Her mood was mostly quiet and reserved.

## 2019-05-07 NOTE — BH NOTES
GROUP THERAPY PROGRESS NOTE Mark Tsai did not participate in a 45 minute Process Group on the Geriatric Unit with a focus on shifting ones feelings to a positive note and preparing for the day through live music.

## 2019-05-07 NOTE — PROGRESS NOTES
100 [facility] 60 Master Treatment Plan for [employer] Date Treatment Plan Initiated: 5/7/19 Treatment Plan Modalities: 
Type of Modality Amount (x minutes) Frequency (x/week) Duration (x days) Name of Responsible Staff Community & wrap-up meetings to encourage peer interactions 15 7 1 Binu Pratt Group psychotherapy to assist in building coping skills and internal controls 60 7 [address] Therapeutic activity groups to build coping skills 60 7 1 Marcus Wood Psychoeducation in group setting to address:  
Medication education [facility] 224 Coping skills Relaxation techniques Symptom management Discharge planning [address] [place] [address] 71 Spirituality 2[address] [facility] Neri Kayce[employer]SMaterials 60 1 1 volunteer Recovery/AA/NA 
    volunteer Physician medication management 15 7 1  Family meeting/discharge planning Migel Cheng THESE GOALS WILL BE MET BY 5/13/19 Problem: Depressed Mood (Adult/Pediatric) Goal: *STG: Participates in treatment plan Outcome: Progressing Towards Goal 
Note: Out on unit attending groups and increase time w peers and out on unit. Sharing less with staff her delusional thinking. Delusional thinking continues shares only when asked about family. Discussed [place] and process. Verbalized understanding and agrees to [place].

## 2019-05-07 NOTE — BH NOTES
GROUP THERAPY PROGRESS NOTE Corey Ley did not participated in group. Group time: 
 
Personal goal for participation:  
 
Goal orientation:  
 
Group therapy participation:  
 
Therapeutic interventions reviewed and discussed:  
 
Impression of participation:

## 2019-05-07 NOTE — PROGRESS NOTES
Problem: Discharge Planning Goal: *Discharge to safe environment Outcome: Progressing Towards Goal 
Note:  
Patient will discharge to [place]. Patient and family are in agreement with this discharge plan. Goal: *Knowledge of medication management Outcome: Progressing Towards Goal 
Note:  
Patient verbalizes understanding of medication regimen. Patient is receptive to receiving long-acting injectable. Patient is taking all medications as prescribed. Goal: *Knowledge of discharge instructions Outcome: Progressing Towards Goal 
Note:  
Patient verbalizes understanding of goals for treatment and safe discharge.

## 2019-05-07 NOTE — PROGRESS NOTES
Patient attended spirituality group with the focus of \"being grateful\" and actively participated. : Rev. Maria Luz Harrington. Anic[place]; to contact [phone] Josef Rahman call: [phone]

## 2019-05-08 PROCEDURE — 74011250637 HC RX REV CODE- 250/637: Performed by: NURSE PRACTITIONER

## 2019-05-08 PROCEDURE — 65220000003 HC RM SEMIPRIVATE PSYCH

## 2019-05-08 PROCEDURE — 74011250637 HC RX REV CODE- 250/637: Performed by: PSYCHIATRY & NEUROLOGY

## 2019-05-08 RX ADMIN — RISPERIDONE 2 MG: 1 TABLET ORAL at 20:53

## 2019-05-08 RX ADMIN — DONEPEZIL HYDROCHLORIDE 5 MG: 5 TABLET, FILM COATED ORAL at 20:52

## 2019-05-08 RX ADMIN — MEMANTINE HYDROCHLORIDE 5 MG: 10 TABLET ORAL at 17:50

## 2019-05-08 RX ADMIN — MEMANTINE HYDROCHLORIDE 5 MG: 10 TABLET ORAL at 08:31

## 2019-05-08 RX ADMIN — RISPERIDONE 1 MG: 1 TABLET ORAL at 08:31

## 2019-05-08 RX ADMIN — SERTRALINE HYDROCHLORIDE 100 MG: 50 TABLET ORAL at 08:30

## 2019-05-08 NOTE — PROGRESS NOTES
Problem: Falls - Risk of 
Goal: *Absence of Falls Description Document Edgar Brunner Fall Risk and appropriate interventions in the flowsheet. Outcome: Progressing Towards Goal 
Note:  
Fall Risk Interventions: 
Mobility Interventions: Assess mobility with egress test 
 
Mentation Interventions: Adequate sleep, hydration, pain control Medication Interventions: Teach patient to arise slowly Elimination Interventions: Toilet paper/wipes in reach History of Falls Interventions: Door open when patient unattended Patient free from falls on current evening shift. Patient educated on fall precautions while on sedative medications. Problem: Depressed Mood (Adult/Pediatric) Goal: *STG: Attends activities and groups Outcome: Progressing Towards Goal 
Note:  
Patient encouraged to participate in milieu activities, groups, and treatment planning. GROUP THERAPY PROGRESS NOTE Tamara Jenny is participating in [place]. Group time: 30 minutes Personal goal for participation: stress reduction Goal orientation: relaxation Group therapy participation: active Therapeutic interventions reviewed and discussed: stress reduction techniques Impression of participation: good participation

## 2019-05-08 NOTE — PROGRESS NOTES
PSYCHIATRIC PROGRESS NOTE Chief Complaint: \"I am doing great. \" Interval History: Erin Martínez remains the same. Ongoing paranoia, continues to believe that \"Giron\" her brother is out to hurt her. Sleeping and eating ok. She did not receive the sustenna 234mg im yesterday due to no availability of 156 mg. Brand Setters had to be decreased d/t her creatinine clearance being in the 50's. No si or hi. Denies medication side effect. No acute behavioral issues noted. Past Medical History: 
Past Medical History:  
Diagnosis Date  Anxiety disorder  Hypertension  Psychiatric disorder   
 schizophrenia ALLERGIES:(reviewed/updated 5/8/2019) Allergies Allergen Reactions  No Known Allergies Other (comments) Laboratory report: 
Lab Results Component Value Date/Time WBC 4.5 04/29/2019 06:34 PM  
 Hemoglobin (POC) 11.9 09/02/2013 10:09 AM  
 HGB 12.7 04/29/2019 06:34 PM  
 Hematocrit (POC) 35 09/02/2013 10:09 AM  
 HCT 38.8 04/29/2019 06:34 PM  
 PLATELET 831 72/64/5360 06:34 PM  
 MCV 90.9 04/29/2019 06:34 PM  
  
Lab Results Component Value Date/Time Sodium 143 04/29/2019 06:34 PM  
 Potassium 4.2 04/29/2019 06:34 PM  
 Chloride 107 04/29/2019 06:34 PM  
 CO2 30 04/29/2019 06:34 PM  
 Anion gap 6 04/29/2019 06:34 PM  
 Glucose 143 (H) 04/29/2019 06:34 PM  
 Glucose 105 (H) 01/23/2019 06:21 AM  
 BUN 15 04/29/2019 06:34 PM  
 Creatinine 0.93 04/29/2019 06:34 PM  
 BUN/Creatinine ratio 16 04/29/2019 06:34 PM  
 GFR est AA >60 04/29/2019 06:34 PM  
 GFR est non-AA >60 04/29/2019 06:34 PM  
 Calcium 9.0 04/29/2019 06:34 PM  
 Bilirubin, total 0.2 04/29/2019 06:34 PM  
 AST (SGOT) 10 (L) 04/29/2019 06:34 PM  
 Alk. phosphatase 60 04/29/2019 06:34 PM  
 Protein, total 7.3 04/29/2019 06:34 PM  
 Albumin 3.8 04/29/2019 06:34 PM  
 Globulin 3.5 04/29/2019 06:34 PM  
 A-G Ratio 1.1 04/29/2019 06:34 PM  
 ALT (SGPT) 13 04/29/2019 06:34 PM  
  
Vitals: 05/07/19 0815 05/07/19 1650 05/07/19 2015 05/08/19 0825 BP: 157/90 153/83 164/82 (!) 176/96 Pulse: 61 80 (!) 53 (!) 58 Resp: 18 18 18 16 Temp: 98.3 °F (36.8 °C) 98.5 °F (36.9 °C) 98.8 °F (37.1 °C) 98.4 °F (36.9 °C) SpO2: 99% 99% 100% 98% Weight:      
Height:      
   
No results found for: VALF2, VALAC, VALP, VALPR, DS6, CRBAM, CRBAMP, CARB2, XCRBAM 
No results found for: [facility] Vital Signs Patient Vitals for the past 24 hrs: 
 Temp Pulse Resp BP SpO2  
05/08/19 0825 98.4 °F (36.9 °C) (!) 58 16 (!) 176/96 98 % 05/07/19 2015 98.8 °F (37.1 °C) (!) 53 18 164/82 100 % 05/07/19 1650 98.5 °F (36.9 °C) 80 18 153/83 99 % Wt Readings from Last 3 Encounters:  
05/05/19 69.4 kg (153 lb 1.6 oz) 04/29/19 71.2 kg (157 lb) 04/01/19 69.9 kg (154 lb) Temp Readings from Last 3 Encounters:  
05/08/19 98.4 °F (36.9 °C)  
04/30/19 98.4 °F (36.9 °C)  
01/25/19 98.4 °F (36.9 °C) BP Readings from Last 3 Encounters:  
05/08/19 (!) 176/96  
04/30/19 (!) 139/92  
04/01/19 (!) 173/102 Pulse Readings from Last 3 Encounters:  
05/08/19 (!) 58  
04/30/19 66  
04/01/19 62 Radiology (reviewed/updated 5/8/2019) No results found. Side Effects: (reviewed/updated 5/8/2019) None reported or admitted to. Review of Systems: (reviewed/updated 5/8/2019) Appetite: good Sleep: good Positive for paranoia. Mental Status Exam: 
Eye contact: limited Psychomotor activity: relaxed Speech is pressured Thought process: loose and disorganized Content: delusion Mood is \"ok\" Affect: constricted Perception: No avh. Suicidal ideation: None Homicidal ideation: None Insight/judgment: Poor Cognition is impaired Physical Exam: Musculoskeletal system: steady gait Tremor not present Cog wheeling not present Assessment and Plan: Kya Orta meets criteria for a diagnosis of Unspecified psychotic disorder. Unspecified dementia. Sustenna 156mg im today. Continue rest of medications as prescribed. We will closely monitor for safety. We will encourage reality orientation. Disposition planning to continue. I certify that this patients inpatient psychiatric hospital services furnished since the previous certification were, and continue to be, required for treatment that could reasonably be expected to improve the patient's condition, or for diagnostic study, and that the patient continues to need, on a daily basis, active treatment furnished directly by or requiring the supervision of inpatient psychiatric facility personnel. In addition, the hospital records show that services furnished were intensive treatment services, admission or related services, or equivalent services. Signed: 
Trinity Tavarez NP 
5/8/2019

## 2019-05-08 NOTE — PROGRESS NOTES
Problem: Depressed Mood (Adult/Pediatric) Goal: *STG: Participates in treatment plan Note:  
Denies SI. Med and meal compliant. Cooperative. Attends groups. Team is working on [facility] placement for patient and she is agreeable to this

## 2019-05-08 NOTE — PROGRESS NOTES
Problem: Falls - Risk of 
Goal: *Absence of Falls Description Document Ml Lange Fall Risk and appropriate interventions in the flowsheet. Outcome: Progressing Towards Goal 
Note:  
Fall Risk Interventions: 
Mobility Interventions: Assess mobility with egress test 
Mentation Interventions: Adequate sleep, hydration, pain control Medication Interventions: Teach patient to arise slowly Elimination Interventions: Toilet paper/wipes in reach History of Falls Interventions: Door open when patient unattended Received pt lying in bed, appears to be asleep. NAD. Respirations even and unlabored. Will continue to monitor Q15 for safety.

## 2019-05-08 NOTE — INTERDISCIPLINARY ROUNDS
Behavioral Health Interdisciplinary Rounds Patient Name: Mark Tsai  Age: 71 y.o. Room/Bed:  731/ Primary Diagnosis: <principal problem not specified> Admission Status: Involuntary Commitment Readmission within 30 days: no 
Power of  in place: no 
Patient requires a blocked bed: no          Reason for blocked bed:  
Sleep hours:  7 Participation in Care/Groups:  yes Medication Compliant?: Yes PRNS (last 24 hours): None Restraints (last 24 hours):  no 
  
Alcohol screening (AUDIT) completed -  AUDIT Score: 0  If applicable, date SBIRT discussed in treatment team AND documented:   
Tobacco - patient is a smoker: Have You Used Tobacco in the Past 30 Days: No 
Illegal Drugs use: Have You Used Any Illegal Substances Over the Past 12 Months: No 
 
24 hour chart check complete: yes Patient goal(s) for today:  
Treatment team focus/goals: Plan to give first Invega injection today. Progress note : she remains paranoid and guarded. She remains compliant with her treatment. LOS:  8  Expected LOS: TBD Participating treatment team members: Horacio Zheng NP- Darcus Hams

## 2019-05-08 NOTE — BH NOTES
GROUP THERAPY PROGRESS NOTE Shayy Fitzgerald participated in a Process Group on the General Unit with a focus identifying feelings, planning for the day, and learning about using the [address] as a long-term personal treatment plan. . 
 Group time: 60 minutes. Personal goal for participation: To increase the capacity to improve ones mood, set personal goals, and understand more about basic activities to successfully state and get ones needs met through personal treatment goal setting. Goal orientation: The patients will be able to identify their feelings 
and develop a goal for themselves for their day. The didactic 
portion of the session covered developing a personal short-term 
and long-term treatment plan for oneself. The group members 
were asked to consider filling in on their own after group. Below are 
the elements of a DBT house drawing with multiple levels:   
1) foundation - values that govern your life;  
2) first floor with a door  behaviors would like to manage and feel more control over or areas you want to change; the door represents an opportunity to list or draw things that you keep hidden from others;  
3) second floor  list or draw emotions you want to experience more often, more fully, or in a more healthy fashion;  
4) third floor  a list of things that make you happy or want to feel happy about;  
5) attic  list or draw what  a Life Alferd Backer would look like. There is also a roof, where people and things that protect you can be listed. The chimney provides an opportunity to list ways in which you blow off steam. The billboard allows one to post those things in one's life they are proud of and the walls of the house provide an opportunity to list those people and things that provide support. Group therapy participation: With prompting, this patient passively and marginally participated in the group. Therapeutic interventions reviewed and discussed: The group members were asked to identify an emotion they are having and/or 
let the group know what they want to focus on for the day as they 
continue to make discharge plans. The group were informed of the 
elements of the [address] for them to complete in their free time. Impression of participation: The patient said she was feeling, \"Fine. \" She added that she was pleased to have her head braided and she acknowledged \"making progress. \" She was alert, generally oriented, and reluctant to share much else. She expressed no current SI/HI and displayed no overt psychotic symptoms in this group. The latter concern regarding psychotic symptoms was not fully explored in this session. Her affect presented as non-dysphoric with some anxiety. Her mood matched her affect. She seemed to be taking care of her personal hygiene and interacting more with her peers on the unit, if not in group.

## 2019-05-08 NOTE — PROGRESS NOTES
Problem: Altered Thought Process (Adult/Pediatric) Goal: *STG: Decreased hallucinations Outcome: Progressing Towards Goal 
Pt is without objective signs of hallucinations. She is able to attend group without interruptiomn from internal stimuli. Pt does not discuss any delusional thoughts with staff.

## 2019-05-09 PROCEDURE — 74011250637 HC RX REV CODE- 250/637: Performed by: NURSE PRACTITIONER

## 2019-05-09 PROCEDURE — 74011250637 HC RX REV CODE- 250/637: Performed by: PSYCHIATRY & NEUROLOGY

## 2019-05-09 PROCEDURE — 65220000003 HC RM SEMIPRIVATE PSYCH

## 2019-05-09 RX ADMIN — MEMANTINE HYDROCHLORIDE 5 MG: 10 TABLET ORAL at 18:07

## 2019-05-09 RX ADMIN — SERTRALINE HYDROCHLORIDE 100 MG: 50 TABLET ORAL at 08:25

## 2019-05-09 RX ADMIN — DONEPEZIL HYDROCHLORIDE 5 MG: 5 TABLET, FILM COATED ORAL at 21:26

## 2019-05-09 RX ADMIN — RISPERIDONE 1 MG: 1 TABLET ORAL at 08:25

## 2019-05-09 RX ADMIN — RISPERIDONE 2 MG: 1 TABLET ORAL at 21:25

## 2019-05-09 RX ADMIN — MEMANTINE HYDROCHLORIDE 5 MG: 10 TABLET ORAL at 11:55

## 2019-05-09 NOTE — PROGRESS NOTES
Problem: Depressed Mood (Adult/Pediatric) Goal: *STG: Participates in treatment plan Outcome: Progressing Towards Goal 
Note:  
Illogical fixed delusions continue. Demonstrates appropriate behaviors, engagement in groups, ability to follow staff direction. Daily goal is to attend group. Staff focus is on offering support and reassurance. Problem: Altered Thought Process (Adult/Pediatric) Goal: *STG: Decreased hallucinations Outcome: Progressing Towards Goal 
Note:  
Denies AH and does not appear to internally stimulated Goal: Interventions Outcome: Progressing Towards Goal

## 2019-05-09 NOTE — PROGRESS NOTES
Problem: Discharge Planning Goal: *Discharge to safe environment Outcome: Progressing Towards Goal 
Note:  
Pt and family have agreed to placement @ halfway Goal: *Knowledge of medication management Outcome: Progressing Towards Goal 
Note:  
Pt remains complaint with taking all her meds Goal: *Knowledge of discharge instructions Outcome: Progressing Towards Goal 
Note:  
Pt is able to verbalize d/c needs and concerns

## 2019-05-09 NOTE — PROGRESS NOTES
PSYCHIATRIC PROGRESS NOTE Chief Complaint: \"I am doing great. \" Interval History: Naomi Mariscal is cheerful and happy. Attending groups. No acute behavioral issues noted. Paranoia however remains, she continues to believe that frank and his mother is out to hurt her. Refuses to go back at niece's home, \"They dont let me eat, they put pills on my dinner food. All I do is lay down away from them. \" Pleasantly confused. She received first loading dose of sustenna yesterday and tolerated it. No si or hi. Past Medical History: 
Past Medical History:  
Diagnosis Date  Anxiety disorder  Hypertension  Psychiatric disorder   
 schizophrenia ALLERGIES:(reviewed/updated 5/9/2019) Allergies Allergen Reactions  No Known Allergies Other (comments) Laboratory report: 
Lab Results Component Value Date/Time WBC 4.5 04/29/2019 06:34 PM  
 Hemoglobin (POC) 11.9 09/02/2013 10:09 AM  
 HGB 12.7 04/29/2019 06:34 PM  
 Hematocrit (POC) 35 09/02/2013 10:09 AM  
 HCT 38.8 04/29/2019 06:34 PM  
 PLATELET 630 93/64/4135 06:34 PM  
 MCV 90.9 04/29/2019 06:34 PM  
  
Lab Results Component Value Date/Time Sodium 143 04/29/2019 06:34 PM  
 Potassium 4.2 04/29/2019 06:34 PM  
 Chloride 107 04/29/2019 06:34 PM  
 CO2 30 04/29/2019 06:34 PM  
 Anion gap 6 04/29/2019 06:34 PM  
 Glucose 143 (H) 04/29/2019 06:34 PM  
 Glucose 105 (H) 01/23/2019 06:21 AM  
 BUN 15 04/29/2019 06:34 PM  
 Creatinine 0.93 04/29/2019 06:34 PM  
 BUN/Creatinine ratio 16 04/29/2019 06:34 PM  
 GFR est AA >60 04/29/2019 06:34 PM  
 GFR est non-AA >60 04/29/2019 06:34 PM  
 Calcium 9.0 04/29/2019 06:34 PM  
 Bilirubin, total 0.2 04/29/2019 06:34 PM  
 AST (SGOT) 10 (L) 04/29/2019 06:34 PM  
 Alk. phosphatase 60 04/29/2019 06:34 PM  
 Protein, total 7.3 04/29/2019 06:34 PM  
 Albumin 3.8 04/29/2019 06:34 PM  
 Globulin 3.5 04/29/2019 06:34 PM  
 A-G Ratio 1.1 04/29/2019 06:34 PM  
 ALT (SGPT) 13 04/29/2019 06:34 PM  
  
Vitals: 05/08/19 0825 05/08/19 1624 05/08/19 2121 05/09/19 7927 BP: (!) 176/96 149/72 164/76 146/86 Pulse: (!) 58 67 (!) 57 66 Resp: 16 16 16 16 Temp: 98.4 °F (36.9 °C) 98.6 °F (37 °C) 98.2 °F (36.8 °C) 98.2 °F (36.8 °C) SpO2: 98% 99% 98% 100% Weight:      
Height:      
   
No results found for: VALF2, VALAC, VALP, VALPR, DS6, CRBAM, CRBAMP, CARB2, XCRBAM 
No results found for: [facility] Vital Signs Patient Vitals for the past 24 hrs: 
 Temp Pulse Resp BP SpO2  
05/09/19 0912 98.2 °F (36.8 °C) 66 16 146/86 100 % 05/08/19 2121 98.2 °F (36.8 °C) (!) 57 16 164/76 98 % 05/08/19 1624 98.6 °F (37 °C) 67 16 149/72 99 % Wt Readings from Last 3 Encounters:  
05/05/19 69.4 kg (153 lb 1.6 oz) 04/29/19 71.2 kg (157 lb) 04/01/19 69.9 kg (154 lb) Temp Readings from Last 3 Encounters:  
05/09/19 98.2 °F (36.8 °C)  
04/30/19 98.4 °F (36.9 °C)  
01/25/19 98.4 °F (36.9 °C) BP Readings from Last 3 Encounters:  
05/09/19 146/86  
04/30/19 (!) 139/92  
04/01/19 (!) 173/102 Pulse Readings from Last 3 Encounters:  
05/09/19 66  
04/30/19 66  
04/01/19 62 Radiology (reviewed/updated 5/9/2019) No results found. Side Effects: (reviewed/updated 5/9/2019) None reported or admitted to. Review of Systems: (reviewed/updated 5/9/2019) Appetite: good Sleep: good Positive for paranoia. Mental Status Exam: 
Eye contact: limited Psychomotor activity: relaxed Speech is pressured Thought process: loose and disorganized Content: delusion Mood is \"ok\" Affect: constricted Perception: No avh. Suicidal ideation: None Homicidal ideation: None Insight/judgment: Poor Cognition is impaired Physical Exam: Musculoskeletal system: steady gait Tremor not present Cog wheeling not present Assessment and Plan: Aidee Blandon meets criteria for a diagnosis of Unspecified psychotic disorder. Unspecified dementia. Sustenna 117mg im this Sunday. Continue rest of medications as prescribed. We will closely monitor for safety. We will encourage reality orientation. Disposition planning to continue. I certify that this patients inpatient psychiatric hospital services furnished since the previous certification were, and continue to be, required for treatment that could reasonably be expected to improve the patient's condition, or for diagnostic study, and that the patient continues to need, on a daily basis, active treatment furnished directly by or requiring the supervision of inpatient psychiatric facility personnel. In addition, the hospital records show that services furnished were intensive treatment services, admission or related services, or equivalent services. Signed: 
Eloina Pagan NP 
5/9/2019

## 2019-05-09 NOTE — INTERDISCIPLINARY ROUNDS
Behavioral Health Interdisciplinary Rounds Patient Name: Carmella Bal  Age: 71 y.o. Room/Bed:  731/ Primary Diagnosis: <principal problem not specified> Admission Status: Involuntary Commitment Readmission within 30 days: no 
Power of  in place: no 
Patient requires a blocked bed: no          Reason for blocked bed:  
Sleep hours:  6 Participation in Care/Groups:  yes Medication Compliant?: Yes PRNS (last 24 hours): None Restraints (last 24 hours):  no 
  
Alcohol screening (AUDIT) completed -  AUDIT Score: 0  If applicable, date SBIRT discussed in treatment team AND documented:   
Tobacco - patient is a smoker: Have You Used Tobacco in the Past 30 Days: No 
Illegal Drugs use: Have You Used Any Illegal Substances Over the Past 12 Months: No 
 
24 hour chart check complete: yes Patient goal(s) for today:  
Treatment team focus/goals: Eval meds / effectiveness Progress note: Good mood, likeable personality and willing to move to living arrangement ( placement) Pt told Team that her niece was putting \"something in her pills\" and she wants to be placed in  New living environment LOS:  9  Expected LOS:  
 
Participating treatment team members: Carmella Brodericks,  Tiffany Austin NP, Kiran Burgess, Ferdinand Estrada RN, and Mitzy FloresD

## 2019-05-09 NOTE — PROGRESS NOTES
Problem: Falls - Risk of 
Goal: *Absence of Falls Description Document Edgar Brunner Fall Risk and appropriate interventions in the flowsheet. Outcome: Progressing Towards Goal 
Note:  
Fall Risk Interventions: 
Mobility Interventions: Assess mobility with egress test 
 
Mentation Interventions: Adequate sleep, hydration, pain control Medication Interventions: Teach patient to arise slowly Elimination Interventions: Toilet paper/wipes in reach History of Falls Interventions: Door open when patient unattended Patient free from falls on current evening shift. Patient educated on fall precautions while taking sedative medications. Problem: Depressed Mood (Adult/Pediatric) Goal: *STG: Attends activities and groups Outcome: Progressing Towards Goal 
Note:  
Patient encouraged to participate in milieu activities, groups, and treatment planning.

## 2019-05-09 NOTE — BH NOTES
GROUP THERAPY PROGRESS NOTE Carmella Bal is participating in [place]. Group time: 20 minutes Personal goal for participation: think positive about new home. Goal orientation: community Group therapy participation: minimal 
 
Therapeutic interventions reviewed and discussed:  
yes Impression of participation: engaged
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
## 2019-05-10 PROCEDURE — 74011250637 HC RX REV CODE- 250/637: Performed by: NURSE PRACTITIONER

## 2019-05-10 PROCEDURE — 74011250637 HC RX REV CODE- 250/637: Performed by: PSYCHIATRY & NEUROLOGY

## 2019-05-10 PROCEDURE — 65220000003 HC RM SEMIPRIVATE PSYCH

## 2019-05-10 RX ADMIN — RISPERIDONE 1 MG: 1 TABLET ORAL at 08:47

## 2019-05-10 RX ADMIN — DONEPEZIL HYDROCHLORIDE 5 MG: 5 TABLET, FILM COATED ORAL at 21:36

## 2019-05-10 RX ADMIN — MEMANTINE HYDROCHLORIDE 5 MG: 10 TABLET ORAL at 08:48

## 2019-05-10 RX ADMIN — RISPERIDONE 2 MG: 1 TABLET ORAL at 21:35

## 2019-05-10 RX ADMIN — SERTRALINE HYDROCHLORIDE 100 MG: 50 TABLET ORAL at 08:48

## 2019-05-10 RX ADMIN — MEMANTINE HYDROCHLORIDE 5 MG: 10 TABLET ORAL at 17:01

## 2019-05-10 NOTE — PROGRESS NOTES
1515: in bed and appears to be sleeping 1430: patient verbalized being ready to go home. Mood is cheerful. Appropriate with staff and peers. Mostly isolates to to room. Problem: Depressed Mood (Adult/Pediatric) Goal: *STG: Participates in treatment plan Outcome: Progressing Towards Goal 
Goal: *STG: Verbalizes anger, guilt, and other feelings in a constructive manor Outcome: Progressing Towards Goal 
Goal: *STG: Attends activities and groups Outcome: Not Progressing Towards Goal

## 2019-05-10 NOTE — INTERDISCIPLINARY ROUNDS
Behavioral Health Interdisciplinary Rounds Patient Name: Miriam Darden  Age: 71 y.o. Room/Bed:  731/ Primary Diagnosis: <principal problem not specified> Admission Status: Involuntary Commitment Readmission within 30 days: no 
Power of  in place: no 
Patient requires a blocked bed: no          Reason for blocked bed:  
Sleep hours: 7.5 Participation in Care/Groups:  yes Medication Compliant?: Yes PRNS (last 24 hours): None Restraints (last 24 hours):  no 
  
Alcohol screening (AUDIT) completed -  AUDIT Score: 0  If applicable, date SBIRT discussed in treatment team AND documented:   
Tobacco - patient is a smoker: Have You Used Tobacco in the Past 30 Days: No 
Illegal Drugs use: Have You Used Any Illegal Substances Over the Past 12 Months: No 
 
24 hour chart check complete: yes Patient goal(s) for today: Continue taking medications as prescribed Treatment team focus/goals: [facility] Progress note: Pt continues to do well; still voices paranoia and wants [facility] placement; Medicaid number in chart was wrong so [facility] was not approved LOS:  10  Expected LOS: TBD Participating treatment team members: REMY Grove; Ramone Yepez NP; Bull Partida RN; Kelly Mathis, PharmD

## 2019-05-10 NOTE — PROGRESS NOTES
PSYCHIATRIC PROGRESS NOTE Chief Complaint: \"I feel good. \" Interval History: Az Coy states she is doing well. She is med compliant and denies side effects. Sleeping and eating ok. Persecutory delusions remain. States that her sister is stealing her disability checks. Her family is putting poison on her food. She states she has a problem with marie because they are not nice people. Past Medical History: 
Past Medical History:  
Diagnosis Date  Anxiety disorder  Hypertension  Psychiatric disorder   
 schizophrenia ALLERGIES:(reviewed/updated 5/10/2019) Allergies Allergen Reactions  No Known Allergies Other (comments) Laboratory report: 
Lab Results Component Value Date/Time WBC 4.5 04/29/2019 06:34 PM  
 Hemoglobin (POC) 11.9 09/02/2013 10:09 AM  
 HGB 12.7 04/29/2019 06:34 PM  
 Hematocrit (POC) 35 09/02/2013 10:09 AM  
 HCT 38.8 04/29/2019 06:34 PM  
 PLATELET 931 26/21/7912 06:34 PM  
 MCV 90.9 04/29/2019 06:34 PM  
  
Lab Results Component Value Date/Time Sodium 143 04/29/2019 06:34 PM  
 Potassium 4.2 04/29/2019 06:34 PM  
 Chloride 107 04/29/2019 06:34 PM  
 CO2 30 04/29/2019 06:34 PM  
 Anion gap 6 04/29/2019 06:34 PM  
 Glucose 143 (H) 04/29/2019 06:34 PM  
 Glucose 105 (H) 01/23/2019 06:21 AM  
 BUN 15 04/29/2019 06:34 PM  
 Creatinine 0.93 04/29/2019 06:34 PM  
 BUN/Creatinine ratio 16 04/29/2019 06:34 PM  
 GFR est AA >60 04/29/2019 06:34 PM  
 GFR est non-AA >60 04/29/2019 06:34 PM  
 Calcium 9.0 04/29/2019 06:34 PM  
 Bilirubin, total 0.2 04/29/2019 06:34 PM  
 AST (SGOT) 10 (L) 04/29/2019 06:34 PM  
 Alk. phosphatase 60 04/29/2019 06:34 PM  
 Protein, total 7.3 04/29/2019 06:34 PM  
 Albumin 3.8 04/29/2019 06:34 PM  
 Globulin 3.5 04/29/2019 06:34 PM  
 A-G Ratio 1.1 04/29/2019 06:34 PM  
 ALT (SGPT) 13 04/29/2019 06:34 PM  
  
Vitals:  
 05/09/19 0912 05/09/19 2011 05/10/19 8940 05/10/19 1552 BP: 146/86 163/90 (!) 192/97 173/80 Pulse: 66 65 69 75 Resp: 16 20 18 16 Temp: 98.2 °F (36.8 °C) 98.3 °F (36.8 °C) 98.2 °F (36.8 °C) 98.9 °F (37.2 °C) SpO2: 100% 100% 95% 99% Weight:      
Height:      
   
No results found for: VALF2, VALAC, VALP, VALPR, DS6, CRBAM, CRBAMP, CARB2, XCRBAM 
No results found for: [facility] Vital Signs Patient Vitals for the past 24 hrs: 
 Temp Pulse Resp BP SpO2  
05/10/19 1552 98.9 °F (37.2 °C) 75 16 173/80 99 % 05/10/19 0944 98.2 °F (36.8 °C) 69 18 (!) 192/97 95 % 05/09/19 2011 98.3 °F (36.8 °C) 65 20 163/90 100 % Wt Readings from Last 3 Encounters:  
05/05/19 69.4 kg (153 lb 1.6 oz) 04/29/19 71.2 kg (157 lb) 04/01/19 69.9 kg (154 lb) Temp Readings from Last 3 Encounters:  
05/10/19 98.9 °F (37.2 °C)  
04/30/19 98.4 °F (36.9 °C)  
01/25/19 98.4 °F (36.9 °C) BP Readings from Last 3 Encounters:  
05/10/19 173/80  
04/30/19 (!) 139/92  
04/01/19 (!) 173/102 Pulse Readings from Last 3 Encounters:  
05/10/19 75  
04/30/19 66  
04/01/19 62 Radiology (reviewed/updated 5/10/2019) No results found. Side Effects: (reviewed/updated 5/10/2019) None reported or admitted to. Review of Systems: (reviewed/updated 5/10/2019) Appetite: good Sleep: good Positive for paranoia. Mental Status Exam: 
Eye contact: limited Psychomotor activity: relaxed Speech is pressured Thought process: loose and disorganized Content: delusion Mood is \"ok\" Affect: constricted Perception: No avh. Suicidal ideation: None Homicidal ideation: None Insight/judgment: Poor Cognition is impaired Physical Exam: Musculoskeletal system: steady gait Tremor not present Cog wheeling not present Assessment and Plan: Miriam Darden meets criteria for a diagnosis of Unspecified psychotic disorder. Unspecified dementia. Sustenna 117mg im this monday. Continue rest of medications as prescribed. We will closely monitor for safety. We will encourage reality orientation. Disposition planning to continue. I certify that this patients inpatient psychiatric hospital services furnished since the previous certification were, and continue to be, required for treatment that could reasonably be expected to improve the patient's condition, or for diagnostic study, and that the patient continues to need, on a daily basis, active treatment furnished directly by or requiring the supervision of inpatient psychiatric facility personnel. In addition, the hospital records show that services furnished were intensive treatment services, admission or related services, or equivalent services. Signed: 
González Oropeza NP 
5/10/2019

## 2019-05-10 NOTE — BH NOTES
GROUP THERAPY PROGRESS NOTE Lili Bobby did not participate in a 40 minute Geriatric Process Group with music or the 75 minute morning Process Group on the General Unit with a focus identifying feelings, planning for the day, and learning about Reactions to Trauma.

## 2019-05-10 NOTE — PROGRESS NOTES
Problem: Depressed Mood (Adult/Pediatric) Goal: *STG: Participates in treatment plan Outcome: Progressing Towards Goal 
Note: Out on unit passively engaged. Increase time out on unit improved eye contact and engagement. Fixed delusions remain however noted decrease focus on sharing delusions. Demonstrates appropriate behaviors and ability to get needs met. Complaint with tx plan. Staff focus is on d/c planning and follow up care.

## 2019-05-10 NOTE — PROGRESS NOTES
Problem: Falls - Risk of 
Goal: *Absence of Falls Description Document Yohan Stevens Fall Risk and appropriate interventions in the flowsheet. Outcome: Progressing Towards Goal 
Note:  
Fall Risk Interventions: 
Mobility Interventions: Assess mobility with egress test 
Mentation Interventions: Adequate sleep, hydration, pain control Medication Interventions: Teach patient to arise slowly Elimination Interventions: Toilet paper/wipes in reach History of Falls Interventions: Door open when patient unattended Received pt asleep in bed. Respirations even and unlabored. NAD. Will continue to monitor Q15 for safety.

## 2019-05-11 PROCEDURE — 65220000003 HC RM SEMIPRIVATE PSYCH

## 2019-05-11 PROCEDURE — 74011250637 HC RX REV CODE- 250/637: Performed by: NURSE PRACTITIONER

## 2019-05-11 PROCEDURE — 74011250637 HC RX REV CODE- 250/637: Performed by: PSYCHIATRY & NEUROLOGY

## 2019-05-11 RX ORDER — AMLODIPINE BESYLATE 5 MG/1
5 TABLET ORAL DAILY
Status: DISCONTINUED | OUTPATIENT
Start: 2019-05-11 | End: 2019-05-20 | Stop reason: HOSPADM

## 2019-05-11 RX ORDER — CLONIDINE HYDROCHLORIDE 0.1 MG/1
0.1 TABLET ORAL
Status: DISCONTINUED | OUTPATIENT
Start: 2019-05-11 | End: 2019-05-20 | Stop reason: HOSPADM

## 2019-05-11 RX ADMIN — RISPERIDONE 1 MG: 1 TABLET ORAL at 08:10

## 2019-05-11 RX ADMIN — MEMANTINE HYDROCHLORIDE 5 MG: 10 TABLET ORAL at 17:35

## 2019-05-11 RX ADMIN — AMLODIPINE BESYLATE 5 MG: 5 TABLET ORAL at 17:35

## 2019-05-11 RX ADMIN — RISPERIDONE 2 MG: 1 TABLET ORAL at 21:39

## 2019-05-11 RX ADMIN — DONEPEZIL HYDROCHLORIDE 5 MG: 5 TABLET, FILM COATED ORAL at 21:39

## 2019-05-11 RX ADMIN — SERTRALINE HYDROCHLORIDE 100 MG: 50 TABLET ORAL at 08:10

## 2019-05-11 RX ADMIN — MEMANTINE HYDROCHLORIDE 5 MG: 10 TABLET ORAL at 08:10

## 2019-05-11 NOTE — PROGRESS NOTES
PSYCHIATRIC PROGRESS NOTE Chief Complaint: \"I feel great. \" Interval History: Ester Christine states she doing great. Calm and pleasant. No acute behavioral issues noted. Persecutory delusions remain. Denies si/hi/avh. Bp has been elevated. Sleeping and eating ok. Past Medical History: 
Past Medical History:  
Diagnosis Date  Anxiety disorder  Hypertension  Psychiatric disorder   
 schizophrenia ALLERGIES:(reviewed/updated 5/11/2019) Allergies Allergen Reactions  No Known Allergies Other (comments) Laboratory report: 
Lab Results Component Value Date/Time WBC 4.5 04/29/2019 06:34 PM  
 Hemoglobin (POC) 11.9 09/02/2013 10:09 AM  
 HGB 12.7 04/29/2019 06:34 PM  
 Hematocrit (POC) 35 09/02/2013 10:09 AM  
 HCT 38.8 04/29/2019 06:34 PM  
 PLATELET 277 20/90/1121 06:34 PM  
 MCV 90.9 04/29/2019 06:34 PM  
  
Lab Results Component Value Date/Time Sodium 143 04/29/2019 06:34 PM  
 Potassium 4.2 04/29/2019 06:34 PM  
 Chloride 107 04/29/2019 06:34 PM  
 CO2 30 04/29/2019 06:34 PM  
 Anion gap 6 04/29/2019 06:34 PM  
 Glucose 143 (H) 04/29/2019 06:34 PM  
 Glucose 105 (H) 01/23/2019 06:21 AM  
 BUN 15 04/29/2019 06:34 PM  
 Creatinine 0.93 04/29/2019 06:34 PM  
 BUN/Creatinine ratio 16 04/29/2019 06:34 PM  
 GFR est AA >60 04/29/2019 06:34 PM  
 GFR est non-AA >60 04/29/2019 06:34 PM  
 Calcium 9.0 04/29/2019 06:34 PM  
 Bilirubin, total 0.2 04/29/2019 06:34 PM  
 AST (SGOT) 10 (L) 04/29/2019 06:34 PM  
 Alk. phosphatase 60 04/29/2019 06:34 PM  
 Protein, total 7.3 04/29/2019 06:34 PM  
 Albumin 3.8 04/29/2019 06:34 PM  
 Globulin 3.5 04/29/2019 06:34 PM  
 A-G Ratio 1.1 04/29/2019 06:34 PM  
 ALT (SGPT) 13 04/29/2019 06:34 PM  
  
Vitals:  
 05/10/19 1948 05/11/19 0840 05/11/19 5815 05/11/19 5711 BP: 168/85   (!) 151/92 Pulse: 69   76 Resp: 16   18 Temp: 98.6 °F (37 °C) 98.1 °F (36.7 °C) 98.1 °F (36.7 °C) 98.1 °F (36.7 °C) SpO2:    95% Weight:      
Height: No results found for: VALF2, VALAC, VALP, VALPR, DS6, CRBAM, CRBAMP, CARB2, XCRBAM 
No results found for: [facility] Vital Signs Patient Vitals for the past 24 hrs: 
 Temp Pulse Resp BP SpO2  
05/11/19 0858 98.1 °F (36.7 °C) 76 18 (!) 151/92 95 % 05/11/19 0841 98.1 °F (36.7 °C)      
05/11/19 0840 98.1 °F (36.7 °C)      
05/10/19 1948 98.6 °F (37 °C) 69 16 168/85   
05/10/19 1552 98.9 °F (37.2 °C) 75 16 173/80 99 % Wt Readings from Last 3 Encounters:  
05/05/19 69.4 kg (153 lb 1.6 oz) 04/29/19 71.2 kg (157 lb) 04/01/19 69.9 kg (154 lb) Temp Readings from Last 3 Encounters:  
05/11/19 98.1 °F (36.7 °C)  
04/30/19 98.4 °F (36.9 °C)  
01/25/19 98.4 °F (36.9 °C) BP Readings from Last 3 Encounters:  
05/11/19 (!) 151/92  
04/30/19 (!) 139/92  
04/01/19 (!) 173/102 Pulse Readings from Last 3 Encounters:  
05/11/19 76  
04/30/19 66  
04/01/19 62 Radiology (reviewed/updated 5/11/2019) No results found. Side Effects: (reviewed/updated 5/11/2019) None reported or admitted to. Review of Systems: (reviewed/updated 5/11/2019) Appetite: good Sleep: good Positive for paranoia. Mental Status Exam: 
Eye contact: limited Psychomotor activity: relaxed Speech is pressured Thought process: loose and disorganized Content: delusion Mood is \"ok\" Affect: constricted Perception: No avh. Suicidal ideation: None Homicidal ideation: None Insight/judgment: Poor Cognition is impaired Physical Exam: Musculoskeletal system: steady gait Tremor not present Cog wheeling not present Assessment and Plan: Guero Rausch meets criteria for a diagnosis of Unspecified psychotic disorder. Unspecified dementia. Norvasc 5mg po daily for bp. Clonidine 01.mg prn. Sustenna 117mg im this monday. Continue rest of medications as prescribed. We will closely monitor for safety. We will encourage reality orientation. Disposition planning to continue. I certify that this patients inpatient psychiatric hospital services furnished since the previous certification were, and continue to be, required for treatment that could reasonably be expected to improve the patient's condition, or for diagnostic study, and that the patient continues to need, on a daily basis, active treatment furnished directly by or requiring the supervision of inpatient psychiatric facility personnel. In addition, the hospital records show that services furnished were intensive treatment services, admission or related services, or equivalent services. Signed: 
Lm Hollins NP 
5/11/2019

## 2019-05-11 NOTE — PROGRESS NOTES
GROUP THERAPY PROGRESS NOTE Teresa Record is participating in Self-care issues. Group time: 20 minutes Personal goal for participation: Discuss healthy sleeping habits Goal orientation: personal 
 
Group therapy participation: minimal 
 
Therapeutic interventions reviewed and discussed: Staff provided snacks and handed out a worksheet discussing the Do's and Don'ts of getting a restful night of sleep. Discussed as a group what helps them get to sleep at night and things that make it hard for them to fall asleep at night. Impression of participation: Minimal, did not engage in conversation

## 2019-05-11 NOTE — PROGRESS NOTES
Problem: Altered Thought Process (Adult/Pediatric) Goal: *STG: Decreased hallucinations Outcome: Progressing Towards Goal 
 
Received pt in bed resting. Pt appears to be in no distress. Respirations even and unlabored. Continuing to monitor pt with q15 min safety rounds.

## 2019-05-11 NOTE — INTERDISCIPLINARY ROUNDS
Behavioral Health Interdisciplinary Rounds Patient Name: Mireille Bernstein  Age: 71 y.o. Room/Bed:  [facility]/ Primary Diagnosis: <principal problem not specified> Admission Status: Involuntary Commitment Readmission within 30 days: no 
Power of  in place: no 
Patient requires a blocked bed: no          Reason for blocked bed: 
Sleep hours: 7 Participation in Care/Groups:  yes Medication Compliant?: Yes PRNS (last 24 hours): None Restraints (last 24 hours):  no 
  
Alcohol screening (AUDIT) completed -  AUDIT Score: 0  If applicable, date SBIRT discussed in treatment team AND documented:   
Tobacco - patient is a smoker: Have You Used Tobacco in the Past 30 Days: No 
Illegal Drugs use: Have You Used Any Illegal Substances Over the Past 12 Months: No 
 
24 hour chart check complete: yes Patient goal(s) for today: 
Treatment team focus/goals: 
Progress note LOS:  10  Expected LOS: 
 
Participating treatment team members: Mireille Bernstein, * (assigned SW),

## 2019-05-11 NOTE — PROGRESS NOTES
Problem: Depressed Mood (Adult/Pediatric) Goal: *STG: Participates in treatment plan Outcome: Progressing Towards Goal 
Note: Out in unit briefly to see NP but returns shortly back to room. No acute distress noted at this time. Comes out for meals but somewhat isolative to room. Staff will continue to monitor q 15 min checks.

## 2019-05-11 NOTE — PROGRESS NOTES
Problem: Depressed Mood (Adult/Pediatric) Goal: *STG: Participates in treatment plan Outcome: Progressing Towards Goal 
 
Visible on the unit. Pt denies SI. Compliant with meals and medications. Continue to encourage and educate.

## 2019-05-12 PROCEDURE — 74011250637 HC RX REV CODE- 250/637: Performed by: PSYCHIATRY & NEUROLOGY

## 2019-05-12 PROCEDURE — 65220000003 HC RM SEMIPRIVATE PSYCH

## 2019-05-12 PROCEDURE — 74011250637 HC RX REV CODE- 250/637: Performed by: NURSE PRACTITIONER

## 2019-05-12 RX ADMIN — MEMANTINE HYDROCHLORIDE 5 MG: 10 TABLET ORAL at 09:21

## 2019-05-12 RX ADMIN — MEMANTINE HYDROCHLORIDE 5 MG: 10 TABLET ORAL at 18:28

## 2019-05-12 RX ADMIN — SERTRALINE HYDROCHLORIDE 100 MG: 50 TABLET ORAL at 09:21

## 2019-05-12 RX ADMIN — AMLODIPINE BESYLATE 5 MG: 5 TABLET ORAL at 09:22

## 2019-05-12 RX ADMIN — DONEPEZIL HYDROCHLORIDE 5 MG: 5 TABLET, FILM COATED ORAL at 21:35

## 2019-05-12 RX ADMIN — RISPERIDONE 1 MG: 1 TABLET ORAL at 09:22

## 2019-05-12 RX ADMIN — RISPERIDONE 2 MG: 1 TABLET ORAL at 21:35

## 2019-05-12 NOTE — BH NOTES
GROUP THERAPY PROGRESS NOTE Della Clark is participating in Reflections. Group time: 15 minutes Personal goal for participation:  
 
Goal orientation: community Group therapy participation: active Therapeutic interventions reviewed and discussed:  
 
Impression of participation: Patient stated her day was good,          Patient was quiet today.

## 2019-05-12 NOTE — PROGRESS NOTES
PSYCHIATRIC PROGRESS NOTE Chief Complaint: \"I am happy. \" Interval History: Elly Godoy states she likes the staff here and she has no plans of staying here forever. Pleasantly confused, delusions remain but no management issues. She is happy and cheerful. Compliant with her meds and denies medication side effects. Denies si hi avh. Bp improving. Past Medical History: 
Past Medical History:  
Diagnosis Date  Anxiety disorder  Hypertension  Psychiatric disorder   
 schizophrenia ALLERGIES:(reviewed/updated 5/12/2019) Allergies Allergen Reactions  No Known Allergies Other (comments) Laboratory report: 
Lab Results Component Value Date/Time WBC 4.5 04/29/2019 06:34 PM  
 Hemoglobin (POC) 11.9 09/02/2013 10:09 AM  
 HGB 12.7 04/29/2019 06:34 PM  
 Hematocrit (POC) 35 09/02/2013 10:09 AM  
 HCT 38.8 04/29/2019 06:34 PM  
 PLATELET 063 72/85/2489 06:34 PM  
 MCV 90.9 04/29/2019 06:34 PM  
  
Lab Results Component Value Date/Time Sodium 143 04/29/2019 06:34 PM  
 Potassium 4.2 04/29/2019 06:34 PM  
 Chloride 107 04/29/2019 06:34 PM  
 CO2 30 04/29/2019 06:34 PM  
 Anion gap 6 04/29/2019 06:34 PM  
 Glucose 143 (H) 04/29/2019 06:34 PM  
 Glucose 105 (H) 01/23/2019 06:21 AM  
 BUN 15 04/29/2019 06:34 PM  
 Creatinine 0.93 04/29/2019 06:34 PM  
 BUN/Creatinine ratio 16 04/29/2019 06:34 PM  
 GFR est AA >60 04/29/2019 06:34 PM  
 GFR est non-AA >60 04/29/2019 06:34 PM  
 Calcium 9.0 04/29/2019 06:34 PM  
 Bilirubin, total 0.2 04/29/2019 06:34 PM  
 AST (SGOT) 10 (L) 04/29/2019 06:34 PM  
 Alk. phosphatase 60 04/29/2019 06:34 PM  
 Protein, total 7.3 04/29/2019 06:34 PM  
 Albumin 3.8 04/29/2019 06:34 PM  
 Globulin 3.5 04/29/2019 06:34 PM  
 A-G Ratio 1.1 04/29/2019 06:34 PM  
 ALT (SGPT) 13 04/29/2019 06:34 PM  
  
Vitals:  
 05/11/19 1550 05/11/19 2034 05/11/19 2200 05/12/19 0751 BP: (!) 146/92 162/86 125/69 144/79 Pulse: 75 67  63 Resp: 16 16  17 Temp: 98.8 °F (37.1 °C) 99.2 °F (37.3 °C)  98 °F (36.7 °C) SpO2: 99%   98% Weight:      
Height:      
   
No results found for: VALF2, VALAC, VALP, VALPR, DS6, CRBAM, CRBAMP, CARB2, XCRBAM 
No results found for: [facility] Vital Signs Patient Vitals for the past 24 hrs: 
 Temp Pulse Resp BP SpO2  
05/12/19 0751 98 °F (36.7 °C) 63 17 144/79 98 % 05/11/19 2200    125/69   
05/11/19 2034 99.2 °F (37.3 °C) 67 16 162/86   
05/11/19 1550 98.8 °F (37.1 °C) 75 16 (!) 146/92 99 % Wt Readings from Last 3 Encounters:  
05/05/19 69.4 kg (153 lb 1.6 oz) 04/29/19 71.2 kg (157 lb) 04/01/19 69.9 kg (154 lb) Temp Readings from Last 3 Encounters:  
05/12/19 98 °F (36.7 °C)  
04/30/19 98.4 °F (36.9 °C)  
01/25/19 98.4 °F (36.9 °C) BP Readings from Last 3 Encounters:  
05/12/19 144/79  
04/30/19 (!) 139/92  
04/01/19 (!) 173/102 Pulse Readings from Last 3 Encounters:  
05/12/19 63  
04/30/19 66  
04/01/19 62 Radiology (reviewed/updated 5/12/2019) No results found. Side Effects: (reviewed/updated 5/12/2019) None reported or admitted to. Review of Systems: (reviewed/updated 5/12/2019) Appetite: good Sleep: good Positive for paranoia. Mental Status Exam: 
Eye contact: limited Psychomotor activity: relaxed Speech is pressured Thought process: loose and disorganized Content: delusion Mood is \"ok\" Affect: constricted Perception: No avh. Suicidal ideation: None Homicidal ideation: None Insight/judgment: Poor Cognition is impaired Physical Exam: Musculoskeletal system: steady gait Tremor not present Cog wheeling not present Assessment and Plan: Rere Freedom meets criteria for a diagnosis of Unspecified psychotic disorder. Unspecified dementia. Sustenna 117mg im tomorrow am. 
Continue rest of medications as prescribed. We will closely monitor for safety. We will encourage reality orientation. Disposition planning to continue. I certify that this patients inpatient psychiatric hospital services furnished since the previous certification were, and continue to be, required for treatment that could reasonably be expected to improve the patient's condition, or for diagnostic study, and that the patient continues to need, on a daily basis, active treatment furnished directly by or requiring the supervision of inpatient psychiatric facility personnel. In addition, the hospital records show that services furnished were intensive treatment services, admission or related services, or equivalent services. Signed: 
Macie Carrington NP 
5/12/2019

## 2019-05-12 NOTE — PROGRESS NOTES
Problem: Falls - Risk of 
Goal: *Absence of Falls Description Document Amando Luther Fall Risk and appropriate interventions in the flowsheet. Outcome: Progressing Towards Goal 
Note:  
Fall Risk Interventions: 
Mobility Interventions: Assess mobility with egress test 
 
Mentation Interventions: Adequate sleep, hydration, pain control Medication Interventions: Teach patient to arise slowly Elimination Interventions: Toilet paper/wipes in reach History of Falls Interventions: Door open when patient unattended Reviewed with pt placement and will need to follow up with  on Monday. Problem: Patient Education: Go to Patient Education Activity Goal: Patient/Family Education Outcome: Progressing Towards Goal 
  
Problem: Depressed Mood (Adult/Pediatric) Goal: *STG: Participates in treatment plan Outcome: Progressing Towards Goal 
Note:  
Pt participated in treatment team. Visible on the unit for small periods of time. Goal: *STG: Verbalizes anger, guilt, and other feelings in a constructive manor Outcome: Progressing Towards Goal 
Note: Able to verbalize feelings with staff. No concerns at this time. Goal: *STG: Attends activities and groups Outcome: Progressing Towards Goal 
Note:  
Encouraged pt to attend groups and express feelings and concerns. Goal: Interventions Outcome: Progressing Towards Goal 
  
Problem: Patient Education: Go to Patient Education Activity Goal: Patient/Family Education Outcome: Progressing Towards Goal

## 2019-05-12 NOTE — INTERDISCIPLINARY ROUNDS
Behavioral Health Interdisciplinary Rounds Patient Name: Nishi Melara  Age: 71 y.o. Room/Bed:  731/ Primary Diagnosis: <principal problem not specified> Admission Status: Involuntary Commitment Readmission within 30 days: no 
Power of  in place: no 
Patient requires a blocked bed: no          Reason for blocked bed: 
Sleep hours: 7 Participation in Care/Groups:  yes Medication Compliant?: Yes PRNS (last 24 hours): None Restraints (last 24 hours):  no 
  
Alcohol screening (AUDIT) completed -  AUDIT Score: 0  If applicable, date SBIRT discussed in treatment team AND documented:   
Tobacco - patient is a smoker: Have You Used Tobacco in the Past 30 Days: No 
Illegal Drugs use: Have You Used Any Illegal Substances Over the Past 12 Months: No 
 
24 hour chart check complete: yes Patient goal(s) for today: 
Treatment team focus/goals: 
Progress note LOS:  11  Expected LOS: 
 
Participating treatment team members: Nishi Saritha, * (assigned SW),

## 2019-05-13 ENCOUNTER — HOSPITAL ENCOUNTER (OUTPATIENT)
Dept: GENERAL RADIOLOGY | Age: 64
Discharge: HOME | End: 2019-05-13
Attending: NURSE PRACTITIONER

## 2019-05-13 PROCEDURE — 74011250637 HC RX REV CODE- 250/637: Performed by: NURSE PRACTITIONER

## 2019-05-13 PROCEDURE — 65220000003 HC RM SEMIPRIVATE PSYCH

## 2019-05-13 PROCEDURE — 74011250637 HC RX REV CODE- 250/637: Performed by: PSYCHIATRY & NEUROLOGY

## 2019-05-13 PROCEDURE — 71045 X-RAY EXAM CHEST 1 VIEW: CPT

## 2019-05-13 RX ADMIN — DONEPEZIL HYDROCHLORIDE 5 MG: 5 TABLET, FILM COATED ORAL at 21:50

## 2019-05-13 RX ADMIN — RISPERIDONE 1 MG: 1 TABLET ORAL at 10:20

## 2019-05-13 RX ADMIN — AMLODIPINE BESYLATE 5 MG: 5 TABLET ORAL at 10:21

## 2019-05-13 RX ADMIN — CLONIDINE HYDROCHLORIDE 0.1 MG: 0.1 TABLET ORAL at 17:40

## 2019-05-13 RX ADMIN — MEMANTINE HYDROCHLORIDE 5 MG: 10 TABLET ORAL at 10:21

## 2019-05-13 RX ADMIN — MEMANTINE HYDROCHLORIDE 5 MG: 10 TABLET ORAL at 17:12

## 2019-05-13 RX ADMIN — SERTRALINE HYDROCHLORIDE 100 MG: 50 TABLET ORAL at 10:21

## 2019-05-13 RX ADMIN — RISPERIDONE 2 MG: 1 TABLET ORAL at 21:50

## 2019-05-13 NOTE — PROGRESS NOTES
Problem: Depressed Mood (Adult/Pediatric) Goal: *STG: Demonstrates reduction in symptoms and increase in insight into coping skills/future focused Outcome: Progressing Towards Goal 
 
Received pt in bed resting. Pt appears to be in no distress. Respirations even and unlabored. Continuing to monitor pt with q15 min safety rounds.

## 2019-05-13 NOTE — PROGRESS NOTES
Problem: Falls - Risk of 
Goal: *Absence of Falls Description Document Jessica Briggs Fall Risk and appropriate interventions in the flowsheet. Outcome: Progressing Towards Goal 
  
Problem: Patient Education: Go to Patient Education Activity Goal: Patient/Family Education Outcome: Progressing Towards Goal 
  
Problem: Depressed Mood (Adult/Pediatric) Goal: *STG: Participates in treatment plan Outcome: Progressing Towards Goal 
Goal: *STG: Verbalizes anger, guilt, and other feelings in a constructive manor Outcome: Progressing Towards Goal 
Goal: *STG: Attends activities and groups Outcome: Progressing Towards Goal 
 Will continue q 15 minute checks for safety Will continue to observe for any behavioral and mood changes

## 2019-05-13 NOTE — BH NOTES
GROUP THERAPY PROGRESS NOTE Mireille Bernstein is participating in Leisure-Creative Group. Group time: 15 minutes Personal goal for participation: DECREASE ANXIETY Goal orientation: relaxation Group therapy participation: active Therapeutic interventions reviewed and discussed:  
 
Impression of participation: GOOD

## 2019-05-13 NOTE — BH NOTES
GROUP THERAPY PROGRESS NOTE Binu Wing did not participate in Substance abuse group.   
 
Tabatha Schwartz [facility]

## 2019-05-13 NOTE — PROGRESS NOTES
PSYCHIATRIC PROGRESS NOTE Chief Complaint: \"I am happy. \" Interval History: Colette Marroquin remains the same. She is calm and pleasant. Happy and cheerful. Pleasantly delusional. She denies si or hi. No acute behavioral issues noted. Patient's [facility] got approved this morning, CM will be looking for [facility] or nursing home. Hopefully libby man can come and interview her sometime this week. Past Medical History: 
Past Medical History:  
Diagnosis Date  Anxiety disorder  Hypertension  Psychiatric disorder   
 schizophrenia ALLERGIES:(reviewed/updated 5/13/2019) Allergies Allergen Reactions  No Known Allergies Other (comments) Laboratory report: 
Lab Results Component Value Date/Time WBC 4.5 04/29/2019 06:34 PM  
 Hemoglobin (POC) 11.9 09/02/2013 10:09 AM  
 HGB 12.7 04/29/2019 06:34 PM  
 Hematocrit (POC) 35 09/02/2013 10:09 AM  
 HCT 38.8 04/29/2019 06:34 PM  
 PLATELET 744 14/05/7341 06:34 PM  
 MCV 90.9 04/29/2019 06:34 PM  
  
Lab Results Component Value Date/Time Sodium 143 04/29/2019 06:34 PM  
 Potassium 4.2 04/29/2019 06:34 PM  
 Chloride 107 04/29/2019 06:34 PM  
 CO2 30 04/29/2019 06:34 PM  
 Anion gap 6 04/29/2019 06:34 PM  
 Glucose 143 (H) 04/29/2019 06:34 PM  
 Glucose 105 (H) 01/23/2019 06:21 AM  
 BUN 15 04/29/2019 06:34 PM  
 Creatinine 0.93 04/29/2019 06:34 PM  
 BUN/Creatinine ratio 16 04/29/2019 06:34 PM  
 GFR est AA >60 04/29/2019 06:34 PM  
 GFR est non-AA >60 04/29/2019 06:34 PM  
 Calcium 9.0 04/29/2019 06:34 PM  
 Bilirubin, total 0.2 04/29/2019 06:34 PM  
 AST (SGOT) 10 (L) 04/29/2019 06:34 PM  
 Alk. phosphatase 60 04/29/2019 06:34 PM  
 Protein, total 7.3 04/29/2019 06:34 PM  
 Albumin 3.8 04/29/2019 06:34 PM  
 Globulin 3.5 04/29/2019 06:34 PM  
 A-G Ratio 1.1 04/29/2019 06:34 PM  
 ALT (SGPT) 13 04/29/2019 06:34 PM  
  
Vitals:  
 05/12/19 0751 05/12/19 1220 05/12/19 2007 05/13/19 1021 BP: 144/79  125/79 (!) 153/94 Pulse: 63  67 85 Resp: 17  16 18 Temp: 98 °F (36.7 °C)  98.9 °F (37.2 °C) 98.4 °F (36.9 °C) SpO2: 98%   98% Weight:  70.4 kg (155 lb 3.2 oz) Height:      
   
No results found for: VALF2, VALAC, VALP, VALPR, DS6, CRBAM, CRBAMP, CARB2, XCRBAM 
No results found for: [facility] Vital Signs Patient Vitals for the past 24 hrs: 
 Temp Pulse Resp BP SpO2  
05/13/19 1021 98.4 °F (36.9 °C) 85 18 (!) 153/94 98 % 05/12/19 2007 98.9 °F (37.2 °C) 67 16 125/79  Wt Readings from Last 3 Encounters:  
05/12/19 70.4 kg (155 lb 3.2 oz) 04/29/19 71.2 kg (157 lb) 04/01/19 69.9 kg (154 lb) Temp Readings from Last 3 Encounters:  
05/13/19 98.4 °F (36.9 °C)  
04/30/19 98.4 °F (36.9 °C)  
01/25/19 98.4 °F (36.9 °C) BP Readings from Last 3 Encounters:  
05/13/19 (!) 153/94  
04/30/19 (!) 139/92  
04/01/19 (!) 173/102 Pulse Readings from Last 3 Encounters:  
05/13/19 85  
04/30/19 66  
04/01/19 62 Radiology (reviewed/updated 5/13/2019) No results found. Side Effects: (reviewed/updated 5/13/2019) None reported or admitted to. Review of Systems: (reviewed/updated 5/13/2019) Appetite: good Sleep: good Positive for paranoia. Mental Status Exam: 
Eye contact: limited Psychomotor activity: relaxed Speech is pressured Thought process: loose and disorganized Content: delusion Mood is \"ok\" Affect: constricted Perception: No avh. Suicidal ideation: None Homicidal ideation: None Insight/judgment: Poor Cognition is impaired Physical Exam: Musculoskeletal system: steady gait Tremor not present Cog wheeling not present Assessment and Plan: Colt Samuels meets criteria for a diagnosis of Unspecified psychotic disorder. Unspecified dementia. Sustenna 117mg im today. Continue rest of medications as prescribed. We will closely monitor for safety. We will encourage reality orientation. Disposition planning to continue.   
 
 
I certify that this patients inpatient psychiatric hospital services furnished since the previous certification were, and continue to be, required for treatment that could reasonably be expected to improve the patient's condition, or for diagnostic study, and that the patient continues to need, on a daily basis, active treatment furnished directly by or requiring the supervision of inpatient psychiatric facility personnel. In addition, the hospital records show that services furnished were intensive treatment services, admission or related services, or equivalent services. Signed: 
Baldomero Villa NP 
5/13/2019

## 2019-05-13 NOTE — BH NOTES
GROUP THERAPY PROGRESS NOTE Rayna Haq did not participate in a 60 minute morning Process Group on the General Unit with a focus identifying feelings, planning for the day, and learning more about DBT concepts on \"Emotion Regulation. \"

## 2019-05-13 NOTE — INTERDISCIPLINARY ROUNDS
Behavioral Health Interdisciplinary Rounds Patient Name: Richie Blancas  Age: 71 y.o. Room/Bed:  731/ Primary Diagnosis: <principal problem not specified> Admission Status: Involuntary Commitment Readmission within 30 days: no 
Power of  in place: no 
Patient requires a blocked bed: no          Reason for blocked bed: 
Sleep hours: 7 Participation in Care/Groups:  yes Medication Compliant?: Yes PRNS (last 24 hours): None Restraints (last 24 hours):  no 
  
Alcohol screening (AUDIT) completed -  AUDIT Score: 0  If applicable, date SBIRT discussed in treatment team AND documented:   
Tobacco - patient is a smoker: Have You Used Tobacco in the Past 30 Days: No 
Illegal Drugs use: Have You Used Any Illegal Substances Over the Past 12 Months: No 
 
24 hour chart check complete: yes Patient goal(s) for today: Continue attending groups; receive [place] injection Treatment team focus/goals: Sukhdeep Fairly Sustenna injection; complete H&P forms; CXR; call Magazino Progress note: Pt continues to be calm, pleasant, and cooperative LOS:  12  Expected LOS: TBD Participating treatment team members: REMY Quispe; Shun Khanna NP; Ellyn Pepe RN; Demetrius Beyer, PharmD

## 2019-05-13 NOTE — PROGRESS NOTES
Problem: Depressed Mood (Adult/Pediatric) Goal: *STG: Participates in treatment plan Outcome: Progressing Towards Goal 
Note: Out on unit isolative to self, minimal interaction w peers. Demonstrates appropriate behaviors, ability to get needs met, care for self and follow unit routine. Daily goal is to relax. Staff focus is on d/c planning, offering support and reassurance. Goal: *STG: Attends activities and groups Outcome: Progressing Towards Goal 
Goal: Interventions Outcome: Progressing Towards Goal 
  
Problem: Patient Education: Go to Patient Education Activity Goal: Patient/Family Education Outcome: Progressing Towards Goal 
  
Problem: Discharge Planning Goal: *Discharge to safe environment Outcome: Progressing Towards Goal 
Goal: *Knowledge of medication management Outcome: Progressing Towards Goal 
Goal: *Knowledge of discharge instructions Outcome: Progressing Towards Goal

## 2019-05-14 PROCEDURE — 65220000003 HC RM SEMIPRIVATE PSYCH

## 2019-05-14 PROCEDURE — 74011250637 HC RX REV CODE- 250/637: Performed by: PSYCHIATRY & NEUROLOGY

## 2019-05-14 PROCEDURE — 74011250637 HC RX REV CODE- 250/637: Performed by: NURSE PRACTITIONER

## 2019-05-14 RX ADMIN — MEMANTINE HYDROCHLORIDE 5 MG: 10 TABLET ORAL at 08:18

## 2019-05-14 RX ADMIN — MEMANTINE HYDROCHLORIDE 5 MG: 10 TABLET ORAL at 17:23

## 2019-05-14 RX ADMIN — RISPERIDONE 1 MG: 1 TABLET ORAL at 08:18

## 2019-05-14 RX ADMIN — RISPERIDONE 2 MG: 1 TABLET ORAL at 21:27

## 2019-05-14 RX ADMIN — DONEPEZIL HYDROCHLORIDE 5 MG: 5 TABLET, FILM COATED ORAL at 21:27

## 2019-05-14 RX ADMIN — SERTRALINE HYDROCHLORIDE 100 MG: 50 TABLET ORAL at 08:18

## 2019-05-14 RX ADMIN — AMLODIPINE BESYLATE 5 MG: 5 TABLET ORAL at 08:18

## 2019-05-14 NOTE — INTERDISCIPLINARY ROUNDS
Behavioral Health Interdisciplinary Rounds Patient Name: Sonia Doss  Age: 71 y.o. Room/Bed:  731/ Primary Diagnosis: <principal problem not specified> Admission Status: Involuntary Commitment Readmission within 30 days: no 
Power of  in place: no 
Patient requires a blocked bed: no          Reason for blocked bed:  
Sleep hours:  7 Participation in Care/Groups:  yes Medication Compliant?: Yes PRNS (last 24 hours): None Restraints (last 24 hours):  no 
  
Alcohol screening (AUDIT) completed -  AUDIT Score: 0  If applicable, date SBIRT discussed in treatment team AND documented:   
Tobacco - patient is a smoker: Have You Used Tobacco in the Past 30 Days: No 
Illegal Drugs use: Have You Used Any Illegal Substances Over the Past 12 Months: No 
 
24 hour chart check complete: yes Patient goal(s) for today: Continue attending groups Treatment team focus/goals: Follow-up on [facility] referral to [facility] Progress note: Pt continues to be pleasant and cooperative LOS:  14  Expected LOS: TBD Participating treatment team members: REMY Barnes; Estuardo Lynn NP; Lynnda Holter, RN; Marah Rodriguez, SandraD

## 2019-05-14 NOTE — BH NOTES
GROUP THERAPY PROGRESS NOTE Aidee Blandon did not participate in the General Unit's 75 minute Process Group, with a focus identifying feelings and planning for the day.

## 2019-05-14 NOTE — BH NOTES
GROUP THERAPY PROGRESS NOTE Meir Pizano is participating in [employer] Group Therapy Group. Group time: 45 minutes Personal goal for participation:  The goal of the group today was to understand the Cognitive Behavior Therapy triangle and cognitive distortions. Each group member was challenged to think of personal examples for each. ** 
 
Goal orientation: personal 
 
Group therapy participation: passive Therapeutic interventions reviewed and discussed: Cognitive Behavior Therapy [place], Cognitive Distortions, Behavioral techniques, Positive thoughts and affirmations Impression of participation: Nerissa Zendejas attended group and quietly listened. She did not verbalize any personal examples during group.

## 2019-05-14 NOTE — PROGRESS NOTES
PSYCHIATRIC PROGRESS NOTE Chief Complaint: \"I feel great. \" Interval History: Ester Christine is happy and cheerful. Remains pleasantly delusional. She received sustenna 117mg yesterday and she tolerated it well. No behavioral issues noted. Denies si hi or avh. Past Medical History: 
Past Medical History:  
Diagnosis Date  Anxiety disorder  Hypertension  Psychiatric disorder   
 schizophrenia ALLERGIES:(reviewed/updated 5/14/2019) Allergies Allergen Reactions  No Known Allergies Other (comments) Laboratory report: 
Lab Results Component Value Date/Time WBC 4.5 04/29/2019 06:34 PM  
 Hemoglobin (POC) 11.9 09/02/2013 10:09 AM  
 HGB 12.7 04/29/2019 06:34 PM  
 Hematocrit (POC) 35 09/02/2013 10:09 AM  
 HCT 38.8 04/29/2019 06:34 PM  
 PLATELET 135 30/03/1789 06:34 PM  
 MCV 90.9 04/29/2019 06:34 PM  
  
Lab Results Component Value Date/Time Sodium 143 04/29/2019 06:34 PM  
 Potassium 4.2 04/29/2019 06:34 PM  
 Chloride 107 04/29/2019 06:34 PM  
 CO2 30 04/29/2019 06:34 PM  
 Anion gap 6 04/29/2019 06:34 PM  
 Glucose 143 (H) 04/29/2019 06:34 PM  
 Glucose 105 (H) 01/23/2019 06:21 AM  
 BUN 15 04/29/2019 06:34 PM  
 Creatinine 0.93 04/29/2019 06:34 PM  
 BUN/Creatinine ratio 16 04/29/2019 06:34 PM  
 GFR est AA >60 04/29/2019 06:34 PM  
 GFR est non-AA >60 04/29/2019 06:34 PM  
 Calcium 9.0 04/29/2019 06:34 PM  
 Bilirubin, total 0.2 04/29/2019 06:34 PM  
 AST (SGOT) 10 (L) 04/29/2019 06:34 PM  
 Alk. phosphatase 60 04/29/2019 06:34 PM  
 Protein, total 7.3 04/29/2019 06:34 PM  
 Albumin 3.8 04/29/2019 06:34 PM  
 Globulin 3.5 04/29/2019 06:34 PM  
 A-G Ratio 1.1 04/29/2019 06:34 PM  
 ALT (SGPT) 13 04/29/2019 06:34 PM  
  
Vitals:  
 05/13/19 1133 05/13/19 1729 05/13/19 2053 05/14/19 0813 BP: 154/88 (!) 161/93 135/72 115/79 Pulse: 84 71 85 66 Resp: 20 16 16 16 Temp: 98.8 °F (37.1 °C) 98.5 °F (36.9 °C) 98.3 °F (36.8 °C) 98.2 °F (36.8 °C) SpO2: 98% 98%  99% Weight:      
Height:      
   
No results found for: VALF2, VALAC, VALP, VALPR, DS6, CRBAM, CRBAMP, CARB2, XCRBAM 
No results found for: [facility] Vital Signs Patient Vitals for the past 24 hrs: 
 Temp Pulse Resp BP SpO2  
05/14/19 0813 98.2 °F (36.8 °C) 66 16 115/79 99 % 05/13/19 2053 98.3 °F (36.8 °C) 85 16 135/72   
05/13/19 1729 98.5 °F (36.9 °C) 71 16 (!) 161/93 98 % 05/13/19 1133 98.8 °F (37.1 °C) 84 20 154/88 98 % Wt Readings from Last 3 Encounters:  
05/12/19 70.4 kg (155 lb 3.2 oz) 04/29/19 71.2 kg (157 lb) 04/01/19 69.9 kg (154 lb) Temp Readings from Last 3 Encounters:  
05/14/19 98.2 °F (36.8 °C)  
04/30/19 98.4 °F (36.9 °C)  
01/25/19 98.4 °F (36.9 °C) BP Readings from Last 3 Encounters:  
05/14/19 115/79  
04/30/19 (!) 139/92  
04/01/19 (!) 173/102 Pulse Readings from Last 3 Encounters:  
05/14/19 66  
04/30/19 66  
04/01/19 62 Radiology (reviewed/updated 5/14/2019) No results found. Side Effects: (reviewed/updated 5/14/2019) None reported or admitted to. Review of Systems: (reviewed/updated 5/14/2019) Appetite: good Sleep: good Positive for paranoia. Mental Status Exam: 
Eye contact: limited Psychomotor activity: relaxed Speech is pressured Thought process: loose and disorganized Content: delusion Mood is \"ok\" Affect: constricted Perception: No avh. Suicidal ideation: None Homicidal ideation: None Insight/judgment: Poor Cognition is impaired Physical Exam: Musculoskeletal system: steady gait Tremor not present Cog wheeling not present Assessment and Plan: Mark Tsai meets criteria for a diagnosis of Unspecified psychotic disorder. Unspecified dementia. Continue current medications as prescribed. We will closely monitor for safety. We will encourage reality orientation. Disposition planning to continue.   
 
 
I certify that this patients inpatient psychiatric hospital services furnished since the previous certification were, and continue to be, required for treatment that could reasonably be expected to improve the patient's condition, or for diagnostic study, and that the patient continues to need, on a daily basis, active treatment furnished directly by or requiring the supervision of inpatient psychiatric facility personnel. In addition, the hospital records show that services furnished were intensive treatment services, admission or related services, or equivalent services. Signed: 
Carlos Naik NP 
5/14/2019

## 2019-05-14 NOTE — PROGRESS NOTES
Problem: Falls - Risk of 
Goal: *Absence of Falls Description Document Enoch Kelley Fall Risk and appropriate interventions in the flowsheet. Outcome: Progressing Towards Goal 
Note:  
Fall Risk Interventions: 
Mobility Interventions: Assess mobility with egress test 
Mentation Interventions: Adequate sleep, hydration, pain control Medication Interventions: Teach patient to arise slowly Elimination Interventions: Toilet paper/wipes in reach History of Falls Interventions: Door open when patient unattended Received pt lying in bed, appears to be asleep. NAD. Respirations even and unlabored. Will continue to monitor Q15 for safety.

## 2019-05-14 NOTE — PROGRESS NOTES
Problem: Depressed Mood (Adult/Pediatric) Goal: *STG: Participates in treatment plan Outcome: Progressing Towards Goal 
Note: Out on unit social w peers and staff. Mood and affect stable. Fixed delusions continue. Denies AH. Does not appear internally stimulated. Demonstrates appropriate behaviors and ability to follow staff direction. Daily goal is to shower and attend one group. Staff focus is on d/c planning and follow up care. Problem: Altered Thought Process (Adult/Pediatric) Goal: *STG: Decreased hallucinations Outcome: Progressing Towards Goal 
Goal: Interventions Outcome: Progressing Towards Goal

## 2019-05-14 NOTE — PROGRESS NOTES
Problem: Discharge Planning Goal: *Discharge to safe environment Outcome: Progressing Towards Goal 
Note:  
Patient is requesting snf placement. Referral has been sent for snf placement. Patient is appropriate and ready for discharge. Goal: *Knowledge of medication management Outcome: Progressing Towards Goal 
Note:  
Patient verbalizes understanding of medication regimen. Patient is taking all medications as prescribed. Goal: *Knowledge of discharge instructions Outcome: Progressing Towards Goal 
Note:  
Patient verbalizes understanding of goals for treatment and safe discharge.

## 2019-05-15 PROCEDURE — 65220000003 HC RM SEMIPRIVATE PSYCH

## 2019-05-15 PROCEDURE — 74011250637 HC RX REV CODE- 250/637: Performed by: PSYCHIATRY & NEUROLOGY

## 2019-05-15 PROCEDURE — 74011250637 HC RX REV CODE- 250/637: Performed by: NURSE PRACTITIONER

## 2019-05-15 RX ADMIN — SERTRALINE HYDROCHLORIDE 100 MG: 50 TABLET ORAL at 08:24

## 2019-05-15 RX ADMIN — DONEPEZIL HYDROCHLORIDE 5 MG: 5 TABLET, FILM COATED ORAL at 21:24

## 2019-05-15 RX ADMIN — MEMANTINE HYDROCHLORIDE 5 MG: 10 TABLET ORAL at 17:34

## 2019-05-15 RX ADMIN — RISPERIDONE 1 MG: 1 TABLET ORAL at 08:24

## 2019-05-15 RX ADMIN — RISPERIDONE 2 MG: 1 TABLET ORAL at 21:24

## 2019-05-15 RX ADMIN — MEMANTINE HYDROCHLORIDE 5 MG: 10 TABLET ORAL at 08:23

## 2019-05-15 RX ADMIN — AMLODIPINE BESYLATE 5 MG: 5 TABLET ORAL at 08:24

## 2019-05-15 NOTE — BH NOTES
GROUP THERAPY PROGRESS NOTE Corey Ley is participating in Coping Skills Group. Group time: 50 minutes Personal goal for participation: To address all types of coping skills-good and bad, encouraging each client to present those skills they utilize and then work toward reducing those that are unhealthy and trying to use at least one to two new healthier ways to work thru problems. Goal orientation: personal 
 
Group therapy participation: Pt would often respond to questions asked but often times would just \"blurt out\" inappropriate answers. Therapeutic interventions reviewed and discussed:  
Insight and behavioral. 
Impression of participation: Pt sat in group and was calm for most of the group. When certain topics came up, she would \"blurt out\" responses. Other group members were somewhat annoyed by her responses however, she was easily redirected.

## 2019-05-15 NOTE — PROGRESS NOTES
Problem: Depressed Mood (Adult/Pediatric) Goal: *STG: Participates in treatment plan Outcome: Progressing Towards Goal 
Affect is bright. Pt verbalizes her needs appropriately. She does not verbalize any delusional thoughts to staff.

## 2019-05-15 NOTE — PROGRESS NOTES
In bed asleep with respirations noted as even and unlabored as chest was rising and falling. Will continue to monitor for safety and 15 minute checks throughout shift.

## 2019-05-15 NOTE — PROGRESS NOTES
Problem: Falls - Risk of 
Goal: *Absence of Falls Description Document Enoch Kelley Fall Risk and appropriate interventions in the flowsheet. Note:  
Fall Risk Interventions: 
Patient is absent of falls. Mobility Interventions: Assess mobility with egress test 
 
Mentation Interventions: Adequate sleep, hydration, pain control Medication Interventions: Teach patient to arise slowly Elimination Interventions: Toilet paper/wipes in reach History of Falls Interventions: Door open when patient unattended Problem: Depressed Mood (Adult/Pediatric) Goal: *STG: Participates in treatment plan Note:  
Patient participates in treatment plan. Patient is smiling, calm, cooperative, med and meal compliant.

## 2019-05-15 NOTE — INTERDISCIPLINARY ROUNDS
Behavioral Health Interdisciplinary Rounds Patient Name: Lili Bobby  Age: 71 y.o. Room/Bed:  731/ Primary Diagnosis: <principal problem not specified> Admission Status: Involuntary Commitment Readmission within 30 days: no 
Power of  in place: no 
Patient requires a blocked bed: no          Reason for blocked bed:  
Sleep hours:       
Participation in Care/Groups:  yes Medication Compliant?: Yes PRNS (last 24 hours): None Restraints (last 24 hours):  no 
  
Alcohol screening (AUDIT) completed -  AUDIT Score: 0  If applicable, date SBIRT discussed in treatment team AND documented:   
Tobacco - patient is a smoker: Have You Used Tobacco in the Past 30 Days: No 
Illegal Drugs use: Have You Used Any Illegal Substances Over the Past 12 Months: No 
 
24 hour chart check complete: yes Patient goal(s) for today: Continue attending all groups Treatment team focus/goals: Continue seeking [facility] placement Progress note: Pt continues to be delusional and tangential at baseline, but is pleasant, cooperative, humorous, and redirectable; was turned down for placement from [facility] LOS:  15  Expected LOS: TBD Participating treatment team members: REMY Thompson; Lissett Chan NP; Heladio Collins RN

## 2019-05-15 NOTE — BH NOTES
GROUP THERAPY PROGRESS NOTE Bette Patel participated in a Process Group on the General Unit with a focus identifying feelings, planning for the day, and learning about using the [address] as a long-term personal treatment plan. . 
 Group time: 75 minutes. Personal goal for participation: To increase the capacity to improve ones mood, set personal goals, and understand more about basic activities to successfully state and get ones needs met through personal treatment goal setting. Goal orientation: The patients will be able to identify their feelings 
and develop a goal for themselves for their day. The didactic 
portion of the session covered developing a personal short-term 
and long-term treatment plan for oneself. The group members 
were asked to consider filling in on their own after group. Below are 
the elements of a DBT house drawing with multiple levels:   
1) foundation - values that govern your life;  
2) first floor with a door  behaviors would like to manage and feel more control over or areas you want to change; the door represents an opportunity to list or draw things that you keep hidden from others;  
3) second floor  list or draw emotions you want to experience more often, more fully, or in a more healthy fashion;  
4) third floor  a list of things that make you happy or want to feel happy about;  
5) attic  list or draw what  a [employer] would look like. There is also a roof, where people and things that protect you can be listed. The chimney provides an opportunity to list ways in which you blow off steam. The billboard allows one to post those things in one's life they are proud of and the walls of the house provide an opportunity to list those people and things that provide support. Group therapy participation: With prompting, this patient actively participated in the group. Therapeutic interventions reviewed and discussed: The group members were asked to identify an emotion they are having and/or 
let the group know what they want to focus on for the day as they 
continue to make discharge plans. The group were informed of the 
elements of the [address] for them to complete in their free time. Impression of participation: The patient initially said she was \"talked out. \" But she went on to say how much her daughter and granddaughter mean to her, after initially complaining about how little time her daughter gives her. It was suggested she try to stay on the good side of her daughter if she wants to see her granddaughter. She went onto a Evangelical tangent, regarding God. This part of her thinking was not easy to follow. She was alert, very generally oriented, and cooperative when asked to refocus. She  expressed no current SI/HI and her Evangelical preoccupation might be part of a Evangelical delusion that she has hung onto since entering the unit.

## 2019-05-15 NOTE — BH NOTES
GROUP THERAPY PROGRESS NOTE Guero Rausch is participating in Reflections. Group time: 20 minutes Personal goal for participation: unit orientation and daily progress Goal orientation: personal 
 
Group therapy participation: minimal 
 
Therapeutic interventions reviewed and discussed: yes Impression of participation: Quiet in group though seemed attentive. Voiced no concerns. Seems in fair spirits.

## 2019-05-15 NOTE — PROGRESS NOTES
PSYCHIATRIC PROGRESS NOTE Chief Complaint: \"When do you think I can go home? \" Interval History: Hayden Hurst feels happy. She is out in the unit, attending groups. Calm and pleasant. Delusions remain. No si or hi or avh. Sleeping and eating ok. No behavioral issues noted. Patient was not accepted at [facility] unfortunately.  will be sending more referrals. Past Medical History: 
Past Medical History:  
Diagnosis Date  Anxiety disorder  Hypertension  Psychiatric disorder   
 schizophrenia ALLERGIES:(reviewed/updated 5/15/2019) Allergies Allergen Reactions  No Known Allergies Other (comments) Laboratory report: 
Lab Results Component Value Date/Time WBC 4.5 04/29/2019 06:34 PM  
 Hemoglobin (POC) 11.9 09/02/2013 10:09 AM  
 HGB 12.7 04/29/2019 06:34 PM  
 Hematocrit (POC) 35 09/02/2013 10:09 AM  
 HCT 38.8 04/29/2019 06:34 PM  
 PLATELET 703 05/90/4398 06:34 PM  
 MCV 90.9 04/29/2019 06:34 PM  
  
Lab Results Component Value Date/Time Sodium 143 04/29/2019 06:34 PM  
 Potassium 4.2 04/29/2019 06:34 PM  
 Chloride 107 04/29/2019 06:34 PM  
 CO2 30 04/29/2019 06:34 PM  
 Anion gap 6 04/29/2019 06:34 PM  
 Glucose 143 (H) 04/29/2019 06:34 PM  
 Glucose 105 (H) 01/23/2019 06:21 AM  
 BUN 15 04/29/2019 06:34 PM  
 Creatinine 0.93 04/29/2019 06:34 PM  
 BUN/Creatinine ratio 16 04/29/2019 06:34 PM  
 GFR est AA >60 04/29/2019 06:34 PM  
 GFR est non-AA >60 04/29/2019 06:34 PM  
 Calcium 9.0 04/29/2019 06:34 PM  
 Bilirubin, total 0.2 04/29/2019 06:34 PM  
 AST (SGOT) 10 (L) 04/29/2019 06:34 PM  
 Alk. phosphatase 60 04/29/2019 06:34 PM  
 Protein, total 7.3 04/29/2019 06:34 PM  
 Albumin 3.8 04/29/2019 06:34 PM  
 Globulin 3.5 04/29/2019 06:34 PM  
 A-G Ratio 1.1 04/29/2019 06:34 PM  
 ALT (SGPT) 13 04/29/2019 06:34 PM  
  
Vitals:  
 05/14/19 0813 05/14/19 1738 05/14/19 1924 05/15/19 0815 BP: 115/79 133/72 (!) 158/92 161/85 Pulse: 66 62 63 63 Resp: 16 16 18 18 Temp: 98.2 °F (36.8 °C) 98.7 °F (37.1 °C) 98.8 °F (37.1 °C) 98.2 °F (36.8 °C) SpO2: 99% 98%  100% Weight:      
Height:      
   
No results found for: VALF2, VALAC, VALP, VALPR, DS6, CRBAM, CRBAMP, CARB2, XCRBAM 
No results found for: [facility] Vital Signs Patient Vitals for the past 24 hrs: 
 Temp Pulse Resp BP SpO2  
05/15/19 0815 98.2 °F (36.8 °C) 63 18 161/85 100 % 05/14/19 1924 98.8 °F (37.1 °C) 63 18 (!) 158/92   
05/14/19 1738 98.7 °F (37.1 °C) 62 16 133/72 98 % Wt Readings from Last 3 Encounters:  
05/12/19 70.4 kg (155 lb 3.2 oz) 04/29/19 71.2 kg (157 lb) 04/01/19 69.9 kg (154 lb) Temp Readings from Last 3 Encounters:  
05/15/19 98.2 °F (36.8 °C)  
04/30/19 98.4 °F (36.9 °C)  
01/25/19 98.4 °F (36.9 °C) BP Readings from Last 3 Encounters:  
05/15/19 161/85  
04/30/19 (!) 139/92  
04/01/19 (!) 173/102 Pulse Readings from Last 3 Encounters:  
05/15/19 63  
04/30/19 66  
04/01/19 62 Radiology (reviewed/updated 5/15/2019) No results found. Side Effects: (reviewed/updated 5/15/2019) None reported or admitted to. Review of Systems: (reviewed/updated 5/15/2019) Appetite: good Sleep: good Positive for paranoia. Mental Status Exam: 
Eye contact: limited Psychomotor activity: relaxed Speech is pressured Thought process: loose and disorganized Content: delusion Mood is \"ok\" Affect: constricted Perception: No avh. Suicidal ideation: None Homicidal ideation: None Insight/judgment: Poor Cognition is impaired Physical Exam: Musculoskeletal system: steady gait Tremor not present Cog wheeling not present Assessment and Plan: Rajni Bose meets criteria for a diagnosis of Unspecified psychotic disorder. Unspecified dementia. Continue current medications as prescribed. We will closely monitor for safety. We will encourage reality orientation. Disposition planning to continue. I certify that this patients inpatient psychiatric hospital services furnished since the previous certification were, and continue to be, required for treatment that could reasonably be expected to improve the patient's condition, or for diagnostic study, and that the patient continues to need, on a daily basis, active treatment furnished directly by or requiring the supervision of inpatient psychiatric facility personnel. In addition, the hospital records show that services furnished were intensive treatment services, admission or related services, or equivalent services. Signed: 
Shereen De La Fuente NP 
5/15/2019

## 2019-05-16 PROCEDURE — 65220000003 HC RM SEMIPRIVATE PSYCH

## 2019-05-16 PROCEDURE — 74011250637 HC RX REV CODE- 250/637: Performed by: PSYCHIATRY & NEUROLOGY

## 2019-05-16 PROCEDURE — 74011250637 HC RX REV CODE- 250/637: Performed by: NURSE PRACTITIONER

## 2019-05-16 RX ADMIN — SERTRALINE HYDROCHLORIDE 100 MG: 50 TABLET ORAL at 08:13

## 2019-05-16 RX ADMIN — MEMANTINE HYDROCHLORIDE 5 MG: 10 TABLET ORAL at 08:13

## 2019-05-16 RX ADMIN — RISPERIDONE 1 MG: 1 TABLET ORAL at 08:13

## 2019-05-16 RX ADMIN — DONEPEZIL HYDROCHLORIDE 5 MG: 5 TABLET, FILM COATED ORAL at 21:19

## 2019-05-16 RX ADMIN — MEMANTINE HYDROCHLORIDE 5 MG: 10 TABLET ORAL at 17:19

## 2019-05-16 RX ADMIN — AMLODIPINE BESYLATE 5 MG: 5 TABLET ORAL at 08:13

## 2019-05-16 NOTE — BH NOTES
GROUP THERAPY PROGRESS NOTE Mark Tsai is participating in [place]. Group time: 15 minutes Personal goal for participation: let God lead the way Goal orientation: community Group therapy participation: active Therapeutic interventions reviewed and discussed: yes Impression of participation: engaged

## 2019-05-16 NOTE — BH NOTES
GROUP THERAPY PROGRESS NOTE Corey Ley did not participate in a 75 minute Morning Process Group on the General Unit with a focus on identifying feelings, planning for the day, and learning more about the grief process.

## 2019-05-16 NOTE — PROGRESS NOTES
PSYCHIATRIC PROGRESS NOTE Chief Complaint: Melissa Carpenter moved me here yesteray. \" Interval History: Nadir Novak is the same. Pleasantly confused. Ongoing delusions but this seems to be her baseline. She already received 2 loading doses of sustenna. Happy and cheerful. Denies si hi or avh. Prefers not to live with her niece. Dispo is being explored. Past Medical History: 
Past Medical History:  
Diagnosis Date  Anxiety disorder  Hypertension  Psychiatric disorder   
 schizophrenia ALLERGIES:(reviewed/updated 5/16/2019) Allergies Allergen Reactions  No Known Allergies Other (comments) Laboratory report: 
Lab Results Component Value Date/Time WBC 4.5 04/29/2019 06:34 PM  
 Hemoglobin (POC) 11.9 09/02/2013 10:09 AM  
 HGB 12.7 04/29/2019 06:34 PM  
 Hematocrit (POC) 35 09/02/2013 10:09 AM  
 HCT 38.8 04/29/2019 06:34 PM  
 PLATELET 725 84/70/8001 06:34 PM  
 MCV 90.9 04/29/2019 06:34 PM  
  
Lab Results Component Value Date/Time Sodium 143 04/29/2019 06:34 PM  
 Potassium 4.2 04/29/2019 06:34 PM  
 Chloride 107 04/29/2019 06:34 PM  
 CO2 30 04/29/2019 06:34 PM  
 Anion gap 6 04/29/2019 06:34 PM  
 Glucose 143 (H) 04/29/2019 06:34 PM  
 Glucose 105 (H) 01/23/2019 06:21 AM  
 BUN 15 04/29/2019 06:34 PM  
 Creatinine 0.93 04/29/2019 06:34 PM  
 BUN/Creatinine ratio 16 04/29/2019 06:34 PM  
 GFR est AA >60 04/29/2019 06:34 PM  
 GFR est non-AA >60 04/29/2019 06:34 PM  
 Calcium 9.0 04/29/2019 06:34 PM  
 Bilirubin, total 0.2 04/29/2019 06:34 PM  
 AST (SGOT) 10 (L) 04/29/2019 06:34 PM  
 Alk. phosphatase 60 04/29/2019 06:34 PM  
 Protein, total 7.3 04/29/2019 06:34 PM  
 Albumin 3.8 04/29/2019 06:34 PM  
 Globulin 3.5 04/29/2019 06:34 PM  
 A-G Ratio 1.1 04/29/2019 06:34 PM  
 ALT (SGPT) 13 04/29/2019 06:34 PM  
  
Vitals:  
 05/15/19 0815 05/15/19 1515 05/15/19 1958 05/16/19 1487 BP: 161/85 150/78 (!) 139/97 141/85 Pulse: 63 77 68 63 Resp: 18 18 18 16 Temp: 98.2 °F (36.8 °C) 98.4 °F (36.9 °C) 98.7 °F (37.1 °C) 97.6 °F (36.4 °C) SpO2: 100%  97% 99% Weight:      
Height:      
   
No results found for: VALF2, VALAC, VALP, VALPR, DS6, CRBAM, CRBAMP, CARB2, XCRBAM 
No results found for: [facility] Vital Signs Patient Vitals for the past 24 hrs: 
 Temp Pulse Resp BP SpO2  
05/16/19 0808 97.6 °F (36.4 °C) 63 16 141/85 99 % 05/15/19 1958 98.7 °F (37.1 °C) 68 18 (!) 139/97 97 % 05/15/19 1515 98.4 °F (36.9 °C) 77 18 150/78  Wt Readings from Last 3 Encounters:  
05/12/19 70.4 kg (155 lb 3.2 oz) 04/29/19 71.2 kg (157 lb) 04/01/19 69.9 kg (154 lb) Temp Readings from Last 3 Encounters:  
05/16/19 97.6 °F (36.4 °C)  
04/30/19 98.4 °F (36.9 °C)  
01/25/19 98.4 °F (36.9 °C) BP Readings from Last 3 Encounters:  
05/16/19 141/85  
04/30/19 (!) 139/92  
04/01/19 (!) 173/102 Pulse Readings from Last 3 Encounters:  
05/16/19 63  
04/30/19 66  
04/01/19 62 Radiology (reviewed/updated 5/16/2019) No results found. Side Effects: (reviewed/updated 5/16/2019) None reported or admitted to. Review of Systems: (reviewed/updated 5/16/2019) Appetite: good Sleep: good Positive for paranoia. Mental Status Exam: 
Eye contact: limited Psychomotor activity: relaxed Speech is pressured Thought process: loose and disorganized Content: delusion Mood is \"ok\" Affect: constricted Perception: No avh. Suicidal ideation: None Homicidal ideation: None Insight/judgment: Poor Cognition is impaired Physical Exam: Musculoskeletal system: steady gait Tremor not present Cog wheeling not present Assessment and Plan: Kendell Nix meets criteria for a diagnosis of Unspecified psychotic disorder. Unspecified dementia. Dc regular dosing of risperdal. 
Continue rest of medications as prescribed. We will closely monitor for safety. We will encourage reality orientation. Disposition planning to continue. I certify that this patients inpatient psychiatric hospital services furnished since the previous certification were, and continue to be, required for treatment that could reasonably be expected to improve the patient's condition, or for diagnostic study, and that the patient continues to need, on a daily basis, active treatment furnished directly by or requiring the supervision of inpatient psychiatric facility personnel. In addition, the hospital records show that services furnished were intensive treatment services, admission or related services, or equivalent services. Signed: 
González Oropeza NP 
5/16/2019

## 2019-05-16 NOTE — PROGRESS NOTES
Problem: Discharge Planning Goal: *Discharge to safe environment Outcome: Progressing Towards Goal 
Note:  
Patient wishes to discharge to [facility]. SW working on placement. Goal: *Knowledge of medication management Outcome: Progressing Towards Goal 
Note:  
Patient verbalizes understanding of medication regimen. Patient is taking all medications as prescribed. Goal: *Knowledge of discharge instructions Outcome: Progressing Towards Goal 
Note:  
Patient verbalizes understanding of goals for treatment and safe discharge.

## 2019-05-16 NOTE — BH NOTES
GROUP THERAPY PROGRESS NOTE Tamara Alvarado did not participate in a 55 minute afternoon Therapeutic Group on the General Unit with a focus on learning more about Healing the Inner Child and reframing dysfunctional family dynamics in a traumatized family by understanding the roles associated with the [facility] [place].

## 2019-05-16 NOTE — PROGRESS NOTES
Patient has been fall free since arriving on the [address] [place] [address] Unit. She is currently sleeping, no stress noted.

## 2019-05-16 NOTE — PROGRESS NOTES
Problem: Falls - Risk of 
Goal: *Absence of Falls Description Document Susy Moreland Fall Risk and appropriate interventions in the flowsheet. Outcome: Progressing Towards Goal 
  
Problem: Patient Education: Go to Patient Education Activity Goal: Patient/Family Education Outcome: Progressing Towards Goal 
 Will continue q 15 minute checks for saftey

## 2019-05-16 NOTE — PROGRESS NOTES
Problem: Altered Thought Process (Adult/Pediatric) Goal: *STG: Decreased hallucinations Outcome: Progressing Towards Goal 
Pt is able to participate in therapeutic activities without interruption from internal stimuli.

## 2019-05-16 NOTE — PROGRESS NOTES
Problem: Depressed Mood (Adult/Pediatric) Goal: *STG: Participates in treatment plan Outcome: Progressing Towards Goal 
Note: Out on unit minimal interaction with peers. Demonstrates appropriate behaviors and ability to follow staff direction and unit routine. Complaint with medications. Daily goal is to shower. Staff focus is on d/c planning and offering support Problem: Altered Thought Process (Adult/Pediatric) Goal: *STG: Decreased hallucinations Outcome: Progressing Towards Goal 
Note:  
Fixed delusions

## 2019-05-16 NOTE — PROGRESS NOTES
GROUP THERAPY PROGRESS NOTE Colt Samuels was NOT present for medication education group. Hannah Gonzalez, PharmD, BCPP, BCPS Clinical Pharmacy Specialist, [facility]

## 2019-05-16 NOTE — BH NOTES
GROUP THERAPY PROGRESS NOTE Tila Carvajal is participating in reflection group. Group time: 30 minutes Personal goal for participation: daily progress Goal orientation: personal 
 
Group therapy participation: active Therapeutic interventions reviewed and discussed: Unit rules and regulations. 15 ways to be happy. Impression of participation: active

## 2019-05-16 NOTE — INTERDISCIPLINARY ROUNDS
Behavioral Health Interdisciplinary Rounds Patient Name: Miriam Darden  Age: 71 y.o. Room/Bed:  [facility]/ Primary Diagnosis: <principal problem not specified> Admission Status: Involuntary Commitment Readmission within 30 days: no 
Power of  in place: no 
Patient requires a blocked bed: no          Reason for blocked bed:  
Sleep hours:  7.45 Participation in Care/Groups:  yes Medication Compliant?: Yes PRNS (last 24 hours): None Restraints (last 24 hours):  no 
  
Alcohol screening (AUDIT) completed -  AUDIT Score: 0  If applicable, date SBIRT discussed in treatment team AND documented:   
Tobacco - patient is a smoker: Have You Used Tobacco in the Past 30 Days: No 
Illegal Drugs use: Have You Used Any Illegal Substances Over the Past 12 Months: No 
 
24 hour chart check complete: yes Patient goal(s) for today: Continue attending all groups Treatment team focus/goals: Send retirement referrals Progress note: Pt continues to exhibit good humor and medication compliance LOS:  16  Expected LOS: 17-20 Participating treatment team members: REMY Grove; Ramone Yepez NP; Bull Partida RN; Kelly Mathis, SandraD

## 2019-05-17 PROCEDURE — 74011250637 HC RX REV CODE- 250/637: Performed by: PSYCHIATRY & NEUROLOGY

## 2019-05-17 PROCEDURE — 65220000003 HC RM SEMIPRIVATE PSYCH

## 2019-05-17 PROCEDURE — 74011250637 HC RX REV CODE- 250/637: Performed by: NURSE PRACTITIONER

## 2019-05-17 RX ORDER — SERTRALINE HYDROCHLORIDE 100 MG/1
100 TABLET, FILM COATED ORAL DAILY
Qty: 30 TAB | Refills: 0 | Status: SHIPPED | OUTPATIENT
Start: 2019-05-18

## 2019-05-17 RX ORDER — AMLODIPINE BESYLATE 5 MG/1
5 TABLET ORAL DAILY
Qty: 30 TAB | Refills: 0 | Status: SHIPPED | OUTPATIENT
Start: 2019-05-18

## 2019-05-17 RX ORDER — MEMANTINE HYDROCHLORIDE 5 MG/1
5 TABLET ORAL 2 TIMES DAILY
Qty: 60 TAB | Refills: 0 | Status: SHIPPED | OUTPATIENT
Start: 2019-05-17

## 2019-05-17 RX ORDER — DONEPEZIL HYDROCHLORIDE 5 MG/1
5 TABLET, FILM COATED ORAL
Qty: 30 TAB | Refills: 0 | Status: SHIPPED | OUTPATIENT
Start: 2019-05-17

## 2019-05-17 RX ADMIN — DONEPEZIL HYDROCHLORIDE 5 MG: 5 TABLET, FILM COATED ORAL at 21:44

## 2019-05-17 RX ADMIN — AMLODIPINE BESYLATE 5 MG: 5 TABLET ORAL at 08:32

## 2019-05-17 RX ADMIN — SERTRALINE HYDROCHLORIDE 100 MG: 50 TABLET ORAL at 08:32

## 2019-05-17 RX ADMIN — MEMANTINE HYDROCHLORIDE 5 MG: 10 TABLET ORAL at 17:15

## 2019-05-17 RX ADMIN — MEMANTINE HYDROCHLORIDE 5 MG: 10 TABLET ORAL at 08:31

## 2019-05-17 NOTE — PROGRESS NOTES
Patient has been fall free since arriving on the [address] Unit. Placement is being actively worked on this case management. She is compliant with medication therapy and cooperative with staff and peers. She is currently sleeping, no stress noted.

## 2019-05-17 NOTE — PROGRESS NOTES
PSYCHIATRIC PROGRESS NOTE Chief Complaint: \"I've got a new family. \" Interval History: Yajaira Gamez continues to do well. She is happy and cheerful. Calm and pleasant. She denies si hi or avh. Sleeping and eating ok. She had an interview with [facility] and it went well. States she likes the staff and she is looking forward to it. No management issue. Past Medical History: 
Past Medical History:  
Diagnosis Date  Anxiety disorder  Hypertension  Psychiatric disorder   
 schizophrenia ALLERGIES:(reviewed/updated 5/17/2019) Allergies Allergen Reactions  No Known Allergies Other (comments) Laboratory report: 
Lab Results Component Value Date/Time WBC 4.5 04/29/2019 06:34 PM  
 Hemoglobin (POC) 11.9 09/02/2013 10:09 AM  
 HGB 12.7 04/29/2019 06:34 PM  
 Hematocrit (POC) 35 09/02/2013 10:09 AM  
 HCT 38.8 04/29/2019 06:34 PM  
 PLATELET 937 10/85/6916 06:34 PM  
 MCV 90.9 04/29/2019 06:34 PM  
  
Lab Results Component Value Date/Time Sodium 143 04/29/2019 06:34 PM  
 Potassium 4.2 04/29/2019 06:34 PM  
 Chloride 107 04/29/2019 06:34 PM  
 CO2 30 04/29/2019 06:34 PM  
 Anion gap 6 04/29/2019 06:34 PM  
 Glucose 143 (H) 04/29/2019 06:34 PM  
 Glucose 105 (H) 01/23/2019 06:21 AM  
 BUN 15 04/29/2019 06:34 PM  
 Creatinine 0.93 04/29/2019 06:34 PM  
 BUN/Creatinine ratio 16 04/29/2019 06:34 PM  
 GFR est AA >60 04/29/2019 06:34 PM  
 GFR est non-AA >60 04/29/2019 06:34 PM  
 Calcium 9.0 04/29/2019 06:34 PM  
 Bilirubin, total 0.2 04/29/2019 06:34 PM  
 AST (SGOT) 10 (L) 04/29/2019 06:34 PM  
 Alk. phosphatase 60 04/29/2019 06:34 PM  
 Protein, total 7.3 04/29/2019 06:34 PM  
 Albumin 3.8 04/29/2019 06:34 PM  
 Globulin 3.5 04/29/2019 06:34 PM  
 A-G Ratio 1.1 04/29/2019 06:34 PM  
 ALT (SGPT) 13 04/29/2019 06:34 PM  
  
Vitals:  
 05/15/19 1958 05/16/19 8234 05/16/19 1754 05/17/19 2978 BP: (!) 139/97 141/85 (!) 138/96 125/85 Pulse: 68 63 89 69 Resp: 18 16 18 16 Temp: 98.7 °F (37.1 °C) 97.6 °F (36.4 °C) 98.7 °F (37.1 °C) 98.8 °F (37.1 °C) SpO2: 97% 99% 99% 94% Weight:      
Height:      
   
No results found for: VALF2, VALAC, VALP, VALPR, DS6, CRBAM, CRBAMP, CARB2, XCRBAM 
No results found for: [facility] Vital Signs Patient Vitals for the past 24 hrs: 
 Temp Pulse Resp BP SpO2  
05/17/19 0838 98.8 °F (37.1 °C) 69 16 125/85 94 % 05/16/19 1754 98.7 °F (37.1 °C) 89 18 (!) 138/96 99 % Wt Readings from Last 3 Encounters:  
05/12/19 70.4 kg (155 lb 3.2 oz) 04/29/19 71.2 kg (157 lb) 04/01/19 69.9 kg (154 lb) Temp Readings from Last 3 Encounters:  
05/17/19 98.8 °F (37.1 °C)  
04/30/19 98.4 °F (36.9 °C)  
01/25/19 98.4 °F (36.9 °C) BP Readings from Last 3 Encounters:  
05/17/19 125/85  
04/30/19 (!) 139/92  
04/01/19 (!) 173/102 Pulse Readings from Last 3 Encounters:  
05/17/19 69  
04/30/19 66  
04/01/19 62 Radiology (reviewed/updated 5/17/2019) No results found. Side Effects: (reviewed/updated 5/17/2019) None reported or admitted to. Review of Systems: (reviewed/updated 5/17/2019) Appetite: good Sleep: good Positive for paranoia. Mental Status Exam: 
Eye contact: limited Psychomotor activity: relaxed Speech is pressured Thought process: loose and disorganized Content: delusion Mood is \"ok\" Affect: constricted Perception: No avh. Suicidal ideation: None Homicidal ideation: None Insight/judgment: Poor Cognition is impaired Physical Exam: Musculoskeletal system: steady gait Tremor not present Cog wheeling not present Assessment and Plan: Miriam Darden meets criteria for a diagnosis of Unspecified psychotic disorder. Unspecified dementia. Continue current medications as prescribed. We will closely monitor for safety. We will encourage reality orientation. Plan for discharge anytime as soon as dispo gets finalized.  
 
 
I certify that this patients inpatient psychiatric hospital services furnished since the previous certification were, and continue to be, required for treatment that could reasonably be expected to improve the patient's condition, or for diagnostic study, and that the patient continues to need, on a daily basis, active treatment furnished directly by or requiring the supervision of inpatient psychiatric facility personnel. In addition, the hospital records show that services furnished were intensive treatment services, admission or related services, or equivalent services. Signed: 
Sha Mattson NP 
5/17/2019

## 2019-05-17 NOTE — INTERDISCIPLINARY ROUNDS
Behavioral Health Interdisciplinary Rounds Patient Name: Bette Patel  Age: 71 y.o. Room/Bed:  8/ Primary Diagnosis: <principal problem not specified> Admission Status: Involuntary Commitment Readmission within 30 days: no 
Power of  in place: no 
Patient requires a blocked bed: no          Reason for blocked bed:  
Sleep hours:  7.0 Participation in Care/Groups:  yes Medication Compliant?: Yes PRNS (last 24 hours): None Restraints (last 24 hours): Alcohol screening (AUDIT) completed -  AUDIT Score: 0  If applicable, date SBIRT discussed in treatment team AND documented:   
Tobacco - patient is a smoker: Have You Used Tobacco in the Past 30 Days: No 
Illegal Drugs use: Have You Used Any Illegal Substances Over the Past 12 Months: No 
 
24 hour chart check complete: yes Patient goal(s) for today: Visit with Ms. Reuben Riddle for placement Treatment team focus/goals: Coordinate with Ms. Reuben Riddle Progress note: Pt reports feeling good; excited about interview for longterm placement LOS:  17  Expected LOS: TBD Participating treatment team members: REMY Cunningham; Ricky Quintero NP; Vinny Cooley RN; Alexei Baird, PharmD

## 2019-05-17 NOTE — PROGRESS NOTES
Problem: Depressed Mood (Adult/Pediatric) Goal: *STG: Participates in treatment plan Outcome: Progressing Towards Goal 
Note: Out on unit social w peers and staff. Mood and affect stable. Fixed delusions remain. Behaviors appropriate and demonstrates self control, ability to follow staff direction and unit routine. Daily goal is to talk with a representative from and [facility] that will be assessing her. STaff focus is on d/c planning Goal: *STG: Verbalizes anger, guilt, and other feelings in a constructive manor Outcome: Progressing Towards Goal 
Goal: *STG: Attends activities and groups Outcome: Progressing Towards Goal 
Goal: *STG: Demonstrates reduction in symptoms and increase in insight into coping skills/future focused Outcome: Progressing Towards Goal 
Goal: Interventions Outcome: Progressing Towards Goal

## 2019-05-18 PROCEDURE — 74011250637 HC RX REV CODE- 250/637: Performed by: NURSE PRACTITIONER

## 2019-05-18 PROCEDURE — 74011250637 HC RX REV CODE- 250/637: Performed by: PSYCHIATRY & NEUROLOGY

## 2019-05-18 PROCEDURE — 65220000003 HC RM SEMIPRIVATE PSYCH

## 2019-05-18 RX ADMIN — MEMANTINE HYDROCHLORIDE 5 MG: 10 TABLET ORAL at 08:30

## 2019-05-18 RX ADMIN — SERTRALINE HYDROCHLORIDE 100 MG: 50 TABLET ORAL at 08:30

## 2019-05-18 RX ADMIN — MEMANTINE HYDROCHLORIDE 5 MG: 10 TABLET ORAL at 17:25

## 2019-05-18 RX ADMIN — DONEPEZIL HYDROCHLORIDE 5 MG: 5 TABLET, FILM COATED ORAL at 22:30

## 2019-05-18 RX ADMIN — AMLODIPINE BESYLATE 5 MG: 5 TABLET ORAL at 08:31

## 2019-05-18 NOTE — INTERDISCIPLINARY ROUNDS
Behavioral Health Interdisciplinary Rounds Patient Name: Imtiaz Ray  Age: 71 y.o. Room/Bed:  [facility] Primary Diagnosis: <principal problem not specified> Admission Status: Involuntary Commitment Readmission within 30 days: no 
Power of  in place: no 
Patient requires a blocked bed: no          Reason for blocked bed:  
Sleep hours:  7.0 Participation in Care/Groups:  no 
Medication Compliant?: Yes PRNS (last 24 hours): none Restraints (last 24 hours):  no 
  
Alcohol screening (AUDIT) completed -  AUDIT Score: 0  If applicable, date SBIRT discussed in treatment team AND documented:   
Tobacco - patient is a smoker: Have You Used Tobacco in the Past 30 Days: No 
Illegal Drugs use: Have You Used Any Illegal Substances Over the Past 12 Months: No 
 
24 hour chart check complete: no  
 
Patient goal(s) for today:  
Treatment team focus/goals:  
Progress note LOS:  18  Expected LOS:  
 
Participating treatment team members: Imtiaz Ray, * (assigned SW),

## 2019-05-18 NOTE — PROGRESS NOTES
Patient has been fall free since arriving on the [address] [place] [address] Unit. She is currently sleeping, no stress noted.

## 2019-05-18 NOTE — PROGRESS NOTES
Problem: Altered Thought Process (Adult/Pediatric) Goal: *STG: Decreased hallucinations Outcome: Progressing Towards Goal 
Pt denies any c/o hallucinations. She is able to participate in activities without interruption form internal stimuli. GROUP THERAPY PROGRESS NOTE Teresa Record is participating in Goals Group. Group time: 30 minutes Personal goal for participation: review daily goals Goal orientation: community Group therapy participation: active Therapeutic interventions reviewed and discussed: Making daily achievable goals, to set self up for daily successes Impression of participation: engaged

## 2019-05-18 NOTE — BH NOTES
Chief Complaint: \"I am good. \" Interval History: 
Patient reports she is OK. Looking forward to being discharged on Monday. Denies SI/HI. Denies psychotic symptoms. Tolerating medications Mental Status Exam: 
General appearance: Well groomed, Psychomotor activity: WNL Eye contact: Good Speech: WNL. Mood: \"Good\" Affect: Euthymic Thought Process: Logical, goal directed Perception: Denies AH or VH. Thought Content: Denies SI/HI; not delusional  
Insight: Good Judgement: Good Cognition: Intact. Assessment and Plan: Psychotic disorder Continue the current medication regimen Disposition planning to continue. I certify that this patients inpatient psychiatric hospital services furnished since the previous certification were, and continue to be, required for treatment that could reasonably be expected to improve the patient's condition, or for diagnostic study, and that the patient continues to need, on a daily basis, active treatment furnished directly by or requiring the supervision of inpatient psychiatric facility personnel. In addition, the hospital records show that services furnished were intensive treatment services, admission or related services, or equivalent services. Current Facility-Administered Medications Medication Dose Route Frequency  amLODIPine (NORVASC) tablet 5 mg  5 mg Oral DAILY  donepezil (ARICEPT) tablet 5 mg  5 mg Oral QHS  memantine (NAMENDA) tablet 5 mg  5 mg Oral BID  sertraline (ZOLOFT) tablet 100 mg  100 mg Oral DAILY Physical Exam: 
 
 
  
Past Medical History: 
Past Medical History:  
Diagnosis Date  Anxiety disorder  Hypertension  Psychiatric disorder   
 schizophrenia Labs: 
Lab Results Component Value Date/Time  WBC 4.5 04/29/2019 06:34 PM  
 Hemoglobin (POC) 11.9 09/02/2013 10:09 AM  
 HGB 12.7 04/29/2019 06:34 PM  
 Hematocrit (POC) 35 09/02/2013 10:09 AM  
 HCT 38.8 04/29/2019 06:34 PM  
 PLATELET 758 20/72/7078 06:34 PM  
 MCV 90.9 04/29/2019 06:34 PM  
  
Lab Results Component Value Date/Time Sodium 143 04/29/2019 06:34 PM  
 Potassium 4.2 04/29/2019 06:34 PM  
 Chloride 107 04/29/2019 06:34 PM  
 CO2 30 04/29/2019 06:34 PM  
 Anion gap 6 04/29/2019 06:34 PM  
 Glucose 143 (H) 04/29/2019 06:34 PM  
 Glucose 105 (H) 01/23/2019 06:21 AM  
 BUN 15 04/29/2019 06:34 PM  
 Creatinine 0.93 04/29/2019 06:34 PM  
 BUN/Creatinine ratio 16 04/29/2019 06:34 PM  
 GFR est AA >60 04/29/2019 06:34 PM  
 GFR est non-AA >60 04/29/2019 06:34 PM  
 Calcium 9.0 04/29/2019 06:34 PM  
 Bilirubin, total 0.2 04/29/2019 06:34 PM  
 AST (SGOT) 10 (L) 04/29/2019 06:34 PM  
 Alk. phosphatase 60 04/29/2019 06:34 PM  
 Protein, total 7.3 04/29/2019 06:34 PM  
 Albumin 3.8 04/29/2019 06:34 PM  
 Globulin 3.5 04/29/2019 06:34 PM  
 A-G Ratio 1.1 04/29/2019 06:34 PM  
 ALT (SGPT) 13 04/29/2019 06:34 PM  
  
Vitals:  
 05/17/19 6522 05/17/19 1629 05/17/19 2043 05/18/19 0830 BP: 125/85 122/90 (!) 177/98 148/86 Pulse: 69 65 85 78 Resp: 16 16 16 16 Temp: 98.8 °F (37.1 °C) 98.9 °F (37.2 °C) 98.8 °F (37.1 °C) 98.3 °F (36.8 °C) SpO2: 94%  98% 97% Weight:      
Height:

## 2019-05-18 NOTE — PROGRESS NOTES
Problem: Depressed Mood (Adult/Pediatric) Goal: *STG: Participates in treatment plan Outcome: Progressing Towards Goal 
Note:  
Patient is participatory in treatment team. Denies SI/HI. Mood is calm and cooperative. Patient is looking forward to being discharged Goal: *STG: Verbalizes anger, guilt, and other feelings in a constructive manor Outcome: Progressing Towards Goal 
Note:  
Verbalizes Goal: *STG: Demonstrates reduction in symptoms and increase in insight into coping skills/future focused Outcome: Progressing Towards Goal 
Note:  
Patient is future focused Goal: Interventions Outcome: Progressing Towards Goal

## 2019-05-19 PROCEDURE — 74011250637 HC RX REV CODE- 250/637: Performed by: PSYCHIATRY & NEUROLOGY

## 2019-05-19 PROCEDURE — 65220000003 HC RM SEMIPRIVATE PSYCH

## 2019-05-19 PROCEDURE — 74011250637 HC RX REV CODE- 250/637: Performed by: NURSE PRACTITIONER

## 2019-05-19 RX ADMIN — MEMANTINE HYDROCHLORIDE 5 MG: 10 TABLET ORAL at 17:11

## 2019-05-19 RX ADMIN — SERTRALINE HYDROCHLORIDE 100 MG: 50 TABLET ORAL at 08:20

## 2019-05-19 RX ADMIN — AMLODIPINE BESYLATE 5 MG: 5 TABLET ORAL at 08:21

## 2019-05-19 RX ADMIN — MEMANTINE HYDROCHLORIDE 5 MG: 10 TABLET ORAL at 08:21

## 2019-05-19 RX ADMIN — DONEPEZIL HYDROCHLORIDE 5 MG: 5 TABLET, FILM COATED ORAL at 21:54

## 2019-05-19 NOTE — PROGRESS NOTES
Problem: Altered Thought Process (Adult/Pediatric) Goal: *STG: Decreased hallucinations Outcome: Progressing Towards Goal 
Affect is bright. She is hopeful to be discharged tomorrow.

## 2019-05-19 NOTE — INTERDISCIPLINARY ROUNDS
Behavioral Health Interdisciplinary Rounds Patient Name: Wilson Calixto  Age: 71 y.o. Room/Bed:  [facility]/ Primary Diagnosis: <principal problem not specified> Admission Status: Involuntary Commitment Readmission within 30 days: no 
Power of  in place: no 
Patient requires a blocked bed: no          Reason for blocked bed:  
Sleep hours:  7 Participation in Care/Groups:  yes Medication Compliant?: Yes PRNS (last 24 hours): None Restraints (last 24 hours):  no 
  
Alcohol screening (AUDIT) completed -  AUDIT Score: 0  If applicable, date SBIRT discussed in treatment team AND documented:   
Tobacco - patient is a smoker: Have You Used Tobacco in the Past 30 Days: No 
Illegal Drugs use: Have You Used Any Illegal Substances Over the Past 12 Months: No 
 
24 hour chart check complete: no  
 
Patient goal(s) for today:  
Treatment team focus/goals:  
Progress note LOS:  19  Expected LOS:  
 
Participating treatment team members: Wilson Calixto, * (assigned SW),

## 2019-05-19 NOTE — PROGRESS NOTES
2330: Patient appears to be sleeping. Respirations are even and unlabored with no apparent distress. Will continue to monitor with q15 checks throughout the shift. Problem: Falls - Risk of 
Goal: *Absence of Falls Description Document Amando Luther Fall Risk and appropriate interventions in the flowsheet.  
Outcome: Progressing Towards Goal

## 2019-05-19 NOTE — PROGRESS NOTES
Problem: Falls - Risk of 
Goal: *Absence of Falls Description Document Angela Ng Fall Risk and appropriate interventions in the flowsheet. Outcome: Progressing Towards Goal 
Note:  
Fall Risk Interventions: 
Mobility Interventions: Assess mobility with egress test 
 
Mentation Interventions: Adequate sleep, hydration, pain control Medication Interventions: Teach patient to arise slowly Elimination Interventions: Toilet paper/wipes in reach History of Falls Interventions: Door open when patient unattended Problem: Patient Education: Go to Patient Education Activity Goal: Patient/Family Education Outcome: Progressing Towards Goal 
  
Problem: Depressed Mood (Adult/Pediatric) Goal: *STG: Participates in treatment plan Outcome: Progressing Towards Goal 
Note:  
Pt participated in treatment team. Visible on the unit for small periods of time. Little interaction with peers Goal: *STG: Attends activities and groups Outcome: Progressing Towards Goal 
Note:  
Encouraged to attend groups and express feelings and concerns. Goal: Interventions Outcome: Progressing Towards Goal 
  
Problem: Patient Education: Go to Patient Education Activity Goal: Patient/Family Education Outcome: Progressing Towards Goal

## 2019-05-20 VITALS
SYSTOLIC BLOOD PRESSURE: 154 MMHG | RESPIRATION RATE: 17 BRPM | HEART RATE: 71 BPM | WEIGHT: 154.4 LBS | DIASTOLIC BLOOD PRESSURE: 81 MMHG | TEMPERATURE: 97.8 F | OXYGEN SATURATION: 98 % | HEIGHT: 62 IN | BODY MASS INDEX: 28.41 KG/M2

## 2019-05-20 PROCEDURE — 74011250637 HC RX REV CODE- 250/637: Performed by: PSYCHIATRY & NEUROLOGY

## 2019-05-20 PROCEDURE — 74011250637 HC RX REV CODE- 250/637: Performed by: NURSE PRACTITIONER

## 2019-05-20 RX ADMIN — AMLODIPINE BESYLATE 5 MG: 5 TABLET ORAL at 08:25

## 2019-05-20 RX ADMIN — MEMANTINE HYDROCHLORIDE 5 MG: 10 TABLET ORAL at 08:25

## 2019-05-20 RX ADMIN — SERTRALINE HYDROCHLORIDE 100 MG: 50 TABLET ORAL at 08:25

## 2019-05-20 NOTE — BH NOTES
GROUP THERAPY PROGRESS NOTE Sabrina Alfaro is participating in Substance abuse group. Group time: 1 hour Personal goal for participation: To understand addiction, criteria for diagnosis, and identify triggers and coping skills. Goal orientation: personal 
 
Group therapy participation: absent Therapeutic interventions reviewed and discussed: Group discussion of substance use, abuse, and dependence and the DSM 5 criteria for a substance use disorder. Patients were able to self-rate themselves based on the 11 criteria for a substance use disorder and explore their own level of addiction for cigarettes, alcohol, heroin, and other substances. Group discussed how they feel when they are unable to use and ways substance use has hindered their lives. Triggers for use and coping skills to avoid use or manage symptoms until craving subsides were discussed. Impression of participation: Naomi Mariscal was present for the first 5-10 minutes then asked to be excused to use the restroom and did not return.  
 
Brooklynn Calhoun [facility]

## 2019-05-20 NOTE — BH NOTES
Behavioral Health Transition Record to Provider Patient Name: Clementine Emery YOB: 1955 Medical Record Number: [id number] Date of Admission: 4/30/2019 Date of Discharge: 5/20/2019 Attending Provider: Claire Cordova NP Discharging Provider: Claire Cordova NP To contact this individual call [phone] and ask the  to page. If unavailable, ask to be transferred to [facility] Provider on call. [facility] Provider will be available on call 24/7 and during holidays. Primary Care Provider: None Allergies Allergen Reactions  No Known Allergies Other (comments) Reason for Admission: The patient is a 26-year-old female who is currently being admitted at [address] [place] [address] on a voluntary basis. She is known here at [facility] Unit due to previous inpatient psychiatric admission, but is new to this provider. Paranoia and persecutory delusions noteworthy. She states that she went to the emergency room because she thought that her cousin is not treating her right. Admission Diagnosis: Schizophrenia, paranoid ([facility] [address] [F20.0] * No surgery found * Results for orders placed or performed during the hospital encounter of 04/30/19 TSH 3RD GENERATION Result Value Ref Range TSH 1.36 0.36 - 3.74 uIU/mL HEMOGLOBIN A1C WITH EAG Result Value Ref Range Hemoglobin A1c 5.8 4.2 - 6.3 % Est. average glucose 120 mg/dL Immunizations administered during this encounter:  
Immunization History Administered Date(s) Administered  Influenza Vaccine Split 10/17/2012 Screening for Metabolic Disorders for Patients on Antipsychotic Medications 
(Data obtained from the EMR) Estimated Body Mass Index Estimated body mass index is 28.24 kg/m² as calculated from the following: 
  Height as of this encounter: 5' 2\" (1.575 m). Weight as of this encounter: 70 kg (154 lb 6.4 oz). Vital Signs/Blood Pressure Visit Vitals /81 Pulse 71 Temp 97.8 °F (36.6 °C) Resp 17 Ht 5' 2\" (1.575 m) Wt 70 kg (154 lb 6.4 oz) SpO2 98% BMI 28.24 kg/m² Blood Glucose/Hemoglobin A1c Lab Results Component Value Date/Time Glucose 143 (H) 2019 06:34 PM  
 Glucose 105 (H) 2019 06:21 AM  
 Glucose (POC) 108 (H) 2013 10:09 AM  
 
Lab Results Component Value Date/Time Hemoglobin A1c 5.8 2019 06:05 AM  
 
  
Lipid Panel Lab Results Component Value Date/Time Cholesterol, total 202 (H) 2018 04:50 AM  
 HDL Cholesterol 85 2018 04:50 AM  
 LDL, calculated 98.2 2018 04:50 AM  
 Triglyceride 94 2018 04:50 AM  
 CHOL/HDL Ratio 2.4 2018 04:50 AM  
 
  
Discharge Diagnosis: Schizophrenia, paranoid (ICD-10-CM: F20.0) Discharge Plan: Patient discharged to [facility] for long-term care. [address] [facility] : 1955 MRN: [id number] The patient Rere Hubbard exhibits the ability to control behavior in a less restrictive environment. Patient's level of functioning is improving. No assaultive/destructive behavior has been observed for the past 24 hours. No suicidal/homicidal threat or behavior has been observed for the past 24 hours. There is no evidence of serious medication side effects. Patient has not been in physical or protective restraints for at least the past 24 hours. If weapons involved, how are they secured? No weapons involved. Is patient aware of and in agreement with discharge plan? Yes Arrangements for medication:  Prescriptions given to patient. Copy of discharge instructions to provider?:  Carlos Jennings Arrangements for transportation home:  Ms. Laura Hodges to . Keep all follow up appointments as scheduled, continue to take prescribed medications per physician instructions. Mental health crisis number:  [phone] or your local mental health crisis line number at [phone]. Discharge Medication List and Instructions:  
Current Discharge Medication List  
  
START taking these medications Details  
amLODIPine (NORVASC) 5 mg tablet Take 1 Tab by mouth daily. Indications: high blood pressure Qty: 30 Tab, Refills: 0  
  
donepezil (ARICEPT) 5 mg tablet Take 1 Tab by mouth nightly. Indications: dementia Qty: 30 Tab, Refills: 0  
  
memantine (NAMENDA) 5 mg tablet Take 1 Tab by mouth two (2) times a day. Indications: dementia Qty: 60 Tab, Refills: 0 CONTINUE these medications which have CHANGED Details  
sertraline (ZOLOFT) 100 mg tablet Take 1 Tab by mouth daily. Indications: major depressive disorder 
Qty: 30 Tab, Refills: 0 STOP taking these medications  
  
 risperiDONE (RISPERDAL) 1 mg tablet Comments:  
Reason for Stopping:   
   
  
 
 
Unresulted Labs (24h ago, onward) None To obtain results of studies pending at discharge, please contact [phone] Follow-up Information Follow up With Specialties Details Why Contact Info Long-acting injectable antipsychotic, Velvet Ape  On 6/10/2019 Patient received 156mg on 5/8 and 117mg on 5/13 (dose adjusted due to kidney function). Recommended maintenance dose of 117mcg IM every 4 weeks. Oral risperidone was discontinued prior to discharge. Bibi Winkler, NORMA Nurse Practitioner On 6/13/2019 You have a 2:30pm appointment with the psychiatric nurse practitioner for medication management. [address] [place] [address] [address] 
[phone] Advanced Directive:  
Does the patient have an appointed surrogate decision maker? No 
Does the patient have a Medical Advance Directive? No 
Does the patient have a Psychiatric Advance Directive? No 
If the patient does not have a surrogate or Medical Advance Directive AND Psychiatric Advance Directive, the patient was offered information on these advance directives Yes and Patient declined to complete Patient Instructions: Please continue all medications until otherwise directed by physician. Tobacco Cessation Discharge Plan:  
Is the patient a smoker and needs referral for smoking cessation? No 
Patient referred to the following for smoking cessation with an appointment? Not applicable Patient was offered medication to assist with smoking cessation at discharge? Not applicable Was education for smoking cessation added to the discharge instructions? Yes Alcohol/Substance Abuse Discharge Plan:  
Does the patient have a history of substance/alcohol abuse and requires a referral for treatment? No 
Patient referred to the following for substance/alcohol abuse treatment with an appointment? Not applicable Patient was offered medication to assist with alcohol cessation at discharge? Not applicable Was education for substance/alcohol abuse added to discharge instructions? No 
 
Patient discharged to Home; discussed with patient/caregiver and provided to the patient/caregiver either in hard copy or electronically.

## 2019-05-20 NOTE — PROGRESS NOTES
GROUP THERAPY PROGRESS NOTE Sonia Doss is participating in Self-care issues. Group time: 20 minutes Personal goal for participation: discuss healthy sleeping habits Goal orientation: personal 
 
Group therapy participation: passive Therapeutic interventions reviewed and discussed:  discussed \"do's\" and \"don'ts\" of healthy sleeping habits that promote relaxation and help sleep throughout the night Impression of participation: actively listened

## 2019-05-20 NOTE — INTERDISCIPLINARY ROUNDS
Behavioral Health Interdisciplinary Rounds Patient Name: Annalee Mobley  Age: 71 y.o. Room/Bed:  727/ Primary Diagnosis: <principal problem not specified> Admission Status: Involuntary Commitment Readmission within 30 days: no 
Power of  in place: no 
Patient requires a blocked bed: no          Reason for blocked bed:  
Sleep hours: 6 1/2 Participation in Care/Groups:  yes Medication Compliant?: Yes PRNS (last 24 hours): None Restraints (last 24 hours):  no 
  
Alcohol screening (AUDIT) completed -  AUDIT Score: 0  If applicable, date SBIRT discussed in treatment team AND documented:   
Tobacco - patient is a smoker: Have You Used Tobacco in the Past 30 Days: No 
Illegal Drugs use: Have You Used Any Illegal Substances Over the Past 12 Months: No 
 
24 hour chart check complete: yes Patient goal(s) for today: Discharge Treatment team focus/goals: Discharge Progress note: Patient is stable and ready for discharge LOS:  20  Expected LOS: 20 
 
Participating treatment team members: REMY Murcia; Salome Nickerson NP; Les Jennings RN

## 2019-05-20 NOTE — PROGRESS NOTES
Problem: Falls - Risk of 
Goal: *Absence of Falls Description Document Amie Bangura Fall Risk and appropriate interventions in the flowsheet. Outcome: Progressing Towards Goal 
 Lying quietly in bed with eyes closed, respirations even and unlabored , NAD noted Q15 min safety monitoring continues

## 2019-05-20 NOTE — DISCHARGE SUMMARY
PSYCHIATRIC DISCHARGE SUMMARY    Date of Admission: 4/30/2019  Date of Discharge:5/20/2019       Type of Discharge:  REGULAR     Admission data:     CHIEF COMPLAINT:  \"My cousin does not treat me right. \"     HISTORY OF PRESENT ILLNESS:  The patient is a 48-year-old female who is currently being admitted at [facility] [address] on a voluntary basis. She is known here at [facility] due to previous inpatient psychiatric admission, but is new to this provider. Paranoia and persecutory delusions noteworthy. She states that she went to the emergency room because she thought that her cousin is not treating her right. She had an argument with her cousin, states that her cousin is not friendly with her. She, however, cannot clearly elaborate what was going on. She believes that people are out to hurt her, keeping secrets from her. She feels that her cousin is not nice and friendly. She denies any abuse or any trauma history. She is noted to have intermittent confusion during the interview. She tells me that she gets confused, she has been forgetful lately. She states that the year is 65, she could not tell me the date and the month. She is endorsing suicidal ideation. She states that the voices are telling her that she is a nice person and visual hallucination of a man and a woman who love each other. Her thought process is noted to be disorganized.     PAST MEDICAL HISTORY:  See H and P.     PAST PSYCHIATRIC HOSPITALIZATION:  She was previously admitted at [address]  It is unsure if she is currently being followed by [facility] or by Dr. Trista Rivera at [facility].  She is currently being prescribed Risperdal and Zoloft.     PSYCHOSOCIAL HISTORY:  She is . She has two children. She finished tenth grade. She is currently receiving social security disability checks.     MENTAL STATUS EXAMINATION:  She is only alert and oriented to self. She is calm and pleasant during the interview. She is dressed in hospital apparel. Mood is elevated. Affect is a little bit expansive. Speech is pressured. Thought process is notably disorganized. She denies suicidal ideation, homicidal ideations. Admits to auditory or visual hallucinations. Memory seems poor. Insight is poor. Judgment is poor. Intelligence seems below average.       Hospital Course:    Patient was admitted to the Psychiatric services for acute psychiatric stabilization in regards to symptomatology as described in the HPI above and placed on Q15 minute checks and suicide precautions. She was started back on her usual medication regimen as well as PRN medications including risperdal. Subsequent to admission, she continued to endorsed paranoia, persecutory delusions, she was also intermittently confused. She did not have any side effect from risperdal, she agreed in receivng HUTCHINSON, invega sustenna was given twice and risperdal was subsequently dc'd. While on the unit [facility] was involved in individual, group, occupational and milieu therapy. She improved gradually and was able to integrate into the milieu with help from the nursing staff. Patients symptoms improved gradually including paranoia and delusions were much less, no si or hi. She's eating and sleeping well. She was appropriate in her interactions, and cooperative with medications and the unit routine. Please see individual progress notes for more specific details regarding patient's hospitalization course. Patient was discharged as per the plan. She had been doing well on the unit as per the report of the nursing staff and my observations. No PRN medication for agitation, seclusion or restraints were required during the last 48 hours of her stay. [facility] had improved progressively to the point of being stable for discharge and outpatient FU. At this time she did not offer any complaints. Patient denied any SI or HI. Denied any AH or VH. She denied any delusions. Was not considered a danger to self or to others and is safe for discharge. Will FU with her appointments and remains motivated to be in treatment. The patient verbalized understanding of her discharge instructions. Allergies:(reviewed/updated 5/20/2019)  Allergies   Allergen Reactions    No Known Allergies Other (comments)       Side Effects: (reviewed/updated 5/20/2019)  None reported or admitted to. Vital Signs:  Patient Vitals for the past 24 hrs:   Temp Pulse Resp BP SpO2   05/20/19 0815 97.8 °F (36.6 °C) 71 17 154/81 98 %   05/19/19 2116 98.7 °F (37.1 °C) 67 16 132/79 98 %     Wt Readings from Last 3 Encounters:   05/19/19 70 kg (154 lb 6.4 oz)   04/29/19 71.2 kg (157 lb)   04/01/19 69.9 kg (154 lb)     Temp Readings from Last 3 Encounters:   05/20/19 97.8 °F (36.6 °C)   04/30/19 98.4 °F (36.9 °C)   01/25/19 98.4 °F (36.9 °C)     BP Readings from Last 3 Encounters:   05/20/19 154/81   04/30/19 (!) 139/92   04/01/19 (!) 173/102     Pulse Readings from Last 3 Encounters:   05/20/19 71   04/30/19 66   04/01/19 62       Labs: (reviewed/updated 5/20/2019)  No results found for this or any previous visit (from the past 24 hour(s)). No results found for: VALF2, VALAC, VALP, VALPR, DS6, CRBAM, CRBAMP, CARB2, XCRBAM  No results found for: Levine, Susan. \Hospital Has a New Name and Outlook.\"" EVALUATION [facility]    Radiology (reviewed/updated 5/20/2019)  Xr Chest Port    Result Date: 5/13/2019  EXAM:  XR CHEST PORT INDICATION:  placement rule out TB COMPARISON:  None. FINDINGS: A portable AP radiograph of the chest was obtained at 1221 hours. . The lungs are clear. Heart size is normal. Aorta is mildly ectatic. The bones and soft tissues are grossly within normal limits. IMPRESSION: No acute process identified. Mental Status Exam on Discharge:  General appearance:   Sonia Doss is a 71 y.o.  BLACK OR  female who is well groomed, psychomotor activity is WNL  Eye contact: makes good eye contact  Speech: Spontaneous and coherent  Affect : Euthymic  Mood: \"OK\"  Thought Process: Logical, goal directed  Perception: Denies any AH or VH. Thought Content: Denies any SI or Plan  Insight: Poor  Judgement: Poor  Cognition: Intermittent confusion     Discharge Diagnoses:  Schizoaffective disorder, bipolar type. Unspecified dementia. Discharge Medication List as of 5/20/2019 10:30 AM      START taking these medications    Details   amLODIPine (NORVASC) 5 mg tablet Take 1 Tab by mouth daily. Indications: high blood pressure, Print, Disp-30 Tab, R-0      donepezil (ARICEPT) 5 mg tablet Take 1 Tab by mouth nightly. Indications: dementia, Print, Disp-30 Tab, R-0      memantine (NAMENDA) 5 mg tablet Take 1 Tab by mouth two (2) times a day. Indications: dementia, Print, Disp-60 Tab, R-0         CONTINUE these medications which have CHANGED    Details   sertraline (ZOLOFT) 100 mg tablet Take 1 Tab by mouth daily. Indications: major depressive disorder, Print, Disp-30 Tab, R-0         STOP taking these medications       risperiDONE (RISPERDAL) 1 mg tablet Comments:   Reason for Stopping: Follow-up Information     Follow up With Specialties Details Why Contact Info    Long-acting injectable antipsychotic, Aiden Nails  On 6/10/2019 Patient received 156mg on 5/8 and 117mg on 5/13 (dose adjusted due to kidney function). Recommended maintenance dose of 117mcg IM every 4 weeks. Oral risperidone was discontinued prior to discharge. Santi Nino NP Nurse Practitioner On 6/13/2019 You have a 2:30pm appointment with the psychiatric nurse practitioner for medication management. [facility]          WOUND CARE: none needed. Prognosis:   Good / Junella Fix based on nature of patient's pathology/ies and treatment compliance issues.   Prognosis is greatly dependent upon patient's ability to  follow up on psychiatric/psychotherapy appointments as well as to comply with psychiatric medications as prescribed. I certify that this patients inpatient psychiatric hospital services furnished since the previous certification were, and continue to be, required for treatment that could reasonably be expected to improve the patient's condition, or for diagnostic study, and that the patient continues to need, on a daily basis, active treatment furnished directly by or requiring the supervision of inpatient psychiatric facility personnel. In addition, the hospital records show that services furnished were intensive treatment services, admission or related services, or equivalent services.      Signed:  Simeon Hayes NP  5/20/2019

## 2019-05-20 NOTE — PROGRESS NOTES
Problem: Falls - Risk of 
Goal: *Absence of Falls Description Document Carin Mueller Fall Risk and appropriate interventions in the flowsheet. 5/20/2019 0852 by Mariusz Bashir RN Note:  
Fall Risk Interventions: 
Patient is absent of falls. Mobility Interventions: Assess mobility with egress test 
 
Mentation Interventions: Adequate sleep, hydration, pain control Medication Interventions: Teach patient to arise slowly Elimination Interventions: Toilet paper/wipes in reach History of Falls Interventions: Door open when patient unattended 5/20/2019 0802 by Mariusz Bashir RN Note:  
Fall Risk Interventions: 
Mobility Interventions: Assess mobility with egress test 
 
Mentation Interventions: Adequate sleep, hydration, pain control Medication Interventions: Teach patient to arise slowly Elimination Interventions: Toilet paper/wipes in reach History of Falls Interventions: Door open when patient unattended Problem: Depressed Mood (Adult/Pediatric) Goal: *STG: Participates in treatment plan Note:  
Patient participates in treatment plan. Problem: Altered Thought Process (Adult/Pediatric) Goal: *STG: Decreased hallucinations Note:  
Patient will have decreased hallucinations.

## 2019-05-22 ENCOUNTER — HOSPITAL ENCOUNTER (EMERGENCY)
Age: 64
Discharge: HOME | End: 2019-05-22
Attending: EMERGENCY MEDICINE
Payer: MEDICARE

## 2019-05-22 VITALS
TEMPERATURE: 98.5 F | SYSTOLIC BLOOD PRESSURE: 145 MMHG | HEART RATE: 81 BPM | OXYGEN SATURATION: 98 % | DIASTOLIC BLOOD PRESSURE: 85 MMHG | RESPIRATION RATE: 15 BRPM

## 2019-05-22 DIAGNOSIS — R45.1 AGITATION: Primary | ICD-10-CM

## 2019-05-22 PROCEDURE — 99284 EMERGENCY DEPT VISIT MOD MDM: CPT

## 2019-05-22 NOTE — ED TRIAGE NOTES
Triage note: pt arrives via EMS from [facility] for irritability and \"not listening to staff and not following directions. \" Pt was recently discharged from [facility] [address] to [address] [facility].

## 2019-05-22 NOTE — ED PROVIDER NOTES
71 y.o. female with past medical history significant for Schizophrenia, HTN, and Anxiety who presents from [facility] via EMS with chief complaint of placement issue. Patient states she does not like the home she is in now. Patient states she \"wants to leave the ladies house,\" \"the lady is not nice she is mean,\" and Judah Sumner will not go back there. \" Patient reports \"Miss Joey Mendez will pick her up at the hospital at 8:30 PM.\" Patient presents to [facility] [place] for placement into her nieces (Miss Joey Mendez) care. Patient endorses \"feeling fine. \" Patient states she has a learning disability described as \"slow learner. \" Patient states she went to high school up to the 10th grade and was in \"regular classes\" and did well. Patient states she is compliant with all her medications. Patient denies SI and HI. Patient denies gait issues. There are no other acute medical concerns at this time. Social hx:   PCP: None    Note written by Trinh Mosher, as dictated by Bud Perez MD 6:45 PM       The history is provided by the patient. No  was used. Past Medical History:   Diagnosis Date    Anxiety disorder     Hypertension     Psychiatric disorder     schizophrenia        History reviewed. No pertinent surgical history.       Family History:   Problem Relation Age of Onset    HIV/AIDS Mother     Heart Attack Father        Social History     Socioeconomic History    Marital status: UNKNOWN     Spouse name: Not on file    Number of children: Not on file    Years of education: Not on file    Highest education level: Not on file   Occupational History    Not on file   Social Needs    Financial resource strain: Not on file    Food insecurity:     Worry: Not on file     Inability: Not on file    Transportation needs:     Medical: Not on file     Non-medical: Not on file   Tobacco Use    Smoking status: Current Some Day Smoker     Packs/day: 0.25    Smokeless tobacco: Never Used   Substance and Sexual Activity    Alcohol use: No    Drug use: No    Sexual activity: Never   Lifestyle    Physical activity:     Days per week: Not on file     Minutes per session: Not on file    Stress: Not on file   Relationships    Social connections:     Talks on phone: Not on file     Gets together: Not on file     Attends Church service: Not on file     Active member of club or organization: Not on file     Attends meetings of clubs or organizations: Not on file     Relationship status: Not on file    Intimate partner violence:     Fear of current or ex partner: Not on file     Emotionally abused: Not on file     Physically abused: Not on file     Forced sexual activity: Not on file   Other Topics Concern    Not on file   Social History Narrative    Not on file         ALLERGIES: No known allergies    Review of Systems   Constitutional: Negative for appetite change, chills and fever. HENT: Negative for rhinorrhea, sore throat and trouble swallowing. Eyes: Negative for photophobia. Respiratory: Negative for cough and shortness of breath. Cardiovascular: Negative for chest pain and palpitations. Gastrointestinal: Negative for abdominal pain, nausea and vomiting. Genitourinary: Negative for dysuria, frequency and hematuria. Musculoskeletal: Negative for arthralgias and gait problem. Neurological: Negative for dizziness, syncope and weakness. Psychiatric/Behavioral: Negative for behavioral problems and suicidal ideas. The patient is not nervous/anxious. All other systems reviewed and are negative. Vitals:    05/22/19 1756   BP: (!) 152/94   Pulse: 83   Resp: 16   Temp: 98.7 °F (37.1 °C)   SpO2: 97%            Physical Exam   Constitutional: She appears well-developed and well-nourished. HENT:   Head: Normocephalic. Mouth/Throat: Oropharynx is clear and moist.   Eyes: Pupils are equal, round, and reactive to light.  EOM are normal.   Neck: Normal range of motion. Neck supple. Cardiovascular: Normal rate, regular rhythm, normal heart sounds and intact distal pulses. Exam reveals no gallop and no friction rub. No murmur heard. Pulmonary/Chest: Effort normal. No respiratory distress. She has no wheezes. She has no rales. Abdominal: Soft. There is no tenderness. There is no rebound. Musculoskeletal: Normal range of motion. She exhibits no tenderness. Neurological: She is alert. No cranial nerve deficit. Motor; symmetric. Oriented to month, year, place, but not President of [address] [place] [address],[address]. Skin: No erythema. Psychiatric:   Affect mildly dysphoric. Cooperative, calm with questions and cognition. Nursing note and vitals reviewed. Note written by Trinh Hammer, as dictated by Sukhdev Sidhu MD 6:45 PM       MDM       Procedures  PROGRESS NOTE:  6:49 PM  Provider spoke to [facility]. [facility] states there is no reason for patient to be seen by them.

## 2019-05-22 NOTE — PROGRESS NOTES
5:57 PM  CM received a consult. CM informed that patient was sent to hospital via EMS from [facility]. Facility reporting that hospital needs to find another placement for patient. Patient recently admitted from 4/30/19-5/20/19 on [address] for Schizophrenia Unspecified. CM contacted [facility] at [phone] and spoke with the . She reports patient to be psychotic.  is not willing to accept patient back for services.  provided contact information for CM that coordinated patient's discharge plan from [facility] [phone]. No CM available to review patient case. Patient requesting to reside with family at this time. CM contacted patient's emergency contact, [facility] [phone]. She reported to CM that she spoke with Mrs. Zahida Garza at facility. Mrs. Zahida Garza is  reporting that patient indicating that she does not want to be there. Mrs. Frank Eckert informed CM that she will plan to pick patient up from ER, however she would not be available to do so until 8:30pm.  Plan would be for patient to stay with Mrs. Frank Eckert this evening at [address] [place] [address], [address] [place] [address]. CM provided updates to RN and Attending following.     REMY Silver/ALEX  Care Management

## 2019-05-23 NOTE — ED NOTES
Spoke to family member that was supposed to  pt for discharge confirmed are on the way . Put pt in waiting room with d/c papers  , pt has no complaints at this time . A&o x4 .  Pt walked to waiting room with no difficulties

## 2019-09-16 ENCOUNTER — HOSPITAL ENCOUNTER (EMERGENCY)
Age: 64
Discharge: HOME | End: 2019-09-16
Attending: EMERGENCY MEDICINE
Payer: MEDICARE

## 2019-09-16 VITALS
RESPIRATION RATE: 18 BRPM | OXYGEN SATURATION: 99 % | SYSTOLIC BLOOD PRESSURE: 118 MMHG | TEMPERATURE: 98.5 F | DIASTOLIC BLOOD PRESSURE: 75 MMHG | HEART RATE: 60 BPM

## 2019-09-16 DIAGNOSIS — R19.7 DIARRHEA, UNSPECIFIED TYPE: Primary | ICD-10-CM

## 2019-09-16 DIAGNOSIS — R55 SYNCOPE AND COLLAPSE: ICD-10-CM

## 2019-09-16 LAB
ALBUMIN SERPL-MCNC: 4 G/DL (ref 3.5–5)
ALBUMIN/GLOB SERPL: 0.9 {RATIO} (ref 1.1–2.2)
ALP SERPL-CCNC: 71 U/L (ref 45–117)
ALT SERPL-CCNC: 12 U/L (ref 12–78)
ANION GAP SERPL CALC-SCNC: 7 MMOL/L (ref 5–15)
APPEARANCE UR: ABNORMAL
AST SERPL-CCNC: 9 U/L (ref 15–37)
ATRIAL RATE: 70 BPM
BACTERIA URNS QL MICRO: ABNORMAL /HPF
BASOPHILS # BLD: 0.1 K/UL (ref 0–0.1)
BASOPHILS NFR BLD: 1 % (ref 0–1)
BILIRUB SERPL-MCNC: 0.4 MG/DL (ref 0.2–1)
BILIRUB UR QL CFM: NEGATIVE
BUN SERPL-MCNC: 19 MG/DL (ref 6–20)
BUN/CREAT SERPL: 20 (ref 12–20)
CALCIUM SERPL-MCNC: 9.5 MG/DL (ref 8.5–10.1)
CALCULATED P AXIS, ECG09: 68 DEGREES
CALCULATED R AXIS, ECG10: 66 DEGREES
CALCULATED T AXIS, ECG11: -137 DEGREES
CHLORIDE SERPL-SCNC: 106 MMOL/L (ref 97–108)
CO2 SERPL-SCNC: 29 MMOL/L (ref 21–32)
COLOR UR: ABNORMAL
COMMENT, HOLDF: NORMAL
CREAT SERPL-MCNC: 0.96 MG/DL (ref 0.55–1.02)
DIAGNOSIS, 93000: NORMAL
DIFFERENTIAL METHOD BLD: ABNORMAL
EOSINOPHIL # BLD: 0.1 K/UL (ref 0–0.4)
EOSINOPHIL NFR BLD: 2 % (ref 0–7)
EPITH CASTS URNS QL MICRO: ABNORMAL /LPF
ERYTHROCYTE [DISTWIDTH] IN BLOOD BY AUTOMATED COUNT: 12.5 % (ref 11.5–14.5)
GLOBULIN SER CALC-MCNC: 4.3 G/DL (ref 2–4)
GLUCOSE SERPL-MCNC: 109 MG/DL (ref 65–100)
GLUCOSE UR STRIP.AUTO-MCNC: NEGATIVE MG/DL
HCT VFR BLD AUTO: 39.7 % (ref 35–47)
HGB BLD-MCNC: 12.4 G/DL (ref 11.5–16)
HGB UR QL STRIP: NEGATIVE
HYALINE CASTS URNS QL MICRO: >20 /LPF (ref 0–5)
IMM GRANULOCYTES # BLD AUTO: 0 K/UL (ref 0–0.04)
IMM GRANULOCYTES NFR BLD AUTO: 1 % (ref 0–0.5)
KETONES UR QL STRIP.AUTO: ABNORMAL MG/DL
LEUKOCYTE ESTERASE UR QL STRIP.AUTO: NEGATIVE
LIPASE SERPL-CCNC: 67 U/L (ref 73–393)
LYMPHOCYTES # BLD: 1.9 K/UL (ref 0.8–3.5)
LYMPHOCYTES NFR BLD: 35 % (ref 12–49)
MCH RBC QN AUTO: 28.6 PG (ref 26–34)
MCHC RBC AUTO-ENTMCNC: 31.2 G/DL (ref 30–36.5)
MCV RBC AUTO: 91.7 FL (ref 80–99)
MONOCYTES # BLD: 0.6 K/UL (ref 0–1)
MONOCYTES NFR BLD: 10 % (ref 5–13)
NEUTS SEG # BLD: 2.9 K/UL (ref 1.8–8)
NEUTS SEG NFR BLD: 51 % (ref 32–75)
NITRITE UR QL STRIP.AUTO: NEGATIVE
NRBC # BLD: 0 K/UL (ref 0–0.01)
NRBC BLD-RTO: 0 PER 100 WBC
P-R INTERVAL, ECG05: 162 MS
PH UR STRIP: 6 [PH] (ref 5–8)
PLATELET # BLD AUTO: 369 K/UL (ref 150–400)
PMV BLD AUTO: 9 FL (ref 8.9–12.9)
POTASSIUM SERPL-SCNC: 3.6 MMOL/L (ref 3.5–5.1)
PROT SERPL-MCNC: 8.3 G/DL (ref 6.4–8.2)
PROT UR STRIP-MCNC: 30 MG/DL
Q-T INTERVAL, ECG07: 436 MS
QRS DURATION, ECG06: 80 MS
QTC CALCULATION (BEZET), ECG08: 470 MS
RBC # BLD AUTO: 4.33 M/UL (ref 3.8–5.2)
RBC #/AREA URNS HPF: ABNORMAL /HPF (ref 0–5)
SAMPLES BEING HELD,HOLD: NORMAL
SODIUM SERPL-SCNC: 142 MMOL/L (ref 136–145)
SP GR UR REFRACTOMETRY: 1.03 (ref 1–1.03)
TROPONIN I SERPL-MCNC: <0.05 NG/ML
UR CULT HOLD, URHOLD: NORMAL
UROBILINOGEN UR QL STRIP.AUTO: 1 EU/DL (ref 0.2–1)
VENTRICULAR RATE, ECG03: 70 BPM
WBC # BLD AUTO: 5.6 K/UL (ref 3.6–11)
WBC URNS QL MICRO: ABNORMAL /HPF (ref 0–4)

## 2019-09-16 PROCEDURE — 85025 COMPLETE CBC W/AUTO DIFF WBC: CPT

## 2019-09-16 PROCEDURE — 80053 COMPREHEN METABOLIC PANEL: CPT

## 2019-09-16 PROCEDURE — 74011250636 HC RX REV CODE- 250/636: Performed by: EMERGENCY MEDICINE

## 2019-09-16 PROCEDURE — 93005 ELECTROCARDIOGRAM TRACING: CPT

## 2019-09-16 PROCEDURE — 81001 URINALYSIS AUTO W/SCOPE: CPT

## 2019-09-16 PROCEDURE — 36415 COLL VENOUS BLD VENIPUNCTURE: CPT

## 2019-09-16 PROCEDURE — 84484 ASSAY OF TROPONIN QUANT: CPT

## 2019-09-16 PROCEDURE — 96374 THER/PROPH/DIAG INJ IV PUSH: CPT

## 2019-09-16 PROCEDURE — 99285 EMERGENCY DEPT VISIT HI MDM: CPT

## 2019-09-16 PROCEDURE — 83690 ASSAY OF LIPASE: CPT

## 2019-09-16 RX ORDER — ONDANSETRON 2 MG/ML
4 INJECTION INTRAMUSCULAR; INTRAVENOUS
Status: COMPLETED | OUTPATIENT
Start: 2019-09-16 | End: 2019-09-16

## 2019-09-16 RX ADMIN — SODIUM CHLORIDE 1000 ML: 900 INJECTION, SOLUTION INTRAVENOUS at 11:55

## 2019-09-16 RX ADMIN — ONDANSETRON 4 MG: 2 INJECTION INTRAMUSCULAR; INTRAVENOUS at 11:55

## 2019-09-16 NOTE — DISCHARGE INSTRUCTIONS
Patient Education        Diarrhea: Care Instructions  Your Care Instructions    Diarrhea is loose, watery stools (bowel movements). The exact cause is often hard to find. Sometimes diarrhea is your body's way of getting rid of what caused an upset stomach. Viruses, food poisoning, and many medicines can cause diarrhea. Some people get diarrhea in response to emotional stress, anxiety, or certain foods. Almost everyone has diarrhea now and then. It usually isn't serious, and your stools will return to normal soon. The important thing to do is replace the fluids you have lost, so you can prevent dehydration. The doctor has checked you carefully, but problems can develop later. If you notice any problems or new symptoms, get medical treatment right away. Follow-up care is a key part of your treatment and safety. Be sure to make and go to all appointments, and call your doctor if you are having problems. It's also a good idea to know your test results and keep a list of the medicines you take. How can you care for yourself at home? · Watch for signs of dehydration, which means your body has lost too much water. Dehydration is a serious condition and should be treated right away. Signs of dehydration are:  ? Increasing thirst and dry eyes and mouth. ? Feeling faint or lightheaded. ? Darker urine, and a smaller amount of urine than normal.  · To prevent dehydration, drink plenty of fluids, enough so that your urine is light yellow or clear like water. Choose water and other caffeine-free clear liquids until you feel better. If you have kidney, heart, or liver disease and have to limit fluids, talk with your doctor before you increase the amount of fluids you drink. · Begin eating small amounts of mild foods the next day, if you feel like it. ? Try yogurt that has live cultures of Lactobacillus. (Check the label.)  ?  Avoid spicy foods, fruits, alcohol, and caffeine until 48 hours after all symptoms are gone.  ? Avoid chewing gum that contains sorbitol. ? Avoid dairy products (except for yogurt with Lactobacillus) while you have diarrhea and for 3 days after symptoms are gone. · The doctor may recommend that you take over-the-counter medicine, such as loperamide (Imodium), if you still have diarrhea after 6 hours. Read and follow all instructions on the label. Do not use this medicine if you have bloody diarrhea, a high fever, or other signs of serious illness. Call your doctor if you think you are having a problem with your medicine. When should you call for help? Call 911 anytime you think you may need emergency care. For example, call if:    · You passed out (lost consciousness).     · Your stools are maroon or very bloody.    Call your doctor now or seek immediate medical care if:    · You are dizzy or lightheaded, or you feel like you may faint.     · Your stools are black and look like tar, or they have streaks of blood.     · You have new or worse belly pain.     · You have symptoms of dehydration, such as:  ? Dry eyes and a dry mouth. ? Passing only a little dark urine. ? Feeling thirstier than usual.     · You have a new or higher fever.    Watch closely for changes in your health, and be sure to contact your doctor if:    · Your diarrhea is getting worse.     · You see pus in the diarrhea.     · You are not getting better after 2 days (48 hours). Where can you learn more? Go to http://manohar-toyin.info/. Enter V785 in the search box to learn more about \"Diarrhea: Care Instructions. \"  Current as of: September 23, 2018  Content Version: 12.1  © 5164-6528 Healthwise, Incorporated. Care instructions adapted under license by [employer] (which disclaims liability or warranty for this information).  If you have questions about a medical condition or this instruction, always ask your healthcare professional. [place] [address] any warranty or liability for your use of this information. Patient Education        Fainting: Care Instructions  Your Care Instructions    When you faint, or pass out, you lose consciousness for a short time. A brief drop in blood flow to the brain often causes it. When you fall or lie down, more blood flows to your brain and you regain consciousness. Emotional stress, pain, or overheating--especially if you have been standing--can make you faint. In these cases, fainting is usually not serious. But fainting can be a sign of a more serious problem. Your doctor may want you to have more tests to rule out other causes. The treatment you need depends on the reason why you fainted. The doctor has checked you carefully, but problems can develop later. If you notice any problems or new symptoms, get medical treatment right away. Follow-up care is a key part of your treatment and safety. Be sure to make and go to all appointments, and call your doctor if you are having problems. It's also a good idea to know your test results and keep a list of the medicines you take. How can you care for yourself at home? · Drink plenty of fluids to prevent dehydration. If you have kidney, heart, or liver disease and have to limit fluids, talk with your doctor before you increase your fluid intake. When should you call for help? Call 911 anytime you think you may need emergency care. For example, call if:    · You have symptoms of a heart problem. These may include:  ? Chest pain or pressure. ? Severe trouble breathing. ? A fast or irregular heartbeat. ? Lightheadedness or sudden weakness. ? Coughing up pink, foamy mucus. ? Passing out. After you call 911, the  may tell you to chew 1 adult-strength or 2 to 4 low-dose aspirin. Wait for an ambulance. Do not try to drive yourself.     · You have symptoms of a stroke.  These may include:  ? Sudden numbness, tingling, weakness, or loss of movement in your face, arm, or leg, especially on only one side of your body. ? Sudden vision changes. ? Sudden trouble speaking. ? Sudden confusion or trouble understanding simple statements. ? Sudden problems with walking or balance. ? A sudden, severe headache that is different from past headaches.     · You passed out (lost consciousness) again.    Watch closely for changes in your health, and be sure to contact your doctor if:    · You do not get better as expected. Where can you learn more? Go to http://manohar-toyin.info/. Enter R672 in the search box to learn more about \"Fainting: Care Instructions. \"  Current as of: September 23, 2018  Content Version: 12.1  © 5342-9066 Healthwise, Incorporated. Care instructions adapted under license by [employer] (which disclaims liability or warranty for this information). If you have questions about a medical condition or this instruction, always ask your healthcare professional. [address] any warranty or liability for your use of this information.

## 2019-09-16 NOTE — ED NOTES
Pt given discharge instructions, patient education and follow-up information by provider. Pt states understanding - all questions answered. Pt discharged to home in private vehicle, ambulatory. Pt A&Ox4, RA, with pain controlled.

## 2019-09-16 NOTE — ED PROVIDER NOTES
HPI     Pt is a 71 y.o. F with PMH of HTN, anxiety, and schizophrenia Presenting to ED via EMS for diarrhea and syncope this AM.  Pt says she woke up feeling fine but then developed diarrhea. She is unable to estimate how many episodes but says it was [place] lot\" she then had periumbilical pain and nausea and developed dizziness. She then passed out in a chair at the day center. She denies new foods or sick contacts. No urinary complaints. No new meds or changes in meds. No other complaints at this time. Past Medical History:   Diagnosis Date    Anxiety disorder     Hypertension     Psychiatric disorder     schizophrenia        History reviewed. No pertinent surgical history.       Family History:   Problem Relation Age of Onset    HIV/AIDS Mother     Heart Attack Father        Social History     Socioeconomic History    Marital status: UNKNOWN     Spouse name: Not on file    Number of children: Not on file    Years of education: Not on file    Highest education level: Not on file   Occupational History    Not on file   Social Needs    Financial resource strain: Not on file    Food insecurity:     Worry: Not on file     Inability: Not on file    Transportation needs:     Medical: Not on file     Non-medical: Not on file   Tobacco Use    Smoking status: Current Some Day Smoker     Packs/day: 0.25    Smokeless tobacco: Never Used   Substance and Sexual Activity    Alcohol use: No    Drug use: No    Sexual activity: Never   Lifestyle    Physical activity:     Days per week: Not on file     Minutes per session: Not on file    Stress: Not on file   Relationships    Social connections:     Talks on phone: Not on file     Gets together: Not on file     Attends Sabianism service: Not on file     Active member of club or organization: Not on file     Attends meetings of clubs or organizations: Not on file     Relationship status: Not on file    Intimate partner violence:     Fear of current or ex partner: Not on file     Emotionally abused: Not on file     Physically abused: Not on file     Forced sexual activity: Not on file   Other Topics Concern    Not on file   Social History Narrative    Not on file         ALLERGIES: No known allergies    Review of Systems   Constitutional: Negative for chills, diaphoresis and fever. HENT: Negative for congestion and trouble swallowing. Eyes: Negative for photophobia and visual disturbance. Respiratory: Negative for cough, chest tightness and shortness of breath. Cardiovascular: Negative for chest pain, palpitations and leg swelling. Gastrointestinal: Positive for abdominal pain, diarrhea and nausea. Negative for vomiting. Genitourinary: Negative for difficulty urinating, dysuria, flank pain and frequency. Musculoskeletal: Negative for back pain and myalgias. Skin: Negative for rash and wound. Neurological: Positive for dizziness and syncope. Negative for weakness, light-headedness and headaches. Hematological: Negative for adenopathy. Does not bruise/bleed easily. Psychiatric/Behavioral: Negative for agitation and confusion. All other systems reviewed and are negative. Vitals:    09/16/19 1050   Pulse: 64   SpO2: 100%            Physical Exam   Constitutional: She is oriented to person, place, and time. She appears well-developed and well-nourished. No distress. HENT:   Head: Normocephalic. Mouth/Throat: Oropharynx is clear and moist.   Eyes: Pupils are equal, round, and reactive to light. Conjunctivae and EOM are normal.   Neck: Normal range of motion. Neck supple. No JVD present. Cardiovascular: Normal rate, regular rhythm, normal heart sounds and intact distal pulses. Pulmonary/Chest: Effort normal and breath sounds normal.   Abdominal: Soft. Bowel sounds are normal. She exhibits no distension. There is no tenderness. Musculoskeletal: Normal range of motion. She exhibits no edema, tenderness or deformity. Lymphadenopathy:     She has no cervical adenopathy. Neurological: She is alert and oriented to person, place, and time. No cranial nerve deficit or sensory deficit. Skin: Skin is warm and dry. Capillary refill takes less than 2 seconds. No rash noted. She is not diaphoretic. No erythema. Psychiatric: She has a normal mood and affect. Nursing note and vitals reviewed. MDM       Procedures      ED EKG interpretation:  Rhythm: normal sinus rhythm; and regular . Rate (approx.): 70; Axis: normal; P wave: normal; QRS interval: normal ; ST/T wave: T wave inverted; in  Lead: inferior, anterior and lateral leads; LVH noted. EKG documented by Zoila Serrano MD, scribe, as interpreted by Keely Quintero MD, ED MD.      EKG compared to January 2019. T wave inversions new in inferior and anterior leads. Patient's results have been reviewed with them. Patient and/or family have verbally conveyed their understanding and agreement of the patient's signs, symptoms, diagnosis, treatment and prognosis and additionally agree to follow up as recommended or return to the Emergency Room should their condition change prior to follow-up. Discharge instructions have also been provided to the patient with some educational information regarding their diagnosis as well a list of reasons why they would want to return to the ER prior to their follow-up appointment should their condition change.     Zoila Serrano MD

## 2020-12-08 NOTE — PROGRESS NOTES
Laboratory Monitoring for Antipsychotics: This patient is currently prescribed the following medication(s):  
Current Facility-Administered Medications Medication Dose Route Frequency  
 risperiDONE (RisperDAL) tablet 1 mg  1 mg Oral DAILY  
 sertraline (ZOLOFT) tablet 150 mg  150 mg Oral DAILY  
 risperiDONE (RisperDAL) tablet 2 mg  2 mg Oral QHS The following labs have been completed for monitoring of antipsychotics and/or mood stabilizers: 
 
Height, Weight, BMI Estimation Estimated body mass index is 28.72 kg/m² as calculated from the following: 
  Height as of this encounter: 157.5 cm (62\"). Weight as of this encounter: 71.2 kg (157 lb). Vital Signs/Blood Pressure Visit Vitals /78 (BP 1 Location: Left arm, BP Patient Position: At rest) Pulse 76 Temp 98.3 °F (36.8 °C) Resp 16 Ht 157.5 cm (62\") Wt 71.2 kg (157 lb) SpO2 98% BMI 28.72 kg/m² Renal Function, Hepatic Function and Chemistry Estimated Creatinine Clearance: 55.7 mL/min (based on SCr of 0.93 mg/dL). Lab Results Component Value Date/Time Sodium 143 04/29/2019 06:34 PM  
 Potassium 4.2 04/29/2019 06:34 PM  
 Chloride 107 04/29/2019 06:34 PM  
 CO2 30 04/29/2019 06:34 PM  
 Anion gap 6 04/29/2019 06:34 PM  
 BUN 15 04/29/2019 06:34 PM  
 Creatinine 0.93 04/29/2019 06:34 PM  
 BUN/Creatinine ratio 16 04/29/2019 06:34 PM  
 Bilirubin, total 0.2 04/29/2019 06:34 PM  
 Protein, total 7.3 04/29/2019 06:34 PM  
 Albumin 3.8 04/29/2019 06:34 PM  
 Globulin 3.5 04/29/2019 06:34 PM  
 A-G Ratio 1.1 04/29/2019 06:34 PM  
 ALT (SGPT) 13 04/29/2019 06:34 PM  
 Alk. phosphatase 60 04/29/2019 06:34 PM  
 
Lab Results Component Value Date/Time Glucose 143 (H) 04/29/2019 06:34 PM  
 Glucose 105 (H) 01/23/2019 06:21 AM  
 Glucose (POC) 108 (H) 09/02/2013 10:09 AM  
 
Lab Results Component Value Date/Time Hemoglobin A1c 5.8 05/01/2019 06:05 AM  
 
Hematology Lab Results Component Value Date/Time WBC 4.5 04/29/2019 06:34 PM  
 RBC 4.27 04/29/2019 06:34 PM  
 HGB 12.7 04/29/2019 06:34 PM  
 HCT 38.8 04/29/2019 06:34 PM  
 MCV 90.9 04/29/2019 06:34 PM  
 MCH 29.7 04/29/2019 06:34 PM  
 MCHC 32.7 04/29/2019 06:34 PM  
 RDW 12.1 04/29/2019 06:34 PM  
 PLATELET 330 26/37/0074 06:34 PM  
 
Lipids Lab Results Component Value Date/Time Cholesterol, total 202 (H) 12/05/2018 04:50 AM  
 HDL Cholesterol 85 12/05/2018 04:50 AM  
 LDL, calculated 98.2 12/05/2018 04:50 AM  
 Triglyceride 94 12/05/2018 04:50 AM  
 CHOL/HDL Ratio 2.4 12/05/2018 04:50 AM  
 
Thyroid Function Lab Results Component Value Date/Time TSH 1.36 05/01/2019 06:05 AM  
 
Assessment/Plan: 
Recommended baseline laboratory monitoring has been completed based on this patient's current medication regimen. Follow-up metabolic monitoring labs should be completed at least annually.   
Lisa Irvin, CHRISD 
 continue home medications

## 2021-10-23 NOTE — PROGRESS NOTES
Problem: Falls - Risk of 
Goal: *Absence of Falls Description Document Carin Mueller Fall Risk and appropriate interventions in the flowsheet. Outcome: Progressing Towards Goal 
Note:  
Fall Risk Interventions: 
Mobility Interventions: Assess mobility with egress test 
Mentation Interventions: Adequate sleep, hydration, pain control Medication Interventions: Teach patient to arise slowly Elimination Interventions: Toilet paper/wipes in reach History of Falls Interventions: Door open when patient unattended Received pt lying in bed with eyes closed, appears to be asleep. Respirations even and unlabored. NAD. Will continue to monitor Q15 for safety. None

## 2022-03-19 PROBLEM — F20.0 SCHIZOPHRENIA, PARANOID (HCC): Status: ACTIVE | Noted: 2019-04-30

## 2022-03-19 PROBLEM — F25.9 SCHIZOAFFECTIVE DISORDER (HCC): Status: ACTIVE | Noted: 2018-12-03

## 2022-03-19 PROBLEM — R41.89 NEUROCOGNITIVE DEFICITS: Status: ACTIVE | Noted: 2018-12-04

## 2022-03-19 PROBLEM — R29.818 NEUROCOGNITIVE DEFICITS: Status: ACTIVE | Noted: 2018-12-04

## 2023-09-20 ENCOUNTER — APPOINTMENT (OUTPATIENT)
Facility: HOSPITAL | Age: 68
End: 2023-09-20
Payer: MEDICARE

## 2023-09-20 ENCOUNTER — HOSPITAL ENCOUNTER (INPATIENT)
Facility: HOSPITAL | Age: 68
LOS: 2 days | Discharge: HOME | End: 2023-09-22
Attending: STUDENT IN AN ORGANIZED HEALTH CARE EDUCATION/TRAINING PROGRAM | Admitting: FAMILY MEDICINE
Payer: MEDICARE

## 2023-09-20 DIAGNOSIS — R55 CARDIAC SYNCOPE: Primary | ICD-10-CM

## 2023-09-20 DIAGNOSIS — I21.4 NSTEMI (NON-ST ELEVATED MYOCARDIAL INFARCTION) (HCC): ICD-10-CM

## 2023-09-20 LAB
ALBUMIN SERPL-MCNC: 3.6 G/DL (ref 3.5–5)
ALBUMIN/GLOB SERPL: 0.9 (ref 1.1–2.2)
ALP SERPL-CCNC: 69 U/L (ref 45–117)
ALT SERPL-CCNC: 18 U/L (ref 12–78)
ANION GAP SERPL CALC-SCNC: 7 MMOL/L (ref 5–15)
AST SERPL-CCNC: ABNORMAL U/L (ref 15–37)
BASOPHILS # BLD: 0 K/UL (ref 0–0.1)
BASOPHILS NFR BLD: 1 % (ref 0–1)
BILIRUB SERPL-MCNC: 0.4 MG/DL (ref 0.2–1)
BUN SERPL-MCNC: 19 MG/DL (ref 6–20)
BUN/CREAT SERPL: 23 (ref 12–20)
CALCIUM SERPL-MCNC: 8.9 MG/DL (ref 8.5–10.1)
CHLORIDE SERPL-SCNC: 107 MMOL/L (ref 97–108)
CO2 SERPL-SCNC: 22 MMOL/L (ref 21–32)
COMMENT:: NORMAL
CREAT SERPL-MCNC: 0.84 MG/DL (ref 0.55–1.02)
D DIMER PPP FEU-MCNC: 4.11 MG/L FEU (ref 0–0.65)
DIFFERENTIAL METHOD BLD: NORMAL
EKG ATRIAL RATE: 53 BPM
EKG DIAGNOSIS: NORMAL
EKG P AXIS: 43 DEGREES
EKG P-R INTERVAL: 174 MS
EKG Q-T INTERVAL: 516 MS
EKG QRS DURATION: 84 MS
EKG QTC CALCULATION (BAZETT): 484 MS
EKG R AXIS: 9 DEGREES
EKG T AXIS: 183 DEGREES
EKG VENTRICULAR RATE: 53 BPM
EOSINOPHIL # BLD: 0.1 K/UL (ref 0–0.4)
EOSINOPHIL NFR BLD: 2 % (ref 0–7)
ERYTHROCYTE [DISTWIDTH] IN BLOOD BY AUTOMATED COUNT: 13 % (ref 11.5–14.5)
GLOBULIN SER CALC-MCNC: 4.1 G/DL (ref 2–4)
GLUCOSE BLD STRIP.AUTO-MCNC: 120 MG/DL (ref 65–117)
GLUCOSE SERPL-MCNC: 131 MG/DL (ref 65–100)
HCT VFR BLD AUTO: 36.9 % (ref 35–47)
HGB BLD-MCNC: 11.8 G/DL (ref 11.5–16)
IMM GRANULOCYTES # BLD AUTO: 0 K/UL (ref 0–0.04)
IMM GRANULOCYTES NFR BLD AUTO: 0 % (ref 0–0.5)
LYMPHOCYTES # BLD: 1.7 K/UL (ref 0.8–3.5)
LYMPHOCYTES NFR BLD: 33 % (ref 12–49)
MAGNESIUM SERPL-MCNC: 2.1 MG/DL (ref 1.6–2.4)
MCH RBC QN AUTO: 28.2 PG (ref 26–34)
MCHC RBC AUTO-ENTMCNC: 32 G/DL (ref 30–36.5)
MCV RBC AUTO: 88.3 FL (ref 80–99)
MONOCYTES # BLD: 0.3 K/UL (ref 0–1)
MONOCYTES NFR BLD: 6 % (ref 5–13)
NEUTS SEG # BLD: 3.1 K/UL (ref 1.8–8)
NEUTS SEG NFR BLD: 59 % (ref 32–75)
NRBC # BLD: 0 K/UL (ref 0–0.01)
NRBC BLD-RTO: 0 PER 100 WBC
NT PRO BNP: 428 PG/ML
PLATELET # BLD AUTO: 238 K/UL (ref 150–400)
PMV BLD AUTO: 10.3 FL (ref 8.9–12.9)
POTASSIUM SERPL-SCNC: ABNORMAL MMOL/L (ref 3.5–5.1)
PROT SERPL-MCNC: 7.7 G/DL (ref 6.4–8.2)
RBC # BLD AUTO: 4.18 M/UL (ref 3.8–5.2)
SERVICE CMNT-IMP: ABNORMAL
SODIUM SERPL-SCNC: 136 MMOL/L (ref 136–145)
SPECIMEN HOLD: NORMAL
TROPONIN I SERPL HS-MCNC: 104 NG/L (ref 0–51)
TROPONIN I SERPL HS-MCNC: 117 NG/L (ref 0–51)
TROPONIN I SERPL HS-MCNC: 126 NG/L (ref 0–51)
WBC # BLD AUTO: 5.2 K/UL (ref 3.6–11)

## 2023-09-20 PROCEDURE — 2580000003 HC RX 258: Performed by: FAMILY MEDICINE

## 2023-09-20 PROCEDURE — 85379 FIBRIN DEGRADATION QUANT: CPT

## 2023-09-20 PROCEDURE — 70450 CT HEAD/BRAIN W/O DYE: CPT

## 2023-09-20 PROCEDURE — 83880 ASSAY OF NATRIURETIC PEPTIDE: CPT

## 2023-09-20 PROCEDURE — 80053 COMPREHEN METABOLIC PANEL: CPT

## 2023-09-20 PROCEDURE — 6360000004 HC RX CONTRAST MEDICATION: Performed by: RADIOLOGY

## 2023-09-20 PROCEDURE — 2500000003 HC RX 250 WO HCPCS: Performed by: FAMILY MEDICINE

## 2023-09-20 PROCEDURE — 99285 EMERGENCY DEPT VISIT HI MDM: CPT

## 2023-09-20 PROCEDURE — 83735 ASSAY OF MAGNESIUM: CPT

## 2023-09-20 PROCEDURE — 85025 COMPLETE CBC W/AUTO DIFF WBC: CPT

## 2023-09-20 PROCEDURE — 84484 ASSAY OF TROPONIN QUANT: CPT

## 2023-09-20 PROCEDURE — 71275 CT ANGIOGRAPHY CHEST: CPT

## 2023-09-20 PROCEDURE — 82962 GLUCOSE BLOOD TEST: CPT

## 2023-09-20 PROCEDURE — 36415 COLL VENOUS BLD VENIPUNCTURE: CPT

## 2023-09-20 PROCEDURE — 93005 ELECTROCARDIOGRAM TRACING: CPT | Performed by: STUDENT IN AN ORGANIZED HEALTH CARE EDUCATION/TRAINING PROGRAM

## 2023-09-20 PROCEDURE — 71045 X-RAY EXAM CHEST 1 VIEW: CPT

## 2023-09-20 PROCEDURE — 2060000000 HC ICU INTERMEDIATE R&B

## 2023-09-20 PROCEDURE — 93010 ELECTROCARDIOGRAM REPORT: CPT | Performed by: SPECIALIST

## 2023-09-20 RX ORDER — ONDANSETRON 2 MG/ML
4 INJECTION INTRAMUSCULAR; INTRAVENOUS EVERY 6 HOURS PRN
Status: DISCONTINUED | OUTPATIENT
Start: 2023-09-20 | End: 2023-09-22 | Stop reason: HOSPADM

## 2023-09-20 RX ORDER — POLYETHYLENE GLYCOL 3350 17 G/17G
17 POWDER, FOR SOLUTION ORAL DAILY PRN
Status: DISCONTINUED | OUTPATIENT
Start: 2023-09-20 | End: 2023-09-22 | Stop reason: HOSPADM

## 2023-09-20 RX ORDER — SODIUM CHLORIDE 0.9 % (FLUSH) 0.9 %
5-40 SYRINGE (ML) INJECTION PRN
Status: DISCONTINUED | OUTPATIENT
Start: 2023-09-20 | End: 2023-09-22 | Stop reason: HOSPADM

## 2023-09-20 RX ORDER — ACETAMINOPHEN 650 MG/1
650 SUPPOSITORY RECTAL EVERY 6 HOURS PRN
Status: DISCONTINUED | OUTPATIENT
Start: 2023-09-20 | End: 2023-09-22 | Stop reason: HOSPADM

## 2023-09-20 RX ORDER — ONDANSETRON 4 MG/1
4 TABLET, ORALLY DISINTEGRATING ORAL EVERY 8 HOURS PRN
Status: DISCONTINUED | OUTPATIENT
Start: 2023-09-20 | End: 2023-09-22 | Stop reason: HOSPADM

## 2023-09-20 RX ORDER — ACETAMINOPHEN 325 MG/1
650 TABLET ORAL EVERY 6 HOURS PRN
Status: DISCONTINUED | OUTPATIENT
Start: 2023-09-20 | End: 2023-09-22 | Stop reason: HOSPADM

## 2023-09-20 RX ORDER — SODIUM CHLORIDE 0.9 % (FLUSH) 0.9 %
5-40 SYRINGE (ML) INJECTION EVERY 12 HOURS SCHEDULED
Status: DISCONTINUED | OUTPATIENT
Start: 2023-09-20 | End: 2023-09-22 | Stop reason: HOSPADM

## 2023-09-20 RX ORDER — SODIUM CHLORIDE 9 MG/ML
INJECTION, SOLUTION INTRAVENOUS PRN
Status: DISCONTINUED | OUTPATIENT
Start: 2023-09-20 | End: 2023-09-22 | Stop reason: HOSPADM

## 2023-09-20 RX ADMIN — NICARDIPINE HYDROCHLORIDE 5 MG/HR: 25 INJECTION, SOLUTION INTRAVENOUS at 23:12

## 2023-09-20 RX ADMIN — IOPAMIDOL 80 ML: 755 INJECTION, SOLUTION INTRAVENOUS at 19:05

## 2023-09-20 ASSESSMENT — PAIN - FUNCTIONAL ASSESSMENT
PAIN_FUNCTIONAL_ASSESSMENT: NONE - DENIES PAIN
PAIN_FUNCTIONAL_ASSESSMENT: 0-10

## 2023-09-20 ASSESSMENT — PAIN SCALES - GENERAL
PAINLEVEL_OUTOF10: 0

## 2023-09-20 NOTE — ED PROVIDER NOTES
[address] [facility]  EMERGENCY DEPARTMENT ENCOUNTER      Pt Name: Sophy Saravia  MRN: [id number]  [facility] 1955  Date of evaluation: 9/20/2023  Provider: Sidney Huertas DO    CHIEF COMPLAINT       Chief Complaint   Patient presents with    Fall       PMH   Past Medical History:   Diagnosis Date    Anxiety disorder     Hypertension     Psychiatric disorder     schizophrenia          MDM:   Vitals:    Vitals:    09/20/23 2314   BP: 137/64   Pulse: 65   Resp: 15   Temp:    SpO2: 97%           This is a 70 y.o. female with pmhx psychiatric disorders hypertension who presents today for cc of syncope. Patient's family were given drug history secondary to psychiatric condition and acuity. The patient was at [facility] today when she had an episode of syncope. She states she did not feel any prodrome, she did not trip and fall, she states that she just suddenly woke up on the floor. Denies preceding chest pain, dyspnea, palpitation. Did not lose bowel or bladder, did hit her head on the floor, no tonic clonic motions noted by bystanders. Was briefly nonresponsive, less than 1 minute. No postictal state. No tongue biting. She had a second episode while getting EKG here in triage and it was noted that her heart rate dipped down to 40 bpm, occurred while sitting. On arrival VS stable. Physical Exam  General: Alert, no acute distress, elderly  HEENT: Normocephalic, atraumatic. EOMI, moist oral mucosa, no conjunctival injection  Neck: ROM normal, supple  Cardio: Heart regular rate and rhythm, cap refill <2seconds  Lungs: No respiratory distress, no wheezing, CTAB  Abdomen: Soft, nontender  MSK: ROM normal, no LE edema  Skin: Warm, dry, no rash  Neuro: No focal neurodeficits, baseline mentation    Patient's history concerning for cardiac syncope, especially with witnessed bradycardic episode that coincided while she was here.   Lab work obtained showing elevated BNP, elevated D-dimer and Wells was low risk so I did Globulin 4.1 (*)     Albumin/Globulin Ratio 0.9 (*)     All other components within normal limits   BRAIN NATRIURETIC PEPTIDE - Abnormal; Notable for the following components:    NT Pro- (*)     All other components within normal limits   TROPONIN - Abnormal; Notable for the following components:    Troponin, High Sensitivity 126 (*)     All other components within normal limits   TROPONIN - Abnormal; Notable for the following components:    Troponin, High Sensitivity 104 (*)     All other components within normal limits   POCT GLUCOSE - Abnormal; Notable for the following components:    POC Glucose 120 (*)     All other components within normal limits   CBC WITH AUTO DIFFERENTIAL   MAGNESIUM   EXTRA TUBES HOLD   BASIC METABOLIC PANEL W/ REFLEX TO MG FOR LOW K   CBC WITH AUTO DIFFERENTIAL       All other labs were within normal range or not returned as of this dictation. PROCEDURES:  Unless otherwise noted below, none     Procedures    Total critical care time (not including time spent performing separately reportable procedures): n/a       FINAL IMPRESSION      1. Cardiac syncope    2. NSTEMI (non-ST elevated myocardial infarction) [facility])          DISPOSITION/PLAN   DISPOSITION Admitted 09/20/2023 07:48:59 PM      PATIENT REFERRED TO:  No follow-up provider specified. DISCHARGE MEDICATIONS:  There are no discharge medications for this patient.         (Please note that portions of this note were completed with a voice recognition program.  Efforts were made to edit the dictations but occasionally words are mis-transcribed.)    Jenine Lanes, DO (electronically signed)  Emergency Attending Physician / Physician Assistant / Nurse Practitioner             Jenine Lanes, DO  09/21/23 0025

## 2023-09-21 ENCOUNTER — APPOINTMENT (OUTPATIENT)
Facility: HOSPITAL | Age: 68
End: 2023-09-21
Attending: FAMILY MEDICINE
Payer: MEDICARE

## 2023-09-21 LAB
AMPHET UR QL SCN: NEGATIVE
ANION GAP SERPL CALC-SCNC: 5 MMOL/L (ref 5–15)
BARBITURATES UR QL SCN: NEGATIVE
BASOPHILS # BLD: 0 K/UL (ref 0–0.1)
BASOPHILS NFR BLD: 1 % (ref 0–1)
BENZODIAZ UR QL: NEGATIVE
BUN SERPL-MCNC: 16 MG/DL (ref 6–20)
BUN/CREAT SERPL: 24 (ref 12–20)
CALCIUM SERPL-MCNC: 9.1 MG/DL (ref 8.5–10.1)
CANNABINOIDS UR QL SCN: NEGATIVE
CHLORIDE SERPL-SCNC: 107 MMOL/L (ref 97–108)
CO2 SERPL-SCNC: 27 MMOL/L (ref 21–32)
COCAINE UR QL SCN: NEGATIVE
CREAT SERPL-MCNC: 0.68 MG/DL (ref 0.55–1.02)
DIFFERENTIAL METHOD BLD: ABNORMAL
ECHO AO ROOT DIAM: 3.6 CM
ECHO AO ROOT INDEX: 2 CM/M2
ECHO AV AREA PEAK VELOCITY: 2.5 CM2
ECHO AV AREA/BSA PEAK VELOCITY: 1.4 CM2/M2
ECHO AV PEAK GRADIENT: 9 MMHG
ECHO AV PEAK VELOCITY: 1.5 M/S
ECHO AV VELOCITY RATIO: 0.8
ECHO BSA: 1.85 M2
ECHO EST RA PRESSURE: 3 MMHG
ECHO LA DIAMETER INDEX: 2.22 CM/M2
ECHO LA DIAMETER: 4 CM
ECHO LA TO AORTIC ROOT RATIO: 1.11
ECHO LA VOL 2C: 104 ML (ref 22–52)
ECHO LA VOL 2C: 105 ML (ref 22–52)
ECHO LA VOL 4C: 83 ML (ref 22–52)
ECHO LA VOL 4C: 86 ML (ref 22–52)
ECHO LA VOLUME AREA LENGTH: 95 ML
ECHO LA VOLUME INDEX AREA LENGTH: 53 ML/M2 (ref 16–34)
ECHO LV E' LATERAL VELOCITY: 7 CM/S
ECHO LV E' SEPTAL VELOCITY: 4 CM/S
ECHO LV FRACTIONAL SHORTENING: 28 % (ref 28–44)
ECHO LV INTERNAL DIMENSION DIASTOLE INDEX: 2.17 CM/M2
ECHO LV INTERNAL DIMENSION DIASTOLIC: 3.9 CM (ref 3.9–5.3)
ECHO LV INTERNAL DIMENSION SYSTOLIC INDEX: 1.56 CM/M2
ECHO LV INTERNAL DIMENSION SYSTOLIC: 2.8 CM
ECHO LV IVSD: 1.3 CM (ref 0.6–0.9)
ECHO LV MASS 2D: 169.4 G (ref 67–162)
ECHO LV MASS INDEX 2D: 94.1 G/M2 (ref 43–95)
ECHO LV POSTERIOR WALL DIASTOLIC: 1.2 CM (ref 0.6–0.9)
ECHO LV RELATIVE WALL THICKNESS RATIO: 0.62
ECHO LVOT AREA: 3.1 CM2
ECHO LVOT DIAM: 2 CM
ECHO LVOT PEAK GRADIENT: 6 MMHG
ECHO LVOT PEAK VELOCITY: 1.2 M/S
ECHO MV A VELOCITY: 1.09 M/S
ECHO MV AREA PHT: 2.2 CM2
ECHO MV E DECELERATION TIME (DT): 345.2 MS
ECHO MV E VELOCITY: 0.61 M/S
ECHO MV E/A RATIO: 0.56
ECHO MV E/E' LATERAL: 8.71
ECHO MV E/E' RATIO (AVERAGED): 11.98
ECHO MV E/E' SEPTAL: 15.25
ECHO MV PRESSURE HALF TIME (PHT): 100.1 MS
ECHO PV MAX VELOCITY: 0.8 M/S
ECHO PV PEAK GRADIENT: 2 MMHG
ECHO RIGHT VENTRICULAR SYSTOLIC PRESSURE (RVSP): 28 MMHG
ECHO RV FREE WALL PEAK S': 19 CM/S
ECHO RV INTERNAL DIMENSION: 2.8 CM
ECHO RV TAPSE: 1.7 CM (ref 1.7–?)
ECHO TV REGURGITANT MAX VELOCITY: 2.52 M/S
ECHO TV REGURGITANT PEAK GRADIENT: 25 MMHG
EOSINOPHIL # BLD: 0.1 K/UL (ref 0–0.4)
EOSINOPHIL NFR BLD: 1 % (ref 0–7)
ERYTHROCYTE [DISTWIDTH] IN BLOOD BY AUTOMATED COUNT: 13 % (ref 11.5–14.5)
GLUCOSE SERPL-MCNC: 117 MG/DL (ref 65–100)
HCT VFR BLD AUTO: 40.7 % (ref 35–47)
HGB BLD-MCNC: 13.2 G/DL (ref 11.5–16)
IMM GRANULOCYTES # BLD AUTO: 0.1 K/UL (ref 0–0.04)
IMM GRANULOCYTES NFR BLD AUTO: 1 % (ref 0–0.5)
LYMPHOCYTES # BLD: 1.9 K/UL (ref 0.8–3.5)
LYMPHOCYTES NFR BLD: 32 % (ref 12–49)
Lab: NORMAL
MCH RBC QN AUTO: 28.7 PG (ref 26–34)
MCHC RBC AUTO-ENTMCNC: 32.4 G/DL (ref 30–36.5)
MCV RBC AUTO: 88.5 FL (ref 80–99)
METHADONE UR QL: NEGATIVE
MONOCYTES # BLD: 0.4 K/UL (ref 0–1)
MONOCYTES NFR BLD: 7 % (ref 5–13)
NEUTS SEG # BLD: 3.6 K/UL (ref 1.8–8)
NEUTS SEG NFR BLD: 58 % (ref 32–75)
NRBC # BLD: 0 K/UL (ref 0–0.01)
NRBC BLD-RTO: 0 PER 100 WBC
OPIATES UR QL: NEGATIVE
PCP UR QL: NEGATIVE
PLATELET # BLD AUTO: 273 K/UL (ref 150–400)
PMV BLD AUTO: 9.4 FL (ref 8.9–12.9)
POTASSIUM SERPL-SCNC: 4.1 MMOL/L (ref 3.5–5.1)
RBC # BLD AUTO: 4.6 M/UL (ref 3.8–5.2)
SODIUM SERPL-SCNC: 139 MMOL/L (ref 136–145)
WBC # BLD AUTO: 6.1 K/UL (ref 3.6–11)

## 2023-09-21 PROCEDURE — 6370000000 HC RX 637 (ALT 250 FOR IP): Performed by: STUDENT IN AN ORGANIZED HEALTH CARE EDUCATION/TRAINING PROGRAM

## 2023-09-21 PROCEDURE — 6370000000 HC RX 637 (ALT 250 FOR IP): Performed by: FAMILY MEDICINE

## 2023-09-21 PROCEDURE — 85025 COMPLETE CBC W/AUTO DIFF WBC: CPT

## 2023-09-21 PROCEDURE — 2060000000 HC ICU INTERMEDIATE R&B

## 2023-09-21 PROCEDURE — 2500000003 HC RX 250 WO HCPCS: Performed by: FAMILY MEDICINE

## 2023-09-21 PROCEDURE — 36415 COLL VENOUS BLD VENIPUNCTURE: CPT

## 2023-09-21 PROCEDURE — 2580000003 HC RX 258: Performed by: FAMILY MEDICINE

## 2023-09-21 PROCEDURE — 80048 BASIC METABOLIC PNL TOTAL CA: CPT

## 2023-09-21 PROCEDURE — 80307 DRUG TEST PRSMV CHEM ANLYZR: CPT

## 2023-09-21 PROCEDURE — 93306 TTE W/DOPPLER COMPLETE: CPT

## 2023-09-21 PROCEDURE — 6360000002 HC RX W HCPCS: Performed by: FAMILY MEDICINE

## 2023-09-21 RX ORDER — RISPERIDONE 1 MG/1
2 TABLET ORAL NIGHTLY
Status: DISCONTINUED | OUTPATIENT
Start: 2023-09-21 | End: 2023-09-22 | Stop reason: HOSPADM

## 2023-09-21 RX ORDER — RISPERIDONE 1 MG/1
TABLET ORAL
COMMUNITY

## 2023-09-21 RX ORDER — RISPERIDONE 0.25 MG/1
1 TABLET ORAL
Status: DISCONTINUED | OUTPATIENT
Start: 2023-09-21 | End: 2023-09-22 | Stop reason: HOSPADM

## 2023-09-21 RX ORDER — SPIRONOLACTONE 50 MG/1
50 TABLET, FILM COATED ORAL DAILY
COMMUNITY
Start: 2023-08-18

## 2023-09-21 RX ORDER — ENOXAPARIN SODIUM 100 MG/ML
40 INJECTION SUBCUTANEOUS DAILY
Status: DISCONTINUED | OUTPATIENT
Start: 2023-09-21 | End: 2023-09-22 | Stop reason: HOSPADM

## 2023-09-21 RX ORDER — AMLODIPINE BESYLATE 5 MG/1
5 TABLET ORAL DAILY
Status: DISCONTINUED | OUTPATIENT
Start: 2023-09-21 | End: 2023-09-22 | Stop reason: HOSPADM

## 2023-09-21 RX ORDER — SPIRONOLACTONE 25 MG/1
50 TABLET ORAL DAILY
Status: DISCONTINUED | OUTPATIENT
Start: 2023-09-21 | End: 2023-09-22 | Stop reason: HOSPADM

## 2023-09-21 RX ADMIN — NICARDIPINE HYDROCHLORIDE 2.5 MG/HR: 25 INJECTION, SOLUTION INTRAVENOUS at 10:16

## 2023-09-21 RX ADMIN — RISPERIDONE 1 MG: 0.25 TABLET, FILM COATED ORAL at 07:21

## 2023-09-21 RX ADMIN — SODIUM CHLORIDE, PRESERVATIVE FREE 10 ML: 5 INJECTION INTRAVENOUS at 21:24

## 2023-09-21 RX ADMIN — ENOXAPARIN SODIUM 40 MG: 100 INJECTION SUBCUTANEOUS at 10:14

## 2023-09-21 RX ADMIN — SODIUM CHLORIDE, PRESERVATIVE FREE 10 ML: 5 INJECTION INTRAVENOUS at 10:28

## 2023-09-21 RX ADMIN — AMLODIPINE BESYLATE 5 MG: 5 TABLET ORAL at 10:15

## 2023-09-21 RX ADMIN — RISPERIDONE 2 MG: 1 TABLET ORAL at 21:23

## 2023-09-21 RX ADMIN — SPIRONOLACTONE 50 MG: 25 TABLET ORAL at 10:15

## 2023-09-21 ASSESSMENT — PAIN SCALES - GENERAL: PAINLEVEL_OUTOF10: 0

## 2023-09-21 NOTE — ED NOTES
Pt hypertensive consistently with no PRN meds ordered on MAR, RN reached out via perfect serve to admitting.  Awaiting further orders at this time      Tiffany Judge RN  09/20/23 6687

## 2023-09-21 NOTE — ED NOTES
RN attempted to call report, nurse unavailable and will return call      Ant Kaplan RN  09/20/23 0381

## 2023-09-21 NOTE — ED NOTES
Report given to Lori CASTILLO.  Repeat trop to be sent prior to transfer of pt to unit      Lola Favre, RN  09/20/23 2040

## 2023-09-21 NOTE — DISCHARGE INSTRUCTIONS
Smoking Cessation Program: This is a free, phone/web based, smoking cessation program. The program is individualized to meet each patient's needs. To enroll use the link - www.quitnow[place]. org or call 9-316-ZVQKNBA ([phone]) .

## 2023-09-21 NOTE — CARE COORDINATION
CM met with pt and her sister, Jorge Varela [phone], who was at bedside, to introduce self and CM role. Pt resides in a 2 story home with her sister Lc Hence [phone]. Pt is independent with ADLs and has significant family support. Pt presented with confabulation and excessive appreciation for CM's visit    Sister verified all facesheet demographics. No dc needs identified. [facility] 09/21/23 162   Service Assessment   Patient Orientation Alert and Oriented;Person   Cognition Other (see comment)  (Presented with excited fast paced conversation)   History Provided By Child/Family   Primary Caregiver Self   Accompanied By/Relationship Sister Jorge Varela [phone]   Support Systems Family Members   Patient's Healthcare Decision Maker is: Legal Next of [address] [place] [place] [address]   PCP Verified by CM No  (Sister Rebecca Yoo has PCP name on pt's medicine bottle at home. Rebecca Yoo to bring to hospital tomorrow)   Prior Functional Level Independent in ADLs/IADLs   Current Functional Level Independent in ADLs/IADLs   Can patient return to prior living arrangement Yes   Ability to make needs known: Fair   Family able to assist with home care needs: Yes   Would you like for me to discuss the discharge plan with any other family members/significant others, and if so, who?  Yes  (Sister Rebecca Yoo [phone])   Financial Resources Medicare   Community Resources None   Social/Functional History   Lives With Family  (Sister Lc Hence [phone])   Home Layout One level   Home Access Stairs to enter without rails   Entrance Stairs - Number of Steps 2   Bathroom Shower/Tub Tub/Shower unit   [employer] Standard   Home Equipment None   Receives Help From Family   Active  No   Mode of Transportation Car;Family   Occupation Retired     [employer] [place]

## 2023-09-21 NOTE — ED NOTES
Pt soiled self, pt cleaned and provided with new gown. Linens changed.       Kelly Martin RN  09/20/23 2046

## 2023-09-21 NOTE — PROGRESS NOTES
meningeal signs  Chest: Clear to auscultation bilaterally   CVS: S1 S2 heard, Capillary refill less than 2 seconds  Abd: soft/ non tender, non distended, BS physiological,   Ext: no clubbing, no cyanosis, no edema, brisk 2+ DP pulses  Neuro/Psych: pleasant mood and affect, CN 2-12 grossly intact, sensory grossly within normal limit, Strength 5/5 in all extremities  Skin: warm            Data Review:    Review and/or order of clinical lab test  Review and/or order of tests in the radiology section of CPT  Review and/or order of tests in the medicine section of CPT    I have independently reviewed and interpreted patient's lab and all other diagnostic data    Notes reviewed from all clinical/nonclinical/nursing services involved in patient's clinical care. Care coordination discussions were held with appropriate clinical/nonclinical/ nursing providers based on care coordination needs. Labs:     Recent Labs     09/20/23  1601 09/21/23  0320   WBC 5.2 6.1   HGB 11.8 13.2   HCT 36.9 40.7    273     Recent Labs     09/20/23  1601 09/21/23  0320    139   K HEMOLYZED,RECOLLECT REQUESTED 4.1    107   CO2 22 27   BUN 19 16   MG 2.1  --      Recent Labs     09/20/23  1601   ALT 18   GLOB 4.1*     No results for input(s): \"INR\", \"APTT\" in the last 72 hours. Invalid input(s): \"PTP\"   No results for input(s): \"TIBC\", \"FERR\" in the last 72 hours. Invalid input(s): \"FE\", \"PSAT\"   No results found for: \"FOL\", \"RBCF\"   No results for input(s): \"PH\", \"PCO2\", \"PO2\" in the last 72 hours. No results for input(s): \"CPK\" in the last 72 hours.     Invalid input(s): \"CPKMB\", \"CKNDX\", \"TROIQ\"  No results found for: \"CHOL\", \"CHOLX\", \"CHLST\", \"CHOLV\", \"HDL\", \"HDLC\", \"LDL\", \"LDLC\", \"TGLX\", \"TRIGL\"  No results found for: \"GLUCPOC\"        Medications Reviewed:     Current Facility-Administered Medications   Medication Dose Route Frequency    risperiDONE (RisperDAL) tablet 1 mg  [facility]    risperiDONE (RISPERDAL) tablet 2 mg  2 mg Oral Nightly    enoxaparin (LOVENOX) injection 40 mg  40 mg SubCUTAneous Daily    spironolactone (ALDACTONE) tablet 50 mg  50 mg Oral Daily    amLODIPine (NORVASC) tablet 5 mg  5 mg Oral Daily    sodium chloride flush 0.9 % injection 5-40 mL  5-40 mL IntraVENous 2 times per day    sodium chloride flush 0.9 % injection 5-40 mL  5-40 mL IntraVENous PRN    0.9 % sodium chloride infusion   IntraVENous PRN    ondansetron (ZOFRAN-ODT) disintegrating tablet 4 mg  4 mg Oral Q8H PRN    Or    ondansetron (ZOFRAN) injection 4 mg  4 mg IntraVENous Q6H PRN    polyethylene glycol (GLYCOLAX) packet 17 g  17 g Oral Daily PRN    acetaminophen (TYLENOL) tablet 650 mg  650 mg Oral Q6H PRN    Or    acetaminophen (TYLENOL) suppository 650 mg  650 mg Rectal Q6H PRN    niCARdipine (CARDENE) 25 mg in sodium chloride 0.9 % 250 mL infusion ([id number])  2.5-15 mg/hr IntraVENous Continuous     ______________________________________________________________________  EXPECTED LENGTH OF STAY: Unable to retrieve estimated LOS  ACTUAL LENGTH OF STAY:          1                 Roscoe Cain MD

## 2023-09-21 NOTE — H&P
History and Physical    Date of Service:  9/20/2023  Primary Care Provider: No primary care provider on file. Source of information: patient, electronic medical record    Chief Complaint: Fall      History of Presenting Illness:   Kranthi Carlson is a 70 y.o. female with medical history of severe anemia, hypertension, and anxiety who presents with a syncopal episode that was observed by chills within [facility] where she was walking. Had past episodes of syncope. She states that she has had two episodes of syncope. The first occurred earlier in the summer and was not witnessed. This time, she was in [facility] when she started to feel lightheaded, then woke up with people standing around her. She denies palpiations, headache, or flushing/nausea prodrome. In the ED, she was elevated to 200/130. Other vitals stable. Labs are significant for d-dimer 4.11, probnp 428, and troponin 117>126. CT head showed no acute abnormality. CTA chest showed no evidence of PE.  EKG shows sinus bradycardia in the 50s with LVH, repolarization abnormalities in the inferolateral leads. REVIEW OF SYSTEMS:  A comprehensive review of systems was negative except for that written in the History of Present Illness. Past Medical History:   Diagnosis Date    Anxiety disorder     Hypertension     Psychiatric disorder     schizophrenia       No past surgical history on file. Prior to Admission medications    Not on File     No Known Allergies   Family History   Problem Relation Age of Onset    HIV/AIDS Mother     Heart Attack Father       Social History:  reports that she has been smoking cigarettes. She has been smoking an average of .25 packs per day. She has never used smokeless tobacco. She reports that she does not drink alcohol and does not use drugs.    Social Determinants of Health     Tobacco Use: High Risk (4/10/2023)    Patient History     Smoking Tobacco Use: Some Days     Smokeless Tobacco Use: Never making was performed, which includes reviewing the patient's available past medical records, laboratory results, and imaging studies. Principal Problem:    NSTEMI (non-ST elevated myocardial infarction) ([address])  Resolved Problems:    * No resolved hospital problems. *      Plan:     #syncope  -continuous telemetry  -check orthostatics  -check ETOH and UDS  -echo    #hypertensive emergency  #troponin elevated  -cardene gtt started for goal -180  -restart home meds if they can verified  -trop 117>126>104, trend flat, and pt. Asymptomatic so likely 2/2 demand    #nicotine dependence  -recommend cessation    #schizophrenia  -takes monthly paliperidone per chart  -continue home risperdal    DIET: ADULT DIET; Regular; 4 carb choices (60 gm/meal); Low Fat/Low Chol/High Fiber/2 gm Na; Low Sodium (2 gm)   ISOLATION PRECAUTIONS: Contact  CODE STATUS: Full  DVT PROPHYLAXIS: Lovenox  ANTICIPATED DISCHARGE: 2-3 days  ANTICIPATED DISPOSITION:  home    CRITICAL CARE WAS PERFORMED FOR THIS ENCOUNTER: 30 to 74 minutes      Signed By: Luana Love MD     September 20, 2023         Please note that this dictation may have been completed with Deysi Castellanos, the computer voice recognition software. Quite often unanticipated grammatical, syntax, homophones, and other interpretive errors are inadvertently transcribed by the computer software. Please disregard these errors. Please excuse any errors that have escaped final proofreading.

## 2023-09-22 VITALS
WEIGHT: 170 LBS | HEART RATE: 83 BPM | RESPIRATION RATE: 16 BRPM | BODY MASS INDEX: 30.12 KG/M2 | DIASTOLIC BLOOD PRESSURE: 68 MMHG | OXYGEN SATURATION: 100 % | SYSTOLIC BLOOD PRESSURE: 117 MMHG | HEIGHT: 63 IN | TEMPERATURE: 98.2 F

## 2023-09-22 LAB
ANION GAP SERPL CALC-SCNC: 4 MMOL/L (ref 5–15)
BUN SERPL-MCNC: 17 MG/DL (ref 6–20)
BUN/CREAT SERPL: 19 (ref 12–20)
CALCIUM SERPL-MCNC: 9.4 MG/DL (ref 8.5–10.1)
CHLORIDE SERPL-SCNC: 108 MMOL/L (ref 97–108)
CO2 SERPL-SCNC: 21 MMOL/L (ref 21–32)
COMMENT:: NORMAL
CREAT SERPL-MCNC: 0.88 MG/DL (ref 0.55–1.02)
GLUCOSE SERPL-MCNC: 97 MG/DL (ref 65–100)
POTASSIUM SERPL-SCNC: 4.4 MMOL/L (ref 3.5–5.1)
SODIUM SERPL-SCNC: 133 MMOL/L (ref 136–145)
SPECIMEN HOLD: NORMAL

## 2023-09-22 PROCEDURE — 6370000000 HC RX 637 (ALT 250 FOR IP): Performed by: FAMILY MEDICINE

## 2023-09-22 PROCEDURE — 6370000000 HC RX 637 (ALT 250 FOR IP): Performed by: STUDENT IN AN ORGANIZED HEALTH CARE EDUCATION/TRAINING PROGRAM

## 2023-09-22 PROCEDURE — 2580000003 HC RX 258: Performed by: FAMILY MEDICINE

## 2023-09-22 PROCEDURE — 80048 BASIC METABOLIC PNL TOTAL CA: CPT

## 2023-09-22 PROCEDURE — 36415 COLL VENOUS BLD VENIPUNCTURE: CPT

## 2023-09-22 PROCEDURE — 6360000002 HC RX W HCPCS: Performed by: FAMILY MEDICINE

## 2023-09-22 RX ORDER — AMLODIPINE BESYLATE 5 MG/1
5 TABLET ORAL DAILY
Qty: 30 TABLET | Refills: 0 | Status: SHIPPED | OUTPATIENT
Start: 2023-09-22 | End: 2023-10-22

## 2023-09-22 RX ADMIN — AMLODIPINE BESYLATE 5 MG: 5 TABLET ORAL at 09:31

## 2023-09-22 RX ADMIN — RISPERIDONE 1 MG: 0.25 TABLET, FILM COATED ORAL at 06:40

## 2023-09-22 RX ADMIN — ENOXAPARIN SODIUM 40 MG: 100 INJECTION SUBCUTANEOUS at 09:31

## 2023-09-22 RX ADMIN — SPIRONOLACTONE 50 MG: 25 TABLET ORAL at 09:31

## 2023-09-22 RX ADMIN — SODIUM CHLORIDE, PRESERVATIVE FREE 10 ML: 5 INJECTION INTRAVENOUS at 09:33

## 2023-09-22 NOTE — DISCHARGE SUMMARY
Discharge Summary       PATIENT ID: Gabby Abreu  MRN: [id number]   YOB: 1955    DATE OF ADMISSION: 9/20/2023  4:02 PM    DATE OF DISCHARGE: 09/22/23    PRIMARY CARE PROVIDER: No primary care provider on file. ATTENDING PHYSICIAN: Monie Luna MD   DISCHARGING PROVIDER: Monie Luna MD    To contact this individual call [phone] and ask the  to page. If unavailable ask to be transferred the Adult Hospitalist Department. CONSULTATIONS: IP CONSULT TO CASE MANAGEMENT    PROCEDURES/SURGERIES: * No surgery found *     ADMITTING DIAGNOSES & HOSPITAL COURSE:   HPI: Gabby Abreu is a 70 y.o. female with medical history of severe anemia, hypertension, and anxiety who presents with a syncopal episode that was observed by chills within [facility] where she was walking. Had past episodes of syncope. She states that she has had two episodes of syncope. The first occurred earlier in the summer and was not witnessed. This time, she was in [facility] when she started to feel lightheaded, then woke up with people standing around her. She denies palpiations, headache, or flushing/nausea prodrome. In the ED, she was elevated to 200/130. Other vitals stable. Labs are significant for d-dimer 4.11, probnp 428, and troponin 117>126. CT head showed no acute abnormality. CTA chest showed no evidence of PE.  EKG shows sinus bradycardia in the 50s with LVH, repolarization abnormalities in the inferolateral leads. \"        DISCHARGE DIAGNOSES / PLAN:      #syncope  -orthostats neg   -etoh and uds neg   -TTE no acute pathology   -no events on tele       #hypertensive emergency  #troponin elevated  -stable off cardene gtt  -start norvasc, cont home aldactone   -trop 117>126>104, trend flat, and pt.  Asymptomatic so likely 2/2 demand     #nicotine dependence  -recommend cessation     #schizophrenia  -takes monthly paliperidone per chart  -continue home risperdal    Follow-up outpatient with

## 2023-09-25 NOTE — PROGRESS NOTES
Physician Progress Note      Christopher Gross  [facility] #:                  [id number]  :                       1955  ADMIT DATE:       2023 4:02 PM  [facility] DATE:        2023 2:59 PM  RESPONDING  PROVIDER #:        Jet Lyons MD          QUERY TEXT:    Patient admitted with HTN emergency. Hospitalist noted principal problem to be   NSTEMI, but also noted troponin trend flat, pt asymptomatic, so likely   secondary to demand. ? Please document if NSTEMI was ruled in or ruled out, and   please update active hospital problems appropriately to reflect response. The medical record reflects the following:    Risk Factors: 70 y.o. female with PMH of severe anemia, hypertension, and   anxiety presented following a syncopal episode and was found to have a   hypertensive emergency  Clinical Indicators: Hospitalist documented: \"Principal Problem: NSTEMI   (non-ST elevated myocardial infarction)\" and \"troponin elevated\" and \"trop   117>126>104, trend flat, and pt. Asymptomatic so likely 2/2 demand\" -- HS   troponin = 117, 126, 104 -- ECG  = Sinus bradycardia - Left ventricular   hypertrophy with repolarization abnormality (R in aVL , Sokolow-Bacon, Dung   product, Romhilt-Penn ) - Abnormal ECG - When compared with ECG of   16-SEP-2019 11:27, Questionable change in QRS axis - Confirmed by Courtney Barclay M.D., Modesto Alegre ([id number]) on 2023 4:56:13 PM  Treatment: Cardene gtt, serial troponin monitoring, ECG, TTE, amlodipine    Thank-you,  Jordan Rae RN, CCDS, [facility], [place]  Clinical   Checo [email or web address]  [phone]  You can also contact me via Libby Hunt.   Options provided:  -- NSTEMI is currently being treated/evaluated  -- Demand ischemia only, the diagnosis of NSTEMI has been ruled out after   study  -- Other - I will add my own diagnosis  -- Disagree - Not applicable / Not valid  -- Disagree - Clinically unable to determine / Unknown  -- Refer to Clinical

## 2024-09-21 ENCOUNTER — HOSPITAL ENCOUNTER (EMERGENCY)
Facility: HOSPITAL | Age: 69
Discharge: HOME | End: 2024-09-21
Payer: MEDICARE

## 2024-09-21 VITALS
RESPIRATION RATE: 17 BRPM | SYSTOLIC BLOOD PRESSURE: 146 MMHG | TEMPERATURE: 99.3 F | OXYGEN SATURATION: 99 % | DIASTOLIC BLOOD PRESSURE: 84 MMHG | HEART RATE: 80 BPM

## 2024-09-21 DIAGNOSIS — Z59.9 HOUSING PROBLEMS: ICD-10-CM

## 2024-09-21 DIAGNOSIS — Z65.9 CONCERNED ABOUT HAVING SOCIAL PROBLEM: Primary | ICD-10-CM

## 2024-09-21 PROCEDURE — 90791 PSYCH DIAGNOSTIC EVALUATION: CPT

## 2024-09-21 PROCEDURE — 99283 EMERGENCY DEPT VISIT LOW MDM: CPT

## 2024-09-21 SDOH — ECONOMIC STABILITY - INCOME SECURITY: PROBLEM RELATED TO HOUSING AND ECONOMIC CIRCUMSTANCES, UNSPECIFIED: Z59.9

## 2024-09-21 ASSESSMENT — ENCOUNTER SYMPTOMS
RHINORRHEA: 0
NAUSEA: 0
WHEEZING: 0
COUGH: 0
SORE THROAT: 0
CHEST TIGHTNESS: 0
DIARRHEA: 0
VOMITING: 0
ABDOMINAL PAIN: 0
SHORTNESS OF BREATH: 0
BACK PAIN: 0
EYE PAIN: 0

## 2024-09-21 ASSESSMENT — PAIN SCALES - GENERAL
PAINLEVEL_OUTOF10: 0
PAINLEVEL_OUTOF10: 0

## 2024-09-21 ASSESSMENT — PAIN - FUNCTIONAL ASSESSMENT: PAIN_FUNCTIONAL_ASSESSMENT: NONE - DENIES PAIN

## 2024-10-04 ENCOUNTER — HOSPITAL ENCOUNTER (INPATIENT)
Facility: HOSPITAL | Age: 69
LOS: 39 days | Discharge: OTHER INSTITUTION | DRG: 885 | End: 2024-11-12
Attending: EMERGENCY MEDICINE | Admitting: PSYCHIATRY & NEUROLOGY
Payer: MEDICARE

## 2024-10-04 DIAGNOSIS — F48.9 MENTAL HEALTH PROBLEM: ICD-10-CM

## 2024-10-04 DIAGNOSIS — Z59.9 HOUSING PROBLEMS: Primary | ICD-10-CM

## 2024-10-04 PROBLEM — F20.9 SCHIZOPHRENIA (HCC): Status: ACTIVE | Noted: 2024-10-04

## 2024-10-04 LAB
ALBUMIN SERPL-MCNC: 3.7 G/DL (ref 3.5–5)
ALBUMIN/GLOB SERPL: 0.9 (ref 1.1–2.2)
ALP SERPL-CCNC: 76 U/L (ref 45–117)
ALT SERPL-CCNC: 28 U/L (ref 12–78)
AMPHET UR QL SCN: NEGATIVE
ANION GAP SERPL CALC-SCNC: 9 MMOL/L (ref 2–12)
APPEARANCE UR: CLEAR
AST SERPL-CCNC: 30 U/L (ref 15–37)
BACTERIA URNS QL MICRO: NEGATIVE /HPF
BARBITURATES UR QL SCN: NEGATIVE
BASOPHILS # BLD: 0.1 K/UL (ref 0–0.1)
BASOPHILS NFR BLD: 1 % (ref 0–1)
BENZODIAZ UR QL: NEGATIVE
BILIRUB SERPL-MCNC: 0.7 MG/DL (ref 0.2–1)
BILIRUB UR QL: NEGATIVE
BUN SERPL-MCNC: 19 MG/DL (ref 6–20)
BUN/CREAT SERPL: 22 (ref 12–20)
CALCIUM SERPL-MCNC: 9.1 MG/DL (ref 8.5–10.1)
CANNABINOIDS UR QL SCN: NEGATIVE
CHLORIDE SERPL-SCNC: 104 MMOL/L (ref 97–108)
CO2 SERPL-SCNC: 28 MMOL/L (ref 21–32)
COCAINE UR QL SCN: NEGATIVE
COLOR UR: NORMAL
CREAT SERPL-MCNC: 0.87 MG/DL (ref 0.55–1.02)
DIFFERENTIAL METHOD BLD: ABNORMAL
EOSINOPHIL # BLD: 0.1 K/UL (ref 0–0.4)
EOSINOPHIL NFR BLD: 2 % (ref 0–7)
EPITH CASTS URNS QL MICRO: NORMAL /LPF
ERYTHROCYTE [DISTWIDTH] IN BLOOD BY AUTOMATED COUNT: 12.5 % (ref 11.5–14.5)
ETHANOL SERPL-MCNC: <10 MG/DL (ref 0–0.08)
GLOBULIN SER CALC-MCNC: 4 G/DL (ref 2–4)
GLUCOSE SERPL-MCNC: 125 MG/DL (ref 65–100)
GLUCOSE UR STRIP.AUTO-MCNC: NEGATIVE MG/DL
HCT VFR BLD AUTO: 36 % (ref 35–47)
HGB BLD-MCNC: 11.9 G/DL (ref 11.5–16)
HGB UR QL STRIP: NEGATIVE
IMM GRANULOCYTES # BLD AUTO: 0 K/UL (ref 0–0.04)
IMM GRANULOCYTES NFR BLD AUTO: 1 % (ref 0–0.5)
KETONES UR QL STRIP.AUTO: NEGATIVE MG/DL
LEUKOCYTE ESTERASE UR QL STRIP.AUTO: NEGATIVE
LYMPHOCYTES # BLD: 2.7 K/UL (ref 0.8–3.5)
LYMPHOCYTES NFR BLD: 37 % (ref 12–49)
Lab: NORMAL
MCH RBC QN AUTO: 28.7 PG (ref 26–34)
MCHC RBC AUTO-ENTMCNC: 33.1 G/DL (ref 30–36.5)
MCV RBC AUTO: 87 FL (ref 80–99)
METHADONE UR QL: NEGATIVE
MONOCYTES # BLD: 0.6 K/UL (ref 0–1)
MONOCYTES NFR BLD: 9 % (ref 5–13)
NEUTS SEG # BLD: 3.7 K/UL (ref 1.8–8)
NEUTS SEG NFR BLD: 50 % (ref 32–75)
NITRITE UR QL STRIP.AUTO: NEGATIVE
NRBC # BLD: 0 K/UL (ref 0–0.01)
NRBC BLD-RTO: 0 PER 100 WBC
OPIATES UR QL: NEGATIVE
PCP UR QL: NEGATIVE
PH UR STRIP: 5.5 (ref 5–8)
PLATELET # BLD AUTO: 295 K/UL (ref 150–400)
PMV BLD AUTO: 8.8 FL (ref 8.9–12.9)
POTASSIUM SERPL-SCNC: 3.6 MMOL/L (ref 3.5–5.1)
PROT SERPL-MCNC: 7.7 G/DL (ref 6.4–8.2)
PROT UR STRIP-MCNC: NEGATIVE MG/DL
RBC # BLD AUTO: 4.14 M/UL (ref 3.8–5.2)
RBC #/AREA URNS HPF: NORMAL /HPF (ref 0–5)
SODIUM SERPL-SCNC: 141 MMOL/L (ref 136–145)
SP GR UR REFRACTOMETRY: <1.005
UROBILINOGEN UR QL STRIP.AUTO: 0.2 EU/DL (ref 0.2–1)
WBC # BLD AUTO: 7.2 K/UL (ref 3.6–11)
WBC URNS QL MICRO: NORMAL /HPF (ref 0–4)

## 2024-10-04 PROCEDURE — 80053 COMPREHEN METABOLIC PANEL: CPT

## 2024-10-04 PROCEDURE — 90791 PSYCH DIAGNOSTIC EVALUATION: CPT

## 2024-10-04 PROCEDURE — 1100000000 HC RM PRIVATE

## 2024-10-04 PROCEDURE — 99285 EMERGENCY DEPT VISIT HI MDM: CPT

## 2024-10-04 PROCEDURE — 85025 COMPLETE CBC W/AUTO DIFF WBC: CPT

## 2024-10-04 PROCEDURE — 81001 URINALYSIS AUTO W/SCOPE: CPT

## 2024-10-04 PROCEDURE — 82077 ASSAY SPEC XCP UR&BREATH IA: CPT

## 2024-10-04 PROCEDURE — 80307 DRUG TEST PRSMV CHEM ANLYZR: CPT

## 2024-10-04 RX ORDER — AMLODIPINE BESYLATE 10 MG/1
10 TABLET ORAL DAILY
Status: ON HOLD | COMMUNITY
Start: 2024-10-02 | End: 2024-11-07

## 2024-10-04 RX ORDER — HYDROXYZINE HYDROCHLORIDE 50 MG/1
50 TABLET, FILM COATED ORAL EVERY 6 HOURS PRN
Status: ON HOLD | COMMUNITY
Start: 2024-10-02 | End: 2024-11-07 | Stop reason: HOSPADM

## 2024-10-04 RX ORDER — RISPERIDONE 2 MG/1
2 TABLET ORAL
Status: ON HOLD | COMMUNITY
Start: 2024-10-02 | End: 2024-11-07 | Stop reason: HOSPADM

## 2024-10-04 RX ORDER — SERTRALINE HYDROCHLORIDE 100 MG/1
100 TABLET, FILM COATED ORAL DAILY
Status: ON HOLD | COMMUNITY
Start: 2024-10-02 | End: 2024-11-07 | Stop reason: HOSPADM

## 2024-10-04 RX ORDER — METOPROLOL SUCCINATE 50 MG/1
50 TABLET, EXTENDED RELEASE ORAL DAILY
Status: ON HOLD | COMMUNITY
Start: 2024-10-01 | End: 2024-11-07

## 2024-10-04 RX ORDER — ATORVASTATIN CALCIUM 40 MG/1
40 TABLET, FILM COATED ORAL
Status: ON HOLD | COMMUNITY
Start: 2024-08-11 | End: 2024-11-07

## 2024-10-04 RX ORDER — TRAZODONE HYDROCHLORIDE 50 MG/1
50 TABLET, FILM COATED ORAL NIGHTLY PRN
Status: ON HOLD | COMMUNITY
Start: 2024-10-02 | End: 2024-11-07 | Stop reason: HOSPADM

## 2024-10-04 RX ORDER — ASPIRIN 81 MG/1
81 TABLET ORAL DAILY
Status: ON HOLD | COMMUNITY
Start: 2024-10-02 | End: 2024-11-07

## 2024-10-04 SDOH — ECONOMIC STABILITY - INCOME SECURITY: PROBLEM RELATED TO HOUSING AND ECONOMIC CIRCUMSTANCES, UNSPECIFIED: Z59.9

## 2024-10-04 ASSESSMENT — PAIN - FUNCTIONAL ASSESSMENT: PAIN_FUNCTIONAL_ASSESSMENT: 0-10

## 2024-10-04 ASSESSMENT — PAIN DESCRIPTION - LOCATION: LOCATION: LEG

## 2024-10-04 ASSESSMENT — PAIN SCALES - GENERAL: PAINLEVEL_OUTOF10: 4

## 2024-10-04 ASSESSMENT — PAIN DESCRIPTION - ORIENTATION: ORIENTATION: RIGHT;LEFT

## 2024-10-04 ASSESSMENT — PAIN DESCRIPTION - DESCRIPTORS: DESCRIPTORS: ACHING

## 2024-10-04 NOTE — ED NOTES
Sister Anel [phone].  Spoke with her and she's going to get her sisters to come ASAP.  The family and police have been looking for her since yesterday.
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
## 2024-10-04 NOTE — ED NOTES
Per Charge Nurse Gordon who spoke with sister Anel, patient has been missing since yesterday. She stated that family and police have been looking for her since yesterday around 3 PM.  Per Anel, patient was admitted to [facility] recently and then discharged to assistant living facility where apparently she was non-cooperative and combative.  After a day at [facility], they sent her back to [facility] because of her behavior.  Unclear to this RN if she was discharged from [facility] or wandered off without completing care but either way was considered missing.

## 2024-10-04 NOTE — ED PROVIDER NOTES
[facility] 3 ACUTE BEHAV [facility]  EMERGENCY DEPARTMENT ENCOUNTER       Pt Name: Kyra Hunt  MRN: [id number]  Birthdate 1955  Date of evaluation: 10/4/2024  Provider: Patti Davila MD   PCP: No primary care provider on file.  Note Started: 10:48 AM 10/4/24     (Please note that parts of this dictation were completed with voice recognition software. Quite often unanticipated grammatical, syntax, homophones, and other interpretive errors are inadvertently transcribed by the computer software. Please disregards these errors. Please excuse any errors that have escaped final proofreading.)    CHIEF COMPLAINT       Chief Complaint   Patient presents with    Depression           Leg Pain               HISTORY OF PRESENT ILLNESS: 1 or more elements      History From: patient, History limited by:  none     Kyra Hunt is a 69 y.o. female who presents with housing concerns.  Patient told our charge nurse \"I lived with my sister.  But they moved all the furniture out.  So I was laying on a porch.\"  Patient told me \"they keep giving me the run around about where I am living.\"  Patient states \"I do not have $500 to give them every few months.\"  She is a somewhat limited historian but she does note that it is 2024, she knows that she is at [facility], and she knows her first and last name.  She denies homicidal or suicidal ideations.  When asked regarding hallucinations she states \"I keep seeing men and woman telling me I am a nice person but I am just going through a horrible ordeal.\"  Patient then states \"I do not want to go back in my mother's house.\"  Patient states she does have medicine she supposed to be taking for chronic conditions and she states that her sister has her pill bottles.  She is complaining of chronic leg pain.     Nursing Notes were all reviewed and agreed with or any disagreements were addressed in the HPI.     REVIEW OF SYSTEMS        Positives and Pertinent negatives as per HPI.    PAST HISTORY

## 2024-10-04 NOTE — ED NOTES
Pt was brought to the ER by EMS with complaint of depression. Upon examination patient denies SI and HI. Pt is A&Ox4. Pt denies drinking or drug use. Pt does have some flight of ideas and is unable to tell us recent events of how she ended up in the ER. Pt is cooperative.Pt is resting on the stretcher at this time in no acute distress.

## 2024-10-04 NOTE — PROGRESS NOTES
[facility] Admission Pharmacy Medication Reconciliation    Information obtained from:  Patient unable to provide medication information, Rx Query  RxQuery data available1: yes    Comments/recommendations:    1) Patient unable to provide medication information per ED nurse.  PTA medication list below based solely on recent Rx Query fill history  Interpret list with caution  Compliance has not been confirmed    2) Per Rx Query fill history:  10/2/24 amlodipine 10 mg #30  10/2/24 ASA 81 mg EC #30  10/2/24 Atorvastatin 40 mg #30  10/2/24 Hydroxyzine 50 mg #30  10/2/24 Metoprolol ER 50 mg #30  10/2/24 Risperidone 2 mg #30  10/4/24 Risperidone 1 mg #30  10/2/24 Sertraline 100 mg #30  10/2/24 Trazodone 50 mg #10    3) The [place] Prescription Monitoring Program () was accessed to determine fill history of any controlled medications:  No record of controlled medications dispensed in past 2 years       1RxQuery pharmacy benefit data reflects medications filled and processed through the patient's insurance, however                this data does NOT capture whether the medication was picked up or is currently being taken by the patient.       Past Medical History/Disease States:  Past Medical History:   Diagnosis Date    Anxiety disorder     Hypertension     Psychiatric disorder     schizophrenia          Patient allergies:   Allergies as of 10/04/2024    (No Known Allergies)         Prior to Admission Medications   Prescriptions Last Dose Informant     amLODIPine (NORVASC) 10 MG tablet  Outside Pharmacy/PCP     Sig: Take 1 tablet by mouth daily   aspirin (EQ ASPIRIN ADULT LOW DOSE) 81 MG EC tablet  Outside Pharmacy/PCP     Sig: Take 1 tablet by mouth daily   atorvastatin (LIPITOR) 40 MG tablet  Outside Pharmacy/PCP     Sig: Take 1 tablet by mouth nightly   hydrOXYzine HCl (ATARAX) 50 MG tablet  Outside Pharmacy/PCP     Sig: Take 1 tablet by mouth every 6 hours as needed for Anxiety   metoprolol succinate (TOPROL XL) 50 MG

## 2024-10-05 PROCEDURE — 1240000000 HC EMOTIONAL WELLNESS R&B

## 2024-10-05 PROCEDURE — 6370000000 HC RX 637 (ALT 250 FOR IP): Performed by: PSYCHIATRY & NEUROLOGY

## 2024-10-05 PROCEDURE — 99223 1ST HOSP IP/OBS HIGH 75: CPT | Performed by: PSYCHIATRY & NEUROLOGY

## 2024-10-05 RX ORDER — RISPERIDONE 1 MG/1
2 TABLET ORAL
Status: DISCONTINUED | OUTPATIENT
Start: 2024-10-05 | End: 2024-11-12 | Stop reason: HOSPADM

## 2024-10-05 RX ORDER — RISPERIDONE 1 MG/1
1 TABLET ORAL DAILY
Status: DISCONTINUED | OUTPATIENT
Start: 2024-10-05 | End: 2024-11-12 | Stop reason: HOSPADM

## 2024-10-05 RX ORDER — HALOPERIDOL 5 MG/1
2.5 TABLET ORAL EVERY 4 HOURS PRN
Status: DISCONTINUED | OUTPATIENT
Start: 2024-10-05 | End: 2024-11-12 | Stop reason: HOSPADM

## 2024-10-05 RX ORDER — MAGNESIUM HYDROXIDE/ALUMINUM HYDROXICE/SIMETHICONE 120; 1200; 1200 MG/30ML; MG/30ML; MG/30ML
30 SUSPENSION ORAL EVERY 6 HOURS PRN
Status: DISCONTINUED | OUTPATIENT
Start: 2024-10-05 | End: 2024-11-12 | Stop reason: HOSPADM

## 2024-10-05 RX ORDER — POLYETHYLENE GLYCOL 3350 17 G/17G
17 POWDER, FOR SOLUTION ORAL DAILY PRN
Status: DISCONTINUED | OUTPATIENT
Start: 2024-10-05 | End: 2024-11-12 | Stop reason: HOSPADM

## 2024-10-05 RX ORDER — ACETAMINOPHEN 325 MG/1
650 TABLET ORAL EVERY 4 HOURS PRN
Status: DISCONTINUED | OUTPATIENT
Start: 2024-10-05 | End: 2024-11-12 | Stop reason: HOSPADM

## 2024-10-05 RX ORDER — HALOPERIDOL 5 MG/ML
2.5 INJECTION INTRAMUSCULAR EVERY 4 HOURS PRN
Status: DISCONTINUED | OUTPATIENT
Start: 2024-10-05 | End: 2024-11-12 | Stop reason: HOSPADM

## 2024-10-05 RX ORDER — ASPIRIN 81 MG/1
81 TABLET ORAL DAILY
Status: DISCONTINUED | OUTPATIENT
Start: 2024-10-05 | End: 2024-11-12 | Stop reason: HOSPADM

## 2024-10-05 RX ORDER — DIPHENHYDRAMINE HYDROCHLORIDE 50 MG/ML
25 INJECTION INTRAMUSCULAR; INTRAVENOUS EVERY 4 HOURS PRN
Status: DISCONTINUED | OUTPATIENT
Start: 2024-10-05 | End: 2024-11-12 | Stop reason: HOSPADM

## 2024-10-05 RX ORDER — ATORVASTATIN CALCIUM 40 MG/1
40 TABLET, FILM COATED ORAL
Status: DISCONTINUED | OUTPATIENT
Start: 2024-10-05 | End: 2024-11-12 | Stop reason: HOSPADM

## 2024-10-05 RX ADMIN — ATORVASTATIN CALCIUM 40 MG: 40 TABLET, FILM COATED ORAL at 20:28

## 2024-10-05 RX ADMIN — RISPERIDONE 1 MG: 1 TABLET, FILM COATED ORAL at 16:39

## 2024-10-05 RX ADMIN — SERTRALINE HYDROCHLORIDE 50 MG: 50 TABLET ORAL at 16:39

## 2024-10-05 RX ADMIN — RISPERIDONE 2 MG: 1 TABLET, FILM COATED ORAL at 20:28

## 2024-10-05 RX ADMIN — ASPIRIN 81 MG: 81 TABLET, COATED ORAL at 16:39

## 2024-10-05 ASSESSMENT — SLEEP AND FATIGUE QUESTIONNAIRES
DO YOU HAVE DIFFICULTY SLEEPING: YES
DO YOU USE A SLEEP AID: NO
AVERAGE NUMBER OF SLEEP HOURS: 7
SLEEP PATTERN: DIFFICULTY FALLING ASLEEP

## 2024-10-05 ASSESSMENT — LIFESTYLE VARIABLES
HOW OFTEN DO YOU HAVE A DRINK CONTAINING ALCOHOL: NEVER
HOW MANY STANDARD DRINKS CONTAINING ALCOHOL DO YOU HAVE ON A TYPICAL DAY: PATIENT DOES NOT DRINK

## 2024-10-05 ASSESSMENT — PAIN - FUNCTIONAL ASSESSMENT: PAIN_FUNCTIONAL_ASSESSMENT: 0-10

## 2024-10-05 ASSESSMENT — PAIN SCALES - GENERAL
PAINLEVEL_OUTOF10: 0
PAINLEVEL_OUTOF10: 0

## 2024-10-05 NOTE — BSMART NOTE
Comprehensive Assessment Form Part 1    Section I - Disposition    Primary Diagnosis: Schizophrenia    The Medical Doctor to Psychiatrist conference was not completed.  The Medical Doctor is in agreement with Psychiatrist disposition because of (reason) patient is willing to be admitted voluntarily.  The plan is admit to inpatient psych once medically cleared.  The on-call Psychiatrist consulted was Dr. PETERSEN.  The admitting Psychiatrist will be Dr. PETERSEN.  The admitting Diagnosis is Schizophrenia.  The Payor source is [facility] Medicare and [facility] Medicaid.  This writer reviewed the [place] Suicide Severity Rating Scale in nursing flowsheet and the risk level assigned is no risk.  Based on this assessment, the risk of suicide is none and the plan is admit to inpatient psych.    Section II - Integrated Summary  Summary:  Patient is a 69 year old female seen face to face in the ER.  She was brought to the ER by EMS complaining of depression and leg pain.  Patient was apparently picked up by EMS when she was walking down the street.  Patient's sister was contacted because patient was unable to explain where she has been living in a logical manner, and her sister reported that she has been missing since yesterday when she eloped from [facility], and she had been reported as a missing person to the police.  Patient's sister Anel came to the ER, and reported that patient had been staying with another sister until a few months ago, when she stated she no longer wanted to stay with her and began expressing delusional beliefs about family.  She was admitted to [facility] and then was discharged to an unknown assisted living facility.  She apparently stayed there 1 night, and ended up walking out and eventually ended up in an ER.  The family did not know where she had been placed, but the ER was able to figure out where she had been living and she went back to the [facility].  Patient was sent to [facility] ER either Wednesday evening or

## 2024-10-05 NOTE — ED NOTES
PETERSON spoke with patient and family.  PETERSON states she plans to admit if possible.    Sister of patient is Anel Trenton (H)[phone] and (C)[phone]

## 2024-10-05 NOTE — PLAN OF CARE
Problem: Risk for Elopement  Goal: Patient will not exit the unit/facility without proper excort  Outcome: Progressing     Problem: Safety - Adult  Goal: Free from fall injury  Outcome: Progressing     Problem: Depression  Goal: Will be euthymic at discharge  Description: INTERVENTIONS:  1. Administer medication as ordered  2. Provide emotional support via 1:1 interaction with staff  3. Encourage involvement in milieu/groups/activities  4. Monitor for social isolation  Outcome: Not Progressing     Problem: Depression  Goal: Will be euthymic at discharge  Description: INTERVENTIONS:  1. Administer medication as ordered  2. Provide emotional support via 1:1 interaction with staff  3. Encourage involvement in milieu/groups/activities  4. Monitor for social isolation  Outcome: Not Progressing

## 2024-10-05 NOTE — BSMART NOTE
BSMART assessment completed, and suicide risk level noted to be no risk. Primary Nurse Beverly and Physician Dr. Carroll notified. Concerns not observed.    1:1 sitter not needed per policy.

## 2024-10-05 NOTE — ED NOTES
TRANSFER - OUT REPORT:    Verbal report given to Shannon on Kyra Hunt  being transferred to U 309 for routine progression of patient care       Report consisted of patient's Situation, Background, Assessment and   Recommendations(SBAR).     Information from the following report(s) Nurse Handoff Report, Index, ED Encounter Summary, ED SBAR, Adult Overview, Surgery Report, Intake/Output, MAR, Neuro Assessment, and Event Log was reviewed with the receiving nurse.    [place] Fall Assessment:    Presents to emergency department  because of falls (Syncope, seizure, or loss of consciousness): No  Age > 70: Yes  Altered Mental Status, Intoxication with alcohol or substance confusion (Disorientation, impaired judgment, poor safety awaremess, or inability to follow instructions): Yes  Impaired Mobility: Ambulates or transfers with assistive devices or assistance; Unable to ambulate or transer.: No  Nursing Judgement: Yes          Lines:       Opportunity for questions and clarification was provided.      Patient transported with:  security

## 2024-10-05 NOTE — ED NOTES
Bedside and Verbal shift change report given to ANNA Moore (oncoming nurse) by ANNA Paul (offgoing nurse). Report included the following information Nurse Handoff Report, Adult Overview, Recent Results, and Event Log.

## 2024-10-05 NOTE — PLAN OF CARE
Pt received at 0020, Alert, Disoriented to time, and Cooperative with Appropriate affect. Pt's vital signs were T 99 °F (37.2 °C), HR 69, RR 17, BP (!) 143/71, and O2 99 %. Pt denied pain, endorsed anxiety (but declined interventions) and endorsed not having a bowel movement in \"about two weeks\" but was passing flatus during assessment. Pt states two weeks between BMs is her baseline, and also reported low food intake related to both reduced appetite and food insecurity. Pt replied No to suicidal ideation, No to homicidal ideation, and Auditory (derogatory) to hallucinations. Pt had speech that was Clear and Unremarkable in quality. MD notified of pt presentation and low suicide risk and agreed to q15min safety checks. Pt encouraged to rest in bed for the remainder of the morning. Pt cooperated with a skin check and belongings search and was oriented to unit rules and routine. Pt received no PRN medications and was monitored q15min throughout the night. Pt rested in bed for 4 hours overnight.    Problem: Pain  Goal: Verbalizes/displays adequate comfort level or baseline comfort level  Outcome: Progressing

## 2024-10-05 NOTE — PROGRESS NOTES
Auto-MST trigger per RN admissions assessment.  No acute nutrition or weight issues identified.  Pt meal compliant but apparently was missing for 24 hours pta as she wandered away from previous facility.  Pt reports significant wt loss but this is unsubstantiated by EMR.  She does report decreased appetite and food insecurity although she has been in various facilities in recent months.  Supplements ordered to help with nutrition.  Hx notable for HTN, constipation (pt reports she has a BM every 2 weeks and that this is normal for her).  Ht: 5'2.99\"  Wt: 172 lb 13.5 oz  BMI: 30.63 kg/(m^2) c/w obesity class I  Est energy needs: 1845 kcal, 68 g protein, 2200 mL fluids  Pt will consume > 75% of meals at follow up 7-10 days  LOS, MST

## 2024-10-06 PROCEDURE — 6370000000 HC RX 637 (ALT 250 FOR IP): Performed by: PSYCHIATRY & NEUROLOGY

## 2024-10-06 PROCEDURE — 1240000000 HC EMOTIONAL WELLNESS R&B

## 2024-10-06 PROCEDURE — 99232 SBSQ HOSP IP/OBS MODERATE 35: CPT | Performed by: PSYCHIATRY & NEUROLOGY

## 2024-10-06 RX ORDER — AMLODIPINE BESYLATE 5 MG/1
10 TABLET ORAL
Status: DISCONTINUED | OUTPATIENT
Start: 2024-10-06 | End: 2024-10-07

## 2024-10-06 RX ORDER — METOPROLOL SUCCINATE 50 MG/1
50 TABLET, EXTENDED RELEASE ORAL
Status: DISCONTINUED | OUTPATIENT
Start: 2024-10-06 | End: 2024-10-07

## 2024-10-06 RX ADMIN — ATORVASTATIN CALCIUM 40 MG: 40 TABLET, FILM COATED ORAL at 20:24

## 2024-10-06 RX ADMIN — RISPERIDONE 1 MG: 1 TABLET, FILM COATED ORAL at 09:05

## 2024-10-06 RX ADMIN — SERTRALINE HYDROCHLORIDE 50 MG: 50 TABLET ORAL at 09:05

## 2024-10-06 RX ADMIN — RISPERIDONE 2 MG: 1 TABLET, FILM COATED ORAL at 20:24

## 2024-10-06 RX ADMIN — ASPIRIN 81 MG: 81 TABLET, COATED ORAL at 09:09

## 2024-10-06 ASSESSMENT — PAIN SCALES - GENERAL
PAINLEVEL_OUTOF10: 0
PAINLEVEL_OUTOF10: 0

## 2024-10-06 NOTE — H&P
INITIAL PSYCHIATRIC EVALUATION            IDENTIFICATION:    Patient Name  Kyra Hunt   Date of Birth 1955   [facility] [id number]   Medical Record Number  [id number]      Age  69 y.o.   PCP No primary care provider on file.   Admit date:  10/4/2024    Room Number  309/02  @ [facility]   Date of Service  10/6/2024            HISTORY         REASON FOR HOSPITALIZATION:  CC: \"psychosis\". Pt admitted under a voluntary basis for severe psychosis proving to be an imminent danger to self and others and an inability to care for self.    HISTORY OF PRESENT ILLNESS:    The patient, Kyra Hunt, is a 69 y.o.  Black /  female with a past psychiatric history significant for schizophrenia, who presents at this time with complaints of (and/or evidence of) the following emotional symptoms: delusions, insomnia, and psychotic behavior.  Additional symptomatology include confusion and poor ADLs.  The above symptoms have been present for 2+ weeks. These symptoms are of moderate to high severity. These symptoms are constant in nature.  The patient's condition has been precipitated by psychosocial stressors.  Patient's condition made worse by treatment noncompliance. UDS: negative; BAL=0.    Per documentation, the patient has been noted to be wandering down the street and complained of depression and pain when brought to the ED. Her family states that she has been more confused and unable to care for herself. She largely denies this account but cannot explain her actions prior to admission. She has lost 40 lbs prior this admission and has been confabulating her whereabouts and activities.    The patient is a poor historian. The patient does not corroborate the above narrative. The patient contracts for safety on the unit and gives consent for the team to contact collateral. The patient is amenable to initiating treatment while on the unit. On interview, she is oriented to person and place but not time,

## 2024-10-06 NOTE — PLAN OF CARE
Problem: Safety - Adult  Goal: Free from fall injury  10/5/2024 2250 by Cora Fisher, RN  Outcome: Progressing  10/5/2024 1740 by Jackie Gil, RN  Outcome: Progressing     Problem: Depression  Goal: Will be euthymic at discharge  Description: INTERVENTIONS:  1. Administer medication as ordered  2. Provide emotional support via 1:1 interaction with staff  3. Encourage involvement in milieu/groups/activities  4. Monitor for social isolation  10/5/2024 2250 by Cora Fisher, RN  Outcome: Progressing  10/5/2024 1740 by Jackie Gil, RN  Outcome: Not Progressing       Problem: Risk for Elopement  Goal: Patient will not exit the unit/facility without proper excort  10/5/2024 1740 by Jackie Gil, RN  Outcome: Progressing

## 2024-10-06 NOTE — PLAN OF CARE
Problem: Safety - Adult  Goal: Free from fall injury  10/6/2024 0823 by Danielle Badillo, RN  Outcome: Progressing

## 2024-10-07 PROBLEM — G31.84 MILD NEUROCOGNITIVE DISORDER: Status: ACTIVE | Noted: 2018-12-04

## 2024-10-07 PROCEDURE — 6370000000 HC RX 637 (ALT 250 FOR IP): Performed by: PSYCHIATRY & NEUROLOGY

## 2024-10-07 PROCEDURE — 99232 SBSQ HOSP IP/OBS MODERATE 35: CPT | Performed by: PSYCHIATRY & NEUROLOGY

## 2024-10-07 PROCEDURE — 1240000000 HC EMOTIONAL WELLNESS R&B

## 2024-10-07 RX ORDER — AMLODIPINE BESYLATE 5 MG/1
10 TABLET ORAL DAILY
Status: DISCONTINUED | OUTPATIENT
Start: 2024-10-07 | End: 2024-11-12 | Stop reason: HOSPADM

## 2024-10-07 RX ORDER — METOPROLOL SUCCINATE 50 MG/1
50 TABLET, EXTENDED RELEASE ORAL DAILY
Status: DISCONTINUED | OUTPATIENT
Start: 2024-10-07 | End: 2024-11-12 | Stop reason: HOSPADM

## 2024-10-07 RX ADMIN — SERTRALINE HYDROCHLORIDE 50 MG: 50 TABLET ORAL at 09:24

## 2024-10-07 RX ADMIN — RISPERIDONE 1 MG: 1 TABLET, FILM COATED ORAL at 09:25

## 2024-10-07 RX ADMIN — AMLODIPINE BESYLATE 10 MG: 5 TABLET ORAL at 10:33

## 2024-10-07 RX ADMIN — RISPERIDONE 2 MG: 1 TABLET, FILM COATED ORAL at 20:48

## 2024-10-07 RX ADMIN — ATORVASTATIN CALCIUM 40 MG: 40 TABLET, FILM COATED ORAL at 20:48

## 2024-10-07 RX ADMIN — METOPROLOL SUCCINATE 50 MG: 50 TABLET, EXTENDED RELEASE ORAL at 10:33

## 2024-10-07 RX ADMIN — ASPIRIN 81 MG: 81 TABLET, COATED ORAL at 09:24

## 2024-10-07 ASSESSMENT — PAIN SCALES - GENERAL: PAINLEVEL_OUTOF10: 0

## 2024-10-07 NOTE — PLAN OF CARE
Problem: Discharge Planning  Goal: Discharge to home or other facility with appropriate resources  Outcome: Not Progressing  Flowsheets (Taken 10/7/2024 0800)  Discharge to home or other facility with appropriate resources: Identify barriers to discharge with patient and caregiver     Problem: Psychosis  Goal: Will report no hallucinations or delusions  Description: INTERVENTIONS:  1. Administer medication as  ordered  2. Assist with reality testing to support increasing orientation  3. Assess if patient's hallucinations or delusions are encouraging self harm or harm to others and intervene as appropriate  Outcome: Not Progressing     Problem: Risk for Elopement  Goal: Patient will not exit the unit/facility without proper excort  Recent Flowsheet Documentation  Taken 10/7/2024 0800 by Rosa Ko RN  Nursing Interventions for Elopement Risk:   Reduce environmental triggers   Place patient in room far away from exits and stairways     Problem: Safety - Adult  Goal: Free from fall injury  Outcome: Progressing     Problem: Discharge Planning  Goal: Discharge to home or other facility with appropriate resources  Outcome: Not Progressing  Flowsheets (Taken 10/7/2024 0800)  Discharge to home or other facility with appropriate resources: Identify barriers to discharge with patient and caregiver     Problem: Depression  Goal: Will be euthymic at discharge  Description: INTERVENTIONS:  1. Administer medication as ordered  2. Provide emotional support via 1:1 interaction with staff  3. Encourage involvement in milieu/groups/activities  4. Monitor for social isolation  Outcome: Progressing

## 2024-10-07 NOTE — PLAN OF CARE
Problem: Discharge Planning  Goal: Discharge to home or other facility with appropriate resources  Outcome: Not Progressing     Problem: Depression  Goal: Will be euthymic at discharge  Description: INTERVENTIONS:  1. Administer medication as ordered  2. Provide emotional support via 1:1 interaction with staff  3. Encourage involvement in milieu/groups/activities  4. Monitor for social isolation  Outcome: Not Progressing

## 2024-10-07 NOTE — CARE COORDINATION
10/07/24 1359   Suicidal Ideation   Wish to be Dead Lifetime - No;Past 1 month - No   Non-Specific Active Suicidal Thoughts Lifetime - No;Past 1 month - No   Suicidal Behavior Trigger No Selected

## 2024-10-07 NOTE — GROUP NOTE
Group Therapy Note    Date: 10/7/2024    Group Start Time: 1500  Group End Time: 1600  Group Topic: Recreational    [facility] 3 ACUTE BEHAV TH    Ariella Juan        Group Therapy Note    Attendees: 7       Patient's Goal:  To concentrate on selected task    Notes:  Pleasant,active participant    Discipline Responsible: Recreational Therapist      Signature:  ARIELLA JUAN

## 2024-10-07 NOTE — PROGRESS NOTES
Spiritual Health History and Assessment/Progress Note  [facility]    Behavioral Health, Spiritual/Emotional Needs,  ,  , Initial Encounter    Name: Kyra Hunt MRN: [id number]    Age: 69 y.o.     Sex: female   Language: English   Hindu: Non-Anglican   Schizophrenia (HCC)     Date: 10/7/2024            Total Time Calculated: 45 min              Spiritual Assessment began in [facility] 3 ACUTE BEHAV HLTH            Encounter Overview/Reason: Behavioral Health, Spiritual/Emotional Needs  Service Provided For: Patient    Argenis, Belief, Meaning:   Patient is connected with a argenis tradition or spiritual practice  Family/Friends No family/friends present      Importance and Influence:  Patient has no beliefs influential to healthcare decision-making identified during this visit  Family/Friends No family/friends present    Community:  Patient feels well-supported. Support system includes: Children and Extended family  Family/Friends No family/friends present    Assessment and Plan of Care:     Patient Interventions include: Facilitated expression of thoughts and feelings  Family/Friends Interventions include: No family/friends present    Patient Plan of Care: Spiritual Care available upon further referral  Family/Friends Plan of Care: No family/friends present    Electronically signed by JUSTIN Sanchez on 10/7/2024 at 2:16 PM

## 2024-10-07 NOTE — PROGRESS NOTES
Impression: Vitreous degeneration, left eye: H43.812. OCT: wnl OD, PVD OS Plan: Indirect ophthalmoscopy with scleral depression was performed and no retinal breaks or evidence of detachment were identified. The diagnosis, natural history, and prognosis of PVD were discussed at length. The signs and symptoms of retinal break/detachment including increased flashes, new-onset floaters, and development of a shadow/curtain shade in the visual field were reviewed. 

RTC 2-3 months DFE OCT OU Night Shift assessment completed.   Kyra is isolative to room, resting in bed, alert, calm, cooperative and free from distress. Pt reports she is worried, misses her family and wants to go home tomorrow. Affect normal. Denies anxiety, depression, SI/HI/AH/VH and pain. C-SSRS, No risk. Interacts appropriately and able to make needs known. No behavioral issues observed.     Nursing Intervention: VS T 98 °F (36.7 °C),HR 76 R 16, B/P 134/80, O2 Sat 97 %. Med and meal compliant. Emotional support and encouragement provided. No side effects from medications observed.     Response: Positive.    Plan: Monitoring for safety and behavior continues q 15 mins. Pt appears to have slept for 8 hours.

## 2024-10-07 NOTE — CARE COORDINATION
10/07/24 1400   ITP   Date of Plan 10/07/24   Primary Diagnosis Code Schizophrenia   Barriers to Treatment Need for psychoeducation;Transportation  (psychosis)   Strengths Incorporated in Plan Verbal;Seeking interactions;Support network;Family supports   Plan of Care   Long Term Goal (LTG) Stated in patient/guardian terms remain safe and stable   Short Term Goal 1   Short Term Goal 1 Client will reduce frequency and intensity of unsafe behavior   Baseline Functioning client has been eloping from ED and group homes   Target Client will not attempt to elope from [facility]   Objectives Client will participate in group therapy;Other (comment)  (Client will meet with treatment team)   Intervention 1 Assess safety   Frequency On going   Measured by Staff observation;Self report   Staff Responsible Clinical staff;[facility] staff   Intervention 2 Family involvement in treatment plan   Frequency Prior to discharge   Measured by Staff observation;Self report   Staff Responsible Clinical staff;[facility] staff   STG Goal 1 Status: Patient Appears to be  Partially meeting treatment plan goal  (Client has not tried to leave the unit thus far)   Short Term Goal 2   Short Term Goal 2 Client will maintain compliance with medication regime   Baseline Functioning Client has not been taking medication consistently and is experiencing psychosis   Target Reduction in psychosis through use of medication   Objectives Client will participate in group therapy;Other (comment)  (Client will participate in treatment team)   Intervention 1 Monitor medications   Frequency On going   Measured by Self report;Staff observation   Staff Responsible Clinical staff;[facility] staff   Intervention 2 Referral to community services   Frequency Prior to discharge   Measured by Self report;Staff observation   Staff Responsible Clinical staff;[facility] staff   STG Goal 2 Status: Patient Appears to be  Progressing toward treatment plan goal  (Client is willing to take medications)

## 2024-10-07 NOTE — GROUP NOTE
Group Therapy Note    Date: 10/7/2024    Group Start Time: 1045  Group End Time: 1130  Group Topic: Relaxation    [facility]    Ariella Juan        Group Therapy Note    Attendees: 5       Patient's Goal:  To participate in relaxation activity    Notes:  Pt did not attend session    Discipline Responsible: Recreational Therapist      Signature:  ARIELLA JUAN

## 2024-10-07 NOTE — PROGRESS NOTES
Laboratory Monitoring for Mood Stabilizers and Antipsychotics    Recommended baseline monitoring has been completed based on this patient's current medication regimen.     This patient is currently prescribed the following medication(s):   Current Facility-Administered Medications: amLODIPine (NORVASC) tablet 10 mg, 10 mg, Oral, Daily  metoprolol succinate (TOPROL XL) extended release tablet 50 mg, 50 mg, Oral, Daily  acetaminophen (TYLENOL) tablet 650 mg, 650 mg, Oral, Q4H PRN  polyethylene glycol (GLYCOLAX) packet 17 g, 17 g, Oral, Daily PRN  aluminum & magnesium hydroxide-simethicone (MAALOX) 200-200-20 MG/5ML suspension 30 mL, 30 mL, Oral, Q6H PRN  haloperidol (HALDOL) tablet 2.5 mg, 2.5 mg, Oral, Q4H PRN **OR** haloperidol lactate (HALDOL) injection 2.5 mg, 2.5 mg, IntraMUSCular, Q4H PRN  diphenhydrAMINE (BENADRYL) injection 25 mg, 25 mg, IntraMUSCular, Q4H PRN  aspirin EC tablet 81 mg, 81 mg, Oral, Daily  atorvastatin (LIPITOR) tablet 40 mg, 40 mg, Oral, QHS  risperiDONE (RISPERDAL) tablet 1 mg, 1 mg, Oral, Daily  risperiDONE (RISPERDAL) tablet 2 mg, 2 mg, Oral, QHS  sertraline (ZOLOFT) tablet 50 mg, 50 mg, Oral, Daily      The following labs have been completed for monitoring of antipsychotics and/or mood stabilizers:    Height, Weight, BMI Estimation  Estimated body mass index is 30.63 kg/m² as calculated from the following:    Height as of this encounter: 1.6 m (5' 2.99\").    Weight as of this encounter: 78.4 kg (172 lb 13.5 oz).     Vital Signs/Blood Pressure  /68   Pulse 62   Temp 98.1 °F (36.7 °C) (Oral)   Resp 16   Ht 1.6 m (5' 2.99\")   Wt 78.4 kg (172 lb 13.5 oz)   SpO2 97%   BMI 30.63 kg/m²     Renal Function, Hepatic Function and Chemistry  Estimated Creatinine Clearance: 60 mL/min (based on SCr of 0.87 mg/dL).    Lab Results   Component Value Date/Time     10/04/2024 06:36 PM    K 3.6 10/04/2024 06:36 PM     10/04/2024 06:36 PM    CO2 28 10/04/2024 06:36 PM    ANIONGAP 9

## 2024-10-07 NOTE — PROGRESS NOTES
20:00; Pt's B/P read 174/80 mmHg  repeat read 171/66 mmHg. N/P on call informed and Amlodipine Tab 10 mg  stat and Metoprolol succinate XL 50 mg stat in pt's medication history was ordered.   21:50; Pt sleeping, attempted to give the above medication . B/P read 134/80 mmHg P 76 the above medications were HELD per N/P order.

## 2024-10-08 LAB
EST. AVERAGE GLUCOSE BLD GHB EST-MCNC: 123 MG/DL
HBA1C MFR BLD: 5.9 % (ref 4–5.6)

## 2024-10-08 PROCEDURE — 6370000000 HC RX 637 (ALT 250 FOR IP): Performed by: PSYCHIATRY & NEUROLOGY

## 2024-10-08 PROCEDURE — 83036 HEMOGLOBIN GLYCOSYLATED A1C: CPT

## 2024-10-08 PROCEDURE — 1240000000 HC EMOTIONAL WELLNESS R&B

## 2024-10-08 PROCEDURE — 99232 SBSQ HOSP IP/OBS MODERATE 35: CPT | Performed by: PSYCHIATRY & NEUROLOGY

## 2024-10-08 PROCEDURE — 36415 COLL VENOUS BLD VENIPUNCTURE: CPT

## 2024-10-08 RX ADMIN — RISPERIDONE 1 MG: 1 TABLET, FILM COATED ORAL at 09:24

## 2024-10-08 RX ADMIN — METOPROLOL SUCCINATE 50 MG: 50 TABLET, EXTENDED RELEASE ORAL at 09:24

## 2024-10-08 RX ADMIN — SERTRALINE HYDROCHLORIDE 50 MG: 50 TABLET ORAL at 09:24

## 2024-10-08 RX ADMIN — AMLODIPINE BESYLATE 10 MG: 5 TABLET ORAL at 09:24

## 2024-10-08 RX ADMIN — ASPIRIN 81 MG: 81 TABLET, COATED ORAL at 09:24

## 2024-10-08 RX ADMIN — ATORVASTATIN CALCIUM 40 MG: 40 TABLET, FILM COATED ORAL at 22:49

## 2024-10-08 RX ADMIN — RISPERIDONE 2 MG: 1 TABLET, FILM COATED ORAL at 22:49

## 2024-10-08 ASSESSMENT — PAIN SCALES - GENERAL
PAINLEVEL_OUTOF10: 0
PAINLEVEL_OUTOF10: 0

## 2024-10-08 NOTE — GROUP NOTE
Group Therapy Note    Date: 10/8/2024    Group Start Time: 1400  Group End Time: 1500  Group Topic: Recreational    [facility]    Ariella Juan        Group Therapy Note    Attendees: 8       Patient's Goal:  To concentrate on selected task    Notes:  Pt did not attend session    Discipline Responsible: Recreational Therapist      Signature:  ARIELLA JUAN

## 2024-10-08 NOTE — PLAN OF CARE
Problem: Psychosis  Goal: Will report no hallucinations or delusions  Description: INTERVENTIONS:  1. Administer medication as  ordered  2. Assist with reality testing to support increasing orientation  3. Assess if patient's hallucinations or delusions are encouraging self harm or harm to others and intervene as appropriate  10/7/2024 2311 by Kayleigh Byod, RN  Outcome: Progressing    Problem: Risk for Elopement  Goal: Patient will not exit the unit/facility without proper excort  Outcome: Progressing     Problem: Safety - Adult  Goal: Free from fall injury  10/7/2024 2311 by Kayleigh Boyd, RN  Outcome: Progressing

## 2024-10-08 NOTE — PLAN OF CARE
Problem: Psychosis  Goal: Will report no hallucinations or delusions  Description: INTERVENTIONS:  1. Administer medication as  ordered  2. Assist with reality testing to support increasing orientation  3. Assess if patient's hallucinations or delusions are encouraging self harm or harm to others and intervene as appropriate  10/8/2024 1016 by Yoanna Mota, RN  Outcome: Progressing  10/7/2024 2311 by Kayleigh Boyd, RN  Outcome: Progressing

## 2024-10-08 NOTE — FLOWSHEET NOTE
Patient was received resting in her room.  She was calm, cooperative and friendly during assessment.  Denies SI/HI/AVH, endorses depression and anxiety both at a level of 5.  Patient was isolative to her room throughout the shift.  She was med compliant.  Will continue to monitor for safety.    Patient appeared to sleep hours.       10/07/24 2309   Mental Status and Behavioral Exam   Normal No   Level of Assistance Independent/Self   Facial Expression Flat   Affect Appropriate   Level of Consciousness Alert   Frequency of Checks 4 times per hour, close   Mood:Normal No   Mood Depressed;Anxious   Motor Activity:Normal Yes   Eye Contact Good   Observed Behavior Cooperative;Friendly   Sexual Misconduct History Current - no   Preception Hooper to situation   Attention:Normal Yes   Thought Processes Unremarkable   Thought Content:Normal Yes   Depression Symptoms Isolative   Anxiety Symptoms Generalized   Theresa Symptoms No problems reported or observed.   Hallucinations None   Delusions No   Memory:Normal No   Memory Poor recent   Insight and Judgment No   Insight and Judgment Poor insight

## 2024-10-08 NOTE — GROUP NOTE
Group Therapy Note    Date: 10/8/2024    Group Start Time: 1000  Group End Time: 1045  Group Topic: Relaxation    [facility]    Ariella Juan        Group Therapy Note    Attendees: 8       Patient's Goal:  To participate in relaxation activity    Notes:  Pt did not attend session      Discipline Responsible: Recreational Therapist      Signature:  ARIELLA JUAN     None needed

## 2024-10-09 PROCEDURE — 1240000000 HC EMOTIONAL WELLNESS R&B

## 2024-10-09 PROCEDURE — 99232 SBSQ HOSP IP/OBS MODERATE 35: CPT | Performed by: PSYCHIATRY & NEUROLOGY

## 2024-10-09 PROCEDURE — 6370000000 HC RX 637 (ALT 250 FOR IP): Performed by: PSYCHIATRY & NEUROLOGY

## 2024-10-09 RX ADMIN — METOPROLOL SUCCINATE 50 MG: 50 TABLET, EXTENDED RELEASE ORAL at 08:54

## 2024-10-09 RX ADMIN — RISPERIDONE 1 MG: 1 TABLET, FILM COATED ORAL at 08:55

## 2024-10-09 RX ADMIN — ASPIRIN 81 MG: 81 TABLET, COATED ORAL at 08:55

## 2024-10-09 RX ADMIN — AMLODIPINE BESYLATE 10 MG: 5 TABLET ORAL at 08:55

## 2024-10-09 RX ADMIN — ATORVASTATIN CALCIUM 40 MG: 40 TABLET, FILM COATED ORAL at 20:55

## 2024-10-09 RX ADMIN — SERTRALINE HYDROCHLORIDE 50 MG: 50 TABLET ORAL at 08:55

## 2024-10-09 RX ADMIN — RISPERIDONE 2 MG: 1 TABLET, FILM COATED ORAL at 20:55

## 2024-10-09 ASSESSMENT — PAIN SCALES - GENERAL
PAINLEVEL_OUTOF10: 0
PAINLEVEL_OUTOF10: 0

## 2024-10-09 NOTE — GROUP NOTE
Group Therapy Note    Date: 10/9/2024    Group Start Time: 1500  Group End Time: 1600  Group Topic: Recreational    [facility]    Ariella Juan        Group Therapy Note    Attendees: 7       Patient's Goal:  To concentrate on selected task    Notes:  Pt did not attend session    Discipline Responsible: Recreational Therapist      Signature:  ARIELLA JUAN

## 2024-10-09 NOTE — PLAN OF CARE
Problem: Risk for Elopement  Goal: Patient will not exit the unit/facility without proper excort  Outcome: Progressing     Problem: Pain  Goal: Verbalizes/displays adequate comfort level or baseline comfort level  Outcome: Progressing

## 2024-10-09 NOTE — GROUP NOTE
Group Therapy Note    Date: 10/9/2024    Group Start Time: 1000  Group End Time: 1045  Group Topic: Relaxation    [facility]    Ariella Juan        Group Therapy Note    Attendees: 8       Patient's Goal:  To participate in relaxation activity    Notes:  Pt did not attend session    Discipline Responsible: Recreational Therapist      Signature:  ARIELLA JUAN

## 2024-10-09 NOTE — PROGRESS NOTES
GROUP THERAPY PROGRESS NOTE      GROUP TIME: Wednesday 2-2:45 PM    NUMBER OF PARTICIPANTS: 9    PERSONAL GOAL FOR PARTICIPATION: To be present for group, participate in discussion, and answer patient-directed questions.    GOAL ORIENTATION: Personal    THERAPEUTIC INTERVENTIONS REVIEWED AND DISCUSSED: The following topics were presented: storage of medications, how to remember to refill medications and keep up with doctor appointments, relapse prevention, keeping a record of all medication including prescription and non-prescription drugs, and who to contact with medication questions. Patients were given time to ask questions regarding their current therapy.    IMPRESSION OF PARTICIPATION: Kyra Hunt was not present for medication group.      Dior Hernandez, [facility]

## 2024-10-09 NOTE — PLAN OF CARE
Problem: Pain  Goal: Verbalizes/displays adequate comfort level or baseline comfort level  10/9/2024 1128 by Jannette Alcaraz RN  Outcome: Progressing  10/8/2024 2359 by Tamara Ortiz RN  Outcome: Progressing     Problem: Risk for Elopement  Goal: Patient will not exit the unit/facility without proper excort  10/9/2024 1128 by Jannette Alcaraz RN  Outcome: Progressing  10/8/2024 2359 by Tamara Ortiz RN  Outcome: Progressing     Problem: Safety - Adult  Goal: Free from fall injury  Outcome: Progressing     Problem: Depression  Goal: Will be euthymic at discharge  Description: INTERVENTIONS:  1. Administer medication as ordered  2. Provide emotional support via 1:1 interaction with staff  3. Encourage involvement in milieu/groups/activities  4. Monitor for social isolation  Outcome: Progressing     Problem: Discharge Planning  Goal: Discharge to home or other facility with appropriate resources  Outcome: Not Progressing     Problem: Psychosis  Goal: Will report no hallucinations or delusions  Description: INTERVENTIONS:  1. Administer medication as  ordered  2. Assist with reality testing to support increasing orientation  3. Assess if patient's hallucinations or delusions are encouraging self harm or harm to others and intervene as appropriate  Outcome: Not Progressing

## 2024-10-10 LAB
CHOLEST SERPL-MCNC: 154 MG/DL
HDLC SERPL-MCNC: 75 MG/DL
HDLC SERPL: 2.1 (ref 0–5)
LDLC SERPL CALC-MCNC: 66 MG/DL (ref 0–100)
TRIGL SERPL-MCNC: 65 MG/DL
TSH SERPL DL<=0.05 MIU/L-ACNC: 0.92 UIU/ML (ref 0.36–3.74)
VLDLC SERPL CALC-MCNC: 13 MG/DL

## 2024-10-10 PROCEDURE — 6370000000 HC RX 637 (ALT 250 FOR IP): Performed by: NURSE PRACTITIONER

## 2024-10-10 PROCEDURE — 6370000000 HC RX 637 (ALT 250 FOR IP): Performed by: PSYCHIATRY & NEUROLOGY

## 2024-10-10 PROCEDURE — 84443 ASSAY THYROID STIM HORMONE: CPT

## 2024-10-10 PROCEDURE — 1240000000 HC EMOTIONAL WELLNESS R&B

## 2024-10-10 PROCEDURE — 36415 COLL VENOUS BLD VENIPUNCTURE: CPT

## 2024-10-10 PROCEDURE — 99232 SBSQ HOSP IP/OBS MODERATE 35: CPT | Performed by: PSYCHIATRY & NEUROLOGY

## 2024-10-10 PROCEDURE — 80061 LIPID PANEL: CPT

## 2024-10-10 RX ADMIN — ASPIRIN 81 MG: 81 TABLET, COATED ORAL at 09:06

## 2024-10-10 RX ADMIN — RISPERIDONE 1 MG: 1 TABLET, FILM COATED ORAL at 09:06

## 2024-10-10 RX ADMIN — ATORVASTATIN CALCIUM 40 MG: 40 TABLET, FILM COATED ORAL at 20:49

## 2024-10-10 RX ADMIN — SERTRALINE HYDROCHLORIDE 50 MG: 50 TABLET ORAL at 09:06

## 2024-10-10 RX ADMIN — RISPERIDONE 2 MG: 1 TABLET, FILM COATED ORAL at 20:49

## 2024-10-10 RX ADMIN — AMLODIPINE BESYLATE 10 MG: 5 TABLET ORAL at 09:06

## 2024-10-10 RX ADMIN — ACETAMINOPHEN 650 MG: 325 TABLET ORAL at 20:49

## 2024-10-10 RX ADMIN — METOPROLOL SUCCINATE 50 MG: 50 TABLET, EXTENDED RELEASE ORAL at 09:06

## 2024-10-10 ASSESSMENT — PAIN SCALES - GENERAL
PAINLEVEL_OUTOF10: 3
PAINLEVEL_OUTOF10: 0
PAINLEVEL_OUTOF10: 0

## 2024-10-10 ASSESSMENT — PAIN DESCRIPTION - LOCATION: LOCATION: LEG

## 2024-10-10 ASSESSMENT — PAIN - FUNCTIONAL ASSESSMENT: PAIN_FUNCTIONAL_ASSESSMENT: 0-10

## 2024-10-10 NOTE — GROUP NOTE
Group Therapy Note    Date: 10/10/2024    Group Start Time: 1000  Group End Time: 1045  Group Topic: Relaxation    [facility] AC[facility]    Ariella Juan        Group Therapy Note    Attendees: 9       Patient's Goal:  To participate in relaxation activity    Notes:  Pt did not attend session    Discipline Responsible: Recreational Therapist      Signature:  ARIELLA JUAN

## 2024-10-10 NOTE — PLAN OF CARE
Problem: Safety - Adult  Goal: Free from fall injury  Outcome: Progressing     Problem: Depression  Goal: Will be euthymic at discharge  Description: INTERVENTIONS:  1. Administer medication as ordered  2. Provide emotional support via 1:1 interaction with staff  3. Encourage involvement in milieu/groups/activities  4. Monitor for social isolation  Outcome: Progressing     Problem: Psychosis  Goal: Will report no hallucinations or delusions  Description: INTERVENTIONS:  1. Administer medication as  ordered  2. Assist with reality testing to support increasing orientation  3. Assess if patient's hallucinations or delusions are encouraging self harm or harm to others and intervene as appropriate  Outcome: Progressing

## 2024-10-10 NOTE — PROGRESS NOTES
1915-0715    Kyra is isolative to her room. She is observed lying in bed awake. She is pleasant and friendly. Kyra denies SI/HI/VH. She reports that she has AH of \"people saying nice things to me. Like you are a good person.\"  Kyra's affect is bright. She is med compliant. No PRNs requested or given at this time. Patient ate snack in her room. No c/o pain or discomfort. No voiced concerns. Staff will continue to monitor for safety, location, and behavior q 15 minutes.     0600  No acute changes overnight. Patient slept for 7 hours

## 2024-10-10 NOTE — GROUP NOTE
Group Therapy Note    Date: 10/10/2024    Group Start Time: 1400  Group End Time: 1500  Group Topic: Recreational    [facility]    Ariella Juan        Group Therapy Note    Attendees: 11       Patient's Goal:  To participate in relaxation activity    Notes:  Pt did not attend session    Discipline Responsible: Recreational Therapist      Signature:  ARIELLA JUAN

## 2024-10-10 NOTE — PLAN OF CARE
Problem: Pain  Goal: Verbalizes/displays adequate comfort level or baseline comfort level  10/9/2024 2139 by Gogo Jackson, RN  Outcome: Progressing  10/9/2024 1128 by Jannette Alcaraz, RN  Outcome: Progressing

## 2024-10-11 PROCEDURE — 1240000000 HC EMOTIONAL WELLNESS R&B

## 2024-10-11 PROCEDURE — 6370000000 HC RX 637 (ALT 250 FOR IP): Performed by: PSYCHIATRY & NEUROLOGY

## 2024-10-11 PROCEDURE — 99232 SBSQ HOSP IP/OBS MODERATE 35: CPT | Performed by: PSYCHIATRY & NEUROLOGY

## 2024-10-11 RX ADMIN — METOPROLOL SUCCINATE 50 MG: 50 TABLET, EXTENDED RELEASE ORAL at 09:13

## 2024-10-11 RX ADMIN — RISPERIDONE 2 MG: 1 TABLET, FILM COATED ORAL at 22:27

## 2024-10-11 RX ADMIN — RISPERIDONE 1 MG: 1 TABLET, FILM COATED ORAL at 09:13

## 2024-10-11 RX ADMIN — ASPIRIN 81 MG: 81 TABLET, COATED ORAL at 09:13

## 2024-10-11 RX ADMIN — AMLODIPINE BESYLATE 10 MG: 5 TABLET ORAL at 09:13

## 2024-10-11 RX ADMIN — ATORVASTATIN CALCIUM 40 MG: 40 TABLET, FILM COATED ORAL at 22:27

## 2024-10-11 RX ADMIN — SERTRALINE HYDROCHLORIDE 50 MG: 50 TABLET ORAL at 09:13

## 2024-10-11 ASSESSMENT — PAIN - FUNCTIONAL ASSESSMENT: PAIN_FUNCTIONAL_ASSESSMENT: NONE - DENIES PAIN

## 2024-10-11 ASSESSMENT — PAIN SCALES - GENERAL: PAINLEVEL_OUTOF10: 0

## 2024-10-11 NOTE — PLAN OF CARE
Pt received at 1930, Alert, Oriented X4, and Cooperative with Appropriate affect. Pt's vital signs were T 98.5 °F (36.9 °C), HR 93, RR 18, /62, and O2 97 %. Pt endorsed leg pain, denied anxiety and denied other side effects / symptoms. Pt replied No to suicidal ideation, No to homicidal ideation, and No to hallucinations. Pt had speech that was Clear and Unremarkable in quality. Pt provided with 1:1 RN assessment with active listening and therapeutic presence for a brief period in the evening. Pt encouraged to participate in the evening milieu including leisure activities, snack time, and positive peer interaction. Pt cooperated with all scheduled medications and received PRN Tylenol for pain. Pt monitored q15min throughout the night. Pt was isolative throughout the evening and rested in bed for 9 hours overnight.    Problem: Psychosis  Goal: Will report no hallucinations or delusions  Description: INTERVENTIONS:  1. Administer medication as  ordered  2. Assist with reality testing to support increasing orientation  3. Assess if patient's hallucinations or delusions are encouraging self harm or harm to others and intervene as appropriate  10/10/2024 6099 by Shannon Camara, RN  Outcome: Progressing  10/10/2024 1305 by Lainey Somers, RN  Outcome: Progressing     Problem: Pain  Goal: Verbalizes/displays adequate comfort level or baseline comfort level  Outcome: Not Progressing

## 2024-10-11 NOTE — GROUP NOTE
Group Therapy Note    Date: 10/11/2024    Group Start Time: 1400  Group End Time: 1500  Group Topic: Recreational    [facility]    Ariella Juan        Group Therapy Note    Attendees: 11       Patient's Goal:  To concentrate on selected task    Notes:  Pt did not attend session      Discipline Responsible: Recreational Therapist      Signature:  ARIELLA JUAN

## 2024-10-11 NOTE — GROUP NOTE
Group Therapy Note    Date: 10/11/2024    Group Start Time: 1000  Group End Time: 1045  Group Topic: Relaxation    [facility]    Ariella Juan        Group Therapy Note    Attendees: 9       Patient's Goal:  To participate in relaxation activity    Notes:  Pt did not attend session    Discipline Responsible: Recreational Therapist      Signature:  ARIELLA JUAN

## 2024-10-12 PROCEDURE — 1240000000 HC EMOTIONAL WELLNESS R&B

## 2024-10-12 PROCEDURE — 6370000000 HC RX 637 (ALT 250 FOR IP): Performed by: PSYCHIATRY & NEUROLOGY

## 2024-10-12 RX ADMIN — METOPROLOL SUCCINATE 50 MG: 50 TABLET, EXTENDED RELEASE ORAL at 09:42

## 2024-10-12 RX ADMIN — AMLODIPINE BESYLATE 10 MG: 5 TABLET ORAL at 09:42

## 2024-10-12 RX ADMIN — ASPIRIN 81 MG: 81 TABLET, COATED ORAL at 09:42

## 2024-10-12 RX ADMIN — RISPERIDONE 2 MG: 1 TABLET, FILM COATED ORAL at 20:43

## 2024-10-12 RX ADMIN — SERTRALINE HYDROCHLORIDE 50 MG: 50 TABLET ORAL at 09:42

## 2024-10-12 RX ADMIN — RISPERIDONE 1 MG: 1 TABLET, FILM COATED ORAL at 09:42

## 2024-10-12 RX ADMIN — ATORVASTATIN CALCIUM 40 MG: 40 TABLET, FILM COATED ORAL at 20:43

## 2024-10-12 ASSESSMENT — PAIN SCALES - GENERAL: PAINLEVEL_OUTOF10: 0

## 2024-10-12 NOTE — PLAN OF CARE
Pt received at 1930, Alert, Oriented X4, and Cooperative with Appropriate affect. Pt's vital signs were T 97.9 °F (36.6 °C), HR 60, RR 16, /67, and O2 98 %. Pt denied pain, denied anxiety and denied other side effects / symptoms. Pt replied No to suicidal ideation, No to homicidal ideation, and yes, Auditory (comment) (vague / nonsensical), to hallucinations. Pt had speech that was Clear and Unremarkable in quality. Pt provided with 1:1 RN assessment with active listening and therapeutic presence for a brief period in the evening. Pt encouraged to participate in the evening milieu including leisure activities, snack time, and positive peer interaction. Pt cooperated with all scheduled medications and received no PRN medications. Pt monitored q15min throughout the night. Pt was isolative throughout the evening and rested in bed for 7 hours overnight.    Problem: Psychosis  Goal: Will report no hallucinations or delusions  Description: INTERVENTIONS:  1. Administer medication as  ordered  2. Assist with reality testing to support increasing orientation  3. Assess if patient's hallucinations or delusions are encouraging self harm or harm to others and intervene as appropriate  Outcome: Not Progressing    Problem: Risk for Elopement  Goal: Patient will not exit the unit/facility without proper excort  Outcome: Progressing

## 2024-10-13 PROCEDURE — 6370000000 HC RX 637 (ALT 250 FOR IP): Performed by: PSYCHIATRY & NEUROLOGY

## 2024-10-13 PROCEDURE — 1240000000 HC EMOTIONAL WELLNESS R&B

## 2024-10-13 RX ADMIN — SERTRALINE HYDROCHLORIDE 50 MG: 50 TABLET ORAL at 09:42

## 2024-10-13 RX ADMIN — ATORVASTATIN CALCIUM 40 MG: 40 TABLET, FILM COATED ORAL at 21:01

## 2024-10-13 RX ADMIN — ASPIRIN 81 MG: 81 TABLET, COATED ORAL at 09:42

## 2024-10-13 RX ADMIN — RISPERIDONE 2 MG: 1 TABLET, FILM COATED ORAL at 21:01

## 2024-10-13 RX ADMIN — AMLODIPINE BESYLATE 10 MG: 5 TABLET ORAL at 09:42

## 2024-10-13 RX ADMIN — RISPERIDONE 1 MG: 1 TABLET, FILM COATED ORAL at 09:42

## 2024-10-13 RX ADMIN — METOPROLOL SUCCINATE 50 MG: 50 TABLET, EXTENDED RELEASE ORAL at 09:42

## 2024-10-13 ASSESSMENT — PAIN SCALES - GENERAL
PAINLEVEL_OUTOF10: 0
PAINLEVEL_OUTOF10: 0

## 2024-10-13 NOTE — PLAN OF CARE
Problem: Safety - Adult  Goal: Free from fall injury  Outcome: Progressing     Problem: Depression  Goal: Will be euthymic at discharge  Description: INTERVENTIONS:  1. Administer medication as ordered  2. Provide emotional support via 1:1 interaction with staff  3. Encourage involvement in milieu/groups/activities  4. Monitor for social isolation  10/12/2024 2134 by Kenya Faustin RN  Outcome: Not Progressing

## 2024-10-13 NOTE — PLAN OF CARE
Problem: Depression  Goal: Will be euthymic at discharge  Description: INTERVENTIONS:  1. Administer medication as ordered  2. Provide emotional support via 1:1 interaction with staff  3. Encourage involvement in milieu/groups/activities  4. Monitor for social isolation  Outcome: Not Progressing

## 2024-10-14 PROCEDURE — 1240000000 HC EMOTIONAL WELLNESS R&B

## 2024-10-14 PROCEDURE — 99232 SBSQ HOSP IP/OBS MODERATE 35: CPT | Performed by: PSYCHIATRY & NEUROLOGY

## 2024-10-14 PROCEDURE — 6370000000 HC RX 637 (ALT 250 FOR IP): Performed by: PSYCHIATRY & NEUROLOGY

## 2024-10-14 RX ADMIN — RISPERIDONE 2 MG: 1 TABLET, FILM COATED ORAL at 20:30

## 2024-10-14 RX ADMIN — AMLODIPINE BESYLATE 10 MG: 5 TABLET ORAL at 09:22

## 2024-10-14 RX ADMIN — ASPIRIN 81 MG: 81 TABLET, COATED ORAL at 09:22

## 2024-10-14 RX ADMIN — ATORVASTATIN CALCIUM 40 MG: 40 TABLET, FILM COATED ORAL at 20:30

## 2024-10-14 RX ADMIN — RISPERIDONE 1 MG: 1 TABLET, FILM COATED ORAL at 09:22

## 2024-10-14 RX ADMIN — METOPROLOL SUCCINATE 50 MG: 50 TABLET, EXTENDED RELEASE ORAL at 09:22

## 2024-10-14 RX ADMIN — SERTRALINE HYDROCHLORIDE 50 MG: 50 TABLET ORAL at 09:22

## 2024-10-14 ASSESSMENT — PAIN SCALES - GENERAL: PAINLEVEL_OUTOF10: 0

## 2024-10-14 NOTE — GROUP NOTE
Group Therapy Note    Date: 10/14/2024    Group Start Time: 1000  Group End Time: 1045  Group Topic: Relaxation    [facility] AC[facility]    Ariella Juan        Group Therapy Note    Attendees: 6       Patient's Goal:  To participate in relaxation activity    Notes:  Pt did not attend session    Discipline Responsible: Recreational Therapist      Signature:  ARIELLA JUAN

## 2024-10-14 NOTE — PLAN OF CARE
Problem: Discharge Planning  Goal: Discharge to home or other facility with appropriate resources  Outcome: Progressing     Problem: Depression  Goal: Will be euthymic at discharge  Description: INTERVENTIONS:  1. Administer medication as ordered  2. Provide emotional support via 1:1 interaction with staff  3. Encourage involvement in milieu/groups/activities  4. Monitor for social isolation  Outcome: Progressing     Problem: Psychosis  Goal: Will report no hallucinations or delusions  Description: INTERVENTIONS:  1. Administer medication as  ordered  2. Assist with reality testing to support increasing orientation  3. Assess if patient's hallucinations or delusions are encouraging self harm or harm to others and intervene as appropriate  Outcome: Progressing

## 2024-10-14 NOTE — GROUP NOTE
Group Therapy Note    Date: 10/14/2024    Group Start Time: 1400  Group End Time: 1500  Group Topic: Recreational    [facility]    Ariella Juan        Group Therapy Note    Attendees: 7       Patient's Goal:  To concentrate on selected task    Notes:  Pt did not attend session    Discipline Responsible: Recreational Therapist      Signature:  ARIELLA JUAN

## 2024-10-14 NOTE — PLAN OF CARE
Problem: Pain  Goal: Verbalizes/displays adequate comfort level or baseline comfort level  Outcome: Progressing     Problem: Safety - Adult  Goal: Free from fall injury  10/13/2024 2248 by Tamara Ortiz RN  Outcome: Progressing

## 2024-10-15 PROCEDURE — 99232 SBSQ HOSP IP/OBS MODERATE 35: CPT | Performed by: PSYCHIATRY & NEUROLOGY

## 2024-10-15 PROCEDURE — 1240000000 HC EMOTIONAL WELLNESS R&B

## 2024-10-15 PROCEDURE — 6370000000 HC RX 637 (ALT 250 FOR IP): Performed by: PSYCHIATRY & NEUROLOGY

## 2024-10-15 RX ADMIN — METOPROLOL SUCCINATE 50 MG: 50 TABLET, EXTENDED RELEASE ORAL at 09:07

## 2024-10-15 RX ADMIN — AMLODIPINE BESYLATE 10 MG: 5 TABLET ORAL at 09:07

## 2024-10-15 RX ADMIN — ATORVASTATIN CALCIUM 40 MG: 40 TABLET, FILM COATED ORAL at 20:33

## 2024-10-15 RX ADMIN — ASPIRIN 81 MG: 81 TABLET, COATED ORAL at 09:07

## 2024-10-15 RX ADMIN — RISPERIDONE 2 MG: 1 TABLET, FILM COATED ORAL at 20:33

## 2024-10-15 RX ADMIN — SERTRALINE HYDROCHLORIDE 50 MG: 50 TABLET ORAL at 09:07

## 2024-10-15 RX ADMIN — RISPERIDONE 1 MG: 1 TABLET, FILM COATED ORAL at 09:07

## 2024-10-15 ASSESSMENT — PAIN SCALES - GENERAL
PAINLEVEL_OUTOF10: 0

## 2024-10-15 NOTE — GROUP NOTE
Group Therapy Note    Date: 10/15/2024    Group Start Time: 1000  Group End Time: 1045  Group Topic: Relaxation    [facility] 3 ACUTE [facility]    Ariella Juan        Group Therapy Note    Attendees: 9       Patient's Goal:  To participate in relaxation activity    Notes:  Pt attended session,arrived late,elected to watch,pleasant upon approach    Discipline Responsible: Recreational Therapist      Signature:  ARIELLA JUAN

## 2024-10-15 NOTE — PLAN OF CARE
Problem: Psychosis  Goal: Will report no hallucinations or delusions  Description: INTERVENTIONS:  1. Administer medication as  ordered  2. Assist with reality testing to support increasing orientation  3. Assess if patient's hallucinations or delusions are encouraging self harm or harm to others and intervene as appropriate  10/15/2024 0929 by Yoanna Mota, RN  Outcome: Progressing

## 2024-10-15 NOTE — PLAN OF CARE
Problem: Depression  Goal: Will be euthymic at discharge  Description: INTERVENTIONS:  1. Administer medication as ordered  2. Provide emotional support via 1:1 interaction with staff  3. Encourage involvement in milieu/groups/activities  4. Monitor for social isolation  Outcome: Progressing

## 2024-10-15 NOTE — PLAN OF CARE
Problem: Discharge Planning  Goal: Discharge to home or other facility with appropriate resources  Outcome: Progressing     Problem: Psychosis  Goal: Will report no hallucinations or delusions  Description: INTERVENTIONS:  1. Administer medication as  ordered  2. Assist with reality testing to support increasing orientation  3. Assess if patient's hallucinations or delusions are encouraging self harm or harm to others and intervene as appropriate  Outcome: Progressing     Problem: Depression  Goal: Will be euthymic at discharge  Description: INTERVENTIONS:  1. Administer medication as ordered  2. Provide emotional support via 1:1 interaction with staff  3. Encourage involvement in milieu/groups/activities  4. Monitor for social isolation  10/15/2024 0013 by Hal Reyna, RN  Outcome: Progressing

## 2024-10-15 NOTE — PROGRESS NOTES
Pt was observed isolative to room. Pt was pleasant upon approach. Pt presented alert and oriented to person and place, bright affect, mood euthymic. Pt voiced no SI/HI/AVH. Pt was compliant with their scheduled meds. Pt denied pain. Pt reported having a bowel movement today. Pt accepted offered fluids and evening snack. Pt voiced no other concerns at this time. Pt was noted with poor personal hygiene. Pt appeared to have slept approx 8 hours during the night. No acute distress observed. Monitoring for safety and behaviors continues.

## 2024-10-16 PROCEDURE — 1240000000 HC EMOTIONAL WELLNESS R&B

## 2024-10-16 PROCEDURE — 6370000000 HC RX 637 (ALT 250 FOR IP): Performed by: PSYCHIATRY & NEUROLOGY

## 2024-10-16 PROCEDURE — 99232 SBSQ HOSP IP/OBS MODERATE 35: CPT | Performed by: PSYCHIATRY & NEUROLOGY

## 2024-10-16 RX ADMIN — RISPERIDONE 2 MG: 1 TABLET, FILM COATED ORAL at 20:49

## 2024-10-16 RX ADMIN — ASPIRIN 81 MG: 81 TABLET, COATED ORAL at 09:07

## 2024-10-16 RX ADMIN — ATORVASTATIN CALCIUM 40 MG: 40 TABLET, FILM COATED ORAL at 20:49

## 2024-10-16 RX ADMIN — RISPERIDONE 1 MG: 1 TABLET, FILM COATED ORAL at 09:07

## 2024-10-16 RX ADMIN — SERTRALINE HYDROCHLORIDE 50 MG: 50 TABLET ORAL at 09:07

## 2024-10-16 RX ADMIN — AMLODIPINE BESYLATE 10 MG: 5 TABLET ORAL at 09:07

## 2024-10-16 RX ADMIN — METOPROLOL SUCCINATE 50 MG: 50 TABLET, EXTENDED RELEASE ORAL at 09:07

## 2024-10-16 ASSESSMENT — PAIN SCALES - GENERAL: PAINLEVEL_OUTOF10: 0

## 2024-10-16 NOTE — GROUP NOTE
Group Therapy Note    Date: 10/16/2024    Group Start Time: 1500  Group End Time: 1600  Group Topic: Recreational    [facility]    Ariella Juan        Group Therapy Note    Attendees: 6       Patient's Goal:  To concentrate on selected task    Notes:  Pt did not attend session    Discipline Responsible: Recreational Therapist      Signature:  ARIELLA JUAN

## 2024-10-16 NOTE — PROGRESS NOTES
1915-0715       Kyra remains isolative to her room. She is observed lying in bed. Her affect is flat.  Encouraged patient to shower, patient reports that she has already. Kyra is pleasant during assessment. She denies SI/HI/VH. She reports endorsing AH of people saying \"I'm a nice person and that I am having problems with my family.\" She reports that her mood is \"good\".  No c/o pain or discomfort. She is med compliant. No PRNs requested or given at this time. Staff will continue to monitor for safety, location, and behavior q 15 minutes.     0600  No acute changes overnight. Patient slept for 8 hours this shift.

## 2024-10-16 NOTE — GROUP NOTE
Group Therapy Note    Date: 10/16/2024    Group Start Time: 1000  Group End Time: 1045  Group Topic: Relaxation    [facility]    Ariella Juan        Group Therapy Note    Attendees: 5       Patient's Goal:  To participate in relaxation activity     Notes:  Pt did ot attend session    Discipline Responsible: Recreational Therapist      Signature:  ARIELLA JUAN

## 2024-10-16 NOTE — PLAN OF CARE
Problem: Pain  Goal: Verbalizes/displays adequate comfort level or baseline comfort level  Outcome: Progressing     Problem: Risk for Elopement  Goal: Patient will not exit the unit/facility without proper excort  Outcome: Adequate for Discharge     Problem: Safety - Adult  Goal: Free from fall injury  Outcome: Adequate for Discharge     Problem: Depression  Goal: Will be euthymic at discharge  Description: INTERVENTIONS:  1. Administer medication as ordered  2. Provide emotional support via 1:1 interaction with staff  3. Encourage involvement in milieu/groups/activities  4. Monitor for social isolation  Outcome: Progressing     Problem: Discharge Planning  Goal: Discharge to home or other facility with appropriate resources  Outcome: Not Progressing

## 2024-10-17 PROCEDURE — 99232 SBSQ HOSP IP/OBS MODERATE 35: CPT | Performed by: PSYCHIATRY & NEUROLOGY

## 2024-10-17 PROCEDURE — 6370000000 HC RX 637 (ALT 250 FOR IP): Performed by: PSYCHIATRY & NEUROLOGY

## 2024-10-17 PROCEDURE — 1240000000 HC EMOTIONAL WELLNESS R&B

## 2024-10-17 RX ADMIN — ATORVASTATIN CALCIUM 40 MG: 40 TABLET, FILM COATED ORAL at 20:56

## 2024-10-17 RX ADMIN — RISPERIDONE 2 MG: 1 TABLET, FILM COATED ORAL at 20:56

## 2024-10-17 RX ADMIN — AMLODIPINE BESYLATE 10 MG: 5 TABLET ORAL at 08:39

## 2024-10-17 RX ADMIN — SERTRALINE HYDROCHLORIDE 50 MG: 50 TABLET ORAL at 08:40

## 2024-10-17 RX ADMIN — METOPROLOL SUCCINATE 50 MG: 50 TABLET, EXTENDED RELEASE ORAL at 08:39

## 2024-10-17 RX ADMIN — RISPERIDONE 1 MG: 1 TABLET, FILM COATED ORAL at 08:39

## 2024-10-17 RX ADMIN — ASPIRIN 81 MG: 81 TABLET, COATED ORAL at 08:39

## 2024-10-17 ASSESSMENT — PAIN SCALES - GENERAL
PAINLEVEL_OUTOF10: 0

## 2024-10-17 NOTE — GROUP NOTE
Group Therapy Note    Date: 10/17/2024    Group Start Time: 1400  Group End Time: 1500  Group Topic: Recreational    [facility]    Ariella Juan        Group Therapy Note    Attendees: 9       Patient's Goal:  To concentrate on selected task    Notes:  Pt did not attend session    Discipline Responsible: Recreational Therapist      Signature:  ARIELLA JUAN

## 2024-10-17 NOTE — CARE COORDINATION
10/17/24 1617   [facility]   Primary Diagnosis Code Schizophrenia   Plan of Care   Long Term Goal (LTG) Stated in patient/guardian terms remain safe and stable   Short Term Goal 1   Short Term Goal 1 Client will reduce frequency and intensity of unsafe behavior   Baseline Functioning client has been eloping from ED and group homes   Target Client will not attempt to elope from [facility]   STG Goal 1 Status: Patient Appears to be  Progressing toward treatment plan goal  (client has not attempting to leave U and has voiced understanding of need to be here)   Short Term Goal 2   Short Term Goal 2 Client will maintain compliance with medication regime   Baseline Functioning Client has not been taking medication consistently and is experiencing psychosis   Target Reduction in psychosis through use of medication   STG Goal 2 Status: Patient Appears to be  Progressing toward treatment plan goal  (client is acknowledging need to take medication, but still experiencing symptoms of psychosis)

## 2024-10-17 NOTE — GROUP NOTE
Group Therapy Note    Date: 10/17/2024    Group Start Time: 1000  Group End Time: 1045  Group Topic: Relaxation    [facility]    Ariella Juan        Group Therapy Note    Attendees: 10       Patient's Goal:  To participate in healthy relationships [email or web address] game    Notes:  Pleasant,active participant      Discipline Responsible: Recreational Therapist      Signature:  ARIELLA JUAN

## 2024-10-17 NOTE — PLAN OF CARE
Problem: Safety - Adult  Goal: Free from fall injury  10/16/2024 2013 by Tamara Ortiz, RN  Outcome: Progressing     Problem: Risk for Elopement  Goal: Patient will not exit the unit/facility without proper excort  10/16/2024 2013 by Tamara Ortiz, RN  Outcome: Progressing

## 2024-10-18 PROCEDURE — 6370000000 HC RX 637 (ALT 250 FOR IP): Performed by: PSYCHIATRY & NEUROLOGY

## 2024-10-18 PROCEDURE — 1240000000 HC EMOTIONAL WELLNESS R&B

## 2024-10-18 PROCEDURE — 99232 SBSQ HOSP IP/OBS MODERATE 35: CPT | Performed by: PSYCHIATRY & NEUROLOGY

## 2024-10-18 RX ADMIN — RISPERIDONE 2 MG: 1 TABLET, FILM COATED ORAL at 20:24

## 2024-10-18 RX ADMIN — RISPERIDONE 1 MG: 1 TABLET, FILM COATED ORAL at 08:33

## 2024-10-18 RX ADMIN — SERTRALINE HYDROCHLORIDE 50 MG: 50 TABLET ORAL at 08:35

## 2024-10-18 RX ADMIN — METOPROLOL SUCCINATE 50 MG: 50 TABLET, EXTENDED RELEASE ORAL at 08:33

## 2024-10-18 RX ADMIN — AMLODIPINE BESYLATE 10 MG: 5 TABLET ORAL at 08:33

## 2024-10-18 RX ADMIN — ASPIRIN 81 MG: 81 TABLET, COATED ORAL at 08:33

## 2024-10-18 RX ADMIN — ATORVASTATIN CALCIUM 40 MG: 40 TABLET, FILM COATED ORAL at 20:24

## 2024-10-18 ASSESSMENT — PAIN SCALES - GENERAL: PAINLEVEL_OUTOF10: 0

## 2024-10-18 NOTE — GROUP NOTE
Group Therapy Note    Date: 10/18/2024    Group Start Time: 1000  Group End Time: 1045  Group Topic: Relaxation    [facility]    Ariella Juan        Group Therapy Note    Attendees: 5       Patient's Goal:  To participate in relaxation activity    Notes:  Pt did not attend session    Discipline Responsible: Recreational Therapist      Signature:  ARIELLA JUNA

## 2024-10-18 NOTE — GROUP NOTE
Group Therapy Note    Date: 10/18/2024    Group Start Time: 1400  Group End Time: 1500  Group Topic: Recreational    [facility]    Ariella Juan        Group Therapy Note    Attendees: 5       Patient's Goal:  To concentrate on selected task    Notes:  Pt did not attend session    Discipline Responsible: Recreational Therapist      Signature:  ARIELLA JUAN

## 2024-10-18 NOTE — PLAN OF CARE
Problem: Pain  Goal: Verbalizes/displays adequate comfort level or baseline comfort level  10/18/2024 1118 by Jannette Alcaraz RN  Outcome: Progressing  10/17/2024 2159 by Tamara Ortiz RN  Outcome: Progressing     Problem: Risk for Elopement  Goal: Patient will not exit the unit/facility without proper excort  Outcome: Progressing     Problem: Safety - Adult  Goal: Free from fall injury  Outcome: Progressing     Problem: Depression  Goal: Will be euthymic at discharge  Description: INTERVENTIONS:  1. Administer medication as ordered  2. Provide emotional support via 1:1 interaction with staff  3. Encourage involvement in milieu/groups/activities  4. Monitor for social isolation  10/18/2024 1118 by Jannette Alcaraz RN  Outcome: Progressing  10/17/2024 2159 by Tamara Ortiz RN  Outcome: Progressing     Problem: Psychosis  Goal: Will report no hallucinations or delusions  Description: INTERVENTIONS:  1. Administer medication as  ordered  2. Assist with reality testing to support increasing orientation  3. Assess if patient's hallucinations or delusions are encouraging self harm or harm to others and intervene as appropriate  Outcome: Progressing     Problem: Discharge Planning  Goal: Discharge to home or other facility with appropriate resources  Outcome: Not Progressing

## 2024-10-18 NOTE — PLAN OF CARE
Problem: Depression  Goal: Will be euthymic at discharge  Description: INTERVENTIONS:  1. Administer medication as ordered  2. Provide emotional support via 1:1 interaction with staff  3. Encourage involvement in milieu/groups/activities  4. Monitor for social isolation  Outcome: Progressing     Problem: Pain  Goal: Verbalizes/displays adequate comfort level or baseline comfort level  Outcome: Progressing

## 2024-10-19 PROCEDURE — 1240000000 HC EMOTIONAL WELLNESS R&B

## 2024-10-19 PROCEDURE — 6370000000 HC RX 637 (ALT 250 FOR IP): Performed by: PSYCHIATRY & NEUROLOGY

## 2024-10-19 RX ADMIN — ATORVASTATIN CALCIUM 40 MG: 40 TABLET, FILM COATED ORAL at 20:28

## 2024-10-19 RX ADMIN — RISPERIDONE 2 MG: 1 TABLET, FILM COATED ORAL at 20:29

## 2024-10-19 RX ADMIN — RISPERIDONE 1 MG: 1 TABLET, FILM COATED ORAL at 09:42

## 2024-10-19 RX ADMIN — ASPIRIN 81 MG: 81 TABLET, COATED ORAL at 09:42

## 2024-10-19 RX ADMIN — SERTRALINE HYDROCHLORIDE 50 MG: 50 TABLET ORAL at 09:42

## 2024-10-19 RX ADMIN — METOPROLOL SUCCINATE 50 MG: 50 TABLET, EXTENDED RELEASE ORAL at 09:42

## 2024-10-19 ASSESSMENT — PAIN SCALES - GENERAL
PAINLEVEL_OUTOF10: 0
PAINLEVEL_OUTOF10: 0

## 2024-10-19 NOTE — PLAN OF CARE
Problem: Discharge Planning  Goal: Discharge to home or other facility with appropriate resources  Outcome: Progressing     Problem: Psychosis  Goal: Will report no hallucinations or delusions  Description: INTERVENTIONS:  1. Administer medication as  ordered  2. Assist with reality testing to support increasing orientation  3. Assess if patient's hallucinations or delusions are encouraging self harm or harm to others and intervene as appropriate  Outcome: Progressing

## 2024-10-19 NOTE — PROGRESS NOTES
Pt was observed isolative to room. Pt was pleasant upon approach. Pt presented alert and oriented to person and place, bright affect, mood euthymic. Pt voiced no SI/HI/AVH. Pt was compliant with scheduled meds. No PRN meds were given. Pt denied pain. Pt reported having a bowel movement today. Pt accepted offered fluids and evening snack. Pt voiced no other concerns at this time. Pt was noted with poor personal hygiene. Pt appeared to have slept approx 8 hours during the night. No acute distress observed. Monitoring for safety and behaviors continues.

## 2024-10-19 NOTE — PROGRESS NOTES
Pt screened per LOS policy.  No acute nutrition or weight issues identified.  Pt meal compliant but apparently was missing for 24 hours pta as she wandered away from previous facility.  Pt reports significant wt loss but this is unsubstantiated by EMR.  She does report decreased appetite and food insecurity although she has been in various facilities in recent months.  Supplements ordered to help with nutrition.  Pt meal compliant.  Hx notable for HTN, constipation (pt reports she has a BM every 2 weeks and that this is normal for her).  Ht: 5'2.99\"  Wt: 172 lb 13.5 oz  BMI: 30.63 kg/(m^2) c/w obesity class I  Est energy needs: 1845 kcal, 68 g protein, 2200 mL fluids  Previous goal met.  Pt will consume > 75% of meals at follow up 7-10 days  LOS

## 2024-10-20 PROCEDURE — 6370000000 HC RX 637 (ALT 250 FOR IP): Performed by: PSYCHIATRY & NEUROLOGY

## 2024-10-20 PROCEDURE — 1240000000 HC EMOTIONAL WELLNESS R&B

## 2024-10-20 RX ADMIN — RISPERIDONE 2 MG: 1 TABLET, FILM COATED ORAL at 20:05

## 2024-10-20 RX ADMIN — SERTRALINE HYDROCHLORIDE 50 MG: 50 TABLET ORAL at 09:39

## 2024-10-20 RX ADMIN — ASPIRIN 81 MG: 81 TABLET, COATED ORAL at 09:39

## 2024-10-20 RX ADMIN — RISPERIDONE 1 MG: 1 TABLET, FILM COATED ORAL at 09:39

## 2024-10-20 RX ADMIN — ATORVASTATIN CALCIUM 40 MG: 40 TABLET, FILM COATED ORAL at 20:05

## 2024-10-20 ASSESSMENT — PAIN SCALES - GENERAL
PAINLEVEL_OUTOF10: 0
PAINLEVEL_OUTOF10: 0

## 2024-10-20 NOTE — PROGRESS NOTES
Pt was observed isolative to room. Pt was pleasant upon approach. Pt presented alert and oriented to person and place, bright affect, mood euthymic. Pt voiced no SI/HI/AVH. Pt was compliant with scheduled meds. No PRN meds were given. Pt denied pain. Pt reported having a bowel movement today. Pt accepted offered fluids and evening snack. Pt voiced no other concerns at this time. Pt was noted with poor personal hygiene. Pt appeared to have slept approx 7 hours during the night. No acute distress observed. Monitoring for safety and behaviors continues.

## 2024-10-20 NOTE — PROGRESS NOTES
Pt alert and oriented x2, isolative to self in room lying in bed. Pt pleasant and cooperative with assessment. Pt denies SI/HI/AVH. Compliant with scheduled medication with no PRNs given. No acute distress observed staff will continue to monitor and round Q-15 minutes for care and safety.      0630 - Pt appears to have slept 9.5 hours.

## 2024-10-21 PROCEDURE — 1240000000 HC EMOTIONAL WELLNESS R&B

## 2024-10-21 PROCEDURE — 6370000000 HC RX 637 (ALT 250 FOR IP): Performed by: PSYCHIATRY & NEUROLOGY

## 2024-10-21 PROCEDURE — 99232 SBSQ HOSP IP/OBS MODERATE 35: CPT | Performed by: PSYCHIATRY & NEUROLOGY

## 2024-10-21 RX ADMIN — ASPIRIN 81 MG: 81 TABLET, COATED ORAL at 09:07

## 2024-10-21 RX ADMIN — RISPERIDONE 2 MG: 1 TABLET, FILM COATED ORAL at 21:01

## 2024-10-21 RX ADMIN — RISPERIDONE 1 MG: 1 TABLET, FILM COATED ORAL at 09:07

## 2024-10-21 RX ADMIN — SERTRALINE HYDROCHLORIDE 50 MG: 50 TABLET ORAL at 09:07

## 2024-10-21 RX ADMIN — ATORVASTATIN CALCIUM 40 MG: 40 TABLET, FILM COATED ORAL at 21:01

## 2024-10-21 ASSESSMENT — PAIN SCALES - GENERAL: PAINLEVEL_OUTOF10: 0

## 2024-10-21 NOTE — GROUP NOTE
Group Therapy Note    Date: 10/21/2024    Group Start Time: 1400  Group End Time: 1500  Group Topic: Recreational    [facility]    Ariella Juan        Group Therapy Note    Attendees: 7       Patient's Goal:  To concentrate on selected task    Notes:  Pt did not attend session          Signature:  ARIELLA JUAN

## 2024-10-21 NOTE — GROUP NOTE
Group Therapy Note    Date: 10/21/2024    Group Start Time: 1000  Group End Time: 1045  Group Topic: Relaxation    [facility]    Ariella Juan        Group Therapy Note    Attendees: 5       Patient's Goal:  To participate in relaxation activity    Notes:  Pt did not attend session    Discipline Responsible: Recreational Therapist      Signature:  ARIELLA JUAN

## 2024-10-21 NOTE — PLAN OF CARE
Problem: Pain  Goal: Verbalizes/displays adequate comfort level or baseline comfort level  Outcome: Progressing     Problem: Discharge Planning  Goal: Discharge to home or other facility with appropriate resources  10/20/2024 0910 by Yoanna Mota RN  Outcome: Progressing

## 2024-10-22 PROCEDURE — 99232 SBSQ HOSP IP/OBS MODERATE 35: CPT | Performed by: PSYCHIATRY & NEUROLOGY

## 2024-10-22 PROCEDURE — 6370000000 HC RX 637 (ALT 250 FOR IP): Performed by: PSYCHIATRY & NEUROLOGY

## 2024-10-22 PROCEDURE — 1240000000 HC EMOTIONAL WELLNESS R&B

## 2024-10-22 RX ADMIN — ATORVASTATIN CALCIUM 40 MG: 40 TABLET, FILM COATED ORAL at 22:50

## 2024-10-22 RX ADMIN — METOPROLOL SUCCINATE 50 MG: 50 TABLET, EXTENDED RELEASE ORAL at 09:26

## 2024-10-22 RX ADMIN — AMLODIPINE BESYLATE 10 MG: 5 TABLET ORAL at 09:26

## 2024-10-22 RX ADMIN — SERTRALINE HYDROCHLORIDE 50 MG: 50 TABLET ORAL at 09:26

## 2024-10-22 RX ADMIN — RISPERIDONE 2 MG: 1 TABLET, FILM COATED ORAL at 22:50

## 2024-10-22 RX ADMIN — RISPERIDONE 1 MG: 1 TABLET, FILM COATED ORAL at 09:26

## 2024-10-22 RX ADMIN — ASPIRIN 81 MG: 81 TABLET, COATED ORAL at 09:27

## 2024-10-22 ASSESSMENT — PAIN SCALES - GENERAL: PAINLEVEL_OUTOF10: 0

## 2024-10-22 NOTE — GROUP NOTE
Group Therapy Note    Date: 10/22/2024    Group Start Time: 1000  Group End Time: 1045  Group Topic: Relaxation    [facility]    Ariella Juan        Group Therapy Note    Attendees: 5       Patient's Goal:  To participate in relaxation activity    Notes:  Pt did not attend session    Discipline Responsible: Recreational Therapist      Signature:  ARIELLA JUAN

## 2024-10-22 NOTE — PROGRESS NOTES
Pt was observed isolative to room. Pt was pleasant upon approach. Pt presented alert and oriented to person and place, bright affect, mood euthymic. Pt denied SI/HI/VH. Pt endorsed AH \"nice voices.\" Pt was compliant with scheduled meds. No PRN meds were given. Pt denied pain. Pt accepted offered fluids and evening snack. Pt voiced no other concerns at this time. Pt encouraged to shower. Pt was receptive and allowed OREN Garcia to assist with shower. Bed was sanitized and clean linen applied after allowing to dry, clean scrubs and gripper socks provided. Pt's dirty clothes were laundered and given back to patient. Pt expressed gratitude for assistance with personal hygiene. Pt returned to bed after shower. Pt appeared to have slept approx 7 hours during the night. No acute distress observed. Monitoring for safety and behaviors continues.

## 2024-10-22 NOTE — PLAN OF CARE
Problem: Safety - Adult  Goal: Free from fall injury  Outcome: Progressing     Problem: Discharge Planning  Goal: Discharge to home or other facility with appropriate resources  10/21/2024 0813 by Yoanna Mota, RN  Outcome: Progressing     Problem: Psychosis  Goal: Will report no hallucinations or delusions  Description: INTERVENTIONS:  1. Administer medication as  ordered  2. Assist with reality testing to support increasing orientation  3. Assess if patient's hallucinations or delusions are encouraging self harm or harm to others and intervene as appropriate  10/21/2024 0813 by Yoanna Mota, RN  Outcome: Progressing

## 2024-10-22 NOTE — GROUP NOTE
Group Therapy Note    Date: 10/22/2024    Group Start Time: 1400  Group End Time: 1500  Group Topic: Recreational    [facility]    Ariella Juan        Group Therapy Note    Attendees: 6       Patient's Goal:  To concentrate on selected task    Notes:  Pt did not attend session    Discipline Responsible: Recreational Therapist      Signature:  ARIELLA JUAN

## 2024-10-23 PROCEDURE — 1240000000 HC EMOTIONAL WELLNESS R&B

## 2024-10-23 PROCEDURE — 99232 SBSQ HOSP IP/OBS MODERATE 35: CPT | Performed by: PSYCHIATRY & NEUROLOGY

## 2024-10-23 PROCEDURE — 6370000000 HC RX 637 (ALT 250 FOR IP): Performed by: PSYCHIATRY & NEUROLOGY

## 2024-10-23 RX ADMIN — AMLODIPINE BESYLATE 10 MG: 5 TABLET ORAL at 09:38

## 2024-10-23 RX ADMIN — RISPERIDONE 1 MG: 1 TABLET, FILM COATED ORAL at 09:38

## 2024-10-23 RX ADMIN — SERTRALINE HYDROCHLORIDE 50 MG: 50 TABLET ORAL at 09:38

## 2024-10-23 RX ADMIN — METOPROLOL SUCCINATE 50 MG: 50 TABLET, EXTENDED RELEASE ORAL at 09:38

## 2024-10-23 RX ADMIN — ATORVASTATIN CALCIUM 40 MG: 40 TABLET, FILM COATED ORAL at 20:48

## 2024-10-23 RX ADMIN — RISPERIDONE 2 MG: 1 TABLET, FILM COATED ORAL at 20:48

## 2024-10-23 RX ADMIN — ASPIRIN 81 MG: 81 TABLET, COATED ORAL at 09:38

## 2024-10-23 NOTE — GROUP NOTE
Group Therapy Note    Date: 10/23/2024    Group Start Time: 1500  Group End Time: 1600  Group Topic: Recreational    [facility]    Ariella Juan        Group Therapy Note    Attendees: 6       Patient's Goal:  To concentrate on selected task    Notes:  Pleasant,active participant in game    Discipline Responsible: Recreational Therapist      Signature:  ARIELLA JUAN

## 2024-10-23 NOTE — PROGRESS NOTES
GROUP THERAPY PROGRESS NOTE      GROUP TIME: Wednesday 2-2:45 PM    NUMBER OF PARTICIPANTS: 6    PERSONAL GOAL FOR PARTICIPATION: To be present for group, participate in discussion, and answer patient-directed questions.    GOAL ORIENTATION: Personal    THERAPEUTIC INTERVENTIONS REVIEWED AND DISCUSSED: The following topics were presented: storage of medications, how to remember to refill medications and keep up with doctor appointments, relapse prevention, keeping a record of all medication including prescription and non-prescription drugs, and who to contact with medication questions. Patients were given time to ask questions regarding their current therapy.    IMPRESSION OF PARTICIPATION: Kyra Hunt was present for medication group.  She did not participate in group discussions, left prior to playing medication BINGO.       Dior Hernandez, [facility]

## 2024-10-23 NOTE — GROUP NOTE
Group Therapy Note    Date: 10/23/2024    Group Start Time: 1000  Group End Time: 1045  Group Topic: Relaxation    [facility]    Ariella Juan        Group Therapy Note    Attendees: 6       Patient's Goal:  To participate in relaxation activity    Notes:  Pt did not attend session    Discipline Responsible: Recreational Therapist      Signature:  ARIELLA JUAN

## 2024-10-23 NOTE — PLAN OF CARE
Problem: Safety - Adult  Goal: Free from fall injury  Outcome: Progressing     Problem: Psychosis  Goal: Will report no hallucinations or delusions  Description: INTERVENTIONS:  1. Administer medication as  ordered  2. Assist with reality testing to support increasing orientation  3. Assess if patient's hallucinations or delusions are encouraging self harm or harm to others and intervene as appropriate  Outcome: Progressing

## 2024-10-23 NOTE — PLAN OF CARE
Problem: Pain  Goal: Verbalizes/displays adequate comfort level or baseline comfort level  Outcome: Progressing     Problem: Psychosis  Goal: Will report no hallucinations or delusions  Description: INTERVENTIONS:  1. Administer medication as  ordered  2. Assist with reality testing to support increasing orientation  3. Assess if patient's hallucinations or delusions are encouraging self harm or harm to others and intervene as appropriate  Outcome: Progressing

## 2024-10-24 PROCEDURE — 99232 SBSQ HOSP IP/OBS MODERATE 35: CPT | Performed by: PSYCHIATRY & NEUROLOGY

## 2024-10-24 PROCEDURE — 1240000000 HC EMOTIONAL WELLNESS R&B

## 2024-10-24 PROCEDURE — 6370000000 HC RX 637 (ALT 250 FOR IP): Performed by: PSYCHIATRY & NEUROLOGY

## 2024-10-24 RX ADMIN — AMLODIPINE BESYLATE 10 MG: 5 TABLET ORAL at 10:04

## 2024-10-24 RX ADMIN — ATORVASTATIN CALCIUM 40 MG: 40 TABLET, FILM COATED ORAL at 20:39

## 2024-10-24 RX ADMIN — ASPIRIN 81 MG: 81 TABLET, COATED ORAL at 10:05

## 2024-10-24 RX ADMIN — RISPERIDONE 2 MG: 1 TABLET, FILM COATED ORAL at 20:39

## 2024-10-24 RX ADMIN — METOPROLOL SUCCINATE 50 MG: 50 TABLET, EXTENDED RELEASE ORAL at 10:04

## 2024-10-24 RX ADMIN — SERTRALINE HYDROCHLORIDE 50 MG: 50 TABLET ORAL at 10:04

## 2024-10-24 RX ADMIN — RISPERIDONE 1 MG: 1 TABLET, FILM COATED ORAL at 10:05

## 2024-10-24 ASSESSMENT — PAIN SCALES - GENERAL
PAINLEVEL_OUTOF10: 0
PAINLEVEL_OUTOF10: 0

## 2024-10-24 NOTE — GROUP NOTE
Group Therapy Note    Date: 10/24/2024    Group Start Time: 1000  Group End Time: 1045  Group Topic: Relaxation    [facility]    Ariella Juan        Group Therapy Note    Attendees: 4       Patient's Goal:  To participate in relaxation activity    Notes:  Pt did not attend session      Signature:  ARIELLA JUAN

## 2024-10-24 NOTE — PLAN OF CARE
Problem: Pain  Goal: Verbalizes/displays adequate comfort level or baseline comfort level  Outcome: Progressing     Problem: Risk for Elopement  Goal: Patient will not exit the unit/facility without proper excort  Outcome: Adequate for Discharge     Problem: Safety - Adult  Goal: Free from fall injury  Outcome: Adequate for Discharge     Problem: Discharge Planning  Goal: Discharge to home or other facility with appropriate resources  Outcome: Progressing     Problem: Depression  Goal: Will be euthymic at discharge  Description: INTERVENTIONS:  1. Administer medication as ordered  2. Provide emotional support via 1:1 interaction with staff  3. Encourage involvement in milieu/groups/activities  4. Monitor for social isolation  Outcome: Progressing     Problem: Psychosis  Goal: Will report no hallucinations or delusions  Description: INTERVENTIONS:  1. Administer medication as  ordered  2. Assist with reality testing to support increasing orientation  3. Assess if patient's hallucinations or delusions are encouraging self harm or harm to others and intervene as appropriate  Outcome: Progressing

## 2024-10-24 NOTE — PLAN OF CARE
Problem: Safety - Adult  Goal: Free from fall injury  10/23/2024 2144 by Gogo Jackson, RN  Outcome: Progressing  10/23/2024 1059 by Lainey Somers, RN  Outcome: Progressing

## 2024-10-24 NOTE — PROGRESS NOTES
1915-0715    Kyra is isolative to her room, lying in bed. She is bright. Kyra is pleasant during assessment. Kyra states \"I\"m discharging tomorrow. I\"m so happy\". Unsure if patient is discharging. She denies SI/HI/AVH. She reports that her mood is \"good\". No c/o pain or discomfort. She is med compliant. No PRNs requested or given at this time. Staff will continue to monitor for safety, location, and behavior q 15 minutes.    0600  No acute changes overnight. Patient slept for 8 hours this shift.

## 2024-10-24 NOTE — GROUP NOTE
Group Therapy Note    Date: 10/24/2024    Group Start Time: 1400  Group End Time: 1500  Group Topic: Recreational    [facility]    Ariella Juan        Group Therapy Note    Attendees: 5       Patient's Goal:  To concentrate on selected task    Notes:  Pleasant,active participant      Discipline Responsible: Recreational Therapist      Signature:  ARIELLA JUAN

## 2024-10-24 NOTE — CARE COORDINATION
10/24/24 1415   [facility]   Primary Diagnosis Code Schizophrenia   Plan of Care   Long Term Goal (LTG) Stated in patient/guardian terms remain safe and stable   Short Term Goal 1   Short Term Goal 1 Client will reduce frequency and intensity of unsafe behavior   Baseline Functioning client has been eloping from ED and group homes   Target Client will not attempt to elope from [facility]   STG Goal 1 Status: Patient Appears to be  Progressing toward treatment plan goal  (client has not attempted to leave but will sometimes randomly state she is being discharged the next day when there is no plan in place)   Short Term Goal 2   Short Term Goal 2 Client will maintain compliance with medication regime   Baseline Functioning Client has not been taking medication consistently and is experiencing psychosis   Target Reduction in psychosis through use of medication   STG Goal 2 Status: Patient Appears to be  Progressing toward treatment plan goal  (client is taking medication as prescribed, psychotic symptoms are still present but not distressing)   Crisis/Safety/Discharge Plan   [facility]

## 2024-10-25 PROCEDURE — 99232 SBSQ HOSP IP/OBS MODERATE 35: CPT | Performed by: PSYCHIATRY & NEUROLOGY

## 2024-10-25 PROCEDURE — 1240000000 HC EMOTIONAL WELLNESS R&B

## 2024-10-25 PROCEDURE — 6370000000 HC RX 637 (ALT 250 FOR IP): Performed by: PSYCHIATRY & NEUROLOGY

## 2024-10-25 RX ADMIN — ASPIRIN 81 MG: 81 TABLET, COATED ORAL at 09:59

## 2024-10-25 RX ADMIN — RISPERIDONE 2 MG: 1 TABLET, FILM COATED ORAL at 21:18

## 2024-10-25 RX ADMIN — AMLODIPINE BESYLATE 10 MG: 5 TABLET ORAL at 09:59

## 2024-10-25 RX ADMIN — ATORVASTATIN CALCIUM 40 MG: 40 TABLET, FILM COATED ORAL at 21:17

## 2024-10-25 RX ADMIN — RISPERIDONE 1 MG: 1 TABLET, FILM COATED ORAL at 09:59

## 2024-10-25 RX ADMIN — METOPROLOL SUCCINATE 50 MG: 50 TABLET, EXTENDED RELEASE ORAL at 10:00

## 2024-10-25 RX ADMIN — SERTRALINE HYDROCHLORIDE 50 MG: 50 TABLET ORAL at 10:00

## 2024-10-25 ASSESSMENT — PAIN SCALES - GENERAL: PAINLEVEL_OUTOF10: 0

## 2024-10-25 NOTE — PLAN OF CARE
Problem: Pain  Goal: Verbalizes/displays adequate comfort level or baseline comfort level  10/25/2024 1027 by Rosa Ko RN  Outcome: Adequate for Discharge  10/24/2024 2121 by Jen Parks RN  Outcome: Progressing     Problem: Risk for Elopement  Goal: Patient will not exit the unit/facility without proper excort  10/25/2024 1027 by Rosa Ko RN  Outcome: Adequate for Discharge  10/24/2024 2121 by Jen Parks RN  Outcome: Progressing     Problem: Safety - Adult  Goal: Free from fall injury  10/25/2024 1027 by Rosa Ko RN  Outcome: Progressing  10/24/2024 2121 by Jen Parks RN  Outcome: Progressing     Problem: Discharge Planning  Goal: Discharge to home or other facility with appropriate resources  10/25/2024 1027 by Rosa Ko RN  Outcome: Progressing  10/24/2024 2121 by Jen Parks RN  Outcome: Progressing     Problem: Depression  Goal: Will be euthymic at discharge  Description: INTERVENTIONS:  1. Administer medication as ordered  2. Provide emotional support via 1:1 interaction with staff  3. Encourage involvement in milieu/groups/activities  4. Monitor for social isolation  10/25/2024 1027 by Rosa Ko RN  Outcome: Adequate for Discharge  10/24/2024 2121 by Jen Parks RN  Outcome: Progressing     Problem: Psychosis  Goal: Will report no hallucinations or delusions  Description: INTERVENTIONS:  1. Administer medication as  ordered  2. Assist with reality testing to support increasing orientation  3. Assess if patient's hallucinations or delusions are encouraging self harm or harm to others and intervene as appropriate  10/25/2024 1027 by Roas Ko RN  Outcome: Progressing  10/24/2024 2121 by Jen Parks RN  Outcome: Progressing      08-Mar-2024 11:59 14-Mar-2024 20:17 08-Mar-2024 14:36 08-Mar-2024

## 2024-10-25 NOTE — PLAN OF CARE
Problem: Discharge Planning  Goal: Discharge to home or other facility with appropriate resources  10/24/2024 2121 by Jen Parks RN  Outcome: Progressing  10/24/2024 1211 by Rosa Ko RN  Outcome: Progressing     Problem: Psychosis  Goal: Will report no hallucinations or delusions  Description: INTERVENTIONS:  1. Administer medication as  ordered  2. Assist with reality testing to support increasing orientation  3. Assess if patient's hallucinations or delusions are encouraging self harm or harm to others and intervene as appropriate  10/24/2024 2121 by Jen Parks RN  Outcome: Progressing  10/24/2024 1211 by Rosa Ko RN  Outcome: Progressing

## 2024-10-25 NOTE — GROUP NOTE
Group Therapy Note    Date: 10/25/2024    Group Start Time: 1000  Group End Time: 1045  Group Topic: Relaxation    [facility]    Ariella Juan        Group Therapy Note    Attendees: 7       Patient's Goal:  To participate in relaxation activity    Notes:  Pt did not attend session    Signature:  ARIELLA JUAN

## 2024-10-25 NOTE — PROGRESS NOTES
Checked in on patient while rounding in the Behavioral Health Unit. Patient was happy to see me and shared that she is doing well and will be discharged soon. Patient appreciates the visits.  provided an active listening presence, words of care, concern, affirmation, and understanding. Advised of  availability.  Rev. Jose Vaz MDiv,

## 2024-10-25 NOTE — GROUP NOTE
Group Therapy Note    Date: 10/25/2024    Group Start Time: 1400  Group End Time: 1500  Group Topic: Recreational    [facility]    Ariella Juan        Group Therapy Note    Attendees: 5       Patient's Goal:  To concentrate on selected task    Notes:  Pt did not attend session    Signature:  ARIELLA JUAN

## 2024-10-25 NOTE — PROGRESS NOTES
Night Shift assessment completed.   Kyra is isolative to room, alert, calm, cooperative and free from distress. Appropriate affect and mood. Denies anxiety, depression, SI/HI/AH/VH and pain. C-SSRS, No risk. Interacts appropriately and able to make needs known. No concerns expressed.    Nursing Intervention: VS T 98 °F (36.7 °C),HR 60 R 16, B/P 123/70, O2 Sat 99 %. Med and meal compliant. Emotional support and encouragement provided. No side effects from medications observed.     Response: Positive.    Plan: Monitoring for safety and behavior continues q 15 mins. Pt appears to have slept for 9 hours.

## 2024-10-26 PROCEDURE — 1240000000 HC EMOTIONAL WELLNESS R&B

## 2024-10-26 PROCEDURE — 6370000000 HC RX 637 (ALT 250 FOR IP): Performed by: PSYCHIATRY & NEUROLOGY

## 2024-10-26 RX ADMIN — RISPERIDONE 2 MG: 1 TABLET, FILM COATED ORAL at 20:14

## 2024-10-26 RX ADMIN — RISPERIDONE 1 MG: 1 TABLET, FILM COATED ORAL at 09:08

## 2024-10-26 RX ADMIN — SERTRALINE HYDROCHLORIDE 50 MG: 50 TABLET ORAL at 09:08

## 2024-10-26 RX ADMIN — ATORVASTATIN CALCIUM 40 MG: 40 TABLET, FILM COATED ORAL at 20:14

## 2024-10-26 RX ADMIN — METOPROLOL SUCCINATE 50 MG: 50 TABLET, EXTENDED RELEASE ORAL at 09:08

## 2024-10-26 RX ADMIN — ASPIRIN 81 MG: 81 TABLET, COATED ORAL at 09:08

## 2024-10-26 RX ADMIN — AMLODIPINE BESYLATE 10 MG: 5 TABLET ORAL at 09:08

## 2024-10-26 ASSESSMENT — PAIN SCALES - GENERAL: PAINLEVEL_OUTOF10: 0

## 2024-10-26 NOTE — PLAN OF CARE
Problem: Safety - Adult  Goal: Free from fall injury  10/26/2024 0911 by Danielle Badillo, RN  Outcome: Progressing

## 2024-10-26 NOTE — PLAN OF CARE
Problem: Depression  Goal: Will be euthymic at discharge  Description: INTERVENTIONS:  1. Administer medication as ordered  2. Provide emotional support via 1:1 interaction with staff  3. Encourage involvement in milieu/groups/activities  4. Monitor for social isolation  Outcome: Adequate for Discharge      Problem: Psychosis  Goal: Will report no hallucinations or delusions  Description: INTERVENTIONS:  1. Administer medication as  ordered  2. Assist with reality testing to support increasing orientation  3. Assess if patient's hallucinations or delusions are encouraging self harm or harm to others and intervene as appropriate  Outcome: Progressing

## 2024-10-27 PROCEDURE — 1240000000 HC EMOTIONAL WELLNESS R&B

## 2024-10-27 PROCEDURE — 6370000000 HC RX 637 (ALT 250 FOR IP): Performed by: PSYCHIATRY & NEUROLOGY

## 2024-10-27 RX ADMIN — RISPERIDONE 1 MG: 1 TABLET, FILM COATED ORAL at 09:32

## 2024-10-27 RX ADMIN — ASPIRIN 81 MG: 81 TABLET, COATED ORAL at 09:32

## 2024-10-27 RX ADMIN — SERTRALINE HYDROCHLORIDE 50 MG: 50 TABLET ORAL at 09:31

## 2024-10-27 RX ADMIN — RISPERIDONE 2 MG: 1 TABLET, FILM COATED ORAL at 21:31

## 2024-10-27 RX ADMIN — AMLODIPINE BESYLATE 10 MG: 5 TABLET ORAL at 09:32

## 2024-10-27 RX ADMIN — ATORVASTATIN CALCIUM 40 MG: 40 TABLET, FILM COATED ORAL at 21:31

## 2024-10-27 RX ADMIN — METOPROLOL SUCCINATE 50 MG: 50 TABLET, EXTENDED RELEASE ORAL at 09:32

## 2024-10-27 ASSESSMENT — PAIN SCALES - GENERAL
PAINLEVEL_OUTOF10: 0
PAINLEVEL_OUTOF10: 0

## 2024-10-27 NOTE — PROGRESS NOTES
Patient remained withdrawn to her room.  Patient is malodorous, and was encouraged to shower. She is calm and cooperative. Affect is appropriate, mood is normal. Patient endorsed VH and  AH, voices telling her, she is a very nice person. Patient denied anxiety, depression, SI, and HI. No PRN given. Patient is compliant with meds and meals. No aggressive behavior noted. Emotional support and encouragement provided. Q 15 minutes and hourly rounds in progress. VS were T 98.5 °F (36.9 °C), HR 58, RR 16, BP (!) 108/55, O2 96 %. Pt slept for the total of 7.5 hours.

## 2024-10-27 NOTE — PLAN OF CARE
Problem: Depression  Goal: Will be euthymic at discharge  Description: INTERVENTIONS:  1. Administer medication as ordered  2. Provide emotional support via 1:1 interaction with staff  3. Encourage involvement in milieu/groups/activities  4. Monitor for social isolation  10/26/2024 2141 by Kenya Faustin RN  Outcome: Not Progressing     Problem: Depression  Goal: Will be euthymic at discharge  Description: INTERVENTIONS:  1. Administer medication as ordered  2. Provide emotional support via 1:1 interaction with staff  3. Encourage involvement in milieu/groups/activities  4. Monitor for social isolation  10/26/2024 2141 by Kenya Faustin, RN  Outcome: Not Progressing

## 2024-10-28 PROCEDURE — 99232 SBSQ HOSP IP/OBS MODERATE 35: CPT | Performed by: PSYCHIATRY & NEUROLOGY

## 2024-10-28 PROCEDURE — 6370000000 HC RX 637 (ALT 250 FOR IP): Performed by: PSYCHIATRY & NEUROLOGY

## 2024-10-28 PROCEDURE — 1240000000 HC EMOTIONAL WELLNESS R&B

## 2024-10-28 RX ADMIN — AMLODIPINE BESYLATE 10 MG: 5 TABLET ORAL at 09:53

## 2024-10-28 RX ADMIN — SERTRALINE HYDROCHLORIDE 50 MG: 50 TABLET ORAL at 09:53

## 2024-10-28 RX ADMIN — METOPROLOL SUCCINATE 50 MG: 50 TABLET, EXTENDED RELEASE ORAL at 09:53

## 2024-10-28 RX ADMIN — RISPERIDONE 2 MG: 1 TABLET, FILM COATED ORAL at 20:46

## 2024-10-28 RX ADMIN — RISPERIDONE 1 MG: 1 TABLET, FILM COATED ORAL at 09:53

## 2024-10-28 RX ADMIN — ASPIRIN 81 MG: 81 TABLET, COATED ORAL at 09:53

## 2024-10-28 RX ADMIN — ATORVASTATIN CALCIUM 40 MG: 40 TABLET, FILM COATED ORAL at 20:46

## 2024-10-28 ASSESSMENT — PAIN SCALES - GENERAL
PAINLEVEL_OUTOF10: 0
PAINLEVEL_OUTOF10: 0

## 2024-10-28 NOTE — GROUP NOTE
Group Therapy Note    Date: 10/28/2024    Group Start Time: 1400  Group End Time: 1500  Group Topic: Recreational    [facility]    Ariella Juan        Group Therapy Note    Attendees: 11       Patient's Goal:  To concentrate on selected task    Notes:  Pt did not attend session    Signature:  ARIELLA JUAN

## 2024-10-28 NOTE — GROUP NOTE
Group Therapy Note    Date: 10/28/2024    Group Start Time: 1000  Group End Time: 1045  Group Topic: Relaxation    [facility]    Ariella Juan        Group Therapy Note    Attendees: 7       Patient's Goal:  To participate in relaxation activity    Notes:  Pt did not attend session    Discipline Responsible: Recreational Therapist      Signature:  ARIELLA JUAN

## 2024-10-28 NOTE — PLAN OF CARE
Problem: Safety - Adult  Goal: Free from fall injury  10/27/2024 2123 by Tamara Ortiz, RN  Outcome: Progressing      Problem: Risk for Elopement  Goal: Patient will not exit the unit/facility without proper excort  10/27/2024 2123 by Tamara Ortiz, RN  Outcome: Progressing

## 2024-10-28 NOTE — PLAN OF CARE
Problem: Safety - Adult  Goal: Free from fall injury  10/28/2024 0849 by Danielle Badillo RN  Outcome: Progressing     Problem: Psychosis  Goal: Will report no hallucinations or delusions  Description: INTERVENTIONS:  1. Administer medication as  ordered  2. Assist with reality testing to support increasing orientation  3. Assess if patient's hallucinations or delusions are encouraging self harm or harm to others and intervene as appropriate  Outcome: Not Progressing

## 2024-10-29 PROCEDURE — 6370000000 HC RX 637 (ALT 250 FOR IP): Performed by: PSYCHIATRY & NEUROLOGY

## 2024-10-29 PROCEDURE — 1240000000 HC EMOTIONAL WELLNESS R&B

## 2024-10-29 PROCEDURE — 99232 SBSQ HOSP IP/OBS MODERATE 35: CPT | Performed by: PSYCHIATRY & NEUROLOGY

## 2024-10-29 PROCEDURE — 6370000000 HC RX 637 (ALT 250 FOR IP): Performed by: NURSE PRACTITIONER

## 2024-10-29 RX ADMIN — AMLODIPINE BESYLATE 10 MG: 5 TABLET ORAL at 08:50

## 2024-10-29 RX ADMIN — METOPROLOL SUCCINATE 50 MG: 50 TABLET, EXTENDED RELEASE ORAL at 08:50

## 2024-10-29 RX ADMIN — ATORVASTATIN CALCIUM 40 MG: 40 TABLET, FILM COATED ORAL at 20:52

## 2024-10-29 RX ADMIN — SERTRALINE HYDROCHLORIDE 50 MG: 50 TABLET ORAL at 08:50

## 2024-10-29 RX ADMIN — RISPERIDONE 2 MG: 1 TABLET, FILM COATED ORAL at 20:52

## 2024-10-29 RX ADMIN — ASPIRIN 81 MG: 81 TABLET, COATED ORAL at 08:50

## 2024-10-29 RX ADMIN — RISPERIDONE 1 MG: 1 TABLET, FILM COATED ORAL at 08:50

## 2024-10-29 RX ADMIN — POLYETHYLENE GLYCOL 3350 17 G: 17 POWDER, FOR SOLUTION ORAL at 08:51

## 2024-10-29 NOTE — PROGRESS NOTES
RN Shift Assessment Note     Kyra was met in her room where she was observed resting awake in bed, she was easily arouse when her named was called out to her. Pt was observed to be isolative to her room during this shift. Pt is medication compliant, Pt did not receive PRN medications.   Kyra did not display any signs of distress or complaints of adverse reactions to medications. Pt denies pain, SI, HI, VH, rates  depression 2/10 and anxiety 0/10. Pt endorse AH of men voices telling her she is real attractive and I'm nice. Pt stated that her mood is okay. Pt was calm and cooperative during assessment. Pt will continue to be monitored for her safety. Pt slept for a total of 8.5 hrs.

## 2024-10-29 NOTE — PLAN OF CARE
Problem: Pain  Goal: Verbalizes/displays adequate comfort level or baseline comfort level  10/29/2024 1936 by Bibiana Murrell RN  Outcome: Progressing  10/29/2024 1232 by Jannette Alcaraz RN  Outcome: Progressing     Problem: Risk for Elopement  Goal: Patient will not exit the unit/facility without proper excort  10/29/2024 1936 by Bibiana Murrell RN  Outcome: Progressing  10/29/2024 1232 by Jannette Alcaraz RN  Outcome: Progressing     Problem: Safety - Adult  Goal: Free from fall injury  10/29/2024 1936 by Bibiana Murrell RN  Outcome: Progressing  10/29/2024 1232 by Jannette Alcaraz RN  Outcome: Progressing     Problem: Psychosis  Goal: Will report no hallucinations or delusions  Description: INTERVENTIONS:  1. Administer medication as  ordered  2. Assist with reality testing to support increasing orientation  3. Assess if patient's hallucinations or delusions are encouraging self harm or harm to others and intervene as appropriate  10/29/2024 1232 by Jannette Alcaraz RN  Outcome: Not Progressing

## 2024-10-29 NOTE — PLAN OF CARE
Problem: Pain  Goal: Verbalizes/displays adequate comfort level or baseline comfort level  Outcome: Progressing     Problem: Risk for Elopement  Goal: Patient will not exit the unit/facility without proper excort  Outcome: Progressing     Problem: Safety - Adult  Goal: Free from fall injury  Outcome: Progressing     Problem: Discharge Planning  Goal: Discharge to home or other facility with appropriate resources  Outcome: Progressing     Problem: Depression  Goal: Will be euthymic at discharge  Description: INTERVENTIONS:  1. Administer medication as ordered  2. Provide emotional support via 1:1 interaction with staff  3. Encourage involvement in milieu/groups/activities  4. Monitor for social isolation  Outcome: Progressing     Problem: Psychosis  Goal: Will report no hallucinations or delusions  Description: INTERVENTIONS:  1. Administer medication as  ordered  2. Assist with reality testing to support increasing orientation  3. Assess if patient's hallucinations or delusions are encouraging self harm or harm to others and intervene as appropriate  Outcome: Progressing

## 2024-10-29 NOTE — PLAN OF CARE
Problem: Pain  Goal: Verbalizes/displays adequate comfort level or baseline comfort level  10/29/2024 1232 by Jannette Alcaraz RN  Outcome: Progressing  10/29/2024 0312 by Jaswinder Islas RN  Outcome: Progressing     Problem: Risk for Elopement  Goal: Patient will not exit the unit/facility without proper excort  10/29/2024 1232 by Jannette Alcaraz RN  Outcome: Progressing  10/29/2024 0312 by Jaswinder Islas RN  Outcome: Progressing     Problem: Safety - Adult  Goal: Free from fall injury  10/29/2024 1232 by Jannette Alcaraz RN  Outcome: Progressing  10/29/2024 0312 by Jaswinder Islas RN  Outcome: Progressing     Problem: Discharge Planning  Goal: Discharge to home or other facility with appropriate resources  10/29/2024 1232 by Jannette Alcaraz RN  Outcome: Progressing  10/29/2024 0312 by Jaswinder Islas RN  Outcome: Progressing     Problem: Depression  Goal: Will be euthymic at discharge  Description: INTERVENTIONS:  1. Administer medication as ordered  2. Provide emotional support via 1:1 interaction with staff  3. Encourage involvement in milieu/groups/activities  4. Monitor for social isolation  10/29/2024 1232 by Jannette Alcaraz RN  Outcome: Progressing  10/29/2024 0312 by Jaswinder Islas RN  Outcome: Progressing     Problem: Psychosis  Goal: Will report no hallucinations or delusions  Description: INTERVENTIONS:  1. Administer medication as  ordered  2. Assist with reality testing to support increasing orientation  3. Assess if patient's hallucinations or delusions are encouraging self harm or harm to others and intervene as appropriate  10/29/2024 1232 by Jannette Alcaraz RN  Outcome: Not Progressing  10/29/2024 0312 by Jaswinder Islas RN  Outcome: Progressing

## 2024-10-29 NOTE — GROUP NOTE
Group Therapy Note    Date: 10/29/2024    Group Start Time: 1000  Group End Time: 1045  Group Topic: Relaxation    [facility]    Ariella Juan        Group Therapy Note    Attendees: 8       Patient's Goal:  To participate in relaxation activity    Notes:  Pt did not attend session      Discipline Responsible: Recreational Therapist      Signature:  ARIELLA JUAN

## 2024-10-30 PROCEDURE — 6370000000 HC RX 637 (ALT 250 FOR IP): Performed by: PSYCHIATRY & NEUROLOGY

## 2024-10-30 PROCEDURE — 99232 SBSQ HOSP IP/OBS MODERATE 35: CPT | Performed by: PSYCHIATRY & NEUROLOGY

## 2024-10-30 PROCEDURE — 1240000000 HC EMOTIONAL WELLNESS R&B

## 2024-10-30 RX ADMIN — AMLODIPINE BESYLATE 10 MG: 5 TABLET ORAL at 08:16

## 2024-10-30 RX ADMIN — ASPIRIN 81 MG: 81 TABLET, COATED ORAL at 08:16

## 2024-10-30 RX ADMIN — RISPERIDONE 1 MG: 1 TABLET, FILM COATED ORAL at 08:16

## 2024-10-30 RX ADMIN — SERTRALINE HYDROCHLORIDE 50 MG: 50 TABLET ORAL at 08:16

## 2024-10-30 RX ADMIN — METOPROLOL SUCCINATE 50 MG: 50 TABLET, EXTENDED RELEASE ORAL at 08:16

## 2024-10-30 RX ADMIN — RISPERIDONE 2 MG: 1 TABLET, FILM COATED ORAL at 22:36

## 2024-10-30 RX ADMIN — ATORVASTATIN CALCIUM 40 MG: 40 TABLET, FILM COATED ORAL at 22:35

## 2024-10-30 ASSESSMENT — PAIN SCALES - GENERAL: PAINLEVEL_OUTOF10: 0

## 2024-10-30 ASSESSMENT — PAIN - FUNCTIONAL ASSESSMENT: PAIN_FUNCTIONAL_ASSESSMENT: NONE - DENIES PAIN

## 2024-10-30 NOTE — CARE COORDINATION
10/30/24 1625   ITP   Date of Next Review 11/06/24   Primary Diagnosis Code Schizophrenia   Plan of Care   Long Term Goal (LTG) Stated in patient/guardian terms remain safe and stable   Short Term Goal 1   Short Term Goal 1 Client will reduce frequency and intensity of unsafe behavior   Baseline Functioning client has been eloping from ED and group homes   Target Client will not attempt to elope from U   STG Goal 1 Status: Patient Appears to be  Progressing toward treatment plan goal  (client has not made any attempts to leave, though will occasionally inform staff she is leaving but is redirectable)   Short Term Goal 2   Short Term Goal 2 Client will maintain compliance with medication regime   Baseline Functioning Client has not been taking medication consistently and is experiencing psychosis   Target Reduction in psychosis through use of medication   STG Goal 2 Status: Patient Appears to be  Progressing toward treatment plan goal  (client still endorses symptoms of psychosis but they appear to be improving)   Crisis/Safety/Discharge Plan   Discharge Plan longterm

## 2024-10-30 NOTE — PROGRESS NOTES
GROUP THERAPY PROGRESS NOTE      GROUP TIME: Wednesday 2-2:45 PM    NUMBER OF PARTICIPANTS: 5    PERSONAL GOAL FOR PARTICIPATION: To be present for group, participate in discussion, and answer patient-directed questions.    GOAL ORIENTATION: Personal    THERAPEUTIC INTERVENTIONS REVIEWED AND DISCUSSED: The following topics were presented: storage of medications, how to remember to refill medications and keep up with doctor appointments, relapse prevention, keeping a record of all medication including prescription and non-prescription drugs, and who to contact with medication questions. Patients were given time to ask questions regarding their current therapy.    IMPRESSION OF PARTICIPATION: Kyra Hunt was not present for medication group.      Dior Hernandez, [facility]

## 2024-10-30 NOTE — PLAN OF CARE
Problem: Pain  Goal: Verbalizes/displays adequate comfort level or baseline comfort level  10/30/2024 0901 by Dione Peguero RN  Outcome: Progressing  10/29/2024 1936 by Bibiana Murrell RN  Outcome: Progressing     Problem: Risk for Elopement  Goal: Patient will not exit the unit/facility without proper excort  10/30/2024 0901 by Dione Peguero RN  Outcome: Progressing  10/29/2024 1936 by Bibiana Murrell RN  Outcome: Progressing     Problem: Safety - Adult  Goal: Free from fall injury  10/30/2024 0901 by Dione Peguero RN  Outcome: Progressing  10/29/2024 1936 by Bibiana Murrell RN  Outcome: Progressing     Problem: Discharge Planning  Goal: Discharge to home or other facility with appropriate resources  Outcome: Progressing  Flowsheets (Taken 10/29/2024 2050 by Bibiana Murrell, RN)  Discharge to home or other facility with appropriate resources: Identify barriers to discharge with patient and caregiver     Problem: Depression  Goal: Will be euthymic at discharge  Description: INTERVENTIONS:  1. Administer medication as ordered  2. Provide emotional support via 1:1 interaction with staff  3. Encourage involvement in milieu/groups/activities  4. Monitor for social isolation  Outcome: Progressing     Problem: Psychosis  Goal: Will report no hallucinations or delusions  Description: INTERVENTIONS:  1. Administer medication as  ordered  2. Assist with reality testing to support increasing orientation  3. Assess if patient's hallucinations or delusions are encouraging self harm or harm to others and intervene as appropriate  Outcome: Progressing

## 2024-10-30 NOTE — GROUP NOTE
Group Therapy Note    Date: 10/30/2024    Group Start Time: 1000  Group End Time: 1045  Group Topic: Relaxation    [facility]    Ariella Juan        Group Therapy Note    Attendees: 8       Patient's Goal:  To participate in relaxation activity    Notes:  Pt did not attend session    Discipline Responsible: Recreational Therapist      Signature:  ARIELLA JUAN

## 2024-10-31 PROCEDURE — 1240000000 HC EMOTIONAL WELLNESS R&B

## 2024-10-31 PROCEDURE — 6370000000 HC RX 637 (ALT 250 FOR IP): Performed by: PSYCHIATRY & NEUROLOGY

## 2024-10-31 PROCEDURE — 99232 SBSQ HOSP IP/OBS MODERATE 35: CPT | Performed by: PSYCHIATRY & NEUROLOGY

## 2024-10-31 RX ADMIN — ATORVASTATIN CALCIUM 40 MG: 40 TABLET, FILM COATED ORAL at 21:47

## 2024-10-31 RX ADMIN — RISPERIDONE 1 MG: 1 TABLET, FILM COATED ORAL at 09:01

## 2024-10-31 RX ADMIN — METOPROLOL SUCCINATE 50 MG: 50 TABLET, EXTENDED RELEASE ORAL at 09:01

## 2024-10-31 RX ADMIN — SERTRALINE HYDROCHLORIDE 50 MG: 50 TABLET ORAL at 09:01

## 2024-10-31 RX ADMIN — ASPIRIN 81 MG: 81 TABLET, COATED ORAL at 09:01

## 2024-10-31 RX ADMIN — AMLODIPINE BESYLATE 10 MG: 5 TABLET ORAL at 09:01

## 2024-10-31 RX ADMIN — RISPERIDONE 2 MG: 1 TABLET, FILM COATED ORAL at 21:47

## 2024-10-31 ASSESSMENT — PAIN - FUNCTIONAL ASSESSMENT: PAIN_FUNCTIONAL_ASSESSMENT: NONE - DENIES PAIN

## 2024-10-31 ASSESSMENT — PAIN SCALES - GENERAL: PAINLEVEL_OUTOF10: 0

## 2024-10-31 NOTE — GROUP NOTE
Group Therapy Note    Date: 10/31/2024    Group Start Time: 1000  Group End Time: 1045  Group Topic: Relaxation    [facility]    Ariella Juan        Group Therapy Note    Attendees: 8       Patient's Goal:  To participate in relaxation activity    Notes:  Pleasant,active participant    Discipline Responsible: Recreational Therapist      Signature:  ARIELLA JUAN

## 2024-10-31 NOTE — PLAN OF CARE
Pt received at 1930, Alert, Oriented to place, and Cooperative with Appropriate affect. Pt's vital signs were T 98.2 °F (36.8 °C), HR 56, RR 15, /62, and O2 96 %. Pt denied pain, denied anxiety and denied other side effects / symptoms. Pt replied no to suicidal ideation, No to homicidal ideation, and No to hallucinations. Pt had speech that was Clear and Unremarkable in quality. Pt provided with 1:1 RN assessment with active listening and therapeutic presence for a brief period in the evening. Pt encouraged to participate in the evening milieu including leisure activities, snack time, and positive peer interaction. Pt cooperated with all scheduled medications and received no PRN medications. Pt monitored q15min throughout the night. Pt was isolative throughout the evening and rested in bed for 10 hours overnight.    Problem: Pain  Goal: Verbalizes/displays adequate comfort level or baseline comfort level  Outcome: Progressing     Problem: Risk for Elopement  Goal: Patient will not exit the unit/facility without proper excort  Outcome: Progressing

## 2024-10-31 NOTE — PLAN OF CARE
0730 At this time Kyra was received resting in her room. Patient was calm, cooperative and friendly during assessment.  Denied SI, HI, A/V hallucinations, endorsed depression and anxiety. She remains isolated to her room most of the time.  She was med compliant. Vital signs within normal parameters. Will continue to monitor for safety.     Problem: Pain  Goal: Verbalizes/displays adequate comfort level or baseline comfort level  10/31/2024 0045 by Shannon Camara, RN  Outcome: Progressing     Problem: Risk for Elopement  Goal: Patient will not exit the unit/facility without proper excort  10/31/2024 0045 by Shannon Camara, RN  Outcome: Progressing     Problem: Safety - Adult  Goal: Free from fall injury  Outcome: Progressing

## 2024-10-31 NOTE — GROUP NOTE
Group Therapy Note    Date: 10/31/2024    Group Start Time: 1400  Group End Time: 1500  Group Topic: Recreational    [facility]    Ariella Juan        Group Therapy Note    Attendees: 9       Patient's Goal:  To concentrate on selected task    Notes:  Pt did not attend session      Discipline Responsible: Recreational Therapist      Signature:  ARIELLA JUAN

## 2024-11-01 PROCEDURE — 1240000000 HC EMOTIONAL WELLNESS R&B

## 2024-11-01 PROCEDURE — 6370000000 HC RX 637 (ALT 250 FOR IP): Performed by: PSYCHIATRY & NEUROLOGY

## 2024-11-01 PROCEDURE — 99232 SBSQ HOSP IP/OBS MODERATE 35: CPT | Performed by: PSYCHIATRY & NEUROLOGY

## 2024-11-01 RX ADMIN — ATORVASTATIN CALCIUM 40 MG: 40 TABLET, FILM COATED ORAL at 21:17

## 2024-11-01 RX ADMIN — ASPIRIN 81 MG: 81 TABLET, COATED ORAL at 09:14

## 2024-11-01 RX ADMIN — RISPERIDONE 1 MG: 1 TABLET, FILM COATED ORAL at 09:14

## 2024-11-01 RX ADMIN — RISPERIDONE 2 MG: 1 TABLET, FILM COATED ORAL at 21:17

## 2024-11-01 RX ADMIN — AMLODIPINE BESYLATE 10 MG: 5 TABLET ORAL at 09:14

## 2024-11-01 RX ADMIN — SERTRALINE HYDROCHLORIDE 50 MG: 50 TABLET ORAL at 09:14

## 2024-11-01 RX ADMIN — METOPROLOL SUCCINATE 50 MG: 50 TABLET, EXTENDED RELEASE ORAL at 09:14

## 2024-11-01 ASSESSMENT — PAIN SCALES - GENERAL
PAINLEVEL_OUTOF10: 0
PAINLEVEL_OUTOF10: 0

## 2024-11-01 NOTE — GROUP NOTE
Group Therapy Note    Date: 11/1/2024    Group Start Time: 1500  Group End Time: 1600  Group Topic: Recreational    [facility]    Ariella Juan        Group Therapy Note    Attendees: 6       Patient's Goal:  To concentrate on selected task    Notes:  Pt did not attend session    Discipline Responsible: Recreational Therapist      Signature:  ARIELLA JUAN

## 2024-11-01 NOTE — PLAN OF CARE
Pt received at 1930, Alert, Oriented X4, and Cooperative with Appropriate affect. Pt's vital signs were T 98.6 °F (37 °C), HR 66, RR 15, /81, and O2 98 %. Pt denied pain, denied anxiety and denied other side effects / symptoms. Pt replied no to suicidal ideation, No to homicidal ideation, and No to hallucinations. Pt had speech that was Clear and Unremarkable in quality. Pt provided with 1:1 RN assessment with active listening and therapeutic presence for a brief period in the evening. Pt encouraged to participate in the evening milieu including leisure activities, snack time, and positive peer interaction. Pt cooperated with all scheduled medications and received no PRN medications. Pt monitored q15min throughout the night. Pt was visible in the milieu throughout the evening and rested in bed for 8 hours overnight.    Problem: Pain  Goal: Verbalizes/displays adequate comfort level or baseline comfort level  Outcome: Progressing     Problem: Risk for Elopement  Goal: Patient will not exit the unit/facility without proper excort  Outcome: Progressing

## 2024-11-01 NOTE — GROUP NOTE
Group Therapy Note    Date: 11/1/2024    Group Start Time: 1000  Group End Time: 1045  Group Topic: Relaxation    [facility]    Ariella Juan        Group Therapy Note    Attendees: 5       Patient's Goal:  To participate in relaxation activity      Status After Intervention:  Improved    Participation Level: Active Listener and Interactive    Participation Quality: Appropriate and Attentive      Speech:  normal      Thought Process/Content: Logical      Affective Functioning: Congruent      Mood: euthymic      Level of consciousness:  Alert and Attentive      Response to Learning: Progressing to goal      Endings: None Reported    Modes of Intervention: Activity      Discipline Responsible: Recreational Therapist      Signature:  ARIELLA JUAN

## 2024-11-01 NOTE — PLAN OF CARE
Problem: Risk for Elopement  Goal: Patient will not exit the unit/facility without proper excort  Outcome: Progressing     Problem: Psychosis  Goal: Will report no hallucinations or delusions  Description: INTERVENTIONS:  1. Administer medication as  ordered  2. Assist with reality testing to support increasing orientation  3. Assess if patient's hallucinations or delusions are encouraging self harm or harm to others and intervene as appropriate  Outcome: Progressing

## 2024-11-02 PROCEDURE — 1240000000 HC EMOTIONAL WELLNESS R&B

## 2024-11-02 PROCEDURE — 99232 SBSQ HOSP IP/OBS MODERATE 35: CPT | Performed by: PSYCHIATRY & NEUROLOGY

## 2024-11-02 PROCEDURE — 6370000000 HC RX 637 (ALT 250 FOR IP): Performed by: PSYCHIATRY & NEUROLOGY

## 2024-11-02 RX ADMIN — METOPROLOL SUCCINATE 50 MG: 50 TABLET, EXTENDED RELEASE ORAL at 09:31

## 2024-11-02 RX ADMIN — ASPIRIN 81 MG: 81 TABLET, COATED ORAL at 09:31

## 2024-11-02 RX ADMIN — AMLODIPINE BESYLATE 10 MG: 5 TABLET ORAL at 09:31

## 2024-11-02 RX ADMIN — RISPERIDONE 2 MG: 1 TABLET, FILM COATED ORAL at 21:21

## 2024-11-02 RX ADMIN — SERTRALINE HYDROCHLORIDE 50 MG: 50 TABLET ORAL at 09:31

## 2024-11-02 RX ADMIN — RISPERIDONE 1 MG: 1 TABLET, FILM COATED ORAL at 09:31

## 2024-11-02 RX ADMIN — ATORVASTATIN CALCIUM 40 MG: 40 TABLET, FILM COATED ORAL at 21:21

## 2024-11-02 ASSESSMENT — PAIN SCALES - GENERAL: PAINLEVEL_OUTOF10: 0

## 2024-11-02 NOTE — PLAN OF CARE
Problem: Depression  Goal: Will be euthymic at discharge  Description: INTERVENTIONS:  1. Administer medication as ordered  2. Provide emotional support via 1:1 interaction with staff  3. Encourage involvement in milieu/groups/activities  4. Monitor for social isolation  Outcome: Progressing     Problem: Psychosis  Goal: Will report no hallucinations or delusions  Description: INTERVENTIONS:  1. Administer medication as  ordered  2. Assist with reality testing to support increasing orientation  3. Assess if patient's hallucinations or delusions are encouraging self harm or harm to others and intervene as appropriate  Outcome: Progressing

## 2024-11-02 NOTE — PLAN OF CARE
Problem: Pain  Goal: Verbalizes/displays adequate comfort level or baseline comfort level  Outcome: Progressing     Problem: Risk for Elopement  Goal: Patient will not exit the unit/facility without proper excort  11/1/2024 2304 by Isabell Patiño, RN  Outcome: Progressing     Problem: Safety - Adult  Goal: Free from fall injury  Outcome: Progressing     Problem: Depression  Goal: Will be euthymic at discharge  Description: INTERVENTIONS:  1. Administer medication as ordered  2. Provide emotional support via 1:1 interaction with staff  3. Encourage involvement in milieu/groups/activities  4. Monitor for social isolation  11/1/2024 2304 by Isabell Patiño, RN  Outcome: Progressing

## 2024-11-03 PROCEDURE — 1240000000 HC EMOTIONAL WELLNESS R&B

## 2024-11-03 PROCEDURE — 99232 SBSQ HOSP IP/OBS MODERATE 35: CPT | Performed by: PSYCHIATRY & NEUROLOGY

## 2024-11-03 PROCEDURE — 6370000000 HC RX 637 (ALT 250 FOR IP): Performed by: PSYCHIATRY & NEUROLOGY

## 2024-11-03 RX ADMIN — ASPIRIN 81 MG: 81 TABLET, COATED ORAL at 08:21

## 2024-11-03 RX ADMIN — SERTRALINE HYDROCHLORIDE 50 MG: 50 TABLET ORAL at 08:21

## 2024-11-03 RX ADMIN — RISPERIDONE 2 MG: 1 TABLET, FILM COATED ORAL at 22:10

## 2024-11-03 RX ADMIN — METOPROLOL SUCCINATE 50 MG: 50 TABLET, EXTENDED RELEASE ORAL at 08:21

## 2024-11-03 RX ADMIN — RISPERIDONE 1 MG: 1 TABLET, FILM COATED ORAL at 08:21

## 2024-11-03 RX ADMIN — AMLODIPINE BESYLATE 10 MG: 5 TABLET ORAL at 08:21

## 2024-11-03 RX ADMIN — ATORVASTATIN CALCIUM 40 MG: 40 TABLET, FILM COATED ORAL at 22:10

## 2024-11-03 ASSESSMENT — PAIN SCALES - GENERAL
PAINLEVEL_OUTOF10: 0
PAINLEVEL_OUTOF10: 0

## 2024-11-03 NOTE — PLAN OF CARE
Problem: Risk for Elopement  Goal: Patient will not exit the unit/facility without proper excort  Outcome: Progressing     Problem: Safety - Adult  Goal: Free from fall injury  Outcome: Progressing     Problem: Depression  Goal: Will be euthymic at discharge  Description: INTERVENTIONS:  1. Administer medication as ordered  2. Provide emotional support via 1:1 interaction with staff  3. Encourage involvement in milieu/groups/activities  4. Monitor for social isolation  11/3/2024 0157 by Keturah Cuevas RN  Outcome: Progressing  11/2/2024 1540 by Jackie Gil, RN  Outcome: Progressing     Problem: Psychosis  Goal: Will report no hallucinations or delusions  Description: INTERVENTIONS:  1. Administer medication as  ordered  2. Assist with reality testing to support increasing orientation  3. Assess if patient's hallucinations or delusions are encouraging self harm or harm to others and intervene as appropriate  11/2/2024 1540 by Jackie Gil, RN  Outcome: Progressing

## 2024-11-03 NOTE — PROGRESS NOTES
Pt alert and oriented x4, isolative to self/room lying in bed. Pt is cooperative with assessment reporting no pain, anxiety or depression. Denies SI/HI/AVH. Compliant with scheduled medication with no PRN meds given. No acute distress observed. Staff will continue hourly rounds and Q-15 minutes checks maintained during shift for care and safety.     0630 - Pt appeared to have slept approximately 9 hours 45 minutes during the night.

## 2024-11-04 PROCEDURE — 6370000000 HC RX 637 (ALT 250 FOR IP): Performed by: PSYCHIATRY & NEUROLOGY

## 2024-11-04 PROCEDURE — 99232 SBSQ HOSP IP/OBS MODERATE 35: CPT | Performed by: PSYCHIATRY & NEUROLOGY

## 2024-11-04 PROCEDURE — 1240000000 HC EMOTIONAL WELLNESS R&B

## 2024-11-04 RX ADMIN — ATORVASTATIN CALCIUM 40 MG: 40 TABLET, FILM COATED ORAL at 21:44

## 2024-11-04 RX ADMIN — RISPERIDONE 1 MG: 1 TABLET, FILM COATED ORAL at 08:55

## 2024-11-04 RX ADMIN — AMLODIPINE BESYLATE 10 MG: 5 TABLET ORAL at 08:55

## 2024-11-04 RX ADMIN — SERTRALINE HYDROCHLORIDE 50 MG: 50 TABLET ORAL at 08:55

## 2024-11-04 RX ADMIN — ASPIRIN 81 MG: 81 TABLET, COATED ORAL at 08:55

## 2024-11-04 RX ADMIN — METOPROLOL SUCCINATE 50 MG: 50 TABLET, EXTENDED RELEASE ORAL at 08:55

## 2024-11-04 RX ADMIN — RISPERIDONE 2 MG: 1 TABLET, FILM COATED ORAL at 21:44

## 2024-11-04 ASSESSMENT — PAIN SCALES - GENERAL: PAINLEVEL_OUTOF10: 0

## 2024-11-04 NOTE — PLAN OF CARE
Problem: Pain  Goal: Verbalizes/displays adequate comfort level or baseline comfort level  11/4/2024 1147 by Jannette Alcaraz RN  Outcome: Progressing  11/4/2024 0259 by Serena Mckenna RN  Outcome: Progressing     Problem: Risk for Elopement  Goal: Patient will not exit the unit/facility without proper excort  Outcome: Progressing     Problem: Safety - Adult  Goal: Free from fall injury  11/4/2024 1147 by Jannette Alcaraz RN  Outcome: Progressing  11/4/2024 0259 by Serena Mckenna RN  Outcome: Progressing     Problem: Discharge Planning  Goal: Discharge to home or other facility with appropriate resources  Outcome: Progressing     Problem: Depression  Goal: Will be euthymic at discharge  Description: INTERVENTIONS:  1. Administer medication as ordered  2. Provide emotional support via 1:1 interaction with staff  3. Encourage involvement in milieu/groups/activities  4. Monitor for social isolation  11/4/2024 1147 by Jannette Alcaraz RN  Outcome: Progressing  11/4/2024 0259 by Serena Mckenna RN  Outcome: Progressing     Problem: Psychosis  Goal: Will report no hallucinations or delusions  Description: INTERVENTIONS:  1. Administer medication as  ordered  2. Assist with reality testing to support increasing orientation  3. Assess if patient's hallucinations or delusions are encouraging self harm or harm to others and intervene as appropriate  11/4/2024 1147 by Jannette Alcaraz RN  Outcome: Progressing  11/4/2024 0259 by Serena Mckenna RN  Outcome: Progressing

## 2024-11-04 NOTE — PLAN OF CARE
Problem: Pain  Goal: Verbalizes/displays adequate comfort level or baseline comfort level  Outcome: Progressing     Problem: Safety - Adult  Goal: Free from fall injury  Outcome: Progressing     Problem: Depression  Goal: Will be euthymic at discharge  Description: INTERVENTIONS:  1. Administer medication as ordered  2. Provide emotional support via 1:1 interaction with staff  3. Encourage involvement in milieu/groups/activities  4. Monitor for social isolation  11/4/2024 0259 by Serena Mckenna RN  Outcome: Progressing     Problem: Psychosis  Goal: Will report no hallucinations or delusions  Description: INTERVENTIONS:  1. Administer medication as  ordered  2. Assist with reality testing to support increasing orientation  3. Assess if patient's hallucinations or delusions are encouraging self harm or harm to others and intervene as appropriate  11/4/2024 0259 by Serena Mckenna, RN  Outcome: Progressing

## 2024-11-05 PROCEDURE — 99232 SBSQ HOSP IP/OBS MODERATE 35: CPT | Performed by: PSYCHIATRY & NEUROLOGY

## 2024-11-05 PROCEDURE — 1240000000 HC EMOTIONAL WELLNESS R&B

## 2024-11-05 PROCEDURE — 6370000000 HC RX 637 (ALT 250 FOR IP): Performed by: PSYCHIATRY & NEUROLOGY

## 2024-11-05 RX ADMIN — AMLODIPINE BESYLATE 10 MG: 5 TABLET ORAL at 08:40

## 2024-11-05 RX ADMIN — METOPROLOL SUCCINATE 50 MG: 50 TABLET, EXTENDED RELEASE ORAL at 08:40

## 2024-11-05 RX ADMIN — ASPIRIN 81 MG: 81 TABLET, COATED ORAL at 08:40

## 2024-11-05 RX ADMIN — ATORVASTATIN CALCIUM 40 MG: 40 TABLET, FILM COATED ORAL at 21:11

## 2024-11-05 RX ADMIN — SERTRALINE HYDROCHLORIDE 50 MG: 50 TABLET ORAL at 08:40

## 2024-11-05 RX ADMIN — RISPERIDONE 1 MG: 1 TABLET, FILM COATED ORAL at 08:40

## 2024-11-05 RX ADMIN — RISPERIDONE 2 MG: 1 TABLET, FILM COATED ORAL at 21:11

## 2024-11-05 ASSESSMENT — PAIN SCALES - GENERAL
PAINLEVEL_OUTOF10: 0
PAINLEVEL_OUTOF10: 0

## 2024-11-05 NOTE — PLAN OF CARE
Problem: Pain  Goal: Verbalizes/displays adequate comfort level or baseline comfort level  11/4/2024 2155 by Jaswinder Islas RN  Outcome: Progressing  11/4/2024 1147 by Jannette Alcaraz RN  Outcome: Progressing     Problem: Risk for Elopement  Goal: Patient will not exit the unit/facility without proper excort  11/4/2024 2155 by Jaswinder Islas RN  Outcome: Progressing  11/4/2024 1147 by Jannette Alcaraz RN  Outcome: Progressing     Problem: Safety - Adult  Goal: Free from fall injury  11/4/2024 2155 by Jaswinder Islas RN  Outcome: Progressing  11/4/2024 1147 by Jannette Alcaraz RN  Outcome: Progressing     Problem: Discharge Planning  Goal: Discharge to home or other facility with appropriate resources  11/4/2024 2155 by Jaswinder Islas RN  Outcome: Progressing  11/4/2024 1147 by Jannette Alcaraz RN  Outcome: Progressing     Problem: Depression  Goal: Will be euthymic at discharge  Description: INTERVENTIONS:  1. Administer medication as ordered  2. Provide emotional support via 1:1 interaction with staff  3. Encourage involvement in milieu/groups/activities  4. Monitor for social isolation  11/4/2024 2155 by Jaswinder Islas RN  Outcome: Progressing  11/4/2024 1147 by Jannette Alcaraz RN  Outcome: Progressing     Problem: Psychosis  Goal: Will report no hallucinations or delusions  Description: INTERVENTIONS:  1. Administer medication as  ordered  2. Assist with reality testing to support increasing orientation  3. Assess if patient's hallucinations or delusions are encouraging self harm or harm to others and intervene as appropriate  11/4/2024 2155 by Jaswinder Islas RN  Outcome: Progressing  11/4/2024 1147 by Jannette Alcaraz RN  Outcome: Progressing

## 2024-11-05 NOTE — PLAN OF CARE
Problem: Discharge Planning  Goal: Discharge to home or other facility with appropriate resources  11/5/2024 0923 by Yoanna Mota, RN  Outcome: Progressing  11/4/2024 2155 by Jaswinder Islas, RN  Outcome: Progressing

## 2024-11-05 NOTE — PROGRESS NOTES
RN Shift Assessment Note      Kyra was met in her room where she was observed resting awake in bed, she was easily arouse when her named was called out to her. Pt was observed to be isolative to her room during this shift. Pt is medication compliant, Pt did not receive PRN medications.   Kyra did not display any signs of distress or complaints of adverse reactions to medications. Pt denies pain, SI, HI, VH, denies depression and anxiety. Pt endorse AH of men voices telling her she is real attractive and I'm nice. Pt stated that her mood is okay. Pt was calm and cooperative during assessment. Pt will continue to be monitored for her safety. Pt slept for a total of 8 hrs.

## 2024-11-06 PROCEDURE — 99232 SBSQ HOSP IP/OBS MODERATE 35: CPT | Performed by: PSYCHIATRY & NEUROLOGY

## 2024-11-06 PROCEDURE — 6370000000 HC RX 637 (ALT 250 FOR IP): Performed by: PSYCHIATRY & NEUROLOGY

## 2024-11-06 PROCEDURE — 1240000000 HC EMOTIONAL WELLNESS R&B

## 2024-11-06 RX ADMIN — AMLODIPINE BESYLATE 10 MG: 5 TABLET ORAL at 09:24

## 2024-11-06 RX ADMIN — ATORVASTATIN CALCIUM 40 MG: 40 TABLET, FILM COATED ORAL at 20:28

## 2024-11-06 RX ADMIN — RISPERIDONE 1 MG: 1 TABLET, FILM COATED ORAL at 09:24

## 2024-11-06 RX ADMIN — METOPROLOL SUCCINATE 50 MG: 50 TABLET, EXTENDED RELEASE ORAL at 09:24

## 2024-11-06 RX ADMIN — ASPIRIN 81 MG: 81 TABLET, COATED ORAL at 09:23

## 2024-11-06 RX ADMIN — RISPERIDONE 2 MG: 1 TABLET, FILM COATED ORAL at 20:28

## 2024-11-06 RX ADMIN — SERTRALINE HYDROCHLORIDE 50 MG: 50 TABLET ORAL at 09:23

## 2024-11-06 ASSESSMENT — PAIN SCALES - GENERAL
PAINLEVEL_OUTOF10: 0
PAINLEVEL_OUTOF10: 0

## 2024-11-06 NOTE — DISCHARGE INSTRUCTIONS
participate in Steffen-Anon. Steffen-Anon members are those concerned about the addiction or drug problem of another. Tomas's program of recovery is adapted from Narcotics Anonymous and uses Steffen-Leela's Twelve Steps, Twelve Traditions, and Twelve Concepts. Steffen-Anon meetings in your area can be located by going to www.steffen-anon.org/find-a-meeting or calling 1-104.627.3891.      [employer] is a group that offers support for individuals who have chosen to abstain, or are considering abstinence, from any type of addictive behaviors (substances or activities) by teaching how to change self-defeating thinking, emotions, and actions, and to work toward long-term satisfaction and quality of life. Please go to www.smartrecovery.org to learn more about programs in your area.     [facility] is a amrita-based, 12-step recovery program for anyone struggling with hurt, pain, or addiction of any kind. [facility] is a amrita-based program that may or may not coincide with your personal beliefs or the medical recommendations of your hospital treatment team. If you are interested in learning more about their program, please go to www.celebrater[employer].      [facility]'s National Helpline, 8-220-779-WRIK (1760), (also known as the Treatment Referral Routing Service) is a confidential, free, 24-hour-a-day, 365-day-a-year, information service, in English and Vietnamese, for individuals and family members facing mental and/or substance use disorders. This service provides referrals to local treatment facilities, support groups, and community-based organizations and can also be accessed at www.[facility].gov/find-help/national-helpline. Callers can also order free publications and other information.      The Family Education Program is a collaborative effort dedicated to providing quality education and support to families of people struggling with substances in our community. This group meets every Thursday from 6:30pm - 8:00pm at 563

## 2024-11-06 NOTE — PLAN OF CARE
Problem: Discharge Planning  Goal: Discharge to home or other facility with appropriate resources  Outcome: Not Progressing     Problem: Psychosis  Goal: Will report no hallucinations or delusions  Description: INTERVENTIONS:  1. Administer medication as  ordered  2. Assist with reality testing to support increasing orientation  3. Assess if patient's hallucinations or delusions are encouraging self harm or harm to others and intervene as appropriate  Outcome: Not Progressing     Problem: Pain  Goal: Verbalizes/displays adequate comfort level or baseline comfort level  Outcome: Progressing     Problem: Risk for Elopement  Goal: Patient will not exit the unit/facility without proper excort  11/6/2024 1207 by Rosa Ko, RN  Outcome: Adequate for Discharge  11/6/2024 0025 by Tamara Ortiz RN  Outcome: Progressing     Problem: Discharge Planning  Goal: Discharge to home or other facility with appropriate resources  Outcome: Not Progressing     Problem: Safety - Adult  Goal: Free from fall injury  11/6/2024 1207 by Rosa Ko, RN  Outcome: Progressing  11/6/2024 0025 by Tamara Ortiz RN  Outcome: Progressing

## 2024-11-06 NOTE — PROGRESS NOTES
GROUP THERAPY PROGRESS NOTE      GROUP TIME: Wednesday 2-2:45 PM    NUMBER OF PARTICIPANTS: 7    PERSONAL GOAL FOR PARTICIPATION: To be present for group, participate in discussion, and answer patient-directed questions.    GOAL ORIENTATION: Personal    THERAPEUTIC INTERVENTIONS REVIEWED AND DISCUSSED: The following topics were presented: storage of medications, how to remember to refill medications and keep up with doctor appointments, relapse prevention, keeping a record of all medication including prescription and non-prescription drugs, and who to contact with medication questions. Patients were given time to ask questions regarding their current therapy.    IMPRESSION OF PARTICIPATION: Kyra Hunt was not present for medication group.      Dior Hernandez, [facility]

## 2024-11-07 PROCEDURE — 6370000000 HC RX 637 (ALT 250 FOR IP): Performed by: PSYCHIATRY & NEUROLOGY

## 2024-11-07 PROCEDURE — 1240000000 HC EMOTIONAL WELLNESS R&B

## 2024-11-07 PROCEDURE — 99232 SBSQ HOSP IP/OBS MODERATE 35: CPT | Performed by: PSYCHIATRY & NEUROLOGY

## 2024-11-07 RX ORDER — AMLODIPINE BESYLATE 10 MG/1
10 TABLET ORAL DAILY
Qty: 30 TABLET | Refills: 3 | Status: SHIPPED | OUTPATIENT
Start: 2024-11-07

## 2024-11-07 RX ORDER — ASPIRIN 81 MG/1
81 TABLET ORAL DAILY
Qty: 30 TABLET | Refills: 3 | Status: SHIPPED | OUTPATIENT
Start: 2024-11-07

## 2024-11-07 RX ORDER — METOPROLOL SUCCINATE 50 MG/1
50 TABLET, EXTENDED RELEASE ORAL DAILY
Qty: 30 TABLET | Refills: 3 | Status: SHIPPED | OUTPATIENT
Start: 2024-11-07

## 2024-11-07 RX ORDER — RISPERIDONE 1 MG/1
TABLET ORAL
Qty: 90 TABLET | Refills: 3 | Status: SHIPPED | OUTPATIENT
Start: 2024-11-07

## 2024-11-07 RX ORDER — ATORVASTATIN CALCIUM 40 MG/1
40 TABLET, FILM COATED ORAL
Qty: 30 TABLET | Refills: 3 | Status: SHIPPED | OUTPATIENT
Start: 2024-11-07

## 2024-11-07 RX ADMIN — AMLODIPINE BESYLATE 10 MG: 5 TABLET ORAL at 08:26

## 2024-11-07 RX ADMIN — RISPERIDONE 1 MG: 1 TABLET, FILM COATED ORAL at 08:25

## 2024-11-07 RX ADMIN — RISPERIDONE 2 MG: 1 TABLET, FILM COATED ORAL at 20:44

## 2024-11-07 RX ADMIN — ASPIRIN 81 MG: 81 TABLET, COATED ORAL at 08:26

## 2024-11-07 RX ADMIN — SERTRALINE HYDROCHLORIDE 50 MG: 50 TABLET ORAL at 08:26

## 2024-11-07 RX ADMIN — METOPROLOL SUCCINATE 50 MG: 50 TABLET, EXTENDED RELEASE ORAL at 08:25

## 2024-11-07 RX ADMIN — ATORVASTATIN CALCIUM 40 MG: 40 TABLET, FILM COATED ORAL at 20:44

## 2024-11-07 ASSESSMENT — PAIN SCALES - GENERAL
PAINLEVEL_OUTOF10: 0
PAINLEVEL_OUTOF10: 0

## 2024-11-07 NOTE — PROGRESS NOTES
Morning assessment complete.   Patient was encountered resting in her room.  Patient was calm and cooperative with assessment.  Eye contact is noted to be good.  Patient appears flat.  Mood is noted to be normal with an appropriate affect.  Patient endorsed anxiety (only because she wants to go home).  Patient currently reports that there are no signs or symptoms of depression also denies all SI/HI/AVH.  C-SSRS level is no risk.  Patient has been isolative to her room throughout the shift.   Patient is both med and meal compliant. There are no untoward side effects from medications to note.  Fifteen minute rounds are being completed to assure patient safety and attend to care needs. Will continue to monitor for safety.

## 2024-11-07 NOTE — PROGRESS NOTES
Pharmacist Discharge Medication Reconciliation    Discharging Provider: Dr. Mijares    Significant PMH:   Past Medical History:   Diagnosis Date    Anxiety disorder     Hypertension     Psychiatric disorder     schizophrenia      Chief Complaint for this Admission:   Chief Complaint   Patient presents with    Depression           Leg Pain            Allergies: Patient has no known allergies.    Discharge Medications:   Current Discharge Medication List        CONTINUE these medications which have CHANGED    Details   sertraline (ZOLOFT) 50 MG tablet Take 1 tablet by mouth daily  Qty: 30 tablet, Refills: 3      aspirin (EQ ASPIRIN ADULT LOW DOSE) 81 MG EC tablet Take 1 tablet by mouth daily  Qty: 30 tablet, Refills: 3      atorvastatin (LIPITOR) 40 MG tablet Take 1 tablet by mouth nightly  Qty: 30 tablet, Refills: 3      risperiDONE (RISPERDAL) 1 MG tablet Take 1 tablet by mouth daily AND 2 tablets nightly.  Qty: 90 tablet, Refills: 3      metoprolol succinate (TOPROL XL) 50 MG extended release tablet Take 1 tablet by mouth daily  Qty: 30 tablet, Refills: 3      amLODIPine (NORVASC) 10 MG tablet Take 1 tablet by mouth daily  Qty: 30 tablet, Refills: 3           STOP taking these medications       hydrOXYzine HCl (ATARAX) 50 MG tablet Comments:   Reason for Stopping:         traZODone (DESYREL) 50 MG tablet Comments:   Reason for Stopping:         spironolactone (ALDACTONE) 50 MG tablet Comments:   Reason for Stopping:               The patient's chart, MAR and AVS were reviewed by Dior Hernandez RPH.

## 2024-11-07 NOTE — PLAN OF CARE
Problem: Pain  Goal: Verbalizes/displays adequate comfort level or baseline comfort level  Outcome: Progressing     Problem: Safety - Adult  Goal: Free from fall injury  Outcome: Progressing     Problem: Depression  Goal: Will be euthymic at discharge  Description: INTERVENTIONS:  1. Administer medication as ordered  2. Provide emotional support via 1:1 interaction with staff  3. Encourage involvement in milieu/groups/activities  4. Monitor for social isolation  Outcome: Progressing

## 2024-11-07 NOTE — PROGRESS NOTES
1915-0715    Kyra is isolative to her room, lying in bed. She is calm and cooperative. Kyra is pleasant during assessment. Kyra states that she endorses AH \"they say I'm such a good person. Kyra is such a good person\". She denies SI/HI/VH. She reports that her mood is \"good\". No c/o pain or discomfort. She is med compliant. No PRNs requested or given at this time. Staff will continue to monitor for safety, location, and behavior q 15 minutes.     0600  No acute changes overnight. Patient slept for 8 hours this shift.

## 2024-11-07 NOTE — PLAN OF CARE
Problem: Pain  Goal: Verbalizes/displays adequate comfort level or baseline comfort level  11/6/2024 2059 by Gogo Jackson RN  Outcome: Progressing  11/6/2024 1207 by Rosa Ko RN  Outcome: Progressing     Problem: Safety - Adult  Goal: Free from fall injury  11/6/2024 2059 by Gogo Jackson, RN  Outcome: Progressing  11/6/2024 1207 by Rosa Ko RN  Outcome: Progressing

## 2024-11-08 PROCEDURE — 6370000000 HC RX 637 (ALT 250 FOR IP): Performed by: PSYCHIATRY & NEUROLOGY

## 2024-11-08 PROCEDURE — 99232 SBSQ HOSP IP/OBS MODERATE 35: CPT | Performed by: PSYCHIATRY & NEUROLOGY

## 2024-11-08 PROCEDURE — 1240000000 HC EMOTIONAL WELLNESS R&B

## 2024-11-08 RX ADMIN — AMLODIPINE BESYLATE 10 MG: 5 TABLET ORAL at 09:24

## 2024-11-08 RX ADMIN — ATORVASTATIN CALCIUM 40 MG: 40 TABLET, FILM COATED ORAL at 21:07

## 2024-11-08 RX ADMIN — RISPERIDONE 1 MG: 1 TABLET, FILM COATED ORAL at 09:24

## 2024-11-08 RX ADMIN — RISPERIDONE 2 MG: 1 TABLET, FILM COATED ORAL at 21:07

## 2024-11-08 RX ADMIN — METOPROLOL SUCCINATE 50 MG: 50 TABLET, EXTENDED RELEASE ORAL at 09:24

## 2024-11-08 RX ADMIN — SERTRALINE HYDROCHLORIDE 50 MG: 50 TABLET ORAL at 09:25

## 2024-11-08 RX ADMIN — ASPIRIN 81 MG: 81 TABLET, COATED ORAL at 09:24

## 2024-11-08 ASSESSMENT — PAIN SCALES - GENERAL
PAINLEVEL_OUTOF10: 0
PAINLEVEL_OUTOF10: 0

## 2024-11-08 NOTE — PROGRESS NOTES
Pt alert and oriented x3, isolative to self/room lying in bed. Pt is cooperative with assessment reporting no pain, anxiety or depression. Denies SI/HI/AVH. Compliant with scheduled medication with no PRN meds given. No acute distress observed. Staff will continue hourly rounds and Q-15 minutes checks maintained during shift for care and safety.      0630 - Pt appeared to have slept approximately 8 hours 45 minutes during the night.

## 2024-11-08 NOTE — PLAN OF CARE
Problem: Risk for Elopement  Goal: Patient will not exit the unit/facility without proper excort  11/8/2024 1310 by Jackie Gil RN  Outcome: Progressing  11/8/2024 0510 by Keturah Cuevas RN  Outcome: Progressing     Problem: Depression  Goal: Will be euthymic at discharge  Description: INTERVENTIONS:  1. Administer medication as ordered  2. Provide emotional support via 1:1 interaction with staff  3. Encourage involvement in milieu/groups/activities  4. Monitor for social isolation  11/8/2024 1310 by Jackie Gil RN  Outcome: Progressing  11/8/2024 0510 by Keturah Cuevas RN  Outcome: Progressing     Problem: Psychosis  Goal: Will report no hallucinations or delusions  Description: INTERVENTIONS:  1. Administer medication as  ordered  2. Assist with reality testing to support increasing orientation  3. Assess if patient's hallucinations or delusions are encouraging self harm or harm to others and intervene as appropriate  11/8/2024 1310 by Jackie Gil RN  Outcome: Progressing  11/8/2024 0510 by Keturah Cuevas RN  Outcome: Progressing

## 2024-11-08 NOTE — PLAN OF CARE
Problem: Risk for Elopement  Goal: Patient will not exit the unit/facility without proper excort  Outcome: Progressing     Problem: Safety - Adult  Goal: Free from fall injury  Outcome: Progressing     Problem: Depression  Goal: Will be euthymic at discharge  Description: INTERVENTIONS:  1. Administer medication as ordered  2. Provide emotional support via 1:1 interaction with staff  3. Encourage involvement in milieu/groups/activities  4. Monitor for social isolation  Outcome: Progressing     Problem: Psychosis  Goal: Will report no hallucinations or delusions  Description: INTERVENTIONS:  1. Administer medication as  ordered  2. Assist with reality testing to support increasing orientation  3. Assess if patient's hallucinations or delusions are encouraging self harm or harm to others and intervene as appropriate  Outcome: Progressing

## 2024-11-09 PROCEDURE — 6370000000 HC RX 637 (ALT 250 FOR IP): Performed by: PSYCHIATRY & NEUROLOGY

## 2024-11-09 PROCEDURE — 1240000000 HC EMOTIONAL WELLNESS R&B

## 2024-11-09 RX ADMIN — RISPERIDONE 2 MG: 1 TABLET, FILM COATED ORAL at 20:40

## 2024-11-09 RX ADMIN — METOPROLOL SUCCINATE 50 MG: 50 TABLET, EXTENDED RELEASE ORAL at 09:04

## 2024-11-09 RX ADMIN — ASPIRIN 81 MG: 81 TABLET, COATED ORAL at 09:04

## 2024-11-09 RX ADMIN — SERTRALINE HYDROCHLORIDE 50 MG: 50 TABLET ORAL at 09:04

## 2024-11-09 RX ADMIN — ATORVASTATIN CALCIUM 40 MG: 40 TABLET, FILM COATED ORAL at 20:40

## 2024-11-09 RX ADMIN — AMLODIPINE BESYLATE 10 MG: 5 TABLET ORAL at 09:03

## 2024-11-09 RX ADMIN — RISPERIDONE 1 MG: 1 TABLET, FILM COATED ORAL at 09:03

## 2024-11-09 ASSESSMENT — PAIN SCALES - GENERAL: PAINLEVEL_OUTOF10: 0

## 2024-11-09 NOTE — PROGRESS NOTES
Night Shift assessment completed.   Kyra is isolative to room, resting in bed, alert, calm, bright, friendly, cooperative and free from distress. Pt reports depression of 10/10, saying '' I am always depressed ''.Denies anxiety, SI/HI/AH/VH and pain. C-SSRS, No risk. Interacts appropriately and able to make needs known. No behavioral issues observed.      Nursing Intervention: VS T 98.4 °F (36.9 °C),HR 67 R 18, B/P 137/73, O2 Sat 99 %. Med and meal compliant. Emotional support and encouragement provided. No side effects from medications observed.     Response: Positive.    Plan: Monitoring for safety and behavior continues q 15 mins. Pt appears to have slept for 7.15 hours.

## 2024-11-09 NOTE — PLAN OF CARE
Problem: Depression  Goal: Will be euthymic at discharge  Description: INTERVENTIONS:  1. Administer medication as ordered  2. Provide emotional support via 1:1 interaction with staff  3. Encourage involvement in milieu/groups/activities  4. Monitor for social isolation  11/8/2024 2231 by Jen Parks, RN  Outcome: Not Progressing

## 2024-11-10 PROCEDURE — 6370000000 HC RX 637 (ALT 250 FOR IP): Performed by: PSYCHIATRY & NEUROLOGY

## 2024-11-10 PROCEDURE — 1240000000 HC EMOTIONAL WELLNESS R&B

## 2024-11-10 RX ADMIN — AMLODIPINE BESYLATE 10 MG: 5 TABLET ORAL at 08:34

## 2024-11-10 RX ADMIN — RISPERIDONE 2 MG: 1 TABLET, FILM COATED ORAL at 20:28

## 2024-11-10 RX ADMIN — ATORVASTATIN CALCIUM 40 MG: 40 TABLET, FILM COATED ORAL at 20:28

## 2024-11-10 RX ADMIN — RISPERIDONE 1 MG: 1 TABLET, FILM COATED ORAL at 08:34

## 2024-11-10 RX ADMIN — METOPROLOL SUCCINATE 50 MG: 50 TABLET, EXTENDED RELEASE ORAL at 08:34

## 2024-11-10 RX ADMIN — ASPIRIN 81 MG: 81 TABLET, COATED ORAL at 08:34

## 2024-11-10 RX ADMIN — SERTRALINE HYDROCHLORIDE 50 MG: 50 TABLET ORAL at 08:34

## 2024-11-10 ASSESSMENT — PAIN SCALES - GENERAL: PAINLEVEL_OUTOF10: 0

## 2024-11-10 NOTE — PLAN OF CARE
Problem: Safety - Adult  Goal: Free from fall injury  Outcome: Progressing     Problem: Psychosis  Goal: Will report no hallucinations or delusions  Description: INTERVENTIONS:  1. Administer medication as  ordered  2. Assist with reality testing to support increasing orientation  3. Assess if patient's hallucinations or delusions are encouraging self harm or harm to others and intervene as appropriate  11/9/2024 9125 by Cora Fisher, RN  Outcome: Progressing  11/9/2024 1341 by Jackie Gil, RN  Outcome: Progressing

## 2024-11-11 LAB
FLUAV RNA SPEC QL NAA+PROBE: NOT DETECTED
FLUBV RNA SPEC QL NAA+PROBE: NOT DETECTED
SARS-COV-2 RNA RESP QL NAA+PROBE: NOT DETECTED
SOURCE: NORMAL

## 2024-11-11 PROCEDURE — 6370000000 HC RX 637 (ALT 250 FOR IP): Performed by: PSYCHIATRY & NEUROLOGY

## 2024-11-11 PROCEDURE — 1240000000 HC EMOTIONAL WELLNESS R&B

## 2024-11-11 PROCEDURE — 87636 SARSCOV2 & INF A&B AMP PRB: CPT

## 2024-11-11 PROCEDURE — 99232 SBSQ HOSP IP/OBS MODERATE 35: CPT | Performed by: PSYCHIATRY & NEUROLOGY

## 2024-11-11 RX ADMIN — ASPIRIN 81 MG: 81 TABLET, COATED ORAL at 09:04

## 2024-11-11 RX ADMIN — SERTRALINE HYDROCHLORIDE 50 MG: 50 TABLET ORAL at 09:04

## 2024-11-11 RX ADMIN — METOPROLOL SUCCINATE 50 MG: 50 TABLET, EXTENDED RELEASE ORAL at 09:04

## 2024-11-11 RX ADMIN — RISPERIDONE 2 MG: 1 TABLET, FILM COATED ORAL at 20:30

## 2024-11-11 RX ADMIN — RISPERIDONE 1 MG: 1 TABLET, FILM COATED ORAL at 09:04

## 2024-11-11 RX ADMIN — AMLODIPINE BESYLATE 10 MG: 5 TABLET ORAL at 09:04

## 2024-11-11 RX ADMIN — ATORVASTATIN CALCIUM 40 MG: 40 TABLET, FILM COATED ORAL at 20:31

## 2024-11-11 ASSESSMENT — PAIN SCALES - GENERAL
PAINLEVEL_OUTOF10: 0
PAINLEVEL_OUTOF10: 0

## 2024-11-11 NOTE — PROGRESS NOTES
Night Shift assessment completed.   Kyra is intermittently visible in the milieu, mostly isolative to room, alert, calm, bright, friendly, cooperative and free from distress. Denies anxiety, depression, SI/HI/AH/VH and pain. C-SSRS, No risk. Interacts appropriately and able to make needs known. No behavioral issues observed.     Nursing Intervention: VS T 98.1 °F (36.7 °C),HR 59 R 16, B/P 129/66, O2 Sat 97 %. Med and meal compliant. Emotional support and encouragement provided. No side effects from medications observed.     Response: Positive.    Plan: Monitoring for safety and behavior continues q 15 mins. Pt appears to have slept for 7 hours.

## 2024-11-11 NOTE — GROUP NOTE
Group Therapy Note    Date: 11/11/2024    Group Start Time: 1000  Group End Time: 1100  Group Topic: Relaxation    [facility]    Ariella Juan        Group Therapy Note    Attendees: 5       Patient's Goal:  To participate in relaxation activity    Notes:  Pt did not attend session    Discipline Responsible: Recreational Therapist      Signature:  ARIELLA JUAN

## 2024-11-11 NOTE — PLAN OF CARE
Problem: Discharge Planning  Goal: Discharge to home or other facility with appropriate resources  11/10/2024 2211 by Jen Parks RN  Outcome: Progressing     Problem: Depression  Goal: Will be euthymic at discharge  Description: INTERVENTIONS:  1. Administer medication as ordered  2. Provide emotional support via 1:1 interaction with staff  3. Encourage involvement in milieu/groups/activities  4. Monitor for social isolation  11/10/2024 2211 by Jen Parks RN  Outcome: Progressing    Problem: Psychosis  Goal: Will report no hallucinations or delusions  Description: INTERVENTIONS:  1. Administer medication as  ordered  2. Assist with reality testing to support increasing orientation  3. Assess if patient's hallucinations or delusions are encouraging self harm or harm to others and intervene as appropriate  11/10/2024 2211 by Jen Parks RN  Outcome: Progressing

## 2024-11-11 NOTE — GROUP NOTE
Group Therapy Note    Date: 11/11/2024    Group Start Time: 1400  Group End Time: 1500  Group Topic: Recreational    [facility]    Ariella Juan        Group Therapy Note    Attendees: 5       Patient's Goal:  To concentrate on selected task    Notes:  Pt did not attend session      Discipline Responsible: Recreational Therapist      Signature:  ARIELLA JUAN

## 2024-11-11 NOTE — PLAN OF CARE
0730 At this time Kyra was received resting in her room. Patient was calm, cooperative and friendly during assessment.  Denied SI, HI, A/V hallucinations, endorsed depression and anxiety. She remains isolated to her room most of the time.  She was med compliant. BP was slightly elevated this morning. Nasal swab was collected to test Covid, specimen was sent to laboratory. Will continue to monitor for safety.     Problem: Safety - Adult  Goal: Free from fall injury  Outcome: Progressing

## 2024-11-12 VITALS
DIASTOLIC BLOOD PRESSURE: 62 MMHG | TEMPERATURE: 98.1 F | BODY MASS INDEX: 30.62 KG/M2 | SYSTOLIC BLOOD PRESSURE: 99 MMHG | OXYGEN SATURATION: 98 % | HEIGHT: 63 IN | HEART RATE: 66 BPM | WEIGHT: 172.84 LBS | RESPIRATION RATE: 17 BRPM

## 2024-11-12 PROCEDURE — 99239 HOSP IP/OBS DSCHRG MGMT >30: CPT | Performed by: PSYCHIATRY & NEUROLOGY

## 2024-11-12 PROCEDURE — 6370000000 HC RX 637 (ALT 250 FOR IP): Performed by: PSYCHIATRY & NEUROLOGY

## 2024-11-12 RX ADMIN — SERTRALINE HYDROCHLORIDE 50 MG: 50 TABLET ORAL at 08:53

## 2024-11-12 RX ADMIN — ASPIRIN 81 MG: 81 TABLET, COATED ORAL at 08:53

## 2024-11-12 RX ADMIN — RISPERIDONE 1 MG: 1 TABLET, FILM COATED ORAL at 08:53

## 2024-11-12 ASSESSMENT — PAIN SCALES - GENERAL: PAINLEVEL_OUTOF10: 0

## 2024-11-12 NOTE — BH NOTE
E/M Psychiatric Progress Note    Patient: Kyra Hunt MRN: [id number]  SSN: [id number]    YOB: 1955  Age: 69 y.o.  Sex: female      Admit Date: 10/4/2024    LOS: 35 days     Chief Complaint: psychosis    Subjective:     HPI / INTERVAL HISTORY:    The patient, Kyra Hunt, is a 69 y.o.  Black /  female with a past psychiatric history significant for schizophrenia, who presents at this time with complaints of (and/or evidence of) the following emotional symptoms: delusions, insomnia, and psychotic behavior.  Additional symptomatology include confusion and poor ADLs.  The above symptoms have been present for 2+ weeks. These symptoms are of moderate to high severity. These symptoms are constant in nature.  The patient's condition has been precipitated by psychosocial stressors.  Patient's condition made worse by treatment noncompliance. UDS: negative; BAL=0.     Per documentation, the patient has been noted to be wandering down the street and complained of depression and pain when brought to the ED. Her family states that she has been more confused and unable to care for herself. She largely denies this account but cannot explain her actions prior to admission. She has lost 40 lbs prior this admission and has been confabulating her whereabouts and activities.     The patient is a poor historian. The patient does not corroborate the above narrative. The patient contracts for safety on the unit and gives consent for the team to contact collateral. The patient is amenable to initiating treatment while on the unit. On interview, she is oriented to person and place but not time, and states the current president is Edita Mercado.    10/06 - the patient has been depressed per RN report, oddly related but pleasant on interview. She slept 9.5 hours overnight and reports no significant issues thus far with her treatment. She got no PRNs for agitation, is confused on interview but oriented to 
    E/M Psychiatric Progress Note    Patient: Kyra Hunt MRN: [id number]  SSN: [id number]    YOB: 1955  Age: 69 y.o.  Sex: female      Admit Date: 10/4/2024    LOS: 21 days     Chief Complaint: psychosis    Subjective:     HPI / INTERVAL HISTORY:    The patient, Kyra Hunt, is a 69 y.o.  Black /  female with a past psychiatric history significant for schizophrenia, who presents at this time with complaints of (and/or evidence of) the following emotional symptoms: delusions, insomnia, and psychotic behavior.  Additional symptomatology include confusion and poor ADLs.  The above symptoms have been present for 2+ weeks. These symptoms are of moderate to high severity. These symptoms are constant in nature.  The patient's condition has been precipitated by psychosocial stressors.  Patient's condition made worse by treatment noncompliance. UDS: negative; BAL=0.     Per documentation, the patient has been noted to be wandering down the street and complained of depression and pain when brought to the ED. Her family states that she has been more confused and unable to care for herself. She largely denies this account but cannot explain her actions prior to admission. She has lost 40 lbs prior this admission and has been confabulating her whereabouts and activities.     The patient is a poor historian. The patient does not corroborate the above narrative. The patient contracts for safety on the unit and gives consent for the team to contact collateral. The patient is amenable to initiating treatment while on the unit. On interview, she is oriented to person and place but not time, and states the current president is Edita Mercado.    10/06 - the patient has been depressed per RN report, oddly related but pleasant on interview. She slept 9.5 hours overnight and reports no significant issues thus far with her treatment. She got no PRNs for agitation, is confused on interview but oriented to 
    E/M Psychiatric Progress Note    Patient: Kyra Hunt MRN: [id number]  SSN: [id number]    YOB: 1955  Age: 69 y.o.  Sex: female      Admit Date: 10/4/2024    LOS: 9 days     Chief Complaint: psychosis    Subjective:     HPI / INTERVAL HISTORY:    The patient, Kyra Hunt, is a 69 y.o.  Black /  female with a past psychiatric history significant for schizophrenia, who presents at this time with complaints of (and/or evidence of) the following emotional symptoms: delusions, insomnia, and psychotic behavior.  Additional symptomatology include confusion and poor ADLs.  The above symptoms have been present for 2+ weeks. These symptoms are of moderate to high severity. These symptoms are constant in nature.  The patient's condition has been precipitated by psychosocial stressors.  Patient's condition made worse by treatment noncompliance. UDS: negative; BAL=0.     Per documentation, the patient has been noted to be wandering down the street and complained of depression and pain when brought to the ED. Her family states that she has been more confused and unable to care for herself. She largely denies this account but cannot explain her actions prior to admission. She has lost 40 lbs prior this admission and has been confabulating her whereabouts and activities.     The patient is a poor historian. The patient does not corroborate the above narrative. The patient contracts for safety on the unit and gives consent for the team to contact collateral. The patient is amenable to initiating treatment while on the unit. On interview, she is oriented to person and place but not time, and states the current president is Edita Mercado.    10/06 - the patient has been depressed per RN report, oddly related but pleasant on interview. She slept 9.5 hours overnight and reports no significant issues thus far with her treatment. She got no PRNs for agitation, is confused on interview but oriented to 
    E/M Psychiatric Progress Note    Patient: Kyra Hunt MRN: [id number]  SSN: [id number]    YOB: 1955  Age: 69 y.o.  Sex: female      Admit Date: 10/4/2024    LOS: 8 days     Chief Complaint: psychosis    Subjective:     HPI / INTERVAL HISTORY:    The patient, Kyra Hunt, is a 69 y.o.  Black /  female with a past psychiatric history significant for schizophrenia, who presents at this time with complaints of (and/or evidence of) the following emotional symptoms: delusions, insomnia, and psychotic behavior.  Additional symptomatology include confusion and poor ADLs.  The above symptoms have been present for 2+ weeks. These symptoms are of moderate to high severity. These symptoms are constant in nature.  The patient's condition has been precipitated by psychosocial stressors.  Patient's condition made worse by treatment noncompliance. UDS: negative; BAL=0.     Per documentation, the patient has been noted to be wandering down the street and complained of depression and pain when brought to the ED. Her family states that she has been more confused and unable to care for herself. She largely denies this account but cannot explain her actions prior to admission. She has lost 40 lbs prior this admission and has been confabulating her whereabouts and activities.     The patient is a poor historian. The patient does not corroborate the above narrative. The patient contracts for safety on the unit and gives consent for the team to contact collateral. The patient is amenable to initiating treatment while on the unit. On interview, she is oriented to person and place but not time, and states the current president is Edita Mercado.    10/06 - the patient has been depressed per RN report, oddly related but pleasant on interview. She slept 9.5 hours overnight and reports no significant issues thus far with her treatment. She got no PRNs for agitation, is confused on interview but oriented to 
    E/M Psychiatric Progress Note    Patient: Kyra Hunt MRN: [id number]  SSN: [id number]    YOB: 1955  Age: 69 y.o.  Sex: female      Admit Date: 10/4/2024    LOS: 16 days     Chief Complaint: psychosis    Subjective:     HPI / INTERVAL HISTORY:    The patient, Kyra Hunt, is a 69 y.o.  Black /  female with a past psychiatric history significant for schizophrenia, who presents at this time with complaints of (and/or evidence of) the following emotional symptoms: delusions, insomnia, and psychotic behavior.  Additional symptomatology include confusion and poor ADLs.  The above symptoms have been present for 2+ weeks. These symptoms are of moderate to high severity. These symptoms are constant in nature.  The patient's condition has been precipitated by psychosocial stressors.  Patient's condition made worse by treatment noncompliance. UDS: negative; BAL=0.     Per documentation, the patient has been noted to be wandering down the street and complained of depression and pain when brought to the ED. Her family states that she has been more confused and unable to care for herself. She largely denies this account but cannot explain her actions prior to admission. She has lost 40 lbs prior this admission and has been confabulating her whereabouts and activities.     The patient is a poor historian. The patient does not corroborate the above narrative. The patient contracts for safety on the unit and gives consent for the team to contact collateral. The patient is amenable to initiating treatment while on the unit. On interview, she is oriented to person and place but not time, and states the current president is Edita Mercado.    10/06 - the patient has been depressed per RN report, oddly related but pleasant on interview. She slept 9.5 hours overnight and reports no significant issues thus far with her treatment. She got no PRNs for agitation, is confused on interview but oriented to 
    E/M Psychiatric Progress Note    Patient: Kyra Hunt MRN: [id number]  SSN: [id number]    YOB: 1955  Age: 69 y.o.  Sex: female      Admit Date: 10/4/2024    LOS: 28 days     Chief Complaint: psychosis    Subjective:     HPI / INTERVAL HISTORY:    The patient, Kyra Hunt, is a 69 y.o.  Black /  female with a past psychiatric history significant for schizophrenia, who presents at this time with complaints of (and/or evidence of) the following emotional symptoms: delusions, insomnia, and psychotic behavior.  Additional symptomatology include confusion and poor ADLs.  The above symptoms have been present for 2+ weeks. These symptoms are of moderate to high severity. These symptoms are constant in nature.  The patient's condition has been precipitated by psychosocial stressors.  Patient's condition made worse by treatment noncompliance. UDS: negative; BAL=0.     Per documentation, the patient has been noted to be wandering down the street and complained of depression and pain when brought to the ED. Her family states that she has been more confused and unable to care for herself. She largely denies this account but cannot explain her actions prior to admission. She has lost 40 lbs prior this admission and has been confabulating her whereabouts and activities.     The patient is a poor historian. The patient does not corroborate the above narrative. The patient contracts for safety on the unit and gives consent for the team to contact collateral. The patient is amenable to initiating treatment while on the unit. On interview, she is oriented to person and place but not time, and states the current president is Edita Mercado.    10/06 - the patient has been depressed per RN report, oddly related but pleasant on interview. She slept 9.5 hours overnight and reports no significant issues thus far with her treatment. She got no PRNs for agitation, is confused on interview but oriented to 
    E/M Psychiatric Progress Note    Patient: Kyra Hunt MRN: [id number]  SSN: [id number]    YOB: 1955  Age: 69 y.o.  Sex: female      Admit Date: 10/4/2024    LOS: 27 days     Chief Complaint: psychosis    Subjective:     HPI / INTERVAL HISTORY:    The patient, Kyra Hunt, is a 69 y.o.  Black /  female with a past psychiatric history significant for schizophrenia, who presents at this time with complaints of (and/or evidence of) the following emotional symptoms: delusions, insomnia, and psychotic behavior.  Additional symptomatology include confusion and poor ADLs.  The above symptoms have been present for 2+ weeks. These symptoms are of moderate to high severity. These symptoms are constant in nature.  The patient's condition has been precipitated by psychosocial stressors.  Patient's condition made worse by treatment noncompliance. UDS: negative; BAL=0.     Per documentation, the patient has been noted to be wandering down the street and complained of depression and pain when brought to the ED. Her family states that she has been more confused and unable to care for herself. She largely denies this account but cannot explain her actions prior to admission. She has lost 40 lbs prior this admission and has been confabulating her whereabouts and activities.     The patient is a poor historian. The patient does not corroborate the above narrative. The patient contracts for safety on the unit and gives consent for the team to contact collateral. The patient is amenable to initiating treatment while on the unit. On interview, she is oriented to person and place but not time, and states the current president is Edita Mercado.    10/06 - the patient has been depressed per RN report, oddly related but pleasant on interview. She slept 9.5 hours overnight and reports no significant issues thus far with her treatment. She got no PRNs for agitation, is confused on interview but oriented to 
    E/M Psychiatric Progress Note    Patient: Kyra Hunt MRN: [id number]  SSN: [id number]    YOB: 1955  Age: 69 y.o.  Sex: female      Admit Date: 10/4/2024    LOS: 5 days     Chief Complaint: psychosis    Subjective:     HPI / INTERVAL HISTORY:    The patient, Kyra Hunt, is a 69 y.o.  Black /  female with a past psychiatric history significant for schizophrenia, who presents at this time with complaints of (and/or evidence of) the following emotional symptoms: delusions, insomnia, and psychotic behavior.  Additional symptomatology include confusion and poor ADLs.  The above symptoms have been present for 2+ weeks. These symptoms are of moderate to high severity. These symptoms are constant in nature.  The patient's condition has been precipitated by psychosocial stressors.  Patient's condition made worse by treatment noncompliance. UDS: negative; BAL=0.     Per documentation, the patient has been noted to be wandering down the street and complained of depression and pain when brought to the ED. Her family states that she has been more confused and unable to care for herself. She largely denies this account but cannot explain her actions prior to admission. She has lost 40 lbs prior this admission and has been confabulating her whereabouts and activities.     The patient is a poor historian. The patient does not corroborate the above narrative. The patient contracts for safety on the unit and gives consent for the team to contact collateral. The patient is amenable to initiating treatment while on the unit. On interview, she is oriented to person and place but not time, and states the current president is Edita Mercado.    10/06 - the patient has been depressed per RN report, oddly related but pleasant on interview. She slept 9.5 hours overnight and reports no significant issues thus far with her treatment. She got no PRNs for agitation, is confused on interview but oriented to 
    E/M Psychiatric Progress Note    Patient: Kyra Hunt MRN: [id number]  SSN: [id number]    YOB: 1955  Age: 69 y.o.  Sex: female      Admit Date: 10/4/2024    LOS: 23 days     Chief Complaint: psychosis    Subjective:     HPI / INTERVAL HISTORY:    The patient, Kyra Hunt, is a 69 y.o.  Black /  female with a past psychiatric history significant for schizophrenia, who presents at this time with complaints of (and/or evidence of) the following emotional symptoms: delusions, insomnia, and psychotic behavior.  Additional symptomatology include confusion and poor ADLs.  The above symptoms have been present for 2+ weeks. These symptoms are of moderate to high severity. These symptoms are constant in nature.  The patient's condition has been precipitated by psychosocial stressors.  Patient's condition made worse by treatment noncompliance. UDS: negative; BAL=0.     Per documentation, the patient has been noted to be wandering down the street and complained of depression and pain when brought to the ED. Her family states that she has been more confused and unable to care for herself. She largely denies this account but cannot explain her actions prior to admission. She has lost 40 lbs prior this admission and has been confabulating her whereabouts and activities.     The patient is a poor historian. The patient does not corroborate the above narrative. The patient contracts for safety on the unit and gives consent for the team to contact collateral. The patient is amenable to initiating treatment while on the unit. On interview, she is oriented to person and place but not time, and states the current president is Edita Mercado.    10/06 - the patient has been depressed per RN report, oddly related but pleasant on interview. She slept 9.5 hours overnight and reports no significant issues thus far with her treatment. She got no PRNs for agitation, is confused on interview but oriented to 
    E/M Psychiatric Progress Note    Patient: Kyra Hunt MRN: [id number]  SSN: [id number]    YOB: 1955  Age: 69 y.o.  Sex: female      Admit Date: 10/4/2024    LOS: 6 days     Chief Complaint: psychosis    Subjective:     HPI / INTERVAL HISTORY:    The patient, Kyra Hunt, is a 69 y.o.  Black /  female with a past psychiatric history significant for schizophrenia, who presents at this time with complaints of (and/or evidence of) the following emotional symptoms: delusions, insomnia, and psychotic behavior.  Additional symptomatology include confusion and poor ADLs.  The above symptoms have been present for 2+ weeks. These symptoms are of moderate to high severity. These symptoms are constant in nature.  The patient's condition has been precipitated by psychosocial stressors.  Patient's condition made worse by treatment noncompliance. UDS: negative; BAL=0.     Per documentation, the patient has been noted to be wandering down the street and complained of depression and pain when brought to the ED. Her family states that she has been more confused and unable to care for herself. She largely denies this account but cannot explain her actions prior to admission. She has lost 40 lbs prior this admission and has been confabulating her whereabouts and activities.     The patient is a poor historian. The patient does not corroborate the above narrative. The patient contracts for safety on the unit and gives consent for the team to contact collateral. The patient is amenable to initiating treatment while on the unit. On interview, she is oriented to person and place but not time, and states the current president is Edita Mercado.    10/06 - the patient has been depressed per RN report, oddly related but pleasant on interview. She slept 9.5 hours overnight and reports no significant issues thus far with her treatment. She got no PRNs for agitation, is confused on interview but oriented to 
    E/M Psychiatric Progress Note    Patient: Kyra Hunt MRN: [id number]  SSN: [id number]    YOB: 1955  Age: 69 y.o.  Sex: female      Admit Date: 10/4/2024    LOS: 12 days     Chief Complaint: psychosis    Subjective:     HPI / INTERVAL HISTORY:    The patient, Kyra Hunt, is a 69 y.o.  Black /  female with a past psychiatric history significant for schizophrenia, who presents at this time with complaints of (and/or evidence of) the following emotional symptoms: delusions, insomnia, and psychotic behavior.  Additional symptomatology include confusion and poor ADLs.  The above symptoms have been present for 2+ weeks. These symptoms are of moderate to high severity. These symptoms are constant in nature.  The patient's condition has been precipitated by psychosocial stressors.  Patient's condition made worse by treatment noncompliance. UDS: negative; BAL=0.     Per documentation, the patient has been noted to be wandering down the street and complained of depression and pain when brought to the ED. Her family states that she has been more confused and unable to care for herself. She largely denies this account but cannot explain her actions prior to admission. She has lost 40 lbs prior this admission and has been confabulating her whereabouts and activities.     The patient is a poor historian. The patient does not corroborate the above narrative. The patient contracts for safety on the unit and gives consent for the team to contact collateral. The patient is amenable to initiating treatment while on the unit. On interview, she is oriented to person and place but not time, and states the current president is Edita Mercado.    10/06 - the patient has been depressed per RN report, oddly related but pleasant on interview. She slept 9.5 hours overnight and reports no significant issues thus far with her treatment. She got no PRNs for agitation, is confused on interview but oriented to 
    E/M Psychiatric Progress Note    Patient: Kyra Hunt MRN: [id number]  SSN: [id number]    YOB: 1955  Age: 69 y.o.  Sex: female      Admit Date: 10/4/2024    LOS: 7 days     Chief Complaint: psychosis    Subjective:     HPI / INTERVAL HISTORY:    The patient, Kyra Hunt, is a 69 y.o.  Black /  female with a past psychiatric history significant for schizophrenia, who presents at this time with complaints of (and/or evidence of) the following emotional symptoms: delusions, insomnia, and psychotic behavior.  Additional symptomatology include confusion and poor ADLs.  The above symptoms have been present for 2+ weeks. These symptoms are of moderate to high severity. These symptoms are constant in nature.  The patient's condition has been precipitated by psychosocial stressors.  Patient's condition made worse by treatment noncompliance. UDS: negative; BAL=0.     Per documentation, the patient has been noted to be wandering down the street and complained of depression and pain when brought to the ED. Her family states that she has been more confused and unable to care for herself. She largely denies this account but cannot explain her actions prior to admission. She has lost 40 lbs prior this admission and has been confabulating her whereabouts and activities.     The patient is a poor historian. The patient does not corroborate the above narrative. The patient contracts for safety on the unit and gives consent for the team to contact collateral. The patient is amenable to initiating treatment while on the unit. On interview, she is oriented to person and place but not time, and states the current president is Edita Mercado.    10/06 - the patient has been depressed per RN report, oddly related but pleasant on interview. She slept 9.5 hours overnight and reports no significant issues thus far with her treatment. She got no PRNs for agitation, is confused on interview but oriented to 
    E/M Psychiatric Progress Note    Patient: Kyra Hunt MRN: [id number]  SSN: [id number]    YOB: 1955  Age: 69 y.o.  Sex: female      Admit Date: 10/4/2024    LOS: 34 days     Chief Complaint: psychosis    Subjective:     HPI / INTERVAL HISTORY:    The patient, Kyra Hunt, is a 69 y.o.  Black /  female with a past psychiatric history significant for schizophrenia, who presents at this time with complaints of (and/or evidence of) the following emotional symptoms: delusions, insomnia, and psychotic behavior.  Additional symptomatology include confusion and poor ADLs.  The above symptoms have been present for 2+ weeks. These symptoms are of moderate to high severity. These symptoms are constant in nature.  The patient's condition has been precipitated by psychosocial stressors.  Patient's condition made worse by treatment noncompliance. UDS: negative; BAL=0.     Per documentation, the patient has been noted to be wandering down the street and complained of depression and pain when brought to the ED. Her family states that she has been more confused and unable to care for herself. She largely denies this account but cannot explain her actions prior to admission. She has lost 40 lbs prior this admission and has been confabulating her whereabouts and activities.     The patient is a poor historian. The patient does not corroborate the above narrative. The patient contracts for safety on the unit and gives consent for the team to contact collateral. The patient is amenable to initiating treatment while on the unit. On interview, she is oriented to person and place but not time, and states the current president is Edita Mercado.    10/06 - the patient has been depressed per RN report, oddly related but pleasant on interview. She slept 9.5 hours overnight and reports no significant issues thus far with her treatment. She got no PRNs for agitation, is confused on interview but oriented to 
    E/M Psychiatric Progress Note    Patient: Kyra Hunt MRN: [id number]  SSN: [id number]    YOB: 1955  Age: 69 y.o.  Sex: female      Admit Date: 10/4/2024    LOS: 24 days     Chief Complaint: psychosis    Subjective:     HPI / INTERVAL HISTORY:    The patient, Kyra Hunt, is a 69 y.o.  Black /  female with a past psychiatric history significant for schizophrenia, who presents at this time with complaints of (and/or evidence of) the following emotional symptoms: delusions, insomnia, and psychotic behavior.  Additional symptomatology include confusion and poor ADLs.  The above symptoms have been present for 2+ weeks. These symptoms are of moderate to high severity. These symptoms are constant in nature.  The patient's condition has been precipitated by psychosocial stressors.  Patient's condition made worse by treatment noncompliance. UDS: negative; BAL=0.     Per documentation, the patient has been noted to be wandering down the street and complained of depression and pain when brought to the ED. Her family states that she has been more confused and unable to care for herself. She largely denies this account but cannot explain her actions prior to admission. She has lost 40 lbs prior this admission and has been confabulating her whereabouts and activities.     The patient is a poor historian. The patient does not corroborate the above narrative. The patient contracts for safety on the unit and gives consent for the team to contact collateral. The patient is amenable to initiating treatment while on the unit. On interview, she is oriented to person and place but not time, and states the current president is Edita Mercado.    10/06 - the patient has been depressed per RN report, oddly related but pleasant on interview. She slept 9.5 hours overnight and reports no significant issues thus far with her treatment. She got no PRNs for agitation, is confused on interview but oriented to 
    E/M Psychiatric Progress Note    Patient: Kyra Hunt MRN: [id number]  SSN: [id number]    YOB: 1955  Age: 69 y.o.  Sex: female      Admit Date: 10/4/2024    LOS: 2 days     Chief Complaint: psychosis    Subjective:     HPI / INTERVAL HISTORY:    The patient, Kyra Hunt, is a 69 y.o.  Black /  female with a past psychiatric history significant for schizophrenia, who presents at this time with complaints of (and/or evidence of) the following emotional symptoms: delusions, insomnia, and psychotic behavior.  Additional symptomatology include confusion and poor ADLs.  The above symptoms have been present for 2+ weeks. These symptoms are of moderate to high severity. These symptoms are constant in nature.  The patient's condition has been precipitated by psychosocial stressors.  Patient's condition made worse by treatment noncompliance. UDS: negative; BAL=0.     Per documentation, the patient has been noted to be wandering down the street and complained of depression and pain when brought to the ED. Her family states that she has been more confused and unable to care for herself. She largely denies this account but cannot explain her actions prior to admission. She has lost 40 lbs prior this admission and has been confabulating her whereabouts and activities.     The patient is a poor historian. The patient does not corroborate the above narrative. The patient contracts for safety on the unit and gives consent for the team to contact collateral. The patient is amenable to initiating treatment while on the unit. On interview, she is oriented to person and place but not time, and states the current president is Edita Mercado.    10/06 - the patient has been depressed per RN report, oddly related but pleasant on interview. She slept 9.5 hours overnight and reports no significant issues thus far with her treatment. She got no PRNs for agitation, is confused on interview but oriented to 
    E/M Psychiatric Progress Note    Patient: Kyra Hunt MRN: [id number]  SSN: [id number]    YOB: 1955  Age: 69 y.o.  Sex: female      Admit Date: 10/4/2024    LOS: 14 days     Chief Complaint: psychosis    Subjective:     HPI / INTERVAL HISTORY:    The patient, Kyra Hunt, is a 69 y.o.  Black /  female with a past psychiatric history significant for schizophrenia, who presents at this time with complaints of (and/or evidence of) the following emotional symptoms: delusions, insomnia, and psychotic behavior.  Additional symptomatology include confusion and poor ADLs.  The above symptoms have been present for 2+ weeks. These symptoms are of moderate to high severity. These symptoms are constant in nature.  The patient's condition has been precipitated by psychosocial stressors.  Patient's condition made worse by treatment noncompliance. UDS: negative; BAL=0.     Per documentation, the patient has been noted to be wandering down the street and complained of depression and pain when brought to the ED. Her family states that she has been more confused and unable to care for herself. She largely denies this account but cannot explain her actions prior to admission. She has lost 40 lbs prior this admission and has been confabulating her whereabouts and activities.     The patient is a poor historian. The patient does not corroborate the above narrative. The patient contracts for safety on the unit and gives consent for the team to contact collateral. The patient is amenable to initiating treatment while on the unit. On interview, she is oriented to person and place but not time, and states the current president is Edita Mercado.    10/06 - the patient has been depressed per RN report, oddly related but pleasant on interview. She slept 9.5 hours overnight and reports no significant issues thus far with her treatment. She got no PRNs for agitation, is confused on interview but oriented to 
    E/M Psychiatric Progress Note    Patient: Kyra Hunt MRN: [id number]  SSN: [id number]    YOB: 1955  Age: 69 y.o.  Sex: female      Admit Date: 10/4/2024    LOS: 29 days     Chief Complaint: psychosis    Subjective:     HPI / INTERVAL HISTORY:    The patient, Kyra Hunt, is a 69 y.o.  Black /  female with a past psychiatric history significant for schizophrenia, who presents at this time with complaints of (and/or evidence of) the following emotional symptoms: delusions, insomnia, and psychotic behavior.  Additional symptomatology include confusion and poor ADLs.  The above symptoms have been present for 2+ weeks. These symptoms are of moderate to high severity. These symptoms are constant in nature.  The patient's condition has been precipitated by psychosocial stressors.  Patient's condition made worse by treatment noncompliance. UDS: negative; BAL=0.     Per documentation, the patient has been noted to be wandering down the street and complained of depression and pain when brought to the ED. Her family states that she has been more confused and unable to care for herself. She largely denies this account but cannot explain her actions prior to admission. She has lost 40 lbs prior this admission and has been confabulating her whereabouts and activities.     The patient is a poor historian. The patient does not corroborate the above narrative. The patient contracts for safety on the unit and gives consent for the team to contact collateral. The patient is amenable to initiating treatment while on the unit. On interview, she is oriented to person and place but not time, and states the current president is Edita Mercado.    10/06 - the patient has been depressed per RN report, oddly related but pleasant on interview. She slept 9.5 hours overnight and reports no significant issues thus far with her treatment. She got no PRNs for agitation, is confused on interview but oriented to 
Assumed care of the patient. Patient was isolative to her room this evening. However she was seen engaging in friendly conversation with the selected staff members. Patient was cooperative with the assessments. She appeared pleasant. Patient denied S.I/H.I/A//VH, anxiety and depression. She was medication and meal compliant.     Patient appeared to be sleeping for about 9 hours.  
Assumed care of the patient. Patient was isolative to her room this evening. Patient overall appeared withdrawn , however during interaction she was appropriate and polite. Patient was disoriented to time. When asked about current year, patient said, \"Something with 8\" when she was told that the current year is 2024 , patient said \"I knew  it was that.\" Similarly, when asked about the day, she thought for sometime but could not come up with the answer. After telling her that it was Sunday, patient again said, \"I knew it was some thing like that.\"     Patient was medication compliant. No PRN medication required. Patient appeared to be sleeping for about 7.5 hours.  
Assumed care of the patient. Patient was isolative to her room this evening. Patient upon approach and during interaction seemed cheerful however over all seemed apathetic.  Patient was cooperative with the assessments. She denied S.I/H.I/A//VH, anxiety and depression. Patient was medication and meal compliant. Alert and calm. Patient's right corner of the mouth and right sleeve noted to be wet , probably due to excessive salivation. Patient however was denying that. Patient was medication and meal compliant. No PRN medication requested or needed. Patient appeared to be sleeping for about 9 hours.  
Assumed care of the patient. Patient was isolative to her room this evening. Patient was encouraged to come out in the milieu however she seemed uninterested. Patient denied S.I/H.I, anxiety and depression. She reported that she continues to hear  positive voices and see people encouraging her. Patient was alert. She was able to say that the current year is 2024. Patient was medication and meal compliant. No PRN medication requested or needed. Patient appeared to be sleeping for about 9 hours.  
Assumed care of the patient. Patient was isolative to her room this evening. She spent this evening resting in her room. When patient was awake for assessments she seemed to be in good spirits. Patient was cooperative with the assessments. She stated that anxiety and depression comes and goes. Auditory hallucinations of people saying her she is a nice person and VH of people saying her good things. Denied S.I/H.I. Patient was medication and meal compliant. No PRN medication requested or needed. Patient appeared to be sleeping for about 8.5 hours.  
Assumed care of the patient. Patient was isolative to her room this evening. She was friendly upon approach. Patient was cooperative with the assessments. Patient denied anxiety, depression,S.I/H.I. She reports A/VH and they are positive and encouraging. Patient was medication and meal compliant. Still disoriented to time, she could not tell what day was today and said the current year is 2020. Somewhat insightful, she said that she was brought in because she had some problems and agrees that it was the right thing. Patient reports last BM was 4 days ago however denies any discomfort related to that.    No PRN medication requested or needed. Patient appeared to be sleeping for about 9 hours.  
Assumed care of the patient. Patient was mostly isolative to her room. She was cooperative with the assessments. Patient denied S.I/H.I/VH, anxiety and depression. She confirmed hearing positive and encouraging voices. Patient was medication and meal compliant. No PRN medications requested or needed. Alert and oriented. Patient appeared to be sleeping for about 9 hours.  
Assumed care of the patient.Patient was isolative to her room. She was cooperative with the assessments and able to make her needs known. Patient denied S.I/H.I/A//VH, anxiety and depression. She was medication and meal compliant. No PRN medication requested. Over all, patient was not as social and as interactive like  she had been.  Patient also was disoriented to time.  
Behavioral Health Interdisciplinary Rounds     Patient Name: Kyra Hunt Age: 69 y.o. Room/Bed:  323/01  Primary Diagnosis: Schizophrenia (HCC)  Admission Status: Voluntary     Readmission within 30 days: No  Power of  in place: No  Patient requires a blocked bed: No          Reason for blocked bed: n/a    Sleep hours: 10       Participation in Care/Groups:  No  Medication Compliant?: Yes  PRNS (last 24 hours): None    Restraints (last 24 hours):  No  Substance Abuse:  No    24 hour chart check complete: Yes    Patient goal(s) for today: Take medications as prescribed, , engage in unit activities, participate in hygiene/ADLs, and communicate needs to appropriate staff,    Treatment team focus/goals: MD adjust medications as appropriate, identify discharge planning needs, coordination of care,      Progress note:   Pt was met in their room. Pt denies SI/HI/AVH. Pt is pleasant though confused. Pt remains mostly isolative. Pt remains discharged focused. ADLs remain fair.         Social work plan:   Major concern for rehospitalization if pt is not able to be discharged directly to [facility] as pt has had three hospitalizations this month as she is unable to care for herself and her family is no longer able to care for her as well. Pt insight and judgement appear to be psychologically impaired and pt demonstrating evidence of mild neurocognitive impairment. Pt is able to recognize at this time that she needs a higher level of care.     Pt met with outpatient [facility] . Pt reports she is leaving Saturday, writer reminded pt that she will be discharging to [facility], pt reports she is looking forward to it. Pt and CM discussed that pt will continue to attend day program. Pt endorses stressors with her family at home and that her sister has been unkind to her recently.     Pt MSE was observed to be the following:  Level of consciousness:  within normal limits  Appearance:  fair grooming and fair 
Behavioral Health Interdisciplinary Rounds     Patient Name: Kyra Hunt Age: 69 y.o. Room/Bed:  323/01  Primary Diagnosis: Schizophrenia (HCC)  Admission Status: Voluntary     Readmission within 30 days: No  Power of  in place: No  Patient requires a blocked bed: No          Reason for blocked bed: n/a    Sleep hours: 8       Participation in Care/Groups:    Medication Compliant?: Yes  PRNS (last 24 hours): None    Restraints (last 24 hours):  No  Substance Abuse:  No    24 hour chart check complete: Yes    Patient goal(s) for today: Take medications as prescribed, , engage in unit activities, participate in hygiene/ADLs, and communicate needs to appropriate staff,   Treatment team focus/goals: MD adjust medications as appropriate, identify discharge planning needs, coordination of care,     Progress note:   Pt was met in their room. Pt denies SI/HI. Pt endorses AH of voices that tell her she is a wonderful person. Pt is pleasant though somewhat confused and isolative. Pt voices understanding of plan to discharge to long term, no concerns or distress noted.         Social work plan:  Major concern for rehospitalization if pt is not able to be discharged directly to [facility] as pt has had three hospitalizations this month as she is unable to care for herself and her family is no longer able to care for her as well. Pt insight and judgement appear to be psychologically impaired and pt demonstrating evidence of mild neurocognitive impairment. Pt is able to recognize at this time that she needs a higher level of care.     Writer spoke to Ms. Loaiza from [employer], continuing to review [facility], asked for it to be emailed. Asked for additional information on pt current presentation and support system, writer provided information that pt needs higher level of care due to memory issues and that her family is no longer able to take care of her due to their own declining health. Informed her that she does have family 
Behavioral Health Interdisciplinary Rounds     Patient Name: Kyra Hunt Age: 69 y.o. Room/Bed:  323/01  Primary Diagnosis: Schizophrenia (HCC)  Admission Status: Voluntary     Readmission within 30 days: No  Power of  in place: No  Patient requires a blocked bed: No          Reason for blocked bed: n/a    Sleep hours: 8       Participation in Care/Groups:  No  Medication Compliant?: Yes  PRNS (last 24 hours):  MIralax     Restraints (last 24 hours):  No  Substance Abuse:  No    24 hour chart check complete: Yes    Patient goal(s) for today: Take medications as prescribed, , engage in unit activities, participate in hygiene/ADLs, and communicate needs to appropriate staff,    Treatment team focus/goals: MD adjust medications as appropriate, identify discharge planning needs, coordination of care,      Progress note:   Pt was met in their room. Pt denies SI/HI/AVH. Pt is discharged focused. Pt is pleasant, mostly isolative with episodes of confusion. ADLs appear fair.         Social work plan:  Major concern for rehospitalization if pt is not able to be discharged directly to [facility] as pt has had three hospitalizations this month as she is unable to care for herself and her family is no longer able to care for her as well. Pt insight and judgement appear to be psychologically impaired and pt demonstrating evidence of mild neurocognitive impairment. Pt is able to recognize at this time that she needs a higher level of care.     Writer still waiting to hear back from "[employer]or. Pt JUSTINA DICKERSON will visit at 1PM tomorrow      Pt MSE was observed to be the following:  Level of consciousness:  within normal limits  Appearance:  good grooming and good hygiene  Behavior/Motor:  no abnormalities noted  Speech:  pressured  Mood:  euthymic  Affect:  mood congruent  Thought processes:  circumstantial  Thought content:  TRISTON  Cognition:  Insight:  poor  Judgment:  fair    LOS:  26  Expected LOS: tbd    Insurance coverage: 
Behavioral Health Interdisciplinary Rounds     Patient Name: Kyra Hunt Age: 69 y.o. Room/Bed:  323/01  Primary Diagnosis: Schizophrenia (HCC)  Admission Status: Voluntary     Readmission within 30 days: No  Power of  in place: No  Patient requires a blocked bed: No          Reason for blocked bed: n/a    Sleep hours: 8       Participation in Care/Groups:  No  Medication Compliant?: Yes  PRNS (last 24 hours): None    Restraints (last 24 hours):  No  Substance Abuse:  No    24 hour chart check complete: Yes    Patient goal(s) for today: Take medications as prescribed, , engage in unit activities, participate in hygiene/ADLs, and communicate needs to appropriate staff,    Treatment team focus/goals: MD adjust medications as appropriate, identify discharge planning needs, coordination of care,      Progress note:   Pt was met in their room. Pt denies SI/HI. Pt endorses AH of voices that telling her nice things. Informed pt of discharge delay, pt reports that is fine and she will stay until next week.         Social work plan: Pt discharged delayed due to [facility] needing to contact pt payee. [facility] Rodriguez does not do intake on Friday, discharge postponed to Tuesday in order for Pt JUSTINA DICKERSON to go with her to [facility] to provide best coordination of care. Pt sister Anel and Ban notified of plan.     Major concern for rehospitalization if pt is not able to be discharged directly to [facility] as pt has had three hospitalizations this month as she is unable to care for herself and her family is no longer able to care for her as well. Pt insight and judgement appear to be psychologically impaired and pt demonstrating evidence of mild neurocognitive impairment. Pt is able to recognize at this time that she needs a higher level of care.     Pt MSE was observed to be the following:  Level of consciousness:  within normal limits  Appearance:  fair grooming and fair hygiene  Behavior/Motor:  no abnormalities noted  Speech:  
Behavioral Health Interdisciplinary Rounds     Patient Name: Kyra Hunt Age: 69 y.o. Room/Bed:  323/01  Primary Diagnosis: Schizophrenia (HCC)  Admission Status: Voluntary     Readmission within 30 days: No  Power of  in place: No  Patient requires a blocked bed: No          Reason for blocked bed: n/a    Sleep hours: 8       Participation in Care/Groups:  Yes  Medication Compliant?: Yes  PRNS (last 24 hours): None    Restraints (last 24 hours):  No  Substance Abuse:  No    24 hour chart check complete: Yes    Patient goal(s) for today: Take medications as prescribed, , engage in unit activities, participate in hygiene/ADLs, and communicate needs to appropriate staff,   Treatment team focus/goals: MD adjust medications as appropriate, identify discharge planning needs, coordination of care,    Progress note:   Pt was met in their room. Pt denies SI/HI/AVH. Pt voices understanding of the discharge plan. Pt reports she is doing well. Sleeping well, eating well. Somewhat isolative but pleasant and out for meals and occasional groups.         Social work plan:  Major concern for rehospitalization if pt is not able to be discharged directly to [facility] as pt has had three hospitalizations this month as she is unable to care for herself and her family is no longer able to care for her as well. Pt insight and judgement appear to be psychologically impaired and pt demonstrating evidence of mild neurocognitive impairment. Pt is able to recognize at this time that she needs a higher level of care.     Writer to follow up with Th[facility] Bland. They report they are still reviewing [facility].     Pt had visit with her sister, Ban, went well, pt was able to pray with sister. Ban asks to be updated about disposition, pt provides consent to speak to her about this.     Pt MSE was observed to be the following:  Level of consciousness:  within normal limits  Appearance:  fair grooming and fair hygiene  Behavior/Motor:  no 
Behavioral Health Interdisciplinary Rounds     Patient Name: Kyra Hunt Age: 69 y.o. Room/Bed:  323/01  Primary Diagnosis: Schizophrenia (HCC)  Admission Status: Voluntary     Readmission within 30 days: No  Power of  in place: No  Patient requires a blocked bed: No          Reason for blocked bed: n/a    Sleep hours: 8.5       Participation in Care/Groups:  No  Medication Compliant?: Yes  PRNS (last 24 hours):  Miralax     Restraints (last 24 hours):  No  Substance Abuse:  No    24 hour chart check complete: Yes    Patient goal(s) for today: Take medications as prescribed, , engage in unit activities, participate in hygiene/ADLs, and communicate needs to appropriate staff,    Treatment team focus/goals: MD adjust medications as appropriate, identify discharge planning needs, coordination of care,      Progress note:   Pt was met in their room. Pt denies SI/HI. Pt endorses AH of men telling her nice things.         Social work plan:  Follow up with [facility] about [facility] placement.   Pt outpatient CM will come see her on Thursday. Pt sister will visit her on Friday.     Major concern for rehospitalization if pt is not able to be discharged directly to [facility] as pt has had three hospitalizations this month as she is unable to care for herself and her family is no longer able to care for her as well. Pt insight and judgement appear to be psychologically impaired and pt demonstrating evidence of mild neurocognitive impairment. Pt is able to recognize at this time that she needs a higher level of care.     Writer contacted Thierno Rodriguez: continuing to review [facility]    Pt MSE was observed to be the following:  Level of consciousness:  within normal limits  Appearance:  fair grooming and fair hygiene  Behavior/Motor:  no abnormalities noted  Speech:  pressured  Mood:  euthymic  Affect:  mood congruent  Thought processes:  perservative  Thought content:  Perceptual Disturbance:  auditory  Cognition:  Insight:  
Behavioral Health Interdisciplinary Rounds     Patient Name: Kyra Hunt Age: 69 y.o. Room/Bed:  324/02  Primary Diagnosis: Schizophrenia (HCC)  Admission Status: Voluntary     Readmission within 30 days: No  Power of  in place: No  Patient requires a blocked bed: No          Reason for blocked bed: n/a    Sleep hours: 5       Participation in Care/Groups:  Yes  Medication Compliant?: Yes  PRNS (last 24 hours): None    Restraints (last 24 hours):  No  Substance Abuse:  No    24 hour chart check complete: Yes    Patient goal(s) for today: Take medications as prescribed, , engage in unit activities, participate in hygiene/ADLs, and communicate needs to appropriate staff,   Treatment team focus/goals: MD adjust medications as appropriate, identify discharge planning needs, coordination of care,     Progress note:   Pt met with treatment team.  Patient presents ao x 2. The patient does appear their stated age. The patient presents Disheveled and Cooperative. The patient's behavior shows poor eye contact. The patient's mood is euthymic. The patient presents with impaired memory. The patient's thought content demonstrates auditory hallucinations and shows a flight of ideas. The patient's affect presents happy. The patient presents with Poor insight and Poor judgment     Pt denies SI/HI. Pt endorses AH of voices that tell her nice things about herself. Pt remains pleasantly confused though oddly related. Pt has been more interactive on the unit and in good behavioral control    Contact with family/outpatient supports: Last week      Social work plan: Brainstorm placement ideas for individual's needs/insurance, Writer continuing to look into locked [facility] options    LOS:  10  Expected LOS: tbd    Insurance coverage: BCBS Medicaid   Family contact: John                                     Family requesting physician contact today:  No  Discharge plan: Home/Self Care.   Access to weapons: No                            
Behavioral Health Interdisciplinary Rounds     Patient Name: Kyra Hunt Age: 69 y.o. Room/Bed:  324/02  Primary Diagnosis: Schizophrenia (HCC)  Admission Status: Voluntary     Readmission within 30 days: No  Power of  in place: No  Patient requires a blocked bed: No          Reason for blocked bed: n/a    Sleep hours: 7       Participation in Care/Groups:    Medication Compliant?: Yes  PRNS (last 24 hours): None    Restraints (last 24 hours):  No  Substance Abuse:  No    24 hour chart check complete: Yes    Patient goal(s) for today: Take medications as prescribed, , engage in unit activities, participate in hygiene/ADLs, and communicate needs to appropriate staff,   Treatment team focus/goals: MD adjust medications as appropriate, identify discharge planning needs, coordination of care,     Progress note:   Pt met with treatment team. Patient presents ao x 2. The patient does appear their stated age. The patient presents Disheveled and Cooperative. The patient's mood is euthymic. The patient presents with impaired memory. The patient's thought content demonstrates auditory hallucinations and shows a flight of ideas. The patient's affect presents happy. The patient presents with Poor insight and Poor judgment     Pt denies SI/HI/VH. Pt endorses AH of voices that tell her positive. Pt appears confused, informs treatment team she is leaving tomorrow, when asked where is going she reports \"I'm not going outside, I'm going inside\". MD informed pt that placement was still being worked on pt appears to voice understanding, though pt made a number of nonsensical statements.         Social work plan:  Rewrite [facility] and begin calling ALFs    LOS:  17  Expected LOS: tbd    Insurance coverage: BCBS Medicaid   Family contact: John                                     Family requesting physician contact today:  No  Discharge plan: Home/Self Care.   Access to weapons: No                                                    
Behavioral Health Interdisciplinary Rounds     Patient Name: Kyra Hunt Age: 69 y.o. Room/Bed:  324/02  Primary Diagnosis: Schizophrenia (HCC)  Admission Status: Voluntary     Readmission within 30 days: No  Power of  in place: No  Patient requires a blocked bed: No          Reason for blocked bed: n/a    Sleep hours: 7       Participation in Care/Groups:  No  Medication Compliant?: Yes  PRNS (last 24 hours): None    Restraints (last 24 hours):  No  Substance Abuse:  No    24 hour chart check complete: Yes    Patient goal(s) for today: Take medications as prescribed, , engage in unit activities, participate in hygiene/ADLs, and communicate needs to appropriate staff,   Treatment team focus/goals: MD adjust medications as appropriate, identify discharge planning needs, coordination of care,     Progress note:     Pt initially presented with symptoms of Psychosis: Delusions , Halluciniations voices, and Disorganized Speech or Behavior: memory issues and pt appears to be largely unchanged     Pt presents Behavior: normal, Speech: rapid, appropriate organization of sentence structure, Thought content: hallucinations of voices telling her nice things, Affect: elated, and memory loss    Pt was met in their room. Pt is pleasant though appears somewhat confused.         Social work plan: Brainstorm placement ideas for individual's needs/insurance, writer to finish [facility], will contact pt sister Anel for additional details needed to complete [facility]    LOS:  18  Expected LOS: tbd    Insurance coverage: BCBS Medicaid   Family contact: John                                     Family requesting physician contact today:  No  Discharge plan: Home/Self Care.   Access to weapons: No                                                         Outpatient provider(s): to be linked  Patient's preferred phone number for follow up call: [phone]    Participating treatment team members: Kyra Hunt, MADI Johansen, Supervisee 
Behavioral Health Interdisciplinary Rounds     Patient Name: Kyra Hunt Age: 69 y.o. Room/Bed:  324/02  Primary Diagnosis: Schizophrenia (HCC)  Admission Status: Voluntary     Readmission within 30 days: No  Power of  in place: No  Patient requires a blocked bed: No          Reason for blocked bed: n/a    Sleep hours: 8       Participation in Care/Groups:  No  Medication Compliant?: Yes  PRNS (last 24 hours): None    Restraints (last 24 hours):  No  Substance Abuse:  No    24 hour chart check complete: Yes    Patient goal(s) for today: Take medications as prescribed, , engage in unit activities, participate in hygiene/ADLs, and communicate needs to appropriate staff,    Treatment team focus/goals: MD adjust medications as appropriate, identify discharge planning needs, coordination of care,      Progress note:     Pt initially presented with symptoms of Psychosis: Delusions , Halluciniations , and Disorganized Speech or Behavior  and pt appears to be improving as evidenced by pt being calm and cooperative    Pt was met in their room. Per RN report last night pt repeatedly expressed that she was discharging today but has made no mention of that today. Pt endorses AH of voices that tell her nice things. Pt is pleasantly confused, reports she enjoys being in the hospital and thanks the treatment team for their hard work.         Social work plan: Call group homes (below):  [facility] [facility]- no answer, no  set up, will call back tomorrow     Pt MSE was observed to be the following:  Level of consciousness:  within normal limits  Appearance:  good grooming and good hygiene  Behavior/Motor:  no abnormalities noted  Attitude toward examiner:  cooperative, attentive, and good eye contact  Speech:  rapid  Mood:  happy  Affect:  mood congruent  Thought processes:  rapid, overabundance of ideas, and tangential  Thought content:  Perceptual Disturbance:  auditory  Cognition:  Memory impaired immediate recall, recent 
Behavioral Health Interdisciplinary Rounds     Patient Name: Kyra Hunt Age: 69 y.o. Room/Bed:  324/02  Primary Diagnosis: Schizophrenia (HCC)  Admission Status: Voluntary     Readmission within 30 days: No  Power of  in place: No  Patient requires a blocked bed: No          Reason for blocked bed: n/a    Sleep hours: 8       Participation in Care/Groups:  Yes  Medication Compliant?: Yes  PRNS (last 24 hours): None    Restraints (last 24 hours):  No  Substance Abuse:  No    24 hour chart check complete: Yes    Patient goal(s) for today: Take medications as prescribed, , engage in unit activities, participate in hygiene/ADLs, and communicate needs to appropriate staff,   Treatment team focus/goals: MD adjust medications as appropriate, identify discharge planning needs, coordination of care,     Progress note:   Pt met with treatment team. Patient presents ao x 2. The patient does appear their stated age. The patient presents Disheveled and Cooperative. The patient's behavior shows poor eye contact. The patient's mood is euthymic. The patient presents with impaired memory. The patient's thought content demonstrates auditory hallucinations and shows a flight of ideas. The patient's affect presents happy. The patient presents with Poor insight and Poor judgment     Pt denies SI/HI. Pt endorses AH that tells her positive things. Pt is pleasant, though markedly disorganized without any insight into her mental health needs. Pt reports she has a good relationship with her family and they are supportive.     SW to contact out patient CM to see if they have information about pt discharge to [facility].    LOS:  4  Expected LOS: tbd    Insurance coverage: BCBS Medicaid   Family contact: John      Family requesting physician contact today:  No  Discharge plan: Home/Self Care.   Access to weapons: No       Outpatient provider(s): to be linked  Patient's preferred phone number for follow up call: 
Behavioral Health Interdisciplinary Rounds     Patient Name: Kyra Hunt Age: 69 y.o. Room/Bed:  324/02  Primary Diagnosis: Schizophrenia (HCC)  Admission Status: Voluntary     Readmission within 30 days: No  Power of  in place: No  Patient requires a blocked bed: No          Reason for blocked bed: n/a    Sleep hours: 8.5       Participation in Care/Groups:  No  Medication Compliant?: Yes  PRNS (last 24 hours): None    Restraints (last 24 hours):  No  Substance Abuse:  No    24 hour chart check complete: Yes    Patient goal(s) for today: Take medications as prescribed, , engage in unit activities, participate in hygiene/ADLs, and communicate needs to appropriate staff,   Treatment team focus/goals: MD adjust medications as appropriate, identify discharge planning needs, coordination of care,     Progress note:   Pt met with treatment team. . Patient presents ao x 2. The patient does appear their stated age. The patient presents Disheveled and Cooperative. The patient's behavior shows poor eye contact. The patient's mood is euthymic. The patient presents with impaired memory. The patient's thought content demonstrates auditory hallucinations and shows a flight of ideas. The patient's affect presents happy. The patient presents with Poor insight and Poor judgment     Pt denies SI/HI. Pt endorses AH of voices that are very nice. Pt is pleasant though confused and oddly related.     SW to call [facility] to see if they have discharge paperwork from [facility]. Plan is to explore long term discharge options     [id number]: Called number for CM \"Alli\" as given by pt sister Anel. Number not in service. Writer went called [facility] to try to get in touch with pt CM. Was informed pt CM is Mitch Szymanski and was given phone number [phone]. Called, left  requesting call back     LOS:  5  Expected LOS: tbd    Insurance coverage: BCBS Medicaid   Family contact: John                                     Family requesting 
Behavioral Health Interdisciplinary Rounds     Patient Name: Kyra Hunt Age: 69 y.o. Room/Bed:  324/02  Primary Diagnosis: Schizophrenia (HCC)  Admission Status: Voluntary     Readmission within 30 days: No  Power of  in place: No  Patient requires a blocked bed: No          Reason for blocked bed: n/a    Sleep hours: 9       Participation in Care/Groups:  No  Medication Compliant?: Yes  PRNS (last 24 hours): None    Restraints (last 24 hours):  No  Substance Abuse:  No    24 hour chart check complete: Yes    Patient goal(s) for today: Take medications as prescribed, , engage in unit activities, participate in hygiene/ADLs, and communicate needs to appropriate staff,    Treatment team focus/goals: MD adjust medications as appropriate, identify discharge planning needs, coordination of care,      Progress note:   Pt met with treatment team.   Patient presents ao x 2. The patient does appear their stated age. The patient presents Disheveled and Cooperative. The patient's mood is euthymic. The patient presents with impaired memory. The patient's thought content demonstrates auditory hallucinations and shows a flight of ideas. The patient's affect presents happy. The patient presents with Poor insight and Poor judgment      Pt denies SI/HI. Pt endorsed AH of nice voices last night but denies this morning. Pt remains pleasantly confused. Pt denies issues on the unit. Pt reports she is eating and sleeping fairly. ADLs appear good. Speech is clear and WNL.         Social work plan: Brainstorm placement ideas for individual's needs/insurance  Writer called CM from [facility] due to questions about [facility] and appropriate ALFs for pt, left  requesting call back     LOS:  14  Expected LOS: tbd    Insurance coverage: BCBS Medicaid   Family contact: John                                     Family requesting physician contact today:  No  Discharge plan: Home/Self Care.   Access to weapons: No                            
Behavioral Health Interdisciplinary Rounds     Patient Name: Kyra Hunt Age: 69 y.o. Room/Bed:  324/02  Primary Diagnosis: Schizophrenia (HCC)  Admission Status: Voluntary     Readmission within 30 days: No  Power of  in place: No  Patient requires a blocked bed: No          Reason for blocked bed: n/a    Sleep hours: 9       Participation in Care/Groups:  No  Medication Compliant?: Yes  PRNS (last 24 hours): None    Restraints (last 24 hours):  No  Substance Abuse:  No    24 hour chart check complete: Yes    Patient goal(s) for today: Take medications as prescribed, , engage in unit activities, participate in hygiene/ADLs, and communicate needs to appropriate staff,    Treatment team focus/goals: MD adjust medications as appropriate, identify discharge planning needs, coordination of care,      Progress note:   Pt met with treatment team.  Patient presents ao x 2. The patient does appear their stated age. The patient presents Disheveled and Cooperative. The patient's behavior shows poor eye contact. The patient's mood is euthymic. The patient presents with impaired memory. The patient's thought content demonstrates auditory hallucinations and shows a flight of ideas. The patient's affect presents happy. The patient presents with Poor insight and Poor judgment     Pt denies SI/HI. Pt endorsed nice voices last night but denies this morning. Pt remains pleasantly confused but does voice the desire to discharge to [facility].         Social work plan: Brainstorm placement ideas for individual's needs/insurance  Plan is to discharge to [facility]. Writer is exploring options that are appropriate for pt.    LOS:  13  Expected LOS: tbd      Insurance coverage: BCBS Medicaid   Family contact: John                                     Family requesting physician contact today:  No  Discharge plan: Home/Self Care.   Access to weapons: No                                                         Outpatient provider(s): to be 
Behavioral Health Interdisciplinary Rounds     Patient Name: Kyra Hunt Age: 69 y.o. Room/Bed:  324/02  Primary Diagnosis: Schizophrenia (HCC)  Admission Status: Voluntary     Readmission within 30 days: No  Power of  in place: No  Patient requires a blocked bed: No          Reason for blocked bed: n/a    Sleep hours: 9       Participation in Care/Groups:  No  Medication Compliant?: Yes  PRNS (last 24 hours): None    Restraints (last 24 hours):  No  Substance Abuse:  No    24 hour chart check complete: Yes    Patient goal(s) for today: Take medications as prescribed, , engage in unit activities, participate in hygiene/ADLs, and communicate needs to appropriate staff,   Treatment team focus/goals: MD adjust medications as appropriate, identify discharge planning needs, coordination of care,     Progress note:   Pt initially presented with symptoms of Psychosis: , Halluciniations , and Disorganized Speech or Behavior  and pt appears to be largely unchanged  as evidenced by pt endorsing AH most days and continue to have memory issues      Pt was met in their room. Pt has been isolative but pleasant. Pt denies SI/HI/AVH. No concerns or questions noted at this time. Continues to voice understanding of plan to discharge [facility]        Social work plan: Discuss with attending physician: MD to complete H&P for [facility]. Writer to call ALFs.    Pt MSE was observed to be the following:  Level of consciousness:  within normal limits  Appearance:  lying in bed  Behavior/Motor:  no abnormalities noted  Attitude toward examiner:  cooperative  Speech:  rapid  Mood:  Euthymic  Affect:  mood congruent  Thought processes:  overabundance of ideas  Thought content: WNL  Cognition:  Memory impaired   Insight:  poor  Judgment:  fair    LOS:  19  Expected LOS: tbd    Insurance coverage: BCBS Medicaid   Family contact: Anel                                  Family requesting physician contact today:  No  Discharge plan: Home/Self Care. 
Behavioral Health Interdisciplinary Rounds     Patient Name: Kyra Hunt Age: 69 y.o. Room/Bed:  324/02  Primary Diagnosis: Schizophrenia (HCC)  Admission Status: Voluntary     Readmission within 30 days: No  Power of  in place: No  Patient requires a blocked bed: No          Reason for blocked bed: n/a    Sleep hours: 9       Participation in Care/Groups:  No  Medication Compliant?: Yes  PRNS (last 24 hours): None    Restraints (last 24 hours):  No  Substance Abuse:  No    24 hour chart check complete: Yes    Patient goal(s) for today: Take medications as prescribed, , engage in unit activities, participate in hygiene/ADLs, and communicate needs to appropriate staff,   Treatment team focus/goals: MD adjust medications as appropriate, identify discharge planning needs, coordination of care,     Progress note:   Pt met with treatment team. Patient presents ao x 2. The patient does appear their stated age. The patient presents Disheveled and Cooperative. The patient's behavior shows poor eye contact. The patient's mood is euthymic. The patient presents with impaired memory. The patient's thought content demonstrates auditory hallucinations and shows a flight of ideas. The patient's affect presents happy. The patient presents with Poor insight and Poor judgment     Pt is pleasant thought markedly disorganized and confused. Pt does continue to voice understanding of plan to discharge to [facility]. Pt voices understanding that this process may take some time. No distress noted, no concerns noted. Pt denies SI/HI. Pt continues to endorse AH of nice voices.     Contact with family/outpatient supports: no      Social work plan: Explore locked [facility] options     LOS:  7  Expected LOS: tbd    Insurance coverage: BCBS Medicaid   Family contact: John                                     Family requesting physician contact today:  No  Discharge plan: Home/Self Care.   Access to weapons: No                                        
Behavioral Health Interdisciplinary Rounds     Patient Name: Kyra Hunt Age: 69 y.o. Room/Bed:  324/02  Primary Diagnosis: Schizophrenia (HCC)  Admission Status: Voluntary     Readmission within 30 days: No  Power of  in place: No  Patient requires a blocked bed: No          Reason for blocked bed: n/a    Sleep hours: 9       Participation in Care/Groups:  Yes  Medication Compliant?: Yes  PRNS (last 24 hours): None    Restraints (last 24 hours):  No  Substance Abuse:  No    24 hour chart check complete: Yes    Patient goal(s) for today: Take medications as prescribed, , engage in unit activities, participate in hygiene/ADLs, and communicate needs to appropriate staff,    Treatment team focus/goals: MD adjust medications as appropriate, identify discharge planning needs, coordination of care,      Progress note:     Pt initially presented with symptoms of psychosis and pt appears to be improving as evidenced by pt being calm, medication compliant and appropriate.       Pt was met in their room. Pt remains pleasant though confused. Pt endorses AH of voices that tell her nice things. Pt denies SI/HI. Pt is mostly isolative to room, occasionally in the milieu. No concerns or distress noted.         Social work plan: Call group homes (below):  [place]: aux vika beds available, writer vikash BAKER     Pt MSE was observed to be the following:  Level of consciousness:  within normal limits  Appearance:  good grooming and good hygiene  Behavior/Motor:  no abnormalities noted  Attitude toward examiner:  cooperative and attentive  Speech:  rapid  Mood:  euthymic  Affect:  mood congruent  Thought processes:  flight of ideas and tangential  Thought content:  Perceptual Disturbance:  auditory  Cognition:  Insight:  poor  Judgment:  fair    LOS:  21  Expected LOS: tbd    Insurance coverage: BCBS Medicaid   Family contact: Anel                                  Family requesting physician contact today:  
Behavioral Health Transition Record    Patient Name: Kyra Hunt  YOB: 1955   Medical Record Number: [id number]  Date of Admission: 10/4/2024  6:11 PM   Date of Discharge: 11/12/24      Attending Provider: Roro Mijares, *   Discharging Provider: Roro Mijares, *    To contact this individual call [phone] and ask the  to page.  If unavailable, ask to be transferred to Behavioral Health Provider on call.  A Behavioral Health Provider will be available on call 24/7 and during holidays.    Primary Care Provider: No primary care provider on file.    No Known Allergies    Reason for Admission: The patient, Kyra Hunt, is a 69 y.o.  Black /  female with a past psychiatric history significant for schizophrenia, who presents at this time with complaints of (and/or evidence of) the following emotional symptoms: delusions, insomnia, and psychotic behavior.  Additional symptomatology include confusion and poor ADLs.  The above symptoms have been present for 2+ weeks. These symptoms are of moderate to high severity. These symptoms are constant in nature.  The patient's condition has been precipitated by psychosocial stressors.  Patient's condition made worse by treatment noncompliance. UDS: negative; BAL=0.     Admission Diagnosis: Schizophrenia (HCC) [F20.9]  Mental health problem [F48.9]  Housing problems [Z59.9]    * No surgery found *    Results for orders placed or performed during the hospital encounter of 10/04/24   COVID-19 & Influenza Combo    Specimen: Nasopharyngeal   Result Value Ref Range    Source Nasopharyngeal      SARS-CoV-2, PCR Not detected NOTD      Rapid Influenza A By PCR Not detected NOTD      Rapid Influenza B By PCR Not detected NOTD     Ethanol   Result Value Ref Range    Ethanol Lvl <10 <10 MG/DL   Urine Drug Screen   Result Value Ref Range    Amphetamine, Urine Negative NEG      Barbiturates, Urine Negative NEG      Benzodiazepines, 
Blood work was attempted twice this morning however attempts were unsuccessful. Will pass this along to the oncoming shift nurse.   
Brief psychiatric note:     I have reviewed the patient's chart and performed an in-person interview, assessing the patient's mental status, needs and capabilities. Her mental status is as follows:        Sensorium   disoriented   Orientation  person and place   Relations  Pleasant and cooperative   Eye Contact  appropriate   Appearance:   age appropriate and disheveled   Motor Behavior:   within normal limits   Speech:   Normal rate and volume   Vocabulary  average   Thought Process:  concrete   Thought Content  Auditory hallucinations   Suicidal ideations  none   Homicidal ideations  none   Mood:   euthymic   Affect:   mood-congruent   Memory recent   impaired   Memory remote:   impaired   Concentration:   impaired   Abstraction:   concrete   Insight:   poor   Reliability  good   Judgment:   fair         At this time, it is my medical opinion that this patient is unable to care for herself in any manner and lacks capacity to make medical and disposition decisions on her own behalf.  
Evening assessment complete. Patient was encountered in her room. VS are stable but BP noted to be 111/49. She is calm and cooperative. Eye contact is noted to be good.   Mood is noted to be normal. Affect is appropriate. Patient currently reports that there are no signs or symptoms of depression or anxiety,also denies all SI/HI, AVH. C-SSRS level is No risk. Patient is med compliant.There are no untoward side effects from medications to note.     The patient has been isolative to herself remaining in her room for the night.      Hourly rounds are being completed to assure patient safety and attend to care needs. Monitoring will continue for changes in patient condition      SLEEP HOURS-7.75  
Met with patient ambulating in the hallway. Bright affect. Euthymic mood. Denies depression and anxiety. Denies SI, HI and AVH. CSSRS No Risk. Pleasant and cooperative. Compliant with medications. No side effects reported. Sleeping and eating well. Remains on Q15 minute rounds for safety.   
Met with patient ambulating in the hallway. Bright affect. Euthymic mood. Denies depression and anxiety. Denies SI, HI and AVH. CSSRS No Risk. Pleasant and cooperative. Sleeping and eating well. Remains on Q15 minute rounds for safety.   
Met with patient lying down resting in bed. Bright affect. Euthymic mood. Denies depression and anxiety. Denies SI, HI and AVH. CSSRS No Risk. Remains isolative to self/room. Sleeping and eating well. Compliant with medications. No side effects reported. Remains on Q15 minute rounds for safety.   
Met with patient lying down resting in bed. Bright affect. Euthymic mood. Denies depression and anxiety. Denies SI, HI and AVH. CSSRS No Risk. Sleeping and eating well. Remains isolative to self/room. Compliant with medications. No side effects reported. Remains on Q15 minute rounds for safety.   
Met with patient lying down resting in bed. Flat affect. Denies depression. Denies anxiety. Denies SI, HI and AVH. CSSRS No Risk. Compliant with medications. No side effects reported. Sleeping and eating well. Remains on Q15 minute rounds for safety.   
Met with patient lying down resting in bed. Flat affect. Depressed mood. Anxious. Denies SI, HI and AVH. CSSRS No Risk. Isolative to self/room. Pleasant and cooperative. Sleeping and eating well. Compliant with medications. No side effects reported. Remains on Q15 minute rounds for safety.   
Met with patient lying down resting in bed. Normal affect. Euthymic mood. Denies depression and anxiety. Denies SI, HI and AVH. CSSRS No Risk. Sleeping and eating well. Compliant with medications. No side effects reported. Remains on Q15 minute rounds for safety.   
Morning assessment complete. Patient was encountered in her room. Patient noted to be resting quietly, awake in bed and appears to be free of distress.    Patient calm and cooperative with vital signs and assessment.   Patient reports that they are currently experiencing feelings of excitement for family visit. Concerns about discharge plan.   Patient has confirmed pain rated 0/10. No PRN pain medication given on this shift.   Eye contact is noted to be good.   Patient reports that last nights sleep was good.   Mood is noted to be normal, happy. Affect is appropriate.   Patient remains isolative to her room despite encouragement to attend groups.   VS are stable.  Patient currently reports that there are no signs or symptoms of depression or anxiety,also denies all SI/HI, AVH.   Patient is both med and meal compliant. There are no untoward side effects from medications to note.   Patient reports that they have set a goal to accomplish \"seeing my sisters and going home\".    C-SSRS level is \"No Risk\".    Hourly rounds are being completed to assure patient safety and attend to care needs. Monitoring will continue for changes in patient condition.    ./67   Pulse 74   Temp 98.2 °F (36.8 °C) (Oral)   Resp 18   Ht 1.6 m (5' 2.99\")   Wt 78.4 kg (172 lb 13.5 oz)   SpO2 99%   BMI 30.63 kg/m²     
Morning assessment complete. Patient was encountered in her room. Patient noted to be resting quietly, awake in bed and appears to be free of distress.    Patient calm and cooperative with vital signs and assessment. Pt A&O to self and location. Patient disoriented to time and situation.   Patient reports that they are currently experiencing feelings of \"happiness\".  Patient without concerns to report on this shift.   Patient has confirmed pain rated 0/10. No PRN pain medication given on this shift.    Eye contact is noted to be good.   Patient reports that last nights sleep was good.   Mood is noted to be brightened. Affect is appropriate.  Patient isolative to her room.   VS are stable.  Pt ambulates with steady gait.  Patient currently reports that there are no signs or symptoms of depression or anxiety,also denies all SI/HI. Patient endorses AH of \"voices telling me good things\", and VH of \"men telling me good things\".  Patient is both med and meal compliant. There are no untoward side effects from medications to note.   Patient unable to verbalize a daily goal.   C-SSRS level is \"No Risk\"      Hourly rounds are being completed to assure patient safety and attend to care needs. Monitoring will continue for changes in patient condition.    ./70   Pulse 65   Temp 98.3 °F (36.8 °C) (Oral)   Resp 18   Ht 1.6 m (5' 2.99\")   Wt 78.4 kg (172 lb 13.5 oz)   SpO2 97%   BMI 30.63 kg/m²    
Morning assessment complete. Patient was encountered in her room. Patient noted to be resting quietly, awake in bed and appears to be free of distress.    Patient calm and cooperative with vital signs and assessment. Pt A&O to self, and location. Patient disoriented to time and situation.   Patient reports that they are currently experiencing feelings of intermittent depression but feeling \"relaxed\" now.  Patient reports concerns about restarting her blood pressure medication. Provider notified and orders added (see MAR).   Patient has confirmed pain rated 0/10. No PRN pain medications given on this shift.    Eye contact is noted to be fair.   Patient reports that last nights sleep was good.   Mood is noted to be  good. Affect is appropriate.   Patient has been out in the milieu interacting well with both peers and staff.   VS are stable. BP elevated, provider notified, new orders obtained (see MAR).   Pt ambulates with steady gait.  Patient currently reports that there are no signs or symptoms of depression or anxiety,also denies all SI/HI. Patient endorses AH command in nature and VH.    Patient is both med and meal compliant. There are no untoward side effects from medications to note.   Patient is without daily goal.   C-SSRS level is \"No Risk\"      Hourly rounds are being completed to assure patient safety and attend to care needs. Monitoring will continue for changes in patient condition.    .BP (!) 158/79   Pulse 65   Temp 97.8 °F (36.6 °C) (Oral)   Resp 16   Ht 1.6 m (5' 2.99\")   Wt 78.4 kg (172 lb 13.5 oz)   SpO2 100%   BMI 30.63 kg/m²        
Morning assessment complete. Patient was encountered in her room. Patient noted to be resting quietly, awake in bed, and appears to be free of distress.    Patient calm and cooperative with vital signs and assessment.   Patient reports that they are currently experiencing feelings of \"happiness\".   Patient has confirmed pain rated 0/10. No PRN pain medication given on this shift.   Eye contact is noted to be good.   Patient reports that last nights sleep was good. Patient reports that she slept \"really well\".   Mood is noted to be brightened. Affect is appropriate.   Patient interactions appropriate with both peers and staff. Patient is isolative to her room.  VS are stable.  Patient currently reports that there are no signs or symptoms of depression or anxiety,also denies all SI/HI, AVH.   Patient is both med and meal compliant. There are no untoward side effects from medications to note.     C-SSRS level is \"No Risk\".    Patient is independent of ADLs.    Hourly rounds are being completed to assure patient safety and attend to care needs. Monitoring will continue for changes in patient condition.    ./82   Pulse 74   Temp 98.1 °F (36.7 °C) (Oral)   Resp 18   Ht 1.6 m (5' 2.99\")   Wt 78.4 kg (172 lb 13.5 oz)   SpO2 97%   BMI 30.63 kg/m²    
NIGHT SHIFT ASSESSMENT COMPLETED 19: am.  Kyra is alert and oriented to person, time, place, and situation.  Encountered by the writer in the pt room awake resting in bed quietly. Pt  was calm and cooperative with assessment questions.  On assessment, Vital signs stable. Denies anxiety,depression and SI/HI.Reported hearing voices telling her that she is a good person. C-SSRS no risk.   Affect flat, normal mood. Speech clear and unremarkable.  Eye contact good.  Thought process unremarkable.  Nursing intervention  Medication compliant,ordered medication given,monitored for effectiveness,no side effects noted.  Prn medication given none  Emotional support and encouragement provided  Plan.  Hourly rounding and Q15 minutes check for safety and behavior maintained.  Pt slept for 8 hrs. Continue with care as ordered.  
PSYCHOSOCIAL ASSESSMENT  :Patient identifying info:   Kyra Hunt is a 69 y.o. female admitted 10/4/2024  6:11 PM    Presenting problem and precipitating factors:   Per chart review \"Patient is a 69 year old female seen face to face in the ER.  She was brought to the ER by EMS complaining of depression and leg pain.  Patient was apparently picked up by EMS when she was walking down the street.  Patient's sister was contacted because patient was unable to explain where she has been living in a logical manner, and her sister reported that she has been missing since yesterday when she eloped from [facility] ER, and she had been reported as a missing person to the police.  Patient's sister Anel came to the ER, and reported that patient had been staying with another sister until a few months ago, when she stated she no longer wanted to stay with her and began expressing delusional beliefs about family.  She was admitted to [facility] and then was discharged to an unknown assisted living facility.  She apparently stayed there 1 night, and ended up walking out and eventually ended up in an ER.  The family did not know where she had been placed, but the ER was able to figure out where she had been living and she went back to the [facility].  Patient was sent to [facility] ER either Wednesday evening or Thursday morning, and when the [facility] was contacted they reported that patient was not able to return because they were not able to manage her.  The plan was for patient to be admitted back to [facility], however she ended up eloping from the ER around 3pm.  She was reported as a missing person and police have been looking for her since.  Patient has not eaten or slept since eloping.  When asked what she did after leaving she said \"walking and sitting on benches.\"  She has not had any medications since Wednesday or possibly Thursday morning.  She reports that she is depressed and feels her medications are not working.  She denied 
Patient is alert and verbal. Discharged to [facility] to continue recommended plan of care. Discharge instructions reviewed with the patient. She verbalized understanding. Patient belongings, valuables returned. Patient escorted to the ED exit. Left via Lyft ride to destination.    
Patient is isolative, alert, and oriented x 2. Patient is malodorous with poor self care, calm and cooperative with assessment. Affect is appropriate, mood is normal. Patient endorsed depression of 5/10, and AH, voices telling her ,she is a nice person. SI, HI, and AVH. PRN trazodone and scheduled meds administered. Patient is compliant with meds and meals. No aggressive behavior noted. Emotional support and encouragement provided. Q 15 minutes and hourly rounds in progress. VS were T 98.5 °F (36.9 °C), HR 77, RR 18, /82, O2 98 %. Pt slept for the total of  hours  
Pt is alert and oriented to herself  and place. Not in any form of distress. Pt with somewhat flat but reactive affect. Denies SI. HI, A/VH. Accepting her scheduled medications without any difficulty, tolerating well. Pt reported (-)BM for 2 weeks, abdomen soft and non-tender. Denies feeling anxious or depressed. No other issues reported.   
Pt is calm and cooperative. Alert. Mostly isolative to self. Pt affect is bright. Pt is alert to self. Pt knows she is in the hospital. Disorganized in thought process.hyper verbal. Pt denies si/hi/v marquez. Anxiety and depression. Endorses A/H+ but tells her positive things. No prn's given this shift. Plan of care ongoing.   
Pt is calm and cooperative. Alert. Oriented to self and place. Pt is accepting meals and medications. Pt denies si/hi. Endorses A/V marquez. Visual marquez of men and women. Auditory marquez of voices saying positive things to her. Anxiety and depression 10/10. Pt is visible on the unit for meals. Plan of care ongoing.   
Pt is calm and cooperative. Alert. Oriented to self and place. Pt is accepting meals and medications. Pt denies si/hi. Endorses A/V marquez. Visual marquez of men. Auditory marquez of voices saying positive things to her. Anxiety and depression 10/10. Pt is visible on the unit for meals. Plan of care ongoing.   
Pt is calm and cooperative. Alert. Oriented to self and place. Pt is accepting meals and medications. Pt denies si/hi/v marquez. Anxiety and depression. Endorses Auditory marquez of voices saying positive things to her. Pt is visible on the unit for meals. Plan of care ongoing  
Pt is calm and cooperative. Alert. Oriented to self and place. Pt is accepting meals and medications. Pt denies si/hi/v marquez. Anxiety and depression. Endorses Auditory marquez of voices saying positive things to her. Pt is visible on the unit for meals. Plan of care ongoing        
Pt is calm and cooperative. Alert. Oriented to self and place. Pt is accepting meals and medications. Pt denies si/hi/v marquez. Anxiety and depression. Endorses Auditory marquez of voices saying positive things to her. Pt is visible on the unit for meals. Pt encouraged to shower. Pt states to nurse she takes her showers at 6 am in the morning. Blood pressure medications held due to low BP. Plan of care ongoing  
Pt is calm and cooperative. Alert. Oriented to self and place. Pt is accepting meals and medications. Pt denies si/hi/v marquez. Anxiety and depression. Endorses Auditory marquez of voices saying positive things to her. Pt is visible on the unit for meals.no prn medications given. Plan of care ongoing        
Pt is calm and cooperative. Alert.Pt is accepting meals and medications. Pt denies si/hi/v marquez. Anxiety and depression. Endorses Auditory marquez of voices saying positive things to her. Pt is visible on the unit for meals.Plan of care ongoing   
Pt received awake in bed, isolative and withdrawn to self. Pt endorsed feeling depressed. Pt denies SI/HI and AVH. Scheduled medication given as ordered. Pt is not interactive with others. RN and Q15 minute safety rounds in place, plan of care to continue.    Pt slept for 8 hours.  
Pt received laying awake in bed. A&Ox3, had difficulty identifying today's date. Affect bright and expansive, eye contact fair. No apparent distress noted. Denies SI/HI. Denies feeling anxious or depressed. Endorses AH telling her she is a \"esteban, good person\" and that other people are not nice. Pt is tangential and disorganized at times. Compliant with scheduled medication. Remains mostly withdrawn to self in her bedroom. Q15 minute rounds maintained for safety.   
Pt received laying awake in bed. Alert and oriented to self and place. Affect bright, eye contact good. No apparent distress noted. Denies SI/HI/AVH. Denies feeling anxious or depressed. Compliant with scheduled medications. Remains withdrawn to self and isolative in her room but friendly and talkative on approach. Q15 minute rounds maintained for safety.   
Pt. Still in bed, awake. Not in any form of distress. On assessment patient is calm and cooperative. Pt states her mood is \"wonderful\". Pt denies SI, HI, A/VH. Pt denies feeling anxious or depressed. Mood is brighter and affect is reactive and appropriate. Pt expressed to this writer states \"I am ready to go home\". Pt has been asking when she can see her psychiatrist and  was explained by this writer that she will be seen tomorrow morning. Intermittently visible in the day room but remain isolative to self. Pt has been cooperative and accepting her scheduled medications. No behavior issues within this shift. Continue on Q 15 min check for safety.  
Received this patient today morning in bed, awake. Not in any form of distress. Pt is calm and cooperative. Denies SI, HI, VH. Pt endorses auditory hallucinations saying \"I'm a real nice person\". \"I've got a good attitude towards people\". Pt remain isolative to herself but responds appropriately during assessment. Denies feeling depressed but reported being anxious of going home.Takes her meal in her room. No behavior issues. Able to follow instructions and re-direction. Accepting her scheduled medications. Uses the bathroom and ambulates  ad karyn. Pt reported she had a small BM today. Denies any  bodily aches or pain. S/B her treatment team today, no new orders.   
Shift Assessment ([id number]):     Writer met patient in her room. Patient resting quietly in bed. Patient appears free of acute distress. Patient calm and cooperative during VS and assessment. VS are stable, BP a little elevated (see flowsheets). Patient is alert and oriented x2. Patient walks with a steady gait, makes good eye contact, and has clear speech. Patient presents brightened with an appropriate affect. Patient is isolative to self. Patient endorses having anxiety and depression. Patient also endorses AH as nice voices telling her she is a good person. Patient denies VH, SI, and HI. Patient scores no risk on CSSRS screening. Patient is medication and meal compliant. Patient can be seen resting in bed and eating meals in the dayroom. Q15 min checks in place for safety.   
Shift Assessment ([id number]):     Writer met patient in hallway. Patient sitting in chair allowing students to take her VS. Patient appears free of acute distress. Patient is calm and cooperative during VS and assessment. VS are stable. Patient is alert and oriented x3. Patient is disoriented to time. Patient walks with a steady gait, makes good eye contact, and has clear speech. Patient presents flat with an appropriate affect. Patient denies anxiety, depression, SI, HI, and AVH. Patient endorses AH. Per patient these voices are pleasant in nature and tell her she is a nice person. Patient scores no risk on CSSRS screening. Patient medication and meal compliant. Patient remains isolative to herself but friendly with staff and peers. Q15 min checks in place for safety.   
Shift Assessment [id number]):     Writer met patient in her room. Patient resting quietly in bed. Patient appears free of acute distress. Patient is calm and cooperative during VS and assessment. VS are stable with lower BP (see flowsheets). Patient is alert and oriented x 2. Patient has clear speech, makes good eye contact, and a steady gait. Patient presents brightened with an appropriate affect. Patient is cooperative and friendly with staff. Patient is medication and meal compliant. Patient denies anxiety, depression, SI, HI, and AVH. Patient scores no risk on CSSRS screening. Patient is isolative to self. Q15 min checks in place for safety.   
Shift Assessment Note [id number]    Patient Alert and Oriented X4, met by RN in patient room. Patient calm and cooperative with assessment. Denies SI. Denies HI. Denies hallucinations. Patient observed resting in bed, and is isolative to room this shift. Patient displays Appropriate affect with Good eye contact. Thought process is Unremarkable. Patient Denies anxiety  with No problems reported or observed.. Patient Denies depression with No problems reported or observed.. Patient received no PRNs this shift.. CSSR score of No Risk this shift. Patient compliant with scheduled medication. Patient slept 9 hours overnight. No acute distress noted. No unsafe behaviors observed. Q15 minute observations maintained.    
Shift RN Assessment Note [id number]    Patient met by RN in patient room, patient observed resting comfortably in bed.      Patient denies SI.   Patient denies HI.  Patient denies Hallucinations.    Patient denies anxiety.  Patient endorses depression of 8/10.    Patient slept 9 and a half hours overnight.    Shift C-SSRS level this shift is \"No risk\".    Patient compliant with scheduled medications.     No acute distress noted.  No unsafe behaviors observed. Q15 minute safety checks maintained.     Status EXAM:  Mental Status and Behavioral Exam  Normal: No  Level of Assistance: Independent/Self  Facial Expression: Brightened  Affect: Appropriate  Level of Consciousness: Alert  Frequency of Checks: 4 times per hour, close  Mood:Normal: Yes  Mood: Sad, Anxious  Motor Activity:Normal: Yes  Eye Contact: Good  Observed Behavior: Cooperative, Friendly  Sexual Misconduct History: Current - no  Preception: [place]ley to person, [place] to time, [place] to place, [place] to situation  Attention:Normal: Yes  Thought Processes: Unremarkable  Thought Content:Normal: Yes  Depression Symptoms: Feelings of hopelessess, Feelings of helplessness, Isolative  Anxiety Symptoms: No problems reported or observed.  Theresa Symptoms: No problems reported or observed.  Hallucinations: None  Delusions: No  Memory:Normal: No  Memory: Poor recent  Insight and Judgment: No  Insight and Judgment: Poor insight  
Shift assessment ([id number]):     Writer met patient in the dayroom. Patient reading the newspaper. Patient appears free of acute distress. Patient is calm and cooperative during VS and assessment. VS are stable, with slightly elevated BP (see flowsheets). Patient is alert and oriented x 2. Patient walks with a steady gait, has clear speech, and makes good eye contact. Patient presents flat with an appropriate affect. Patient is friendly with peers and staff. Patient endorses AH as mens voices telling her she is an attractive and nice lady. Patient denies anxiety, depression, SI, HI, and VH. Patient scores no risk on CSSRS screening. Patient remains resting in bed. Q15 min checks in place for safety.     0851: PRN Miralax given to patient due to patient stating she hasn't had a BM in a week. Patient tolerated medication well as no adverse effects reported or observed. Medication is not evident to be effective yet.   
TEJA Cedillo did not order a 1:1 for this patient based on the information provided by the ED and [facility]. Should the patient's presentation change the on call should be consulted about further 1:1 needs.  
The ending of Daylight Saving Time occurred at 0200 hrs. Documentation of patient care and medications administered is done with respect to the time change.   
Writer met with patient  her room . Patient was appeared calm/free from distress. Patient was cooperative with assessment. Patient is Aox2. Patient presents with a appropriate affect and anxious and sad mood. Patient denies anxiety, depression, SI, HI, and AVH. Patient is compliant with scheduled medications. Patient is isolative to themself. Patient appearance is well-groomed. Q15 minute safety checks maintained.     
Writer met with patient in  her room . Patient appeared calm/free from distress. Patient was cooperative with assessment. Patient is Aox2. Patient presents with an appropriate affect and euthymic mood. Patient denies anxiety, depression, SI, HI, and AVH. Patient is compliant with scheduled medications. Patient is isolative to themself. Patient appearance is disheveled. She was encouraged to do her ADLs and interact with her peers but she declined at this time. Q15 minute safety checks maintained.         
Writer met with patient in  her room . Patient appeared calm/free from distress. Patient was cooperative with assessment. Patient is Aox2. Patient presents with an appropriate affect and euthymic mood. Patient denies anxiety, depression, SI, HI, and AVH. Patient is compliant with scheduled medications. Patient is isolative to themself. Patient appearance is well-groomed. Q15 minute safety checks maintained.     
Writer met with patient in  her room . Patient was  resting quietly  and appeared calm/free from distress. Patient was cooperative with assessment. Patient is Aox3. Patient presents with a flat affect and euthymic mood. Patient denies anxiety, depression, SI, HI, pain and AVH. Patient is compliant with scheduled medications. Patient is isolative to themself. She is scheduled for discharge this afternoon. Patient appearance is disheveled. Q15 minute safety checks ongoing to ensure patient safety.     
Writer met with patient in  her room . Patient was resting quietly and appeared calm/free from distress. Patient was cooperative with assessment. Patient is Aox3. Patient presents with a flat affect and euthymic mood. Patient denies anxiety, depression, SI, HI, pain and AVH. Patient is compliant with scheduled medications. Patient is isolative to themself. Patient appearance is disheveled. Q15 minute safety checks maintained.     
Writer met with patient in  the hallway . Patient appeared calm/free from distress. Patient was cooperative with assessment. Patient is Aox4. Patient presents with a brightened affect and euthymic mood. Patient denies anxiety, depression, SI, HI, pain, and AVH. Patient is compliant with scheduled medications. Patient is isolative to themself. Patient appearance is disheveled. Q15 minute safety checks maintained.     
Writer met with patient in the hallway. Patient appeared calm/free from distress. Patient was cooperative with assessment. Patient is Aox2. Patient presents with an appropriate affect and euthymic mood. Patient denies anxiety, depression, SI, HI, pain, and AVH. Patient is compliant with scheduled medications. Patient is isolative to themself. Patient appearance is disheveled. She was encouraged to do her ADLs and interact with her peers but she declined at this time. Q15 minute safety checks maintained.       
\Morning assessment complete. Patient was encountered in her room.    Patient calm and cooperative with vital signs and assessment. Pt A&O x 3 with disorientation to situation.   Patient has confirmed pain rated 0/10. No PRN pain medication on this shift.    Eye contact is noted to be good.   Patient reports that last nights sleep was.   Mood is noted to be brightened. Patient pleasant and friendly. Affect is appropriate.   Patient isolative to her room.   VS are stable.  Pt ambulates with steady gait.  Patient currently reports that there are no signs or symptoms of depression or anxiety,also denies all SI/HI, AVH.   Patient is both med and meal compliant. There are no untoward side effects from medications to note.   Patient unable to identify a daily goal.   C-SSRS level is \"No Risk\".      Hourly rounds are being completed to assure patient safety and attend to care needs. Monitoring will continue for changes in patient condition.    ./67   Pulse 64   Temp 98.4 °F (36.9 °C) (Oral)   Resp 16   Ht 1.6 m (5' 2.99\")   Wt 78.4 kg (172 lb 13.5 oz)   SpO2 96%   BMI 30.63 kg/m²      
                                     Outpatient provider(s): Mitch HORN  Patient's preferred phone number for follow up call: [phone]    Participating treatment team members: Kyra Hunt, MADI Johansen, Supervisee in Social Work, Roro Mijares MD  
  Outpatient provider(s): to be linked  Patient's preferred phone number for follow up call: [phone]    Participating treatment team members: Kyra Hunt, MADI Johansen, Supervisee in Social Work, Roro Mijares MD  
[facility]  Patient's preferred phone number for follow up call: [phone]    Participating treatment team members: Kyra Hunt, MADI Johansen, Supervisee in Social Work, Roro Mijares MD  
congruent  Thought processes:  loose associations  Thought content:  WNL  Cognition:  Memory impaired immediate recall, recent memory, and remote memory  Insight:  poor  Judgment:  poor    LOS:  24  Expected LOS: tbd    Insurance coverage: [facility] Medicaid   Family contact: Anel                                  Family requesting physician contact today:  No  Discharge plan: Home/Self Care.   Access to weapons: No                                                         Outpatient provider(s): to be linked  Patient's preferred phone number for follow up call: [phone]    Participating treatment team members: Tricia Candelario MSW, Supervisee in Social Work, Roro Mijares MD  
congruent  Thought processes:  tangential  Thought content:  Perceptual Disturbance:  auditory  Cognition:  Insight:  fair  Judgment:  fair    LOS:  32  Expected LOS: 34    Insurance coverage: [facility] Medicaid   Family contact: Anel                                  Family requesting physician contact today:  No  Discharge plan: Home/Self Care.   Access to weapons: No                                                         Outpatient provider(s): Mitch HORN  Patient's preferred phone number for follow up call: [phone]    Participating treatment team members: Tricia Candelario MSW, Supervisee in Social Work, Roro Mijares MD  
linked  Patient's preferred phone number for follow up call: [phone]    Participating treatment team members: Kyra Hunt, MADI Johansen, Supervisee in Social Work, Roro Mijares MD  
person and place. The patient encourages the team to speak with her family about next steps, as they are responsible for her being in the hospital.    10/07 - no acute overnight events. Patient has been isolative, pleasantly confused and with no episodes of agitation or aggression. She is medication compliant, oddly related and endorses CAH though she is not able to discern what these voices are commanding her to do. Patient is pleasant on interview, no sundowning noted. She denies SI/HI/PI.    10/08 - the patient is unchanged. She is pleasant and cooperative, confused and with no significant change in her mood or affect. She slept 8 hours overnight and is medication compliant, no sundowning behaviors noted; the patient does well with verbal redirection per RN report. She denies SI/HI/AVH/PI.     10/09- no acute overnight events. The patient has been isolative to her room but out for meals, she is pleasantly confused and with no episodes of agitation or aggression. She is medication compliant, oddly related and endorses. Patient denies SI/HI/PI. Team trying to find prior records of her disposition to help get level 2 screening for placement.     10/10 - the patient has been visible, pleasant and cooperative, she is medication compliant, confused about her disposition but with no episodes of agitation or aggression. Patient denies SI, she is in no distress and discharge focused. SW coordinating dispo, discharge information faxed in from prior admission.     10/11 - the patient slept 9 hours overnight. She is medication compliant and disorganized, but pleasant. Patient has been visible and with no episodes of agitation or aggression. She is cooperative with treatment. Patient oriented x2, she voices understanding of the plan to work with SW on disposition planning.     10/12 (weekend coverage) - Ms. Hunt reports that she is feeling well this morning. States that mood is \"good.\" She denies SI/HI. She does report AH 
but has difficulty describing what they are saying to her. Taking and tolerating medication. She has been cooperative on the unit. No acute overnight events.     10/13 (weekend coverage) - Ms. Hunt reports she is doing fine today. Mood is \"good.\" She endorses AH that are saying good things about her. Denies Si/HI/VH. Taking and tolerating medications (no PRNs). Eating and sleeping fairly (8hrs).     10/14 - the patient has been visible, able to make her needs known and denies active thoughts of self harm. AH is unchanged. She slept 7.5 hours overnight and has been pleasantly confused and oriented x2. Patient with no episodes of agitation, she is oddly related and with no specific concerns. She is out for meals, oddly related.    10/15 - the patient slept 5 hours overnight. She is medication compliant and disorganized, but pleasant. Patient has been visible and with no episodes of agitation or aggression. She is cooperative with treatment. Patient oriented x2, she voices understanding of the plan to work with SW on disposition planning.     10/16 - the patient is unchanged. AH are ongoing; she is medication compliant and remains confused and disorganized. Patient requested more clothes from RN otherwise she has no complaints. She has been visible, able to make her needs known and with no significant instances of agitation or aggression. Patient with limited insight into her circumstances.     10/17 - no acute overnight events. Patient has been visible, pleasant and cooperative and with no instances of agitation or aggression. She is pleasantly confused throughout the day, and per RN report continues to voice AH but is not particularly bothered about it. The patient endorsed constipation x several days but is a poor historian. She is in fair behavioral control and has been able to make her needs known. SW coordinating disposition as she is unable to care for herself in any capacity, exploring group home or care home 
but has difficulty describing what they are saying to her. Taking and tolerating medication. She has been cooperative on the unit. No acute overnight events.     10/13 (weekend coverage) - Ms. Hunt reports she is doing fine today. Mood is \"good.\" She endorses AH that are saying good things about her. Denies Si/HI/VH. Taking and tolerating medications (no PRNs). Eating and sleeping fairly (8hrs).     10/14 - the patient has been visible, able to make her needs known and denies active thoughts of self harm. AH is unchanged. She slept 7.5 hours overnight and has been pleasantly confused and oriented x2. Patient with no episodes of agitation, she is oddly related and with no specific concerns. She is out for meals, oddly related.    10/15 - the patient slept 5 hours overnight. She is medication compliant and disorganized, but pleasant. Patient has been visible and with no episodes of agitation or aggression. She is cooperative with treatment. Patient oriented x2, she voices understanding of the plan to work with SW on disposition planning.     10/16 - the patient is unchanged. AH are ongoing; she is medication compliant and remains confused and disorganized. Patient requested more clothes from RN otherwise she has no complaints. She has been visible, able to make her needs known and with no significant instances of agitation or aggression. Patient with limited insight into her circumstances.     10/17 - no acute overnight events. Patient has been visible, pleasant and cooperative and with no instances of agitation or aggression. She is pleasantly confused throughout the day, and per RN report continues to voice AH but is not particularly bothered about it. The patient endorsed constipation x several days but is a poor historian. She is in fair behavioral control and has been able to make her needs known. SW coordinating disposition as she is unable to care for herself in any capacity, exploring group home or custodial 
options with family input.     10/18 - the patient has been visible, able to make her needs known and pleasantly confused. AH are ongoing, still with difficulty with ADLs but redirectable. Patient denies SI/HI/VH/PI. She has limited insight into her circumstances but has been in fair behavioral control. The patient is compliant with medication and denies active thoughts of self harm.     10/21 - the patient is unchanged, stable. AH are ongoing; she is medication compliant and remains confused and disorganized. She has been visible, able to make her needs known and with no significant instances of agitation or aggression. Patient with limited insight into her circumstances. She slept 7 hours overnight, and got no PRNs for agitation.     10/22 - no acute overnight events. The patient has been visible, cooperative and calm, and with no specific concerns about her treatment plan. She is medication compliant and with no specific concerns, her confusion is unchanged but she is able to make her needs known. She does require redirection. SW continues to coordinate disposition to a safe facility.    10/23 - the patient has been visible, pleasant and cooperative, she is medication compliant, confused about her disposition but with no episodes of agitation or aggression. Patient denies SI, she is in no distress and discharge focused. SW coordinating dispo, UAI in process. She slept 9 hours overnight and denies all symptoms, except AH which is stable.     10/24 -  the patient remains confused but pleasant. AH are ongoing; she is medication compliant and remains confused and disorganized. She has been visible, able to make her needs known and with no significant instances of agitation or aggression. Patient with limited insight into her circumstances. She slept 7 hours overnight, and got no PRNs for agitation.    10/25 - no acute overnight events. The pateint slept 9 hours and is right and smiling on interview. She has been 
   atorvastatin  40 mg Oral QHS    risperiDONE  1 mg Oral Daily    risperiDONE  2 mg Oral QHS    sertraline  50 mg Oral Daily        LAB RESULTS: (reviewed/updated 10/21/2024)  Admission on 10/04/2024   Component Date Value Ref Range Status    Ethanol Lvl 10/04/2024 <10  <10 MG/DL Final    Amphetamine, Urine 10/04/2024 Negative  NEG   Final    Barbiturates, Urine 10/04/2024 Negative  NEG   Final    Benzodiazepines, Urine 10/04/2024 Negative  NEG   Final    Cocaine, Urine 10/04/2024 Negative  NEG   Final    Methadone, Urine 10/04/2024 Negative  NEG   Final    Opiates, Urine 10/04/2024 Negative  NEG   Final    Phencyclidine, Urine 10/04/2024 Negative  NEG   Final    THC, TH-Cannabinol, Urine 10/04/2024 Negative  NEG   Final    Comments: 10/04/2024 (NOTE)   Final    Sodium 10/04/2024 141  136 - 145 mmol/L Final    Potassium 10/04/2024 3.6  3.5 - 5.1 mmol/L Final    Chloride 10/04/2024 104  97 - 108 mmol/L Final    CO2 10/04/2024 28  21 - 32 mmol/L Final    Anion Gap 10/04/2024 9  2 - 12 mmol/L Final    Glucose 10/04/2024 125 (H)  65 - 100 mg/dL Final    BUN 10/04/2024 19  6 - 20 MG/DL Final    Creatinine 10/04/2024 0.87  0.55 - 1.02 MG/DL Final    BUN/Creatinine Ratio 10/04/2024 22 (H)  12 - 20   Final    Est, Glom Filt Rate 10/04/2024 72  >60 ml/min/1.73m2 Final    Calcium 10/04/2024 9.1  8.5 - 10.1 MG/DL Final    Total Bilirubin 10/04/2024 0.7  0.2 - 1.0 MG/DL Final    ALT 10/04/2024 28  12 - 78 U/L Final    AST 10/04/2024 30  15 - 37 U/L Final    Alk Phosphatase 10/04/2024 76  45 - 117 U/L Final    Total Protein 10/04/2024 7.7  6.4 - 8.2 g/dL Final    Albumin 10/04/2024 3.7  3.5 - 5.0 g/dL Final    Globulin 10/04/2024 4.0  2.0 - 4.0 g/dL Final    Albumin/Globulin Ratio 10/04/2024 0.9 (L)  1.1 - 2.2   Final    WBC 10/04/2024 7.2  3.6 - 11.0 K/uL Final    RBC 10/04/2024 4.14  3.80 - 5.20 M/uL Final    Hemoglobin 10/04/2024 11.9  11.5 - 16.0 g/dL Final    Hematocrit 10/04/2024 36.0  35.0 - 47.0 % Final    MCV 
87.0  80.0 - 99.0 FL Final    MCH 10/04/2024 28.7  26.0 - 34.0 PG Final    MCHC 10/04/2024 33.1  30.0 - 36.5 g/dL Final    RDW 10/04/2024 12.5  11.5 - 14.5 % Final    Platelets 10/04/2024 295  150 - 400 K/uL Final    MPV 10/04/2024 8.8 (L)  8.9 - 12.9 FL Final    Nucleated RBCs 10/04/2024 0.0  0  WBC Final    nRBC 10/04/2024 0.00  0.00 - 0.01 K/uL Final    Neutrophils % 10/04/2024 50  32 - 75 % Final    Lymphocytes % 10/04/2024 37  12 - 49 % Final    Monocytes % 10/04/2024 9  5 - 13 % Final    Eosinophils % 10/04/2024 2  0 - 7 % Final    Basophils % 10/04/2024 1  0 - 1 % Final    Immature Granulocytes % 10/04/2024 1 (H)  0.0 - 0.5 % Final    Neutrophils Absolute 10/04/2024 3.7  1.8 - 8.0 K/UL Final    Lymphocytes Absolute 10/04/2024 2.7  0.8 - 3.5 K/UL Final    Monocytes Absolute 10/04/2024 0.6  0.0 - 1.0 K/UL Final    Eosinophils Absolute 10/04/2024 0.1  0.0 - 0.4 K/UL Final    Basophils Absolute 10/04/2024 0.1  0.0 - 0.1 K/UL Final    Immature Granulocytes Absolute 10/04/2024 0.0  0.00 - 0.04 K/UL Final    Differential Type 10/04/2024 AUTOMATED    Final    Color, UA 10/04/2024 YELLOW/STRAW    Final    Appearance 10/04/2024 CLEAR  CLEAR   Final    Specific Gravity, UA 10/04/2024 <1.005   Final    pH, Urine 10/04/2024 5.5  5.0 - 8.0   Final    Protein, UA 10/04/2024 Negative  NEG mg/dL Final    Glucose, Ur 10/04/2024 Negative  NEG mg/dL Final    Ketones, Urine 10/04/2024 Negative  NEG mg/dL Final    Bilirubin, Urine 10/04/2024 Negative  NEG   Final    Blood, Urine 10/04/2024 Negative  NEG   Final    Urobilinogen, Urine 10/04/2024 0.2  0.2 - 1.0 EU/dL Final    Nitrite, Urine 10/04/2024 Negative  NEG   Final    Leukocyte Esterase, Urine 10/04/2024 Negative  NEG   Final    WBC, UA 10/04/2024 0-4  0 - 4 /hpf Final    RBC, UA 10/04/2024 0-5  0 - 5 /hpf Final    Epithelial Cells, UA 10/04/2024 FEW  FEW /lpf Final    BACTERIA, URINE 10/04/2024 Negative  NEG /hpf Final    Hemoglobin A1C 10/08/2024 5.9 (H)  4.0 - 5.6 
Final    Hemoglobin A1C 10/08/2024 5.9 (H)  4.0 - 5.6 % Final    Estimated Avg Glucose 10/08/2024 123  mg/dL Final         Assessment & Plan     The patient, Kyra Hunt, is a 69 y.o.  female who presents at this time for treatment of the following diagnoses:  Schizophrenia ([facility])  ASSESSMENT: the patient presents with worsening confusion and thought process disorganization concerning for neurocognitive decline associated with schizophrenia vs comorbid dementia. Will monitor for sundowning, use behavioral interventions, work with family on a safe plan.   PLAN:  - CONTINUE Risperdal 1 mg QDAY and 2 mg QHS for psychosis  - CONTINUE Zoloft 50 mg QAY for MDD, neuro-protection  - Consider Aricept / Namenda for neurocognitive decline  - IGM therapy as tolerated  - Expand database / obtain collateral  - Dispo planning (home when stable)       I certify that this patient's inpatient psychiatric hospital services furnished since the previous certification were, and continue to be,required for treatment that could reasonably be expected to improve the patient's condition, or for diagnostic study, and that the patient continues to need, on a daily basis, active treatment furnished directly by or requiring the supervision of inpatient psychiatric facility personnel. In addition, the hospital records show that services furnished were intensive treatment services, admission or related services, or equivalent services.    Signed By: Roro Mijares MD     October 8, 2024        
visible, cooperative and calm, and with no specific concerns about her treatment plan. She is medication compliant and with no specific concerns, her confusion is unchanged but she is able to make her needs known. She does require redirection. SW continues to coordinate disposition to a safe facility.    10/28 - the patient has been visible, able to make her needs known and denies active thoughts of self harm. AH is unchanged. She slept 7.5 hours overnight and has been pleasantly confused and oriented x2. Patient with no episodes of agitation, she is oddly related and with no specific concerns. She is out for meals, well-related.      10/29- no acute overnight events. The patient has been isolative to her room but out for meals, she is pleasantly confused and with no episodes of agitation or aggression. She is medication compliant, oddly related and endorses. Patient denies SI/HI/PI. Team working on safe disposition plan.     10/30 - the patient is medication compliant, slept 8 hours overnight and is cooperative with treatment. She endorses AH fairly innocuous voices telling her things. She is pleasant and with no instances of agitation or aggression. Patient denies SI/HI/PI.  Per SW, the patient will be meeting with assisted to go over disposition.     10/31 - overnight, the patient was noted to be confused and with little change in her mental state. She is medication compliant and denies active thoughts of self harm; she slept 10 hours overnight and has been able to make her needs known. She has been cooperative, still with AH but no significant distress.     11/01 - the patient has been visible, pleasant and cooperative, she is medication compliant, confused about her disposition but with no episodes of agitation or aggression. Patient denies SI, she is in no distress and discharge focused. AH are unchanged but she is not bothered by this.    11/02 - the patient slept 8 hours overnight. She is medication compliant and 
with schizophrenia vs comorbid dementia. Will monitor for sundowning, use behavioral interventions, work with family on a safe plan.   PLAN:  - CONTINUE Risperdal 1 mg QDAY and 2 mg QHS for psychosis  - CONTINUE Zoloft 50 mg QAY for MDD, neuro-protection  - Consider Aricept / Namenda for neurocognitive decline  - IGM therapy as tolerated  - Expand database / obtain collateral  - Dispo planning (home when stable)       I certify that this patient's inpatient psychiatric hospital services furnished since the previous certification were, and continue to be,required for treatment that could reasonably be expected to improve the patient's condition, or for diagnostic study, and that the patient continues to need, on a daily basis, active treatment furnished directly by or requiring the supervision of inpatient psychiatric facility personnel. In addition, the hospital records show that services furnished were intensive treatment services, admission or related services, or equivalent services.    Signed By: Roro Mijares MD     October 7, 2024        
disorganized, but pleasant. Patient has been visible and with no episodes of agitation or aggression. She is cooperative with treatment. Patient oriented x2, she got no PRNs for agitation and is discharge focused.     11/03 - the patient has been visible, able to make her needs known and pleasantly confused. AH are ongoing, she has been wandering the unit but is redirectable. Patient denies SI/HI/VH/PI. She has limited insight into her circumstances but has been in fair behavioral control. The patient is compliant with medication and denies active thoughts of self harm.     11/04 - the patient slept 9 hours overnight. She is medication compliant and disorganized, but pleasant. Patient has been visible and with no episodes of agitation or aggression. She is cooperative with treatment. Patient oriented x2, she voices understanding of the plan to work with SW on disposition planning.     11/05 - the patient is unchanged. She slept 7 hours overnight, AH are ongoing; she is medication compliant and remains confused and disorganized. She has been visible, able to make her needs known and with no significant instances of agitation or aggression. Patient with limited insight into her circumstances. Per SW, she will discharge on Thursday to [facility].    11/06 - the patient slept 8.5 hours overnight. She is medication compliant and disorganized, but pleasant. Patient has been visible and with no episodes of agitation or aggression. She is cooperative with treatment. Patient oriented x2, she voices understanding of the plan to discharge tomorrow to [facility].    11/07 - no acute overnight events. The patient has been confused but pleasant. She is medication compliant, able to make her needs known and with no change in her thought process. Per SW, she will discharge Monday as bed is not yet available at [facility]. She voices understanding of the plan and is in good spirits, still with AH.     11/08 - the patient is unchanged. She is pleasant, 
of Knowledge below average   Insight and Judgment: impaired due to psychosis       MEDICATIONS:   amLODIPine  10 mg Oral Daily    metoprolol succinate  50 mg Oral Daily    aspirin  81 mg Oral Daily    atorvastatin  40 mg Oral QHS    risperiDONE  1 mg Oral Daily    risperiDONE  2 mg Oral QHS    sertraline  50 mg Oral Daily        LAB RESULTS: (reviewed/updated 10/29/2024)  Admission on 10/04/2024   Component Date Value Ref Range Status    Ethanol Lvl 10/04/2024 <10  <10 MG/DL Final    Amphetamine, Urine 10/04/2024 Negative  NEG   Final    Barbiturates, Urine 10/04/2024 Negative  NEG   Final    Benzodiazepines, Urine 10/04/2024 Negative  NEG   Final    Cocaine, Urine 10/04/2024 Negative  NEG   Final    Methadone, Urine 10/04/2024 Negative  NEG   Final    Opiates, Urine 10/04/2024 Negative  NEG   Final    Phencyclidine, Urine 10/04/2024 Negative  NEG   Final    THC, TH-Cannabinol, Urine 10/04/2024 Negative  NEG   Final    Comments: 10/04/2024 (NOTE)   Final    Sodium 10/04/2024 141  136 - 145 mmol/L Final    Potassium 10/04/2024 3.6  3.5 - 5.1 mmol/L Final    Chloride 10/04/2024 104  97 - 108 mmol/L Final    CO2 10/04/2024 28  21 - 32 mmol/L Final    Anion Gap 10/04/2024 9  2 - 12 mmol/L Final    Glucose 10/04/2024 125 (H)  65 - 100 mg/dL Final    BUN 10/04/2024 19  6 - 20 MG/DL Final    Creatinine 10/04/2024 0.87  0.55 - 1.02 MG/DL Final    BUN/Creatinine Ratio 10/04/2024 22 (H)  12 - 20   Final    Est, Glom Filt Rate 10/04/2024 72  >60 ml/min/1.73m2 Final    Calcium 10/04/2024 9.1  8.5 - 10.1 MG/DL Final    Total Bilirubin 10/04/2024 0.7  0.2 - 1.0 MG/DL Final    ALT 10/04/2024 28  12 - 78 U/L Final    AST 10/04/2024 30  15 - 37 U/L Final    Alk Phosphatase 10/04/2024 76  45 - 117 U/L Final    Total Protein 10/04/2024 7.7  6.4 - 8.2 g/dL Final    Albumin 10/04/2024 3.7  3.5 - 5.0 g/dL Final    Globulin 10/04/2024 4.0  2.0 - 4.0 g/dL Final    Albumin/Globulin Ratio 10/04/2024 0.9 (L)  1.1 - 2.2   Final    WBC 
10/04/2024 76  45 - 117 U/L Final    Total Protein 10/04/2024 7.7  6.4 - 8.2 g/dL Final    Albumin 10/04/2024 3.7  3.5 - 5.0 g/dL Final    Globulin 10/04/2024 4.0  2.0 - 4.0 g/dL Final    Albumin/Globulin Ratio 10/04/2024 0.9 (L)  1.1 - 2.2   Final    WBC 10/04/2024 7.2  3.6 - 11.0 K/uL Final    RBC 10/04/2024 4.14  3.80 - 5.20 M/uL Final    Hemoglobin 10/04/2024 11.9  11.5 - 16.0 g/dL Final    Hematocrit 10/04/2024 36.0  35.0 - 47.0 % Final    MCV 10/04/2024 87.0  80.0 - 99.0 FL Final    MCH 10/04/2024 28.7  26.0 - 34.0 PG Final    MCHC 10/04/2024 33.1  30.0 - 36.5 g/dL Final    RDW 10/04/2024 12.5  11.5 - 14.5 % Final    Platelets 10/04/2024 295  150 - 400 K/uL Final    MPV 10/04/2024 8.8 (L)  8.9 - 12.9 FL Final    Nucleated RBCs 10/04/2024 0.0  0  WBC Final    nRBC 10/04/2024 0.00  0.00 - 0.01 K/uL Final    Neutrophils % 10/04/2024 50  32 - 75 % Final    Lymphocytes % 10/04/2024 37  12 - 49 % Final    Monocytes % 10/04/2024 9  5 - 13 % Final    Eosinophils % 10/04/2024 2  0 - 7 % Final    Basophils % 10/04/2024 1  0 - 1 % Final    Immature Granulocytes % 10/04/2024 1 (H)  0.0 - 0.5 % Final    Neutrophils Absolute 10/04/2024 3.7  1.8 - 8.0 K/UL Final    Lymphocytes Absolute 10/04/2024 2.7  0.8 - 3.5 K/UL Final    Monocytes Absolute 10/04/2024 0.6  0.0 - 1.0 K/UL Final    Eosinophils Absolute 10/04/2024 0.1  0.0 - 0.4 K/UL Final    Basophils Absolute 10/04/2024 0.1  0.0 - 0.1 K/UL Final    Immature Granulocytes Absolute 10/04/2024 0.0  0.00 - 0.04 K/UL Final    Differential Type 10/04/2024 AUTOMATED    Final    Color, UA 10/04/2024 YELLOW/STRAW    Final    Appearance 10/04/2024 CLEAR  CLEAR   Final    Specific Gravity, UA 10/04/2024 <1.005   Final    pH, Urine 10/04/2024 5.5  5.0 - 8.0   Final    Protein, UA 10/04/2024 Negative  NEG mg/dL Final    Glucose, Ur 10/04/2024 Negative  NEG mg/dL Final    Ketones, Urine 10/04/2024 Negative  NEG mg/dL Final    Bilirubin, Urine 10/04/2024 Negative  NEG   Final    
10/10/2024 66  0 - 100 MG/DL Final    VLDL Cholesterol Calculated 10/10/2024 13  MG/DL Final    Chol/HDL Ratio 10/10/2024 2.1  0.0 - 5.0   Final    TSH, 3rd Generation 10/10/2024 0.92  0.36 - 3.74 uIU/mL Final         Assessment & Plan     The patient, Kyra Hunt, is a 69 y.o.  female who presents at this time for treatment of the following diagnoses:  Schizophrenia (HCC)  ASSESSMENT: the patient presents with worsening confusion and thought process disorganization concerning for neurocognitive decline associated with schizophrenia vs comorbid dementia. Will monitor for sundowning, use behavioral interventions, work with family on a safe plan.   PLAN:  - CONTINUE Risperdal 1 mg QDAY and 2 mg QHS for psychosis  - CONTINUE Zoloft 50 mg QAY for MDD, neuro-protection  - Consider Aricept / Namenda for neurocognitive decline  - IGM therapy as tolerated  - Expand database / obtain collateral  - Dispo planning (home when stable)       I certify that this patient's inpatient psychiatric hospital services furnished since the previous certification were, and continue to be,required for treatment that could reasonably be expected to improve the patient's condition, or for diagnostic study, and that the patient continues to need, on a daily basis, active treatment furnished directly by or requiring the supervision of inpatient psychiatric facility personnel. In addition, the hospital records show that services furnished were intensive treatment services, admission or related services, or equivalent services.    Signed By: Lore Carney MD     October 19, 2024        
Judgment: impaired due to psychosis       MEDICATIONS:   amLODIPine  10 mg Oral Daily    metoprolol succinate  50 mg Oral Daily    aspirin  81 mg Oral Daily    atorvastatin  40 mg Oral QHS    risperiDONE  1 mg Oral Daily    risperiDONE  2 mg Oral QHS    sertraline  50 mg Oral Daily        LAB RESULTS: (reviewed/updated 11/4/2024)  Admission on 10/04/2024   Component Date Value Ref Range Status    Ethanol Lvl 10/04/2024 <10  <10 MG/DL Final    Amphetamine, Urine 10/04/2024 Negative  NEG   Final    Barbiturates, Urine 10/04/2024 Negative  NEG   Final    Benzodiazepines, Urine 10/04/2024 Negative  NEG   Final    Cocaine, Urine 10/04/2024 Negative  NEG   Final    Methadone, Urine 10/04/2024 Negative  NEG   Final    Opiates, Urine 10/04/2024 Negative  NEG   Final    Phencyclidine, Urine 10/04/2024 Negative  NEG   Final    THC, TH-Cannabinol, Urine 10/04/2024 Negative  NEG   Final    Comments: 10/04/2024 (NOTE)   Final    Sodium 10/04/2024 141  136 - 145 mmol/L Final    Potassium 10/04/2024 3.6  3.5 - 5.1 mmol/L Final    Chloride 10/04/2024 104  97 - 108 mmol/L Final    CO2 10/04/2024 28  21 - 32 mmol/L Final    Anion Gap 10/04/2024 9  2 - 12 mmol/L Final    Glucose 10/04/2024 125 (H)  65 - 100 mg/dL Final    BUN 10/04/2024 19  6 - 20 MG/DL Final    Creatinine 10/04/2024 0.87  0.55 - 1.02 MG/DL Final    BUN/Creatinine Ratio 10/04/2024 22 (H)  12 - 20   Final    Est, Glom Filt Rate 10/04/2024 72  >60 ml/min/1.73m2 Final    Calcium 10/04/2024 9.1  8.5 - 10.1 MG/DL Final    Total Bilirubin 10/04/2024 0.7  0.2 - 1.0 MG/DL Final    ALT 10/04/2024 28  12 - 78 U/L Final    AST 10/04/2024 30  15 - 37 U/L Final    Alk Phosphatase 10/04/2024 76  45 - 117 U/L Final    Total Protein 10/04/2024 7.7  6.4 - 8.2 g/dL Final    Albumin 10/04/2024 3.7  3.5 - 5.0 g/dL Final    Globulin 10/04/2024 4.0  2.0 - 4.0 g/dL Final    Albumin/Globulin Ratio 10/04/2024 0.9 (L)  1.1 - 2.2   Final    WBC 10/04/2024 7.2  3.6 - 11.0 K/uL Final    RBC 
Urine 10/04/2024 Negative  NEG   Final    Leukocyte Esterase, Urine 10/04/2024 Negative  NEG   Final    WBC, UA 10/04/2024 0-4  0 - 4 /hpf Final    RBC, UA 10/04/2024 0-5  0 - 5 /hpf Final    Epithelial Cells, UA 10/04/2024 FEW  FEW /lpf Final    BACTERIA, URINE 10/04/2024 Negative  NEG /hpf Final    Hemoglobin A1C 10/08/2024 5.9 (H)  4.0 - 5.6 % Final    Estimated Avg Glucose 10/08/2024 123  mg/dL Final    Cholesterol, Total 10/10/2024 154  <200 MG/DL Final    Triglycerides 10/10/2024 65  <150 MG/DL Final    HDL 10/10/2024 75  MG/DL Final    LDL Cholesterol 10/10/2024 66  0 - 100 MG/DL Final    VLDL Cholesterol Calculated 10/10/2024 13  MG/DL Final    Chol/HDL Ratio 10/10/2024 2.1  0.0 - 5.0   Final    TSH, 3rd Generation 10/10/2024 0.92  0.36 - 3.74 uIU/mL Final         Assessment & Plan     The patient, Kyra Hunt, is a 69 y.o.  female who presents at this time for treatment of the following diagnoses:  Schizophrenia (HCC)  ASSESSMENT: the patient presents with worsening confusion and thought process disorganization concerning for neurocognitive decline associated with schizophrenia vs comorbid dementia. Will monitor for sundowning, use behavioral interventions, work with family on a safe plan.   PLAN:  - CONTINUE Risperdal 1 mg QDAY and 2 mg QHS for psychosis  - CONTINUE Zoloft 50 mg QAY for MDD, neuro-protection  - IGM therapy as tolerated  - Expand database / obtain collateral  - Dispo planning (home when stable)       I certify that this patient's inpatient psychiatric hospital services furnished since the previous certification were, and continue to be,required for treatment that could reasonably be expected to improve the patient's condition, or for diagnostic study, and that the patient continues to need, on a daily basis, active treatment furnished directly by or requiring the supervision of inpatient psychiatric facility personnel. In addition, the hospital records show that services furnished were 
services furnished were intensive treatment services, admission or related services, or equivalent services.    Signed By: Roro Mijares MD     October 15, 2024        
continue to be,required for treatment that could reasonably be expected to improve the patient's condition, or for diagnostic study, and that the patient continues to need, on a daily basis, active treatment furnished directly by or requiring the supervision of inpatient psychiatric facility personnel. In addition, the hospital records show that services furnished were intensive treatment services, admission or related services, or equivalent services.    Signed By: Roro Mijares MD     October 23, 2024        
10/04/2024 CLEAR  CLEAR   Final    Specific Gravity, UA 10/04/2024 <1.005   Final    pH, Urine 10/04/2024 5.5  5.0 - 8.0   Final    Protein, UA 10/04/2024 Negative  NEG mg/dL Final    Glucose, Ur 10/04/2024 Negative  NEG mg/dL Final    Ketones, Urine 10/04/2024 Negative  NEG mg/dL Final    Bilirubin, Urine 10/04/2024 Negative  NEG   Final    Blood, Urine 10/04/2024 Negative  NEG   Final    Urobilinogen, Urine 10/04/2024 0.2  0.2 - 1.0 EU/dL Final    Nitrite, Urine 10/04/2024 Negative  NEG   Final    Leukocyte Esterase, Urine 10/04/2024 Negative  NEG   Final    WBC, UA 10/04/2024 0-4  0 - 4 /hpf Final    RBC, UA 10/04/2024 0-5  0 - 5 /hpf Final    Epithelial Cells, UA 10/04/2024 FEW  FEW /lpf Final    BACTERIA, URINE 10/04/2024 Negative  NEG /hpf Final    Hemoglobin A1C 10/08/2024 5.9 (H)  4.0 - 5.6 % Final    Estimated Avg Glucose 10/08/2024 123  mg/dL Final    Cholesterol, Total 10/10/2024 154  <200 MG/DL Final    Triglycerides 10/10/2024 65  <150 MG/DL Final    HDL 10/10/2024 75  MG/DL Final    LDL Cholesterol 10/10/2024 66  0 - 100 MG/DL Final    VLDL Cholesterol Calculated 10/10/2024 13  MG/DL Final    Chol/HDL Ratio 10/10/2024 2.1  0.0 - 5.0   Final    TSH, 3rd Generation 10/10/2024 0.92  0.36 - 3.74 uIU/mL Final         Assessment & Plan     The patient, Kyra uHnt, is a 69 y.o.  female who presents at this time for treatment of the following diagnoses:  Schizophrenia (HCC)  ASSESSMENT: the patient presents with worsening confusion and thought process disorganization concerning for neurocognitive decline associated with schizophrenia vs comorbid dementia. Will monitor for sundowning, use behavioral interventions, work with family on a safe plan.   PLAN:  - CONTINUE Risperdal 1 mg QDAY and 2 mg QHS for psychosis  - CONTINUE Zoloft 50 mg QAY for MDD, neuro-protection  - IGM therapy as tolerated  - Expand database / obtain collateral  - Dispo planning (home when stable)       I certify that this patient's 
on a daily basis, active treatment furnished directly by or requiring the supervision of inpatient psychiatric facility personnel. In addition, the hospital records show that services furnished were intensive treatment services, admission or related services, or equivalent services.    Signed By: Roro Mijares MD     October 30, 2024        
 <200 MG/DL Final    Triglycerides 10/10/2024 65  <150 MG/DL Final    HDL 10/10/2024 75  MG/DL Final    LDL Cholesterol 10/10/2024 66  0 - 100 MG/DL Final    VLDL Cholesterol Calculated 10/10/2024 13  MG/DL Final    Chol/HDL Ratio 10/10/2024 2.1  0.0 - 5.0   Final    TSH, 3rd Generation 10/10/2024 0.92  0.36 - 3.74 uIU/mL Final         Assessment & Plan     The patient, Kyra Hunt, is a 69 y.o.  female who presents at this time for treatment of the following diagnoses:  Schizophrenia (HCC)  ASSESSMENT: the patient presents with worsening confusion and thought process disorganization concerning for neurocognitive decline associated with schizophrenia vs comorbid dementia. Will monitor for sundowning, use behavioral interventions, work with family on a safe plan.   PLAN:  - CONTINUE Risperdal 1 mg QDAY and 2 mg QHS for psychosis  - CONTINUE Zoloft 50 mg QAY for MDD, neuro-protection  - IGM therapy as tolerated  - Expand database / obtain collateral  - Dispo planning ([facility] on 11/11/24)     I certify that this patient's inpatient psychiatric hospital services furnished since the previous certification were, and continue to be,required for treatment that could reasonably be expected to improve the patient's condition, or for diagnostic study, and that the patient continues to need, on a daily basis, active treatment furnished directly by or requiring the supervision of inpatient psychiatric facility personnel. In addition, the hospital records show that services furnished were intensive treatment services, admission or related services, or equivalent services.    Signed By: NIKIA Chambers - NP     November 9, 2024

## 2024-11-12 NOTE — GROUP NOTE
Group Therapy Note    Date: 11/12/2024    Group Start Time: 1000  Group End Time: 1100  Group Topic: Relaxation    [facility] ACUTE [facility]    Ariella Juan        Group Therapy Note    Attendees: 8       Patient's Goal:  To participate in relaxation activity    Notes:  Pt did not attend session    Discipline Responsible: Recreational Therapist      Signature:  ARIELLA JUAN

## 2024-11-12 NOTE — PLAN OF CARE
Problem: Pain  Goal: Verbalizes/displays adequate comfort level or baseline comfort level  Outcome: Completed     Problem: Risk for Elopement  Goal: Patient will not exit the unit/facility without proper excort  Outcome: Completed     Problem: Safety - Adult  Goal: Free from fall injury  11/12/2024 0830 by Danielle Badillo, RN  Outcome: Completed  11/12/2024 0829 by Danielle Badillo RN  Outcome: Progressing     Problem: Discharge Planning  Goal: Discharge to home or other facility with appropriate resources  11/12/2024 0830 by Danielle Badillo RN  Outcome: Completed  11/12/2024 0829 by Danielle Badillo RN  Outcome: Progressing     Problem: Depression  Goal: Will be euthymic at discharge  Description: INTERVENTIONS:  1. Administer medication as ordered  2. Provide emotional support via 1:1 interaction with staff  3. Encourage involvement in milieu/groups/activities  4. Monitor for social isolation  11/11/2024 2829 by Gogo Jackson RN  Outcome: Completed     Problem: Psychosis  Goal: Will report no hallucinations or delusions  Description: INTERVENTIONS:  1. Administer medication as  ordered  2. Assist with reality testing to support increasing orientation  3. Assess if patient's hallucinations or delusions are encouraging self harm or harm to others and intervene as appropriate  Outcome: Completed

## 2024-11-12 NOTE — PROGRESS NOTES
1915-0715    No acute changes this shift. Patient denies current SI/HI/AVH. Patient is friendly and cooperative. She is med compliant. No PRNs given.  No c/o pain or discomfort verbalized.     0600 Patient slept for 8 hours this shift.

## 2024-11-12 NOTE — PLAN OF CARE
Problem: Depression  Goal: Will be euthymic at discharge  Description: INTERVENTIONS:  1. Administer medication as ordered  2. Provide emotional support via 1:1 interaction with staff  3. Encourage involvement in milieu/groups/activities  4. Monitor for social isolation  Outcome: Completed

## 2024-11-18 ENCOUNTER — TRANSCRIBE ORDERS (OUTPATIENT)
Dept: SCHEDULING | Age: 69
End: 2024-11-18

## 2024-11-18 DIAGNOSIS — Z12.31 VISIT FOR SCREENING MAMMOGRAM: Primary | ICD-10-CM

## 2025-05-10 ENCOUNTER — HOSPITAL ENCOUNTER (INPATIENT)
Facility: HOSPITAL | Age: 70
LOS: 2 days | Discharge: HOME HEALTH | DRG: 312 | End: 2025-05-12
Attending: EMERGENCY MEDICINE | Admitting: FAMILY MEDICINE
Payer: MEDICARE

## 2025-05-10 ENCOUNTER — APPOINTMENT (OUTPATIENT)
Facility: HOSPITAL | Age: 70
DRG: 312 | End: 2025-05-10
Payer: MEDICARE

## 2025-05-10 DIAGNOSIS — R00.1 BRADYCARDIA: ICD-10-CM

## 2025-05-10 DIAGNOSIS — R29.818 FOCAL NEUROLOGICAL DEFICIT: ICD-10-CM

## 2025-05-10 DIAGNOSIS — R29.90 STROKE-LIKE SYMPTOMS: Primary | ICD-10-CM

## 2025-05-10 DIAGNOSIS — R79.89 TROPONIN LEVEL ELEVATED: ICD-10-CM

## 2025-05-10 LAB
ALBUMIN SERPL-MCNC: 3.5 G/DL (ref 3.5–5)
ALBUMIN/GLOB SERPL: 1 (ref 1.1–2.2)
ALP SERPL-CCNC: 79 U/L (ref 45–117)
ALT SERPL-CCNC: 21 U/L (ref 12–78)
ANION GAP SERPL CALC-SCNC: 3 MMOL/L (ref 2–12)
ANION GAP SERPL CALC-SCNC: 5 MMOL/L (ref 2–12)
AST SERPL-CCNC: 19 U/L (ref 15–37)
BASOPHILS # BLD: 0.02 K/UL (ref 0–0.1)
BASOPHILS NFR BLD: 0.5 % (ref 0–1)
BILIRUB SERPL-MCNC: 0.7 MG/DL (ref 0.2–1)
BUN SERPL-MCNC: 8 MG/DL (ref 6–20)
BUN SERPL-MCNC: 8 MG/DL (ref 6–20)
BUN/CREAT SERPL: 10 (ref 12–20)
BUN/CREAT SERPL: 9 (ref 12–20)
CALCIUM SERPL-MCNC: 8.9 MG/DL (ref 8.5–10.1)
CALCIUM SERPL-MCNC: 9 MG/DL (ref 8.5–10.1)
CHLORIDE SERPL-SCNC: 107 MMOL/L (ref 97–108)
CHLORIDE SERPL-SCNC: 107 MMOL/L (ref 97–108)
CO2 SERPL-SCNC: 26 MMOL/L (ref 21–32)
CO2 SERPL-SCNC: 28 MMOL/L (ref 21–32)
COMMENT:: NORMAL
CREAT SERPL-MCNC: 0.82 MG/DL (ref 0.55–1.02)
CREAT SERPL-MCNC: 0.85 MG/DL (ref 0.55–1.02)
DIFFERENTIAL METHOD BLD: ABNORMAL
EOSINOPHIL # BLD: 0.09 K/UL (ref 0–0.4)
EOSINOPHIL NFR BLD: 2.3 % (ref 0–7)
ERYTHROCYTE [DISTWIDTH] IN BLOOD BY AUTOMATED COUNT: 13.2 % (ref 11.5–14.5)
GLOBULIN SER CALC-MCNC: 3.5 G/DL (ref 2–4)
GLUCOSE SERPL-MCNC: 123 MG/DL (ref 65–100)
GLUCOSE SERPL-MCNC: 139 MG/DL (ref 65–100)
HCT VFR BLD AUTO: 35.8 % (ref 35–47)
HGB BLD-MCNC: 11.7 G/DL (ref 11.5–16)
IMM GRANULOCYTES # BLD AUTO: 0.01 K/UL (ref 0–0.04)
IMM GRANULOCYTES NFR BLD AUTO: 0.3 % (ref 0–0.5)
INR PPP: 1.1 (ref 0.9–1.1)
LYMPHOCYTES # BLD: 1.91 K/UL (ref 0.8–3.5)
LYMPHOCYTES NFR BLD: 48.1 % (ref 12–49)
MCH RBC QN AUTO: 29.2 PG (ref 26–34)
MCHC RBC AUTO-ENTMCNC: 32.7 G/DL (ref 30–36.5)
MCV RBC AUTO: 89.3 FL (ref 80–99)
MONOCYTES # BLD: 0.36 K/UL (ref 0–1)
MONOCYTES NFR BLD: 9.1 % (ref 5–13)
NEUTS SEG # BLD: 1.58 K/UL (ref 1.8–8)
NEUTS SEG NFR BLD: 39.7 % (ref 32–75)
NRBC # BLD: 0 K/UL (ref 0–0.01)
NRBC BLD-RTO: 0 PER 100 WBC
PLATELET # BLD AUTO: 225 K/UL (ref 150–400)
PMV BLD AUTO: 9.7 FL (ref 8.9–12.9)
POTASSIUM SERPL-SCNC: 3.7 MMOL/L (ref 3.5–5.1)
POTASSIUM SERPL-SCNC: ABNORMAL MMOL/L (ref 3.5–5.1)
PROT SERPL-MCNC: 7 G/DL (ref 6.4–8.2)
PROTHROMBIN TIME: 11.5 SEC (ref 9.2–11.2)
RBC # BLD AUTO: 4.01 M/UL (ref 3.8–5.2)
SODIUM SERPL-SCNC: 138 MMOL/L (ref 136–145)
SODIUM SERPL-SCNC: 138 MMOL/L (ref 136–145)
SPECIMEN HOLD: NORMAL
TROPONIN I SERPL HS-MCNC: 171 NG/L (ref 0–51)
WBC # BLD AUTO: 4 K/UL (ref 3.6–11)

## 2025-05-10 PROCEDURE — 2500000003 HC RX 250 WO HCPCS: Performed by: FAMILY MEDICINE

## 2025-05-10 PROCEDURE — 1100000000 HC RM PRIVATE

## 2025-05-10 PROCEDURE — 70450 CT HEAD/BRAIN W/O DYE: CPT

## 2025-05-10 PROCEDURE — 6370000000 HC RX 637 (ALT 250 FOR IP): Performed by: FAMILY MEDICINE

## 2025-05-10 PROCEDURE — 70496 CT ANGIOGRAPHY HEAD: CPT

## 2025-05-10 PROCEDURE — 85025 COMPLETE CBC W/AUTO DIFF WBC: CPT

## 2025-05-10 PROCEDURE — 0042T CT BRAIN PERFUSION: CPT

## 2025-05-10 PROCEDURE — 2060000000 HC ICU INTERMEDIATE R&B

## 2025-05-10 PROCEDURE — 6360000004 HC RX CONTRAST MEDICATION: Performed by: EMERGENCY MEDICINE

## 2025-05-10 PROCEDURE — 2580000003 HC RX 258: Performed by: EMERGENCY MEDICINE

## 2025-05-10 PROCEDURE — 85610 PROTHROMBIN TIME: CPT

## 2025-05-10 PROCEDURE — 99285 EMERGENCY DEPT VISIT HI MDM: CPT

## 2025-05-10 PROCEDURE — 93005 ELECTROCARDIOGRAM TRACING: CPT

## 2025-05-10 PROCEDURE — 84484 ASSAY OF TROPONIN QUANT: CPT

## 2025-05-10 PROCEDURE — 80053 COMPREHEN METABOLIC PANEL: CPT

## 2025-05-10 PROCEDURE — 4A03X5D MEASUREMENT OF ARTERIAL FLOW, INTRACRANIAL, EXTERNAL APPROACH: ICD-10-PCS | Performed by: STUDENT IN AN ORGANIZED HEALTH CARE EDUCATION/TRAINING PROGRAM

## 2025-05-10 PROCEDURE — APPNB45 APP NON BILLABLE 31-45 MINUTES

## 2025-05-10 RX ORDER — ASPIRIN 300 MG/1
300 SUPPOSITORY RECTAL DAILY
Status: DISCONTINUED | OUTPATIENT
Start: 2025-05-11 | End: 2025-05-12 | Stop reason: HOSPADM

## 2025-05-10 RX ORDER — ATORVASTATIN CALCIUM 40 MG/1
80 TABLET, FILM COATED ORAL NIGHTLY
Status: DISCONTINUED | OUTPATIENT
Start: 2025-05-10 | End: 2025-05-12 | Stop reason: HOSPADM

## 2025-05-10 RX ORDER — 0.9 % SODIUM CHLORIDE 0.9 %
1000 INTRAVENOUS SOLUTION INTRAVENOUS ONCE
Status: COMPLETED | OUTPATIENT
Start: 2025-05-10 | End: 2025-05-10

## 2025-05-10 RX ORDER — IOPAMIDOL 755 MG/ML
100 INJECTION, SOLUTION INTRAVASCULAR
Status: COMPLETED | OUTPATIENT
Start: 2025-05-10 | End: 2025-05-10

## 2025-05-10 RX ORDER — POLYETHYLENE GLYCOL 3350 17 G/17G
17 POWDER, FOR SOLUTION ORAL DAILY PRN
Status: DISCONTINUED | OUTPATIENT
Start: 2025-05-10 | End: 2025-05-12 | Stop reason: HOSPADM

## 2025-05-10 RX ORDER — SODIUM CHLORIDE 0.9 % (FLUSH) 0.9 %
5-40 SYRINGE (ML) INJECTION EVERY 12 HOURS SCHEDULED
Status: DISCONTINUED | OUTPATIENT
Start: 2025-05-10 | End: 2025-05-12 | Stop reason: HOSPADM

## 2025-05-10 RX ORDER — SODIUM CHLORIDE 9 MG/ML
INJECTION, SOLUTION INTRAVENOUS PRN
Status: DISCONTINUED | OUTPATIENT
Start: 2025-05-10 | End: 2025-05-12 | Stop reason: HOSPADM

## 2025-05-10 RX ORDER — SODIUM CHLORIDE 0.9 % (FLUSH) 0.9 %
5-40 SYRINGE (ML) INJECTION PRN
Status: DISCONTINUED | OUTPATIENT
Start: 2025-05-10 | End: 2025-05-12 | Stop reason: HOSPADM

## 2025-05-10 RX ORDER — ONDANSETRON 2 MG/ML
4 INJECTION INTRAMUSCULAR; INTRAVENOUS EVERY 6 HOURS PRN
Status: DISCONTINUED | OUTPATIENT
Start: 2025-05-10 | End: 2025-05-12 | Stop reason: HOSPADM

## 2025-05-10 RX ORDER — ONDANSETRON 4 MG/1
4 TABLET, ORALLY DISINTEGRATING ORAL EVERY 8 HOURS PRN
Status: DISCONTINUED | OUTPATIENT
Start: 2025-05-10 | End: 2025-05-12 | Stop reason: HOSPADM

## 2025-05-10 RX ORDER — ASPIRIN 81 MG/1
81 TABLET, CHEWABLE ORAL DAILY
Status: DISCONTINUED | OUTPATIENT
Start: 2025-05-11 | End: 2025-05-12 | Stop reason: HOSPADM

## 2025-05-10 RX ADMIN — IOPAMIDOL 100 ML: 755 INJECTION, SOLUTION INTRAVENOUS at 18:12

## 2025-05-10 RX ADMIN — IOPAMIDOL 40 ML: 755 INJECTION, SOLUTION INTRAVENOUS at 18:08

## 2025-05-10 RX ADMIN — SODIUM CHLORIDE 1000 ML: 0.9 INJECTION, SOLUTION INTRAVENOUS at 18:20

## 2025-05-10 RX ADMIN — Medication 10 ML: at 21:26

## 2025-05-10 RX ADMIN — ATORVASTATIN CALCIUM 80 MG: 40 TABLET, FILM COATED ORAL at 23:10

## 2025-05-10 ASSESSMENT — PAIN - FUNCTIONAL ASSESSMENT: PAIN_FUNCTIONAL_ASSESSMENT: NONE - DENIES PAIN

## 2025-05-10 NOTE — ED PROVIDER NOTES
METABOLIC PANEL - Abnormal; Notable for the following components:    Glucose 123 (*)     BUN/Creatinine Ratio 9 (*)     Albumin/Globulin Ratio 1.0 (*)     All other components within normal limits   PROTIME-INR - Abnormal; Notable for the following components:    Protime 11.5 (*)     All other components within normal limits   TROPONIN - Abnormal; Notable for the following components:    Troponin, High Sensitivity 171 (*)     All other components within normal limits   EXTRA TUBES HOLD   POCT GLUCOSE       All other labs were within normal range or not returned as of this dictation.    EMERGENCY DEPARTMENT COURSE and DIFFERENTIAL DIAGNOSIS/MDM:   Vitals:    Vitals:    05/10/25 1755 05/10/25 1819 05/10/25 1857   BP: 120/64 129/60    Pulse: (!) 41 (!) 49 (!) 47   Resp: 18 14    Temp:  97.9 °F (36.6 °C)    TempSrc:  Oral    SpO2: 95% 95%    Weight:  77 kg (169 lb 12.1 oz)    Height:  1.575 m (5' 2\")            Medical Decision Making  Amount and/or Complexity of Data Reviewed  Labs: ordered.  Radiology: ordered.  ECG/medicine tests: ordered.    Risk  Prescription drug management.  Decision regarding hospitalization.            REASSESSMENT      6:00 PM  Discussed with Teleneuro,  Will see patient.    6:44 PM  Recommend admit to complete stroke/TIA work-up    CRITICAL CARE TIME       CONSULTS:  IP CONSULT TO TELE-NEUROLOGY    PROCEDURES:  Unless otherwise noted below, none     Procedures        FINAL IMPRESSION      1. Stroke-like symptoms    2. Bradycardia    3. Troponin level elevated          DISPOSITION/PLAN   DISPOSITION      Perfect Serve Consult for Admission  7:59 PM    ED Room Number: ER18/18  Patient Name and age:  Kyra Hunt 70 y.o.  female  Working Diagnosis:   1. Stroke-like symptoms    2. Bradycardia    3. Troponin level elevated        COVID-19 Suspicion: No  Sepsis present:  No  Reassessment needed: No  Code Status:  Full Code  Readmission: No  Isolation Requirements: no  Recommended Level of Care:

## 2025-05-10 NOTE — ED TRIAGE NOTES
Pt to ED via EMS as a code stroke. Pt was having dinner with family when family states she has a period of unresponsiveness lasting 15 seconds. After episode family states she had increased L sided weakness, dysarthria, and expressive aphasia.   [id number].   per EMS.

## 2025-05-11 ENCOUNTER — APPOINTMENT (OUTPATIENT)
Facility: HOSPITAL | Age: 70
DRG: 312 | End: 2025-05-11
Payer: MEDICARE

## 2025-05-11 LAB
AMPHET UR QL SCN: NEGATIVE
APPEARANCE UR: CLEAR
B PERT DNA SPEC QL NAA+PROBE: NOT DETECTED
BACTERIA URNS QL MICRO: NEGATIVE /HPF
BARBITURATES UR QL SCN: NEGATIVE
BENZODIAZ UR QL: NEGATIVE
BILIRUB UR QL: NEGATIVE
BORDETELLA PARAPERTUSSIS BY PCR: NOT DETECTED
C PNEUM DNA SPEC QL NAA+PROBE: NOT DETECTED
CANNABINOIDS UR QL SCN: NEGATIVE
CHOLEST SERPL-MCNC: 122 MG/DL
COCAINE UR QL SCN: NEGATIVE
COLOR UR: NORMAL
COMMENT:: NORMAL
COMMENT:: NORMAL
EPITH CASTS URNS QL MICRO: NORMAL /LPF
ERYTHROCYTE [DISTWIDTH] IN BLOOD BY AUTOMATED COUNT: 13.3 % (ref 11.5–14.5)
EST. AVERAGE GLUCOSE BLD GHB EST-MCNC: 128 MG/DL
FLUAV SUBTYP SPEC NAA+PROBE: NOT DETECTED
FLUBV RNA SPEC QL NAA+PROBE: NOT DETECTED
GLUCOSE UR STRIP.AUTO-MCNC: NEGATIVE MG/DL
HADV DNA SPEC QL NAA+PROBE: NOT DETECTED
HBA1C MFR BLD: 6.1 % (ref 4–5.6)
HCOV 229E RNA SPEC QL NAA+PROBE: NOT DETECTED
HCOV HKU1 RNA SPEC QL NAA+PROBE: NOT DETECTED
HCOV NL63 RNA SPEC QL NAA+PROBE: NOT DETECTED
HCOV OC43 RNA SPEC QL NAA+PROBE: NOT DETECTED
HCT VFR BLD AUTO: 37.4 % (ref 35–47)
HDLC SERPL-MCNC: 63 MG/DL
HDLC SERPL: 1.9 (ref 0–5)
HGB BLD-MCNC: 12.3 G/DL (ref 11.5–16)
HGB UR QL STRIP: NEGATIVE
HMPV RNA SPEC QL NAA+PROBE: NOT DETECTED
HPIV1 RNA SPEC QL NAA+PROBE: NOT DETECTED
HPIV2 RNA SPEC QL NAA+PROBE: NOT DETECTED
HPIV3 RNA SPEC QL NAA+PROBE: NOT DETECTED
HPIV4 RNA SPEC QL NAA+PROBE: NOT DETECTED
HYALINE CASTS URNS QL MICRO: NORMAL /LPF (ref 0–5)
KETONES UR QL STRIP.AUTO: NEGATIVE MG/DL
LDLC SERPL CALC-MCNC: 41 MG/DL (ref 0–100)
LEUKOCYTE ESTERASE UR QL STRIP.AUTO: NEGATIVE
Lab: NORMAL
M PNEUMO DNA SPEC QL NAA+PROBE: NOT DETECTED
MCH RBC QN AUTO: 29.3 PG (ref 26–34)
MCHC RBC AUTO-ENTMCNC: 32.9 G/DL (ref 30–36.5)
MCV RBC AUTO: 89 FL (ref 80–99)
METHADONE UR QL: NEGATIVE
NITRITE UR QL STRIP.AUTO: NEGATIVE
NRBC # BLD: 0 K/UL (ref 0–0.01)
NRBC BLD-RTO: 0 PER 100 WBC
OPIATES UR QL: NEGATIVE
PCP UR QL: NEGATIVE
PH UR STRIP: 7 (ref 5–8)
PLATELET # BLD AUTO: 252 K/UL (ref 150–400)
PMV BLD AUTO: 9.5 FL (ref 8.9–12.9)
PROT UR STRIP-MCNC: NEGATIVE MG/DL
RBC # BLD AUTO: 4.2 M/UL (ref 3.8–5.2)
RBC #/AREA URNS HPF: NORMAL /HPF (ref 0–5)
RSV RNA SPEC QL NAA+PROBE: NOT DETECTED
RV+EV RNA SPEC QL NAA+PROBE: NOT DETECTED
SARS-COV-2 RNA RESP QL NAA+PROBE: NOT DETECTED
SP GR UR REFRACTOMETRY: 1.02 (ref 1–1.03)
SPECIMEN HOLD: NORMAL
SPECIMEN HOLD: NORMAL
TRIGL SERPL-MCNC: 90 MG/DL
TROPONIN I SERPL HS-MCNC: 177 NG/L (ref 0–51)
TROPONIN I SERPL HS-MCNC: 195 NG/L (ref 0–51)
URINE CULTURE IF INDICATED: NORMAL
UROBILINOGEN UR QL STRIP.AUTO: 1 EU/DL (ref 0.2–1)
VLDLC SERPL CALC-MCNC: 18 MG/DL
WBC # BLD AUTO: 5.1 K/UL (ref 3.6–11)
WBC URNS QL MICRO: NORMAL /HPF (ref 0–4)

## 2025-05-11 PROCEDURE — 6370000000 HC RX 637 (ALT 250 FOR IP): Performed by: FAMILY MEDICINE

## 2025-05-11 PROCEDURE — 97165 OT EVAL LOW COMPLEX 30 MIN: CPT

## 2025-05-11 PROCEDURE — 80061 LIPID PANEL: CPT

## 2025-05-11 PROCEDURE — 92610 EVALUATE SWALLOWING FUNCTION: CPT

## 2025-05-11 PROCEDURE — 80307 DRUG TEST PRSMV CHEM ANLYZR: CPT

## 2025-05-11 PROCEDURE — 84484 ASSAY OF TROPONIN QUANT: CPT

## 2025-05-11 PROCEDURE — 99223 1ST HOSP IP/OBS HIGH 75: CPT | Performed by: STUDENT IN AN ORGANIZED HEALTH CARE EDUCATION/TRAINING PROGRAM

## 2025-05-11 PROCEDURE — 2060000000 HC ICU INTERMEDIATE R&B

## 2025-05-11 PROCEDURE — 97535 SELF CARE MNGMENT TRAINING: CPT

## 2025-05-11 PROCEDURE — 6360000004 HC RX CONTRAST MEDICATION: Performed by: STUDENT IN AN ORGANIZED HEALTH CARE EDUCATION/TRAINING PROGRAM

## 2025-05-11 PROCEDURE — 83036 HEMOGLOBIN GLYCOSYLATED A1C: CPT

## 2025-05-11 PROCEDURE — 0202U NFCT DS 22 TRGT SARS-COV-2: CPT

## 2025-05-11 PROCEDURE — 81001 URINALYSIS AUTO W/SCOPE: CPT

## 2025-05-11 PROCEDURE — 97530 THERAPEUTIC ACTIVITIES: CPT

## 2025-05-11 PROCEDURE — 97161 PT EVAL LOW COMPLEX 20 MIN: CPT

## 2025-05-11 PROCEDURE — 2500000003 HC RX 250 WO HCPCS: Performed by: FAMILY MEDICINE

## 2025-05-11 PROCEDURE — 70553 MRI BRAIN STEM W/O & W/DYE: CPT

## 2025-05-11 PROCEDURE — 85027 COMPLETE CBC AUTOMATED: CPT

## 2025-05-11 PROCEDURE — A9579 GAD-BASE MR CONTRAST NOS,1ML: HCPCS | Performed by: STUDENT IN AN ORGANIZED HEALTH CARE EDUCATION/TRAINING PROGRAM

## 2025-05-11 RX ORDER — HYDRALAZINE HYDROCHLORIDE 20 MG/ML
10 INJECTION INTRAMUSCULAR; INTRAVENOUS EVERY 6 HOURS PRN
Status: DISCONTINUED | OUTPATIENT
Start: 2025-05-11 | End: 2025-05-12 | Stop reason: HOSPADM

## 2025-05-11 RX ORDER — RISPERIDONE 0.5 MG/1
1 TABLET ORAL DAILY
Status: DISCONTINUED | OUTPATIENT
Start: 2025-05-11 | End: 2025-05-12 | Stop reason: HOSPADM

## 2025-05-11 RX ORDER — RISPERIDONE 0.5 MG/1
2 TABLET ORAL
Status: DISCONTINUED | OUTPATIENT
Start: 2025-05-11 | End: 2025-05-12 | Stop reason: HOSPADM

## 2025-05-11 RX ORDER — AMLODIPINE BESYLATE 5 MG/1
10 TABLET ORAL DAILY
Status: DISCONTINUED | OUTPATIENT
Start: 2025-05-11 | End: 2025-05-12 | Stop reason: HOSPADM

## 2025-05-11 RX ORDER — METOPROLOL SUCCINATE 25 MG/1
50 TABLET, EXTENDED RELEASE ORAL DAILY
Status: DISCONTINUED | OUTPATIENT
Start: 2025-05-11 | End: 2025-05-11

## 2025-05-11 RX ORDER — HYDRALAZINE HYDROCHLORIDE 20 MG/ML
10 INJECTION INTRAMUSCULAR; INTRAVENOUS EVERY 6 HOURS PRN
Status: DISCONTINUED | OUTPATIENT
Start: 2025-05-11 | End: 2025-05-11

## 2025-05-11 RX ADMIN — SERTRALINE HYDROCHLORIDE 50 MG: 50 TABLET ORAL at 08:34

## 2025-05-11 RX ADMIN — GADOTERIDOL 15 ML: 279.3 INJECTION, SOLUTION INTRAVENOUS at 13:59

## 2025-05-11 RX ADMIN — ATORVASTATIN CALCIUM 80 MG: 40 TABLET, FILM COATED ORAL at 21:16

## 2025-05-11 RX ADMIN — Medication 10 ML: at 08:44

## 2025-05-11 RX ADMIN — ASPIRIN 81 MG CHEWABLE TABLET 81 MG: 81 TABLET CHEWABLE at 08:33

## 2025-05-11 RX ADMIN — Medication 10 ML: at 21:16

## 2025-05-11 RX ADMIN — RISPERIDONE 1 MG: 1 TABLET, FILM COATED ORAL at 08:33

## 2025-05-11 RX ADMIN — RISPERIDONE 2 MG: 0.5 TABLET, FILM COATED ORAL at 21:16

## 2025-05-11 ASSESSMENT — PAIN SCALES - GENERAL
PAINLEVEL_OUTOF10: 0
PAINLEVEL_OUTOF10: 0

## 2025-05-11 NOTE — ED NOTES
TRANSFER - OUT REPORT:    Verbal report given to ANNA Hager on Kyra Hunt  being transferred to [facility] for routine progression of patient care       Report consisted of patient's Situation, Background, Assessment and   Recommendations(SBAR).     Information from the following report(s) Nurse Handoff Report, ED Encounter Summary, ED SBAR, Intake/Output, MAR, Recent Results, Med Rec Status, Cardiac Rhythm Sinus Royal/ Sinus Rhythm, and Neuro Assessment was reviewed with the receiving nurse.    [place] Fall Assessment:    Presents to emergency department  because of falls (Syncope, seizure, or loss of consciousness): Yes  Age > 70: Yes  Altered Mental Status, Intoxication with alcohol or substance confusion (Disorientation, impaired judgment, poor safety awaremess, or inability to follow instructions): Yes  Impaired Mobility: Ambulates or transfers with assistive devices or assistance; Unable to ambulate or transer.: Yes  Nursing Judgement: Yes          Lines:   Peripheral IV 05/10/25 Left;Proximal Antecubital (Active)   Site Assessment Clean, dry & intact 05/10/25 1806   Dressing Status New dressing applied 05/10/25 1806   Dressing Type Transparent 05/10/25 1806   Dressing Intervention New 05/10/25 1806       Peripheral IV 05/10/25 Right Antecubital (Active)   Site Assessment Clean, dry & intact 05/10/25 1807   Phlebitis Assessment No symptoms 05/10/25 1807   Infiltration Assessment 0 05/10/25 1807   Dressing Status New dressing applied 05/10/25 1807   Dressing Type Transparent 05/10/25 1807   Dressing Intervention New 05/10/25 1807        Opportunity for questions and clarification was provided.      Patient transported with:  Monitor and Registered Nurse

## 2025-05-11 NOTE — PLAN OF CARE
Speech LAnguage Pathology EVALUATION    Patient: Kyra Hunt (70 y.o. female)  Date: 5/11/2025  Primary Diagnosis: Focal neurological deficit [R29.818]       Precautions:  Fall Risk                  ASSESSMENT :  Patient with aspiration risk factors related to acute neurological changes, though MRI without any acute findings: \"Mild chronic microvascular ischemic change and mild temporal predominant cerebral atrophy. No acute intracranial process. No intracranial mass, hemorrhage or evidence of acute infarction.\" Patient with mild oral deficits related to edentulous status and cognitive status with poor attention/concentration to task. Patient would benefit from diet modification of Easy to Chew diet at this time. Additionally, patient with notable cognitive deficits at bedside in conversation specifically attention and memory, sequencing, and insight. She would benefit from further evaluation in acute setting.    Patient will benefit from skilled intervention to address the above impairments.     PLAN :  Recommendations and Planned Interventions:  Diet: Easy to chew and thin liquids  - General aspiration precautions: upright for all oral intake, small bites/sips, slow rate of intake  - Oral meds per patient preference (likely whole with thin liquids vs whole in applesauce, avoid crushing unless absolutely necessary)   - Oral hygiene BID, encourage self care with non-alcohol mouthwash      Recommend next SLP session: arsenio/murray, cog eval    Acute SLP Services: SLP Plan of Care: 3 times/week. Patient's rehabilitation potential is considered to be Good.  Discharge Recommendations: Continue to assess pending progress     SUBJECTIVE:   Patient stated, “What is it?” re: year    OBJECTIVE:     Past Medical History:   Diagnosis Date    Anxiety disorder     Hypertension     Psychiatric disorder     schizophrenia    History reviewed. No pertinent surgical history.  Prior Level of Function/Home Situation:   Social/Functional

## 2025-05-11 NOTE — ED NOTES
Bedside and Verbal shift change report given to Dominique RN and ANNA Lepe (oncoming nurse) by ANNA Beal (offgoing nurse). Report included the following information Nurse Handoff Report, Index, ED Encounter Summary, ED SBAR, Adult Overview, Surgery Report, and Intake/Output.

## 2025-05-11 NOTE — PLAN OF CARE
Problem: Occupational Therapy - Adult  Goal: By Discharge: Performs self-care activities at highest level of function for planned discharge setting.  See evaluation for individualized goals.  Description: FUNCTIONAL STATUS PRIOR TO ADMISSION/HOME SUPPORT:  Patient live alone in senior apartment vs ILF (?), reports she lives with a friend. Reports she is typically INDP with ADL, able to perform functional mobility without AD around complex and to facility's dining room for meals. She reports she enjoys walking and reading in her free time     Occupational Therapy Goals:  Initiated 5/11/2025  1.  Patient will perform upper body dressing with [place] within 7 day(s).  2.  Patient will perform lower body dressing with [place] within 7 day(s).  3.  Patient will perform seated bathing with [place] within 7 day(s).  4.  Patient will perform toilet transfers with [place]  within 7 day(s).  5.  Patient will perform all aspects of toileting with [place] within 7 day(s).  6.  Patient will participate in upper extremity therapeutic exercise/activities with [place] for 5 minutes within 7 day(s).    7.  Patient will utilize energy conservation techniques during functional activities with verbal, visual, and tactile cues within 7 day(s).   Outcome: Progressing   OCCUPATIONAL THERAPY EVALUATION    Patient: Kyra Hunt (70 y.o. female)  Date: 5/11/2025  Primary Diagnosis: Focal neurological deficit [R29.818]         Precautions: Fall Risk                  ASSESSMENT :  The patient is limited by decreased functional mobility, independence in ADLs, high-level IADLs, strength, activity tolerance, endurance, cognition, coordination, balance following admission to ED for L sided weakness, facial droop, aphasia and episode of unresponsiveness. CT negative for acute process, MRI pending at time of evaluation. Patient denies weakness, changes in vision, or sensation on this date - reports she came to the

## 2025-05-11 NOTE — H&P
History and Physical    Date of Service:  5/10/2025  Primary Care Provider: No primary care provider on file.  Source of information: patient, electronic medical record    Chief Complaint: Cerebrovascular Accident      History of Presenting Illness:   Kyra Hunt is a 70 y.o. 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  Patient was at his assisted living facility having dinner when he became unresponsive for 15 seconds with drooling, left sided weakness, and aphasia.    In the ED, she was bradycardic to 41.  Other VSS.  Lab showed troponin 171. CT head showed no acute intracranial process. CTA head/neck and CT brain perfusion showed chronic encephalomalacia/infarct at the inferior left cerebellum with mild chronic microvascular ischemia change with no major vessel occlusion or hemodynamically significant stenosis.    In the ED,  she received 1L normal saline.  Code stroke was called with initial NIH  was 2 for disorientation and dysarthria     REVIEW OF SYSTEMS:  A comprehensive review of systems was negative except for that written in the History of Present Illness.     Past Medical History:   Diagnosis Date    Anxiety disorder     Hypertension     Psychiatric disorder     schizophrenia       History reviewed. No pertinent surgical history.  Prior to Admission medications    Medication Sig Start Date End Date Taking? Authorizing Provider   sertraline (ZOLOFT) 50 MG tablet Take 1 tablet by mouth daily 11/8/24   Roro Mijares MD   aspirin (EQ ASPIRIN ADULT LOW DOSE) 81 MG EC tablet Take 1 tablet by mouth daily 11/7/24   Roro Mijares MD   atorvastatin (LIPITOR) 40 MG tablet Take 1 tablet by mouth nightly 11/7/24   Roro Mijares MD   risperiDONE (RISPERDAL) 1 MG tablet Take 1 tablet by mouth daily AND 2 tablets nightly. 11/7/24   Roro Mijares MD

## 2025-05-11 NOTE — ED NOTES
Pt unable to fill out MRI screening form. Attempted to call emergency contacts x2 for information with no response.

## 2025-05-11 NOTE — PLAN OF CARE
Problem: Physical Therapy - Adult  Goal: By Discharge: Performs mobility at highest level of function for planned discharge setting.  See evaluation for individualized goals.  Description: FUNCTIONAL STATUS PRIOR TO ADMISSION: Patient was independent and active without use of DME.    HOME SUPPORT PRIOR TO ADMISSION: Patient lived in an assisted living facility per chart    Physical Therapy Goals  Initiated 5/11/2025  1.  Patient will move from supine to sit and sit to supine, scoot up and down, and roll side to side in bed with supervision/set-up within 7 day(s).    2.  Patient will perform sit to stand with supervision/set-up within 7 day(s).  3.  Patient will transfer from bed to chair and chair to bed with supervision/set-up using the least restrictive device within 7 day(s).  4.  Patient will ambulate with supervision/set-up for 120 feet with the least restrictive device within 7 day(s).     Outcome: Progressing       PHYSICAL THERAPY EVALUATION    Patient: Kyra Hunt (70 y.o. female)  Date: 5/11/2025  Primary Diagnosis: Focal neurological deficit [R29.818]       Precautions: Restrictions/Precautions  Restrictions/Precautions: Fall Risk            ASSESSMENT :   DEFICITS/IMPAIRMENTS:   The patient is limited by decreased functional mobility, independence in ADLs, strength, activity tolerance, coordination, balance, posture who would benefit from PT to address these impairments. Patient sat edge of bed with contact guard assist and able to perform stand pivot transfer to chair. Patient stood with min to mod assist  x2 before returning to bed secondary to symptomatic hypotension.   05/11/25 0900   Vital Signs   Orthostatic B/P and Pulse? Yes   Blood Pressure Lying 171/81   Pulse Lying 48 PER MINUTE   Blood Pressure Sitting 167/91   Pulse Sitting 54 PER MINUTE   Blood Pressure Standing 145/84   Pulse Standing 63 PER MINUTE           Functional Outcome Measure:  The patient scored 16/24 on the [facility] outcome

## 2025-05-12 ENCOUNTER — APPOINTMENT (OUTPATIENT)
Facility: HOSPITAL | Age: 70
DRG: 312 | End: 2025-05-12
Attending: STUDENT IN AN ORGANIZED HEALTH CARE EDUCATION/TRAINING PROGRAM
Payer: MEDICARE

## 2025-05-12 PROBLEM — R29.818 FOCAL NEUROLOGICAL DEFICIT: Status: RESOLVED | Noted: 2025-05-10 | Resolved: 2025-05-12

## 2025-05-12 PROBLEM — R55 SYNCOPE: Status: ACTIVE | Noted: 2025-05-12

## 2025-05-12 LAB
ANION GAP SERPL CALC-SCNC: 5 MMOL/L (ref 2–12)
BASOPHILS # BLD: 0.04 K/UL (ref 0–0.1)
BASOPHILS NFR BLD: 0.8 % (ref 0–1)
BUN SERPL-MCNC: 20 MG/DL (ref 6–20)
BUN/CREAT SERPL: 21 (ref 12–20)
CALCIUM SERPL-MCNC: 9.1 MG/DL (ref 8.5–10.1)
CHLORIDE SERPL-SCNC: 108 MMOL/L (ref 97–108)
CO2 SERPL-SCNC: 25 MMOL/L (ref 21–32)
CREAT SERPL-MCNC: 0.95 MG/DL (ref 0.55–1.02)
DIFFERENTIAL METHOD BLD: ABNORMAL
ECHO AO ROOT DIAM: 3.6 CM
ECHO AO ROOT INDEX: 2 CM/M2
ECHO AR MAX VEL PISA: 4 M/S
ECHO AV AREA PEAK VELOCITY: 1.7 CM2
ECHO AV AREA/BSA PEAK VELOCITY: 0.9 CM2/M2
ECHO AV PEAK GRADIENT: 13 MMHG
ECHO AV PEAK VELOCITY: 1.8 M/S
ECHO AV REGURGITANT PHT: 583.5 MS
ECHO AV VELOCITY RATIO: 0.72
ECHO BSA: 1.85 M2
ECHO EST RA PRESSURE: 3 MMHG
ECHO LA DIAMETER INDEX: 2.5 CM/M2
ECHO LA DIAMETER: 4.5 CM
ECHO LA TO AORTIC ROOT RATIO: 1.25
ECHO LV E' LATERAL VELOCITY: 5.61 CM/S
ECHO LV E' SEPTAL VELOCITY: 3.52 CM/S
ECHO LV EF PHYSICIAN: 55 %
ECHO LV FRACTIONAL SHORTENING: 39 % (ref 28–44)
ECHO LV INTERNAL DIMENSION DIASTOLE INDEX: 2.28 CM/M2
ECHO LV INTERNAL DIMENSION DIASTOLIC: 4.1 CM (ref 3.9–5.3)
ECHO LV INTERNAL DIMENSION SYSTOLIC INDEX: 1.39 CM/M2
ECHO LV INTERNAL DIMENSION SYSTOLIC: 2.5 CM
ECHO LV IVSD: 1.4 CM (ref 0.6–0.9)
ECHO LV MASS 2D: 182.5 G (ref 67–162)
ECHO LV MASS INDEX 2D: 101.4 G/M2 (ref 43–95)
ECHO LV POSTERIOR WALL DIASTOLIC: 1.1 CM (ref 0.6–0.9)
ECHO LV RELATIVE WALL THICKNESS RATIO: 0.54
ECHO LVOT AREA: 2.5 CM2
ECHO LVOT DIAM: 1.8 CM
ECHO LVOT PEAK GRADIENT: 6 MMHG
ECHO LVOT PEAK VELOCITY: 1.3 M/S
ECHO MV A VELOCITY: 1.12 M/S
ECHO MV AREA PHT: 3.6 CM2
ECHO MV E DECELERATION TIME (DT): 208.9 MS
ECHO MV E VELOCITY: 0.71 M/S
ECHO MV E/A RATIO: 0.63
ECHO MV E/E' LATERAL: 12.66
ECHO MV E/E' RATIO (AVERAGED): 16.41
ECHO MV E/E' SEPTAL: 20.17
ECHO MV PRESSURE HALF TIME (PHT): 60.6 MS
ECHO PV MAX VELOCITY: 0.7 M/S
ECHO PV PEAK GRADIENT: 2 MMHG
ECHO RIGHT VENTRICULAR SYSTOLIC PRESSURE (RVSP): 27 MMHG
ECHO RV FREE WALL PEAK S': 18.2 CM/S
ECHO RV TAPSE: 1.2 CM (ref 1.7–?)
ECHO TV REGURGITANT MAX VELOCITY: 2.46 M/S
ECHO TV REGURGITANT PEAK GRADIENT: 24 MMHG
EOSINOPHIL # BLD: 0.16 K/UL (ref 0–0.4)
EOSINOPHIL NFR BLD: 3.1 % (ref 0–7)
ERYTHROCYTE [DISTWIDTH] IN BLOOD BY AUTOMATED COUNT: 13.3 % (ref 11.5–14.5)
GLUCOSE SERPL-MCNC: 147 MG/DL (ref 65–100)
HCT VFR BLD AUTO: 34.7 % (ref 35–47)
HGB BLD-MCNC: 11.5 G/DL (ref 11.5–16)
IMM GRANULOCYTES # BLD AUTO: 0.02 K/UL (ref 0–0.04)
IMM GRANULOCYTES NFR BLD AUTO: 0.4 % (ref 0–0.5)
LYMPHOCYTES # BLD: 2.25 K/UL (ref 0.8–3.5)
LYMPHOCYTES NFR BLD: 44.1 % (ref 12–49)
MCH RBC QN AUTO: 29.3 PG (ref 26–34)
MCHC RBC AUTO-ENTMCNC: 33.1 G/DL (ref 30–36.5)
MCV RBC AUTO: 88.3 FL (ref 80–99)
MONOCYTES # BLD: 0.39 K/UL (ref 0–1)
MONOCYTES NFR BLD: 7.6 % (ref 5–13)
NEUTS SEG # BLD: 2.24 K/UL (ref 1.8–8)
NEUTS SEG NFR BLD: 44 % (ref 32–75)
NRBC # BLD: 0 K/UL (ref 0–0.01)
NRBC BLD-RTO: 0 PER 100 WBC
PLATELET # BLD AUTO: 256 K/UL (ref 150–400)
PMV BLD AUTO: 9.4 FL (ref 8.9–12.9)
POTASSIUM SERPL-SCNC: 3.8 MMOL/L (ref 3.5–5.1)
RBC # BLD AUTO: 3.93 M/UL (ref 3.8–5.2)
SODIUM SERPL-SCNC: 138 MMOL/L (ref 136–145)
WBC # BLD AUTO: 5.1 K/UL (ref 3.6–11)

## 2025-05-12 PROCEDURE — 95957 EEG DIGITAL ANALYSIS: CPT

## 2025-05-12 PROCEDURE — 92507 TX SP LANG VOICE COMM INDIV: CPT

## 2025-05-12 PROCEDURE — 97530 THERAPEUTIC ACTIVITIES: CPT

## 2025-05-12 PROCEDURE — 95816 EEG AWAKE AND DROWSY: CPT | Performed by: PSYCHIATRY & NEUROLOGY

## 2025-05-12 PROCEDURE — 6370000000 HC RX 637 (ALT 250 FOR IP): Performed by: FAMILY MEDICINE

## 2025-05-12 PROCEDURE — 85025 COMPLETE CBC W/AUTO DIFF WBC: CPT

## 2025-05-12 PROCEDURE — 93306 TTE W/DOPPLER COMPLETE: CPT | Performed by: SPECIALIST

## 2025-05-12 PROCEDURE — 2500000003 HC RX 250 WO HCPCS: Performed by: FAMILY MEDICINE

## 2025-05-12 PROCEDURE — 97535 SELF CARE MNGMENT TRAINING: CPT

## 2025-05-12 PROCEDURE — 93306 TTE W/DOPPLER COMPLETE: CPT

## 2025-05-12 PROCEDURE — 99223 1ST HOSP IP/OBS HIGH 75: CPT | Performed by: INTERNAL MEDICINE

## 2025-05-12 PROCEDURE — 80048 BASIC METABOLIC PNL TOTAL CA: CPT

## 2025-05-12 PROCEDURE — 95816 EEG AWAKE AND DROWSY: CPT

## 2025-05-12 PROCEDURE — 92526 ORAL FUNCTION THERAPY: CPT

## 2025-05-12 RX ADMIN — Medication 10 ML: at 09:20

## 2025-05-12 RX ADMIN — SERTRALINE HYDROCHLORIDE 50 MG: 50 TABLET ORAL at 09:19

## 2025-05-12 RX ADMIN — RISPERIDONE 1 MG: 1 TABLET, FILM COATED ORAL at 09:19

## 2025-05-12 RX ADMIN — ASPIRIN 81 MG CHEWABLE TABLET 81 MG: 81 TABLET CHEWABLE at 09:19

## 2025-05-12 ASSESSMENT — PAIN SCALES - GENERAL
PAINLEVEL_OUTOF10: 0

## 2025-05-12 NOTE — CONSULTS
Neurology Consult Note     NAME: Kyra Hunt   :  1955   MRN:  [id number]   DATE:  2025    Assessment and Plan:     69 yo F h/o 2nd degree HB, HTN, anxiety, schizophrenia presenting with episode of unresponsiveness. Patient reports that she \"blanket out\" and then \"spit up\" when she came back to consciousness. Patient reportedly was having dinner when she became unresponsive with drooling for ~15 seconds then family noted L sided weakness and difficulty speaking. Improved in ED per ED notes. NIHSS 2 on arrival for mild disorientation and dysarthria. Reportedly had 2nd degree AV block, pulse 41. CT neuroimaging showed no acute findings. MRI showed CIWM and diffuse atrophy but no acute findings. Patient denies prior strokes. Denies taking ASA, takes statin and riperdal. On exam, patient is mildly disoriented with generalized weakness but otherwise within normal limits. Presentation possibly due to syncope from bradycardia, seizure possible but without specific evidence for this at this time, no evidence for stroke/TIA but given risk factors may be reasonable to continue ASA. Recrudescence could also be considered though patient denies prior similar symptoms. Unclear if patient had true L sided weakness on presentation.    - Continue ASA 81 mg daily  - Continue home statin  - Routine EEG  - Consider cardiology consult given bradycardia on presentation and possible syncope    Will follow peripherally for EEG results and see as needed.    Subjective   HPI:  Kyra Hunt is a 70 y.o. female who presents with Focal neurological deficit. Neurology was consulted for episode of unresponsiveness. History obtained per patient and chart review.    Ms. Hunt presents due to episode of unresponsiveness. Patient reports that she \"blanket out\" and then \"spit up\" when she came back to consciousness. Patient reportedly was having dinner when she became unresponsive with drooling for ~15 seconds then family noted L sided 
[phone]        ____________________________________________________________     Cardiac testing   09/20/23     ECHO (TTE) COMPLETE (CONTRAST/BUBBLE/3D PRN) 09/21/2023  3:17 PM (Final)     Interpretation Summary   ·  Left Ventricle: Normal left ventricular systolic function with a visually estimated EF of 60 - 65%. Left ventricle size is normal. Mildly increased wall thickness. Normal wall motion. Diastolic dysfunction present with normal LV EF.   ·  Aortic Valve: Trileaflet valve.   ·  Left Atrium: Left atrium is dilated.     Signed by: Jesse Szymanski MD on 9/21/2023  3:17 PM      Most recent HS troponins:   Recent Labs     05/10/25   1804 05/11/25   0542 05/11/25   0841   TROPHS 171* 195* 177*       ECG: sinus bradycardia LVH with strain pattern    Review of Systems:     [x]All other systems reviewed and all negative except as written in HPI     [ ] Patient unable to provide secondary to condition     Past Medical History:   Diagnosis Date   · Anxiety disorder   · Hypertension   · Psychiatric disorder   schizophrenia     History reviewed. No pertinent surgical history.   Social Hx:  reports that she has been smoking cigarettes. She has never used smokeless tobacco. She reports that she does not drink alcohol and does not use drugs.   Family Hx: family history includes HIV/AIDS in her mother; Heart Attack in her father.   No Known Allergies       OBJECTIVE:   Wt Readings from Last 3 Encounters:   05/12/25 78.5 kg (173 lb)   09/21/23 77.1 kg (170 lb)     I/O last 3 completed shifts:   In: 300 [P.O.:300]   Out: 1650 [Urine:1650]   No intake/output data recorded.     Physical Exam:      BP: (!) 82/46 (!) 82/50 97/65   Pulse: 64 59   Resp:   Temp: 98.2 °F (36.8 °C)   TempSrc: Oral   SpO2: 98%   Weight: 78.5 kg (173 lb)   Height:     Telemetry: normal sinus rhythm     Gen: Well-developed, well-nourished, in no acute distress   Neck: Supple, No JVD, No Carotid Bruit   Resp: No accessory muscle use, Clear

## 2025-05-12 NOTE — PROCEDURES
PROCEDURE NOTE  Date: 5/12/2025   Name: Kyra Hunt  YOB: 1955    Procedures    This Routine EEG was performed in real-time by an EEG technologist.  Electrodes placed according to the 10-20 International System.  Standard sensitivity 7uV/mm and high filter 70 Hz and a TC of 0.16 sec settings were set at initiation of the recording and adjustments, as appropriate, and noted on the recording.  Baseline electrode impedances were at or below 10k Ohms.  Per protocol, this recording data is uploaded/transferred from the EEG equipment to a central storage location in real time.  Duration of this EEG was (23) minutes.  Photic stimulation was not performed, and Hyperventilation was not performed.  Digital analysis is recorded using a software tool that analyzes EEG data to identify seizures called Persyst.

## 2025-05-12 NOTE — PLAN OF CARE
Speech LAnguage Pathology TREATMENT    Patient: Kyra Hunt (70 y.o. female)  Date: 5/12/2025  Primary Diagnosis: Bradycardia [R00.1]  Troponin level elevated [R79.89]  Focal neurological deficit [R29.818]  Stroke-like symptoms [R29.90]       Precautions:  Fall Risk                  ASSESSMENT :  Patient completed SLP treatment upright in chair addressing swallowing and cognitive communication goals.  Patient observed with solid and thin liquids.  Patient with very slow but adequate mastication given edentulous status.  No overt s/s aspiration noted.  Will recommend continuing regular diet + thin liquids.  Follow up recommend x1 for continued tolerance and then sign off if indicated.    Completed further assessment of cognitive communication with the orientation log (O Log) and the Cognitive Log (Cog Log).  Patient scored 17/30 on the O Log and 9/30 on the Cog Log, both below the normal cut off score of 25/30.  Deficits noted with attention and verbal recall as well as organization.  Patient notes that she lives with a roommate but they both keep to themselves.  Her sister manages her bills/meds and she has a 11th grade education level.  However, these deficits appear lower than her baseline functional level.  Will continue to follow.    Patient will benefit from skilled intervention to address the above impairments.     PLAN :  Recommendations and Planned Interventions:  Diet: Regular and thin liquids  --meds as tolerated     Recommend next SLP session: swallow follow up and sign off, cognitive communication tasks    Acute SLP Services: Yes, SLP will continue to follow per plan of care.  Discharge Recommendations: Yes, recommend SLP treatment at next level of care     SUBJECTIVE:   Patient stated, “.”    OBJECTIVE:     Past Medical History:   Diagnosis Date    Anxiety disorder     Hypertension     Psychiatric disorder     schizophrenia    History reviewed. No pertinent surgical history.  Prior Level of

## 2025-05-12 NOTE — PROGRESS NOTES
Hospitalist Progress Note  Kale Kidd MD  Answering service: [phone] OR [phone] from in house phone        Date of Service:  2025  NAME:  Kyra Hunt  :  1955  MRN:  [id number]      Admission Summary:   Kyra Hunt is a 70 y.o. 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  Patient was at his assisted living facility having dinner when he became unresponsive for 15 seconds with drooling, left sided weakness, and aphasia.     In the ED, she was bradycardic to 41.  Other VSS.  Lab showed troponin 171. CT head showed no acute intracranial process. CTA head/neck and CT brain perfusion showed chronic encephalomalacia/infarct at the inferior left cerebellum with mild chronic microvascular ischemia change with no major vessel occlusion or hemodynamically significant stenosis.     In the ED,  she received 1L normal saline.  Code stroke was called with initial NIH  was 2 for disorientation and dysarthria    Interval history / Subjective:   Seen and examined for follow up syncope, left sided weakness. No acute complaints. Looks good today. Discussed that she will probably be able to go home today or tomorrow.      Assessment & Plan:     aphasia  left sided weakness  Possible CVA  Syncope  -admit to nuerotele  -serial neuro check  -permissive htn  -MRI brain wwo is pending   -statin and ASA  -check hgba1c and lipid panel  -npo pending speech eval  -pt/ot/speech  -neurology on board  -TTE ordered and pending   -unclear at this time what the episode was  -MRI brain was negative for acute findings  -EEG read pending     Elevate troponin  -rphx107->195, EKG lateral t wave inversions, pt. asymptomatic  -non cardiac  -denies any pain or dyspnea  -No pain at this time   -Repeat troponin 
 Neurology Progress Note     NAME: Kyra Hunt   :  1955   MRN:  [id number]   DATE:  2025    Assessment and Plan:     69 yo F h/o 2nd degree HB, HTN, anxiety, schizophrenia presenting with episode of unresponsiveness. Patient reports that she \"blanket out\" and then \"spit up\" when she came back to consciousness. Patient reportedly was having dinner when she became unresponsive with drooling for ~15 seconds then family noted L sided weakness and difficulty speaking. Improved in ED per ED notes. NIHSS 2 on arrival for mild disorientation and dysarthria. Reportedly had 2nd degree AV block, pulse 41. CT neuroimaging showed no acute findings. MRI showed CIWM and diffuse atrophy but no acute findings. Patient denies prior strokes. Denies taking ASA, takes statin and riperdal. On exam, patient is mildly disoriented with generalized weakness but otherwise within normal limits. Presentation possibly due to syncope from bradycardia, seizure possible but without specific evidence for this at this time, no evidence for stroke/TIA but given risk factors may be reasonable to continue ASA. Recrudescence could also be considered though patient denies prior similar symptoms. Unclear if patient had true L sided weakness on presentation.    25:  Exam back to baseline today. Cardiology consulted and telemetry reviewed the HR sinus 50's. Metoprolol discontinued and no PPM indicated.     - Continue ASA 81 mg daily  - Continue home statin  - Routine EEG done and read pending     Given improvement to exam and low suspicion for seizure. Ok to discharge prior to read.       Subjective   HPI:  Kyra Hunt is a 70 y.o. female who presents with Focal neurological deficit. Neurology was consulted for episode of unresponsiveness. History obtained per patient and chart review.    Ms. Hunt presents due to episode of unresponsiveness. Patient reports that she \"blanket out\" and then \"spit up\" when she came back to consciousness. 
A Spiritual Care Partner Volunteer visited Kyra Hunt at [facility] in [facility] 6S NEURO-SCI TELE on 5/12/2025     Documented by: Chaplain Renae Goins  
Called about pt with bradycardia and syncope. Reviewed chart and EKG with sinus bradycardia. Spoke with care team and HR is mostly in 50s and sinus and pt is without current symptoms. Full EP consult in AM. Supportive care and telemetry overnight.  
Code Stroke Documentation      Symptoms:  Left sided weakness, left sided droop, unresponsive for about 15 minutes and aphasia.     Baseline mRS:   3   Last Known Well:  05/10/25 1645   Medical hx: Past Medical History:   Diagnosis Date    Anxiety disorder     Hypertension     Psychiatric disorder     schizophrenia       Vitals: Vitals:    05/10/25 1755   BP: 120/64   Pulse: (!) 41   Resp: 18   SpO2: 95%      AC/APT:  None   VAN: Negative   NIHSS: 1a-LOC:0  1b-Month/Age:1  1c-Open/Close Hand:0  2-Best Gaze:0  3-Visual Fields:0  4-Facial Palsy:0  5a-Left Arm:0  5b-Right Arm:0  6a-Left Le  6b-Right Le  7-Limb Ataxia:0  8-Sensory:0  9-Best Language:0  10-Dysarthria:1  11-Extinction/Inattention:0  TOTAL SCORE:2   Imaging (personally reviewed): CT: No acute process  CTA/CTP: No LVO      Plan: tNK Candidate: NO  Mechanical thrombectomy Candidate: NO    *Perform dysphagia screening prior to any PO intake*     Discussed with: Dr. Shaw - ED physician, Dr. 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  Previous Echo shows trileaflet atrial valve and dilated left atrium.    I have spent 45 of critical care time involved in lab review, consultations with specialist, family decision making and documentation. During this entire length of time I was immediately available to the patient.    Sabrina Dumont, APRN - NP  Neurovascular Nurse Practitioner  [phone]    
SLP Contact Note    Consult received and chart reviewed in preparation for evaluation. Patient being worked-up for stroke. CT negative for acute infarct.  NIH 1. Pt passed swallow screen and has been initiated on diet.  Therefore, will await MRI and follow up for evaluation as indicated.    SHANTEL Mckeon.Ed, CCC-SLP (pronouns: she/her/hers)  Speech-Language Pathologist    
throughout the day)     mood disorder  Schizophrenia   -continue risperdal and zoloft     Code status: full  Prophylaxis: SCDs  Care Plan discussed with: patient, nurses  Anticipated Disposition: pending medical clearance, likely home     Principal Problem:    Focal neurological deficit  Resolved Problems:    * No resolved hospital problems. *            Review of Systems:   Pertinent items are noted in HPI.         Vital Signs:    Last 24hrs VS reviewed since prior progress note. Most recent are:  BP (!) 146/92   Pulse 58   Temp 98.1 °F (36.7 °C) (Oral)   Resp 14   Ht 1.575 m (5' 2\")   Wt 77 kg (169 lb 12.1 oz)   SpO2 96%   BMI 31.05 kg/m²        Intake/Output Summary (Last 24 hours) at 5/11/2025 0959  Last data filed at 5/11/2025 0830  Gross per 24 hour   Intake --   Output 650 ml   Net -650 ml        Physical Examination:     I had a face to face encounter with this patient and independently examined them on 5/11/2025 as outlined below:          General : alert x 3, awake, no acute distress,   HEENT: PEERL, EOMI, moist mucus membrane  Neck: supple, no JVD, no meningeal signs  Chest: Clear to auscultation bilaterally   CVS: S1 S2 heard, Capillary refill less than 2 seconds  Abd: soft/ non tender, non distended, BS physiological,   Ext: no clubbing, no cyanosis, no edema, brisk 2+ DP pulses  Neuro/Psych: pleasant mood and affect, CN 2-12 grossly intact, sensory grossly within normal limit, Strength 5/5 in all extremities but LUE mildly weaker that RUE. No other clear focal signs   Skin: warm            Data Review:    Review and/or order of clinical lab test  Review and/or order of tests in the radiology section of CPT  Review and/or order of tests in the medicine section of CPT    I have independently reviewed and interpreted patient's lab and all other diagnostic data    Notes reviewed from all clinical/nonclinical/nursing services involved in patient's clinical care. Care coordination discussions were

## 2025-05-12 NOTE — PROCEDURES
[place] [id number]                                   EEG      PATIENT NAME: MIKEL HANSEN                : 1955  MED REC NO: [id number]                       ROOM: 4  ACCOUNT NO: [id number]                       ADMIT DATE: 05/10/2025  PROVIDER: Fidel Harrington MD    DATE OF SERVICE:  2025    HISTORY:  The patient is a 70-year-old female who is being evaluated after an episode of syncope and altered mentation.    DESCRIPTION:  This is an 18-channel EEG performed on an awake patient.  The dominant posterior background rhythm consists of symmetric, fairly well modulated, medium voltage rhythms in the 8-9 hertz frequency range out of the posterior head region.  Faster frequencies seen in the frontal areas.  Drowsiness and sleep states are not recorded.  Photic stimulation and hyperventilation was not performed.    EEG SUMMARY:  Normal study.    CLINICAL INTERPRETATION:  This is a normal EEG during wakeful state of the patient.  No lateralizing or epileptiform features were noted.  A normal EEG does not rule out seizure disorder.  If this is a strong clinical concern, repeat prolonged and sleep-deprived EEG may provide additional relevant detail.        FIDEL HARRINGTON MD      ASS/AQS  D:  2025 16:22:05  T:  2025 17:09:20  JOB #:  [id number]

## 2025-05-12 NOTE — PLAN OF CARE
Problem: Physical Therapy - Adult  Goal: By Discharge: Performs mobility at highest level of function for planned discharge setting.  See evaluation for individualized goals.  Description: FUNCTIONAL STATUS PRIOR TO ADMISSION: Patient was independent and active without use of DME.    HOME SUPPORT PRIOR TO ADMISSION: Patient lived in an assisted living facility per chart    Physical Therapy Goals  Initiated 5/11/2025  1.  Patient will move from supine to sit and sit to supine, scoot up and down, and roll side to side in bed with supervision/set-up within 7 day(s).    2.  Patient will perform sit to stand with supervision/set-up within 7 day(s).  3.  Patient will transfer from bed to chair and chair to bed with supervision/set-up using the least restrictive device within 7 day(s).  4.  Patient will ambulate with supervision/set-up for 120 feet with the least restrictive device within 7 day(s).     Outcome: Adequate for Discharge       PHYSICAL THERAPY TREATMENT    Patient: Kyra Hunt (70 y.o. female)  Date: 5/12/2025  Diagnosis: Bradycardia [R00.1]  Troponin level elevated [R79.89]  Focal neurological deficit [R29.818]  Stroke-like symptoms [R29.90] Focal neurological deficit      Precautions: Restrictions/Precautions  Restrictions/Precautions: Fall Risk            ASSESSMENT:  Patient continues to benefit from skilled PT services and is progressing towards goals. Patient demonstrated improved tolerance for mobility - vital signs stable throughout. Patient stated that she was at her functional baseline - ambulated without issue without use of device; performed standing balance with supervision while reaching across midline for functional task.          PLAN:  Patient continues to benefit from skilled intervention to address the above impairments.  Continue treatment per established plan of care.    Recommendations for staff mobility and toileting assistance:  Recommend that staff completes patient mobility with

## 2025-05-12 NOTE — PLAN OF CARE
Problem: Skin/Tissue Integrity  Goal: Skin integrity remains intact  Description: 1.  Monitor for areas of redness and/or skin breakdown2.  Assess vascular access sites hourly3.  Every 4-6 hours minimum:  Change oxygen saturation probe site4.  Every 4-6 hours:  If on nasal continuous positive airway pressure, respiratory therapy assess nares and determine need for appliance change or resting period  Outcome: Progressing     Problem: Discharge Planning  Goal: Discharge to home or other facility with appropriate resources  Outcome: Progressing     Problem: Safety - Adult  Goal: Free from fall injury  Outcome: Progressing     Problem: Pain  Goal: Verbalizes/displays adequate comfort level or baseline comfort level  Outcome: Progressing

## 2025-05-12 NOTE — PLAN OF CARE
Problem: Occupational Therapy - Adult  Goal: By Discharge: Performs self-care activities at highest level of function for planned discharge setting.  See evaluation for individualized goals.  Description: FUNCTIONAL STATUS PRIOR TO ADMISSION/HOME SUPPORT:  Patient live alone in senior apartment vs ILF (?), reports she lives with a friend. Reports she is typically INDP with ADL, able to perform functional mobility without AD around complex and to facility's dining room for meals. She reports she enjoys walking and reading in her free time     Occupational Therapy Goals:  Initiated 5/11/2025  1.  Patient will perform upper body dressing with Gooding within 7 day(s).  2.  Patient will perform lower body dressing with Gooding within 7 day(s).  3.  Patient will perform seated bathing with Gooding within 7 day(s).  4.  Patient will perform toilet transfers with Gooding  within 7 day(s).  5.  Patient will perform all aspects of toileting with Gooding within 7 day(s).  6.  Patient will participate in upper extremity therapeutic exercise/activities with Gooding for 5 minutes within 7 day(s).    7.  Patient will utilize energy conservation techniques during functional activities with verbal, visual, and tactile cues within 7 day(s).   Outcome: Progressing   OCCUPATIONAL THERAPY TREATMENT  Patient: Kyra Hunt (70 y.o. female)  Date: 5/12/2025  Primary Diagnosis: Bradycardia [R00.1]  Troponin level elevated [R79.89]  Focal neurological deficit [R29.818]  Stroke-like symptoms [R29.90]       Precautions: Fall Risk                Chart, occupational therapy assessment, plan of care, and goals were reviewed.    ASSESSMENT  Patient continues to benefit from skilled OT services and is progressing towards goals. Patient able to complete standing Adls and grooming tasks without difficulty on this date. She demonstrates improving overall activity tolerance and her vitals remained stable throughout

## 2025-05-12 NOTE — CARE COORDINATION
Care Management Initial Assessment       RUR: 8%  Readmission? No  1st IM letter given? Yes     RAYMUNDO: Pt Admitted from [facility]   [address], [place] [address]  [phone]     - [facility] PT/OT - [facility]    Transport: Roundtrip    CM met with patient at bedside to introduce self and role. Pt lives at [facility]. CM spoke with Олег [phone], manager at group home, they do not provide assist with ADLs and would not be able to accept Pt at  level. Pt would be able to use RW, rollator or cane as needed. However, pt would benefit from rehab per therapy prior to return to adult home.     ADLs: independent  DME: none  PCP follow up: none listed  Previous Home Health: none  Previous Skilled Nursing Facility: none  Previous Inpatient Rehab: none  Insurance verified: [facility] Medicare dual plan/medicaid   Pharmacy:   Emergency Contact: Anel Chowdhury [phone]    CM spoke with pt's sisters Anel and Tina ([phone]), discussed discharge plan and recommendation for rehab, they are in agreement for rehab. Anel will be decision maker, referral sent to [facility].    [facility] has accepted and plans to initiate insurance auth through [facility] Medicare. PM&R consulted.    EEG is pending, if negative Pt may not have dx for IPR. Would need SNF. Pt will require Level 2 determination for SNF.    CM will follow patient progress and assist as needed with RAYMUNDO plan.    2:46pm: PT/OT recommending Pt return to [facility] now with HH. CM spoke with , they can accept Pt back today before 5pm. CM to fax dc summary to [phone].    [facility] has accepted Pt for SOC pending confirmation of Pt PCP. Follow up info placed on AVS.    Pt in need of timely transport back to facility, roundtrip transport scheduled for 4:15pm. Hospital to cover cost $6-10.       05/12/25 1146   Service Assessment   Patient Orientation Alert and

## 2025-05-13 VITALS
RESPIRATION RATE: 20 BRPM | OXYGEN SATURATION: 97 % | BODY MASS INDEX: 31.83 KG/M2 | TEMPERATURE: 98 F | HEIGHT: 62 IN | HEART RATE: 73 BPM | SYSTOLIC BLOOD PRESSURE: 117 MMHG | DIASTOLIC BLOOD PRESSURE: 60 MMHG | WEIGHT: 173 LBS

## 2025-05-15 LAB
EKG ATRIAL RATE: 48 BPM
EKG DIAGNOSIS: NORMAL
EKG P AXIS: 57 DEGREES
EKG P-R INTERVAL: 206 MS
EKG Q-T INTERVAL: 554 MS
EKG QRS DURATION: 80 MS
EKG QTC CALCULATION (BAZETT): 494 MS
EKG R AXIS: 8 DEGREES
EKG T AXIS: 177 DEGREES
EKG VENTRICULAR RATE: 48 BPM